# Patient Record
Sex: FEMALE | Race: WHITE | NOT HISPANIC OR LATINO | Employment: FULL TIME | URBAN - METROPOLITAN AREA
[De-identification: names, ages, dates, MRNs, and addresses within clinical notes are randomized per-mention and may not be internally consistent; named-entity substitution may affect disease eponyms.]

---

## 2017-02-04 ENCOUNTER — OFFICE VISIT (OUTPATIENT)
Dept: INTERNAL MEDICINE | Facility: CLINIC | Age: 27
End: 2017-02-04
Payer: COMMERCIAL

## 2017-02-04 VITALS
DIASTOLIC BLOOD PRESSURE: 78 MMHG | HEART RATE: 91 BPM | HEIGHT: 63 IN | TEMPERATURE: 98 F | WEIGHT: 229.06 LBS | BODY MASS INDEX: 40.59 KG/M2 | OXYGEN SATURATION: 97 % | SYSTOLIC BLOOD PRESSURE: 104 MMHG

## 2017-02-04 DIAGNOSIS — R05.9 COUGH: Primary | ICD-10-CM

## 2017-02-04 PROCEDURE — 99213 OFFICE O/P EST LOW 20 MIN: CPT | Mod: S$GLB,,, | Performed by: INTERNAL MEDICINE

## 2017-02-04 PROCEDURE — 99999 PR PBB SHADOW E&M-EST. PATIENT-LVL III: CPT | Mod: PBBFAC,,, | Performed by: INTERNAL MEDICINE

## 2017-02-04 RX ORDER — PREDNISONE 20 MG/1
20 TABLET ORAL SEE ADMIN INSTRUCTIONS
Qty: 12 TABLET | Refills: 0 | Status: SHIPPED | OUTPATIENT
Start: 2017-02-04 | End: 2017-02-10

## 2017-02-04 RX ORDER — CODEINE PHOSPHATE AND GUAIFENESIN 10; 100 MG/5ML; MG/5ML
5 SOLUTION ORAL 3 TIMES DAILY PRN
Qty: 180 ML | Refills: 0 | Status: SHIPPED | OUTPATIENT
Start: 2017-02-04 | End: 2018-02-20

## 2017-02-04 NOTE — LETTER
February 4, 2017      Sonam Hidalgo   [address] [place] [id number]             [facility]  [address]  [place] [address]  Phone: [phone]  Fax: [phone] Sonam Hidalgo    Was treated here on 02/04/2017    May Return to work/school on 02/06/2017    No Restrictions             Baldomero Booker MD

## 2017-02-04 NOTE — MR AVS SNAPSHOT
[facility]  [address]  [facility] [address]  Phone: [phone]  Fax: [phone]                  Sonam Hidalgo   2017 9:45 AM   Office Visit    Description:  Female : 1990   Provider:  Baldomero Booker MD   Department:  [facility]           Reason for Visit     Cough                To Do List           Goals (5 Years of Data)     None       These Medications        Disp Refills Start End    predniSONE (DELTASONE) 20 MG tablet 12 tablet 0 2017 2/10/2017    Take 1 tablet (20 mg total) by mouth As instructed. - Oral    Pharmacy: [facility]/pharmacy #[id number] - [place] - [address]. Ph #: [phone]       Notes to Pharmacy: Take all doses with food. Take 3 pills by mouth once daily for 2 days. Then take 2 pills by mouth once daily for 2 days. Then take one pill once daily for 2 days then stop. May increase your sugar level.    guaifenesin-codeine 100-10 mg/5 ml (TUSSI-ORGANIDIN NR)  mg/5 mL syrup 180 mL 0 2017     Take 5 mLs by mouth 3 (three) times daily as needed. - Oral    Pharmacy: [facility]/pharmacy #[id number] [facility] [place] - [address]. Ph #: [phone]         [facility] On [facility]     [facility] On Call Nurse Care Line -  Assistance  Registered nurses in the [facility] On Call Center provide clinical advisement, health education, appointment booking, and other advisory services.  Call for this free service at [phone].             Medications           Message regarding Medications     Verify the changes and/or additions to your medication regime listed below are the same as discussed with your clinician today.  If any of these changes or additions are incorrect, please notify your healthcare provider.        START taking these NEW medications        Refills    predniSONE (DELTASONE) 20 MG tablet 0    Sig: Take 1 tablet (20 mg total) by mouth As instructed.    Class: Normal    Route: Oral     "guaifenesin-codeine 100-10 mg/5 ml (TUSSI-ORGANIDIN NR)  mg/5 mL syrup 0    Sig: Take 5 mLs by mouth 3 (three) times daily as needed.    Class: Normal    Route: Oral      STOP taking these medications     albuterol 90 mcg/actuation inhaler Inhale 2 puffs into the lungs every 6 (six) hours as needed for Wheezing.           Verify that the below list of medications is an accurate representation of the medications you are currently taking.  If none reported, the list may be blank. If incorrect, please contact your healthcare provider. Carry this list with you in case of emergency.           Current Medications     guaifenesin-codeine 100-10 mg/5 ml (TUSSI-ORGANIDIN NR)  mg/5 mL syrup Take 5 mLs by mouth 3 (three) times daily as needed.    predniSONE (DELTASONE) 20 MG tablet Take 1 tablet (20 mg total) by mouth As instructed.    PRENATAL VIT W-CA,FE,FA,<1 MG, (PRENATAL VITAMIN ORAL) Take by mouth.    sertraline (ZOLOFT) 50 MG tablet Take 1 tablet (50 mg total) by mouth once daily.           Clinical Reference Information           Your Vitals Were     BP Pulse Temp Height Weight SpO2    104/78 (BP Location: Left arm, Patient Position: Sitting, BP Method: Manual) 91 98.1 °F (36.7 °C) (Oral) 5' 3" (1.6 m) 103.9 kg (229 lb 0.9 oz) 97%    BMI                40.58 kg/m2          Blood Pressure          Most Recent Value    BP  104/78      Allergies as of 2/4/2017     No Known Allergies      Immunizations Administered on Date of Encounter - 2/4/2017     None      Language Assistance Services     ATTENTION: Language assistance services are available, free of charge. Please call [phone].      ATENCIÓN: Si habla español, tiene a hanson disposición servicios gratuitos de asistencia lingüística. Llame al [phone].     CAROL Ý: N?u b?n nói Ti?ng Vi?t, có các d?ch v? h? tr? ngôn ng? mi?n phí dành cho b?n. G?i s? [phone].         [facility] complies with applicable Federal civil rights laws " and does not discriminate on the basis of race, color, national origin, age, disability, or sex.

## 2017-02-04 NOTE — PROGRESS NOTES
Subjective:       Patient ID: Sonam Hidalgo is a 26 y.o. female.    Chief Complaint: Cough ( x 1 week otc robintussin no relief 7/10)    HPI Comments: History of present illness: 26-year-old female complains of cough for about a week.  Dry, persistent throughout the day and night.  At times her chest feels tight from coughing but she hasn't noted an wheezing.  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  She had an albuterol inhaler in her medication profile.  She said she thinks she was given that during an episode of cough a few years ago but doesn't remember specifically wheezing and was never told she had asthma.     Cough   Pertinent negatives include no chest pain, chills, fever, rash, sore throat, shortness of breath or wheezing.     Review of Systems   Constitutional: Negative for appetite change, chills, diaphoresis and fever.   HENT: Positive for congestion. Negative for sore throat.    Respiratory: Positive for cough. Negative for shortness of breath and wheezing.    Cardiovascular: Negative for chest pain and leg swelling.   Gastrointestinal: Negative for abdominal pain, constipation and diarrhea.   Genitourinary: Negative for difficulty urinating.   Musculoskeletal: Negative for neck pain and neck stiffness.   Skin: Negative for rash.       Objective:      Physical Exam   Constitutional: She is oriented to person, place, and time. She appears well-developed and well-nourished. No distress.   OVerweight female, NAD   HENT:   Head: Normocephalic and atraumatic.   Mouth/Throat: Oropharynx is clear and moist. No oropharyngeal exudate.   Eyes: Conjunctivae are normal. Pupils are equal, round, and reactive to light. No scleral icterus.   Neck: Normal range of motion. Neck supple. No thyromegaly present.   Cardiovascular: Normal rate and regular rhythm.    No murmur heard.  Pulmonary/Chest: Effort normal and breath sounds normal. No respiratory distress. She has no wheezes. She has no  rales. She exhibits no tenderness.   Abdominal: Soft. Bowel sounds are normal. She exhibits no distension. There is no tenderness.   Musculoskeletal: She exhibits no tenderness.   Lymphadenopathy:     She has no cervical adenopathy.   Neurological: She is alert and oriented to person, place, and time.   Skin: No rash noted.   Psychiatric: She has a normal mood and affect.       Assessment:       1. Cough        Plan:       Sonam was seen today for cough.    Diagnoses and all orders for this visit:    Cough  Comments:  Viral syndrome versus ALLERGIES.  Certainly reactive airway disease is on the differential but without wheezing on exam I believe much less likely     Other orders  -     predniSONE (DELTASONE) 20 MG tablet; Take 1 tablet (20 mg total) by mouth As instructed.  -     guaifenesin-codeine 100-10 mg/5 ml (TUSSI-ORGANIDIN NR)  mg/5 mL syrup; Take 5 mLs by mouth 3 (three) times daily as needed.        Call if symptoms fail to improve or worsen.  Driving and drowsy precautions discussed.  Note for work with return on Monday given to patient

## 2017-12-27 ENCOUNTER — HOSPITAL ENCOUNTER (EMERGENCY)
Facility: HOSPITAL | Age: 27
Discharge: HOME | End: 2017-12-27
Attending: EMERGENCY MEDICINE
Payer: MEDICAID

## 2017-12-27 VITALS
TEMPERATURE: 98 F | WEIGHT: 220 LBS | RESPIRATION RATE: 16 BRPM | OXYGEN SATURATION: 98 % | HEIGHT: 62 IN | DIASTOLIC BLOOD PRESSURE: 97 MMHG | SYSTOLIC BLOOD PRESSURE: 119 MMHG | HEART RATE: 75 BPM | BODY MASS INDEX: 40.48 KG/M2

## 2017-12-27 DIAGNOSIS — M67.949 DISORDER OF TENDON IN FINGER: ICD-10-CM

## 2017-12-27 DIAGNOSIS — S60.221A CONTUSION OF RIGHT HAND, INITIAL ENCOUNTER: Primary | ICD-10-CM

## 2017-12-27 LAB
B-HCG UR QL: NEGATIVE
CTP QC/QA: YES

## 2017-12-27 PROCEDURE — 81025 URINE PREGNANCY TEST: CPT | Performed by: EMERGENCY MEDICINE

## 2017-12-27 PROCEDURE — 99284 EMERGENCY DEPT VISIT MOD MDM: CPT | Mod: 25

## 2017-12-28 NOTE — ED PROVIDER NOTES
Encounter Date: 12/27/2017       History     Chief Complaint   Patient presents with    Hand Pain     right hand pain after hitting door yesterday, unable to straighten right 4th digit.  Bruising, swelling and pain with movement     Patient is a 27-year-old female who complains of pain to her right hand after punching a door.  This occurred yesterday.  She has increased pain with movement of her right wrist.  Patient also says she is unable to fully extend her right fifth finger.      The history is provided by the patient.     Review of patient's allergies indicates:  No Known Allergies  Past Medical History:   Diagnosis Date    Abnormal Pap smear of vagina     Allergy     Anxiety     Hypertension      Past Surgical History:   Procedure Laterality Date    CHOLECYSTECTOMY      median arcuate ligament        Family History   Problem Relation Age of Onset    Breast cancer Mother     Alcohol abuse Father     Celiac disease Neg Hx     Cirrhosis Neg Hx     Colon cancer Neg Hx     Colon polyps Neg Hx     Crohn's disease Neg Hx     Cystic fibrosis Neg Hx     Esophageal cancer Neg Hx     Hemochromatosis Neg Hx     Inflammatory bowel disease Neg Hx     Irritable bowel syndrome Neg Hx     Liver cancer Neg Hx     Liver disease Neg Hx     Rectal cancer Neg Hx     Stomach cancer Neg Hx     Ulcerative colitis Neg Hx     Jacob's disease Neg Hx     Ovarian cancer Neg Hx      Social History   Substance Use Topics    Smoking status: Never Smoker    Smokeless tobacco: Never Used    Alcohol use Yes     Review of Systems   Musculoskeletal:        Right hand/wrist pain.   Skin: Negative for color change.   Neurological: Negative for numbness.   All other systems reviewed and are negative.      Physical Exam     Initial Vitals [12/27/17 2053]   BP Pulse Resp Temp SpO2   (!) 119/97 75 16 98.2 °F (36.8 °C) 98 %      MAP       104.33         Physical Exam    Nursing note and vitals reviewed.  Constitutional: No  distress.   HENT:   Head: Atraumatic.   Cardiovascular: Normal rate, regular rhythm and normal heart sounds.   Pulmonary/Chest: Breath sounds normal.   Musculoskeletal:   There is tenderness across the dorsum of the right wrist.  No bruising.  There is full range of motion of the right wrist.  Patient is unable to fully extend the right fifth finger.  No sensory deficits.  Right radial pulses 2+.   Neurological: She is alert and oriented to person, place, and time.   Skin: Skin is warm and dry. Capillary refill takes less than 2 seconds.   Psychiatric: Her behavior is normal. Thought content normal.         ED Course   Procedures  Labs Reviewed   POCT URINE PREGNANCY        Imaging Results          X-Ray Hand 3 view Right (Final result)  Result time 12/27/17 21:46:30    Final result by Rehan Gregg MD (12/27/17 21:46:30)                 Impression:        No acute bony abnormality.      Electronically signed by: Rehan Gregg  Date:     12/27/17  Time:    21:46              Narrative:    COMPARISON: None.    FINDINGS: Three views of the right hand.  No acute fracture or dislocation.  Soft tissues are unremarkable.  No radiopaque foreign body.                                 Medical Decision Making:   Clinical Tests:   Radiological Study: Ordered and Reviewed  ED Management:  27-year-old female with a right hand contusion after punching a door.  X-rays show no evidence of fracture dislocation.  My concern is that the patient cannot fully extend her small finger.  Finger has been splinted in full extension.  I will have her follow-up with Dr. Murrell, hand specialist, for further evaluation and treatment.                   ED Course      Clinical Impression:   The primary encounter diagnosis was Contusion of right hand, initial encounter. A diagnosis of Disorder of tendon in finger was also pertinent to this visit.                           Atif Gibbs MD  12/27/17 5591

## 2017-12-28 NOTE — ED NOTES
Pt reports hitting metal door with rt arm/hand last night, pt reports pain to rt wrist, no swelling or deformity noted, denies fever, full active rom with pain noted to rt wrist, no deformity noted, family at bedside, rt hand digits sensation intact cap refill less than 3 seconds

## 2018-02-20 ENCOUNTER — HOSPITAL ENCOUNTER (EMERGENCY)
Facility: HOSPITAL | Age: 28
Discharge: HOME | End: 2018-02-20
Attending: EMERGENCY MEDICINE

## 2018-02-20 VITALS
BODY MASS INDEX: 41.15 KG/M2 | WEIGHT: 225 LBS | OXYGEN SATURATION: 98 % | HEART RATE: 83 BPM | SYSTOLIC BLOOD PRESSURE: 135 MMHG | DIASTOLIC BLOOD PRESSURE: 93 MMHG | TEMPERATURE: 98 F | RESPIRATION RATE: 20 BRPM

## 2018-02-20 DIAGNOSIS — R05.9 COUGH: ICD-10-CM

## 2018-02-20 DIAGNOSIS — I10 HTN (HYPERTENSION): ICD-10-CM

## 2018-02-20 DIAGNOSIS — R07.89 CHEST WALL TENDERNESS: Primary | ICD-10-CM

## 2018-02-20 DIAGNOSIS — J20.8 ACUTE VIRAL BRONCHITIS: ICD-10-CM

## 2018-02-20 LAB
B-HCG UR QL: NEGATIVE
CTP QC/QA: YES

## 2018-02-20 PROCEDURE — 63600175 PHARM REV CODE 636 W HCPCS: Performed by: EMERGENCY MEDICINE

## 2018-02-20 PROCEDURE — 81025 URINE PREGNANCY TEST: CPT | Performed by: EMERGENCY MEDICINE

## 2018-02-20 PROCEDURE — 99284 EMERGENCY DEPT VISIT MOD MDM: CPT | Mod: 25

## 2018-02-20 PROCEDURE — 25000003 PHARM REV CODE 250: Performed by: EMERGENCY MEDICINE

## 2018-02-20 PROCEDURE — 25000242 PHARM REV CODE 250 ALT 637 W/ HCPCS: Performed by: EMERGENCY MEDICINE

## 2018-02-20 PROCEDURE — 94640 AIRWAY INHALATION TREATMENT: CPT

## 2018-02-20 PROCEDURE — 99284 EMERGENCY DEPT VISIT MOD MDM: CPT | Mod: ,,, | Performed by: EMERGENCY MEDICINE

## 2018-02-20 PROCEDURE — 96372 THER/PROPH/DIAG INJ SC/IM: CPT

## 2018-02-20 RX ORDER — IPRATROPIUM BROMIDE AND ALBUTEROL SULFATE 2.5; .5 MG/3ML; MG/3ML
3 SOLUTION RESPIRATORY (INHALATION)
Status: COMPLETED | OUTPATIENT
Start: 2018-02-20 | End: 2018-02-20

## 2018-02-20 RX ORDER — ACETAMINOPHEN 325 MG/1
650 TABLET ORAL
Status: COMPLETED | OUTPATIENT
Start: 2018-02-20 | End: 2018-02-20

## 2018-02-20 RX ORDER — TRIAMCINOLONE ACETONIDE 40 MG/ML
60 INJECTION, SUSPENSION INTRA-ARTICULAR; INTRAMUSCULAR
Status: COMPLETED | OUTPATIENT
Start: 2018-02-20 | End: 2018-02-20

## 2018-02-20 RX ORDER — ALBUTEROL SULFATE 90 UG/1
2 AEROSOL, METERED RESPIRATORY (INHALATION)
Qty: 18 G | Refills: 0 | Status: SHIPPED | OUTPATIENT
Start: 2018-02-20 | End: 2018-11-15

## 2018-02-20 RX ADMIN — ACETAMINOPHEN 650 MG: 325 TABLET ORAL at 10:02

## 2018-02-20 RX ADMIN — TRIAMCINOLONE ACETONIDE 60 MG: 40 INJECTION, SUSPENSION INTRA-ARTICULAR; INTRAMUSCULAR at 11:02

## 2018-02-20 RX ADMIN — IPRATROPIUM BROMIDE AND ALBUTEROL SULFATE 3 ML: .5; 3 SOLUTION RESPIRATORY (INHALATION) at 11:02

## 2018-02-20 NOTE — ED PROVIDER NOTES
Encounter Date: 2/20/2018    SCRIBE #1 NOTE: I, Erika Lujan, am scribing for, and in the presence of,  Dr. Cabello. I have scribed the entire note.       History     Chief Complaint   Patient presents with    URI     x few days.      Time patient was seen by the provider: 10:01 AM      The patient is a 27 y.o. female with co-morbidities including: HTN, who presents to the ED with a complaint of URI symptoms that began 5 days ago. The patient reports she has SOB, sore throat, cough, chest pain and weakness. She denies fever, nausea, and vomiting. Patient is a nonsmoker.           The history is provided by the patient, a parent and medical records.     Review of patient's allergies indicates:  No Known Allergies  Past Medical History:   Diagnosis Date    Abnormal Pap smear of vagina     Allergy     Anxiety     Hypertension      Past Surgical History:   Procedure Laterality Date    CHOLECYSTECTOMY      median arcuate ligament        Family History   Problem Relation Age of Onset    Breast cancer Mother     Alcohol abuse Father     Celiac disease Neg Hx     Cirrhosis Neg Hx     Colon cancer Neg Hx     Colon polyps Neg Hx     Crohn's disease Neg Hx     Cystic fibrosis Neg Hx     Esophageal cancer Neg Hx     Hemochromatosis Neg Hx     Inflammatory bowel disease Neg Hx     Irritable bowel syndrome Neg Hx     Liver cancer Neg Hx     Liver disease Neg Hx     Rectal cancer Neg Hx     Stomach cancer Neg Hx     Ulcerative colitis Neg Hx     Jacob's disease Neg Hx     Ovarian cancer Neg Hx      Social History   Substance Use Topics    Smoking status: Never Smoker    Smokeless tobacco: Never Used    Alcohol use Yes      Comment: occas, none recently     Review of Systems   Constitutional: Negative for fever.   HENT: Positive for sore throat.    Respiratory: Positive for cough and shortness of breath.    Cardiovascular: Positive for chest pain.   Gastrointestinal: Negative for nausea and vomiting.    Genitourinary: Negative for dysuria.   Musculoskeletal: Negative for back pain.   Skin: Negative for rash.   Neurological: Positive for weakness.   Hematological: Does not bruise/bleed easily.       Physical Exam     Initial Vitals [02/20/18 0952]   BP Pulse Resp Temp SpO2   (!) 135/93 83 20 98.3 °F (36.8 °C) 98 %      MAP       107         Physical Exam    Vitals reviewed.  Constitutional: She appears well-developed.   27 y.o.  female in mild discomfort noted.     HENT:   Head: Normocephalic and atraumatic.   Mouth/Throat: No oropharyngeal exudate.   Mildly erythematous throat no exudates or tonsillar enlargement.     Eyes: EOM are normal. Pupils are equal, round, and reactive to light.   Neck: No tracheal deviation present. No JVD present.   Cardiovascular: Normal rate, regular rhythm, normal heart sounds and intact distal pulses.   Pulmonary/Chest: Breath sounds normal. No stridor. No respiratory distress.   Intermittent cough. There is mid lower sternal tenderness noted.      Abdominal: Soft. She exhibits no distension. There is no tenderness.   Musculoskeletal: Normal range of motion. She exhibits no edema.   Moving all 4 extremities. No peripheral edema.   Psychiatric: Her behavior is normal. Thought content normal.         ED Course   Procedures  Labs Reviewed   POCT URINE PREGNANCY        Imaging Results          X-Ray Chest PA And Lateral (Final result)  Result time 02/20/18 11:01:04    Final result by Sridhar Vazquez MD (02/20/18 11:01:04)                 Impression:     No active cardiopulmonary disease and no significant interval change      Electronically signed by: Dr. SRIDHAR VAZQUEZ MD  Date:     02/20/18  Time:    11:01              Narrative:    Comparison: 07/02/2011    Results: 2 views. Heart size and pulmonary vascularity are within normal limits. No hilar or mediastinal enlargement. Lungs are well-expanded and clear. No pleural fluid or pneumothorax. Surgical clips in the right upper quadrant  of the abdomen.                                 Medical Decision Making:   History:   Old Medical Records: I decided to obtain old medical records.  Differential Diagnosis:   Viral syndrome, bronchitis, PNA.   Clinical Tests:   Lab Tests: Ordered and Reviewed  Radiological Study: Ordered and Reviewed            Scribe Attestation:   Scribe #1: I performed the above scribed service and the documentation accurately describes the services I performed. I attest to the accuracy of the note.    Attending Attestation:             Attending ED Notes:   Chest x-ray does not reveal evidence of acute injury or consolidation.  The patient describes improvement of her presenting complaints with emergency department therapy.  A parenteral dose of Kenalog 60 mg IM has been administered, and the patient will be prescribed a metered dose inhaler to be used every 4 hours while awake for the next 5 days.  I have recommended that she follow-up with her primary care physician in 3 days, and return to the emergency department as needed for worsening of current condition, intractable vomiting, worsening shortness of breath, or other urgent concerns.             Clinical Impression:   The primary encounter diagnosis was Chest wall tenderness. A diagnosis of Cough was also pertinent to this visit.    Disposition:   Disposition: Discharged  Condition: Stable                        Parviz Cabello MD  02/20/18 1127

## 2018-02-20 NOTE — ED NOTES
Patient identifiers verified and correct for Ms Hidalgo  C/C: Cough SOB  APPEARANCE: awake and alert in NAD.  SKIN: warm, dry and intact. No breakdown or bruising.  MUSCULOSKELETAL: Patient moving all extremities spontaneously, no obvious swelling or deformities noted. Ambulates independently.  RESPIRATORY: Denies shortness of breath.Respirations unlabored.   CARDIAC: Denies CP, 2+ distal pulses; no peripheral edema  ABDOMEN: S/ND/NT, Denies nausea  : voids spontaneously, denies difficulty  Neurologic: AAO x 4; follows commands equal strength in all extremities; denies numbness/tingling. Denies dizziness Denies weakness

## 2018-05-23 ENCOUNTER — PATIENT MESSAGE (OUTPATIENT)
Dept: OBSTETRICS AND GYNECOLOGY | Facility: CLINIC | Age: 28
End: 2018-05-23

## 2018-06-20 ENCOUNTER — PATIENT MESSAGE (OUTPATIENT)
Dept: OBSTETRICS AND GYNECOLOGY | Facility: CLINIC | Age: 28
End: 2018-06-20

## 2018-07-02 ENCOUNTER — OFFICE VISIT (OUTPATIENT)
Dept: URGENT CARE | Facility: CLINIC | Age: 28
End: 2018-07-02
Payer: COMMERCIAL

## 2018-07-02 VITALS
WEIGHT: 230 LBS | RESPIRATION RATE: 18 BRPM | OXYGEN SATURATION: 99 % | HEIGHT: 63 IN | HEART RATE: 63 BPM | TEMPERATURE: 98 F | BODY MASS INDEX: 40.75 KG/M2 | DIASTOLIC BLOOD PRESSURE: 88 MMHG | SYSTOLIC BLOOD PRESSURE: 124 MMHG

## 2018-07-02 DIAGNOSIS — J02.9 SORE THROAT: ICD-10-CM

## 2018-07-02 DIAGNOSIS — J02.9 PHARYNGITIS, UNSPECIFIED ETIOLOGY: Primary | ICD-10-CM

## 2018-07-02 LAB
CTP QC/QA: YES
S PYO RRNA THROAT QL PROBE: NEGATIVE

## 2018-07-02 PROCEDURE — 96372 THER/PROPH/DIAG INJ SC/IM: CPT | Mod: S$GLB,,, | Performed by: FAMILY MEDICINE

## 2018-07-02 PROCEDURE — 99214 OFFICE O/P EST MOD 30 MIN: CPT | Mod: 25,S$GLB,, | Performed by: FAMILY MEDICINE

## 2018-07-02 PROCEDURE — 3008F BODY MASS INDEX DOCD: CPT | Mod: CPTII,S$GLB,, | Performed by: FAMILY MEDICINE

## 2018-07-02 PROCEDURE — 87880 STREP A ASSAY W/OPTIC: CPT | Mod: QW,S$GLB,, | Performed by: FAMILY MEDICINE

## 2018-07-02 RX ORDER — AZITHROMYCIN 250 MG/1
TABLET, FILM COATED ORAL
Qty: 6 TABLET | Refills: 0 | Status: SHIPPED | OUTPATIENT
Start: 2018-07-02 | End: 2018-10-16

## 2018-07-02 RX ORDER — BETAMETHASONE SODIUM PHOSPHATE AND BETAMETHASONE ACETATE 3; 3 MG/ML; MG/ML
9 INJECTION, SUSPENSION INTRA-ARTICULAR; INTRALESIONAL; INTRAMUSCULAR; SOFT TISSUE
Status: COMPLETED | OUTPATIENT
Start: 2018-07-02 | End: 2018-07-02

## 2018-07-02 RX ADMIN — BETAMETHASONE SODIUM PHOSPHATE AND BETAMETHASONE ACETATE 9 MG: 3; 3 INJECTION, SUSPENSION INTRA-ARTICULAR; INTRALESIONAL; INTRAMUSCULAR; SOFT TISSUE at 05:07

## 2018-07-02 NOTE — PROGRESS NOTES
"Subjective:       Patient ID: Sonam Hidalgo is a 28 y.o. female.    Vitals:  height is 5' 3" (1.6 m) and weight is 104.3 kg (230 lb). Her temperature is 98.2 °F (36.8 °C). Her blood pressure is 124/88 and her pulse is 63. Her respiration is 18 and oxygen saturation is 99%.     Chief Complaint: Sore Throat    Sore Throat    This is a new problem. The current episode started today. The problem has been gradually worsening. The pain is worse on the left side. There has been no fever. The pain is at a severity of 5/10. The pain is moderate. Associated symptoms include swollen glands and trouble swallowing. Pertinent negatives include no abdominal pain, congestion, coughing, ear pain, headaches, hoarse voice or shortness of breath. She has tried nothing for the symptoms.     Review of Systems   Constitution: Positive for malaise/fatigue. Negative for chills and fever.   HENT: Positive for sore throat and trouble swallowing. Negative for congestion, ear pain and hoarse voice.    Eyes: Negative for discharge and redness.   Cardiovascular: Negative for chest pain, dyspnea on exertion and leg swelling.   Respiratory: Negative for cough, shortness of breath, sputum production and wheezing.    Musculoskeletal: Negative for myalgias.   Gastrointestinal: Negative for abdominal pain and nausea.   Neurological: Negative for headaches.       Objective:      Physical Exam   Constitutional: She is oriented to person, place, and time. She appears well-developed and well-nourished.   HENT:   Head: Normocephalic and atraumatic.   Right Ear: External ear normal.   Left Ear: External ear normal.   Erythematous pharynx, no exudate, no lesion, uvula midline.   Eyes: EOM are normal. Pupils are equal, round, and reactive to light.   Neck: Normal range of motion. Neck supple. No JVD present. No tracheal deviation present. No thyromegaly present.   Cardiovascular: Normal rate, regular rhythm and normal heart sounds.  Exam reveals no " gallop and no friction rub.    No murmur heard.  Pulmonary/Chest: Breath sounds normal. No respiratory distress. She has no wheezes. She has no rales. She exhibits no tenderness.   Abdominal: Soft. Bowel sounds are normal. She exhibits no distension and no mass. There is no tenderness. There is no rebound and no guarding. No hernia.   Musculoskeletal: Normal range of motion. She exhibits no edema, tenderness or deformity.   Lymphadenopathy:     She has no cervical adenopathy.   Neurological: She is alert and oriented to person, place, and time. She displays normal reflexes. No cranial nerve deficit. She exhibits normal muscle tone. Coordination normal.   Skin: Skin is warm. Capillary refill takes less than 2 seconds. No rash noted. No erythema. No pallor.   Psychiatric: She has a normal mood and affect. Her behavior is normal. Judgment and thought content normal.   Vitals reviewed.      Assessment:       1. Pharyngitis, unspecified etiology    2. Sore throat        Plan:         Pharyngitis, unspecified etiology  -     betamethasone acetate-betamethasone sodium phosphate injection 9 mg; Inject 1.5 mLs (9 mg total) into the muscle one time.  -     azithromycin (Z-CONOR) 250 MG tablet; Take 2 tablets by mouth x 1 for day 1 Then take 1 tablet by mouth daily for day 2 - 5  Dispense: 6 tablet; Refill: 0    Sore throat  -     POCT rapid strep A      Follow up with your doctor in a few days as needed.  Return to the urgent care or go to the ER if symptoms get worse.    Kosta Aviles MD

## 2018-07-02 NOTE — PATIENT INSTRUCTIONS
When You Have a Sore Throat    A sore throat can be painful. There are many reasons why you may have a sore throat. Your healthcare provider will work with you to find the cause of your sore throat. He or she will also find the best treatment for you.  What causes a sore throat?  Sore throats can be caused or worsened by:  · Cold or flu viruses  · Bacteria  · Irritants such as tobacco smoke or air pollution  · Acid reflux  A healthy throat  The tonsils are on the sides of the throat near the base of the tongue. They collect viruses and bacteria and help fight infection. The throat (pharynx) is the passage for air. Mucus from the nasal cavity also moves down the passage.  An inflamed throat  The tonsils and pharynx can become inflamed due to a cold or flu virus. Postnasal drip (excess mucus draining from the nasal cavity) can irritate the throat. It can also make the throat or tonsils more likely to be infected by bacteria. Severe, untreated tonsillitis in children or adults can cause a pocket of pus (abscess) to form near the tonsil.  Your evaluation  A medical evaluation can help find the cause of your sore throat. It can also help your healthcare provider choose the best treatment for you. The evaluation may include a health history, physical exam, and diagnostic tests.  Health history  Your healthcare provider may ask you the following:  · How long has the sore throat lasted and how have you been treating it?  · Do you have any other symptoms, such as body aches, fever, or cough?  · Does your sore throat recur? If so, how often? How many days of school or work have you missed because of a sore throat?  · Do you have trouble eating or swallowing?  · Have you been told that you snore or have other sleep problems?  · Do you have bad breath?  · Do you cough up bad-tasting mucus?  Physical exam  During the exam, your healthcare provider checks your ears, nose, and throat for problems. He or she also checks for  "swelling in the neck, and may listen to your chest.  Possible tests  Other tests your healthcare provider may perform include:  · A throat swab to check for bacteria such as streptococcus (the bacteria that causes strep throat)  · A blood test to check for mononucleosis (a viral infection)  · A chest X-ray to rule out pneumonia, especially if you have a cough  Treating a sore throat  Treatment depends on many factors. What is the likely cause? Is the problem recent? Does it keep coming back? In many cases, the best thing to do is to treat the symptoms, rest, and let the problem heal itself. Antibiotics may help clear up some bacterial infections. For cases of severe or recurring tonsillitis, the tonsils may need to be removed.  Relieving your symptoms  · Dont smoke, and avoid secondhand smoke.  · For children, try throat sprays or Popsicles. Adults and older children may try lozenges.  · Drink warm liquids to soothe the throat and help thin mucus. Avoid alcohol, spicy foods, and acidic drinks such as orange juice. These can irritate the throat.  · Gargle with warm saltwater (1 teaspoon of salt to 8 ounces of warm water).  · Use a humidifier to keep air moist and relieve throat dryness.  · Try over-the-counter pain relievers such as acetaminophen or ibuprofen. Use as directed, and dont exceed the recommended dose. Dont give aspirin to children.   Are antibiotics needed?  If your sore throat is due to a bacterial infection, antibiotics may speed healing and prevent complications. Although group A streptococcus ("strep throat" or GAS) is the major treatable infection for a sore throat, GAS causes only 5% to 15% of sore throats in adults who seek medical care. Most sore throats are caused by cold or flu viruses. And antibiotics dont treat viral illness. In fact, using antibiotics when theyre not needed may produce bacteria that are harder to kill. Your healthcare provider will prescribe antibiotics only if he or " she thinks they are likely to help.  If antibiotics are prescribed  Take the medicine exactly as directed. Be sure to finish your prescription even if youre feeling better. And be sure to ask your healthcare provider or pharmacist what side effects are common and what to do about them.  Is surgery needed?  In some cases, tonsils need to be removed. This is often done as outpatient (same-day) surgery. Your healthcare provider may advise removing the tonsils in cases of:  · Several severe bouts of tonsillitis in a year. Severe episodes include those that lead to missed days of school or work, or that need to be treated with antibiotics.  · Tonsillitis that causes breathing problems during sleep  · Tonsillitis caused by food particles collecting in pouches in the tonsils (cryptic tonsillitis)  Call your healthcare provider if any of the following occur:  · Symptoms worsen, or new symptoms develop.  · Swollen tonsils make breathing difficult.  · The pain is severe enough to keep you from drinking liquids.  · A skin rash, hives, or wheezing develops. Any of these could signal an allergic reaction to antibiotics.  · Symptoms dont improve within a week.  · Symptoms dont improve within 2 to 3 days of starting antibiotics.   Date Last Reviewed: 10/1/2016  © 9133-9722 [employer]. [address] [place] [address], [place] [address]. All rights reserved. This information is not intended as a substitute for professional medical care. Always follow your healthcare professional's instructions.      Follow up with your doctor in a few days as needed.  Return to the urgent care or go to the ER if symptoms get worse.    Kosta Aviles MD

## 2018-07-13 ENCOUNTER — TELEPHONE (OUTPATIENT)
Dept: OBSTETRICS AND GYNECOLOGY | Facility: CLINIC | Age: 28
End: 2018-07-13

## 2018-07-13 ENCOUNTER — OFFICE VISIT (OUTPATIENT)
Dept: OBSTETRICS AND GYNECOLOGY | Facility: CLINIC | Age: 28
End: 2018-07-13
Payer: COMMERCIAL

## 2018-07-13 ENCOUNTER — LAB VISIT (OUTPATIENT)
Dept: LAB | Facility: HOSPITAL | Age: 28
End: 2018-07-13
Attending: OBSTETRICS & GYNECOLOGY
Payer: COMMERCIAL

## 2018-07-13 VITALS
HEIGHT: 63 IN | HEART RATE: 73 BPM | BODY MASS INDEX: 42.35 KG/M2 | DIASTOLIC BLOOD PRESSURE: 75 MMHG | WEIGHT: 239 LBS | SYSTOLIC BLOOD PRESSURE: 121 MMHG

## 2018-07-13 DIAGNOSIS — Z01.419 ENCOUNTER FOR GYNECOLOGICAL EXAMINATION WITHOUT ABNORMAL FINDING: ICD-10-CM

## 2018-07-13 DIAGNOSIS — N92.6 IRREGULAR BLEEDING: ICD-10-CM

## 2018-07-13 DIAGNOSIS — I10 HYPERTENSION, UNSPECIFIED TYPE: ICD-10-CM

## 2018-07-13 DIAGNOSIS — N92.6 IRREGULAR BLEEDING: Primary | ICD-10-CM

## 2018-07-13 LAB
BASOPHILS # BLD AUTO: 0.07 K/UL
BASOPHILS NFR BLD: 0.7 %
DIFFERENTIAL METHOD: ABNORMAL
EOSINOPHIL # BLD AUTO: 0.1 K/UL
EOSINOPHIL NFR BLD: 1.3 %
ERYTHROCYTE [DISTWIDTH] IN BLOOD BY AUTOMATED COUNT: 13.2 %
HCT VFR BLD AUTO: 40.5 %
HGB BLD-MCNC: 12.9 G/DL
LYMPHOCYTES # BLD AUTO: 2.7 K/UL
LYMPHOCYTES NFR BLD: 28.4 %
MCH RBC QN AUTO: 27.6 PG
MCHC RBC AUTO-ENTMCNC: 31.9 G/DL
MCV RBC AUTO: 87 FL
MONOCYTES # BLD AUTO: 0.8 K/UL
MONOCYTES NFR BLD: 8.8 %
NEUTROPHILS # BLD AUTO: 5.7 K/UL
NEUTROPHILS NFR BLD: 60.3 %
NRBC BLD-RTO: 0 /100 WBC
PLATELET # BLD AUTO: 354 K/UL
PMV BLD AUTO: 9.8 FL
RBC # BLD AUTO: 4.67 M/UL
TSH SERPL DL<=0.005 MIU/L-ACNC: 1.25 UIU/ML
WBC # BLD AUTO: 9.48 K/UL

## 2018-07-13 PROCEDURE — 99999 PR PBB SHADOW E&M-EST. PATIENT-LVL III: CPT | Mod: PBBFAC,,, | Performed by: OBSTETRICS & GYNECOLOGY

## 2018-07-13 PROCEDURE — 99214 OFFICE O/P EST MOD 30 MIN: CPT | Mod: 25,S$GLB,, | Performed by: OBSTETRICS & GYNECOLOGY

## 2018-07-13 PROCEDURE — 36415 COLL VENOUS BLD VENIPUNCTURE: CPT

## 2018-07-13 PROCEDURE — 88175 CYTOPATH C/V AUTO FLUID REDO: CPT

## 2018-07-13 PROCEDURE — 3008F BODY MASS INDEX DOCD: CPT | Mod: CPTII,S$GLB,, | Performed by: OBSTETRICS & GYNECOLOGY

## 2018-07-13 PROCEDURE — 87491 CHLMYD TRACH DNA AMP PROBE: CPT

## 2018-07-13 PROCEDURE — 85025 COMPLETE CBC W/AUTO DIFF WBC: CPT

## 2018-07-13 PROCEDURE — 58301 REMOVE INTRAUTERINE DEVICE: CPT | Mod: S$GLB,,, | Performed by: OBSTETRICS & GYNECOLOGY

## 2018-07-13 PROCEDURE — 84443 ASSAY THYROID STIM HORMONE: CPT

## 2018-07-13 NOTE — PROGRESS NOTES
"CC: irregular bleeding  MIRENA IUD in place    Sonam Hidalgo is a 28 y.o. female  presents for Irregular bleeding.    She bled since April.  SHe stopped a few days ago.     Past Medical History:   Diagnosis Date    Abnormal Pap smear of vagina     Allergy     Anxiety     Hypertension        Past Surgical History:   Procedure Laterality Date    CHOLECYSTECTOMY      median arcuate ligament          OB History    Para Term  AB Living   2 1 1 0 1 1   SAB TAB Ectopic Multiple Live Births   0 1 0 0 1      # Outcome Date GA Lbr Angelito/2nd Weight Sex Delivery Anes PTL Lv   2 Term 11/30/15 38w3d  2.81 kg (6 lb 3.1 oz) M Vag-Spont EPI N ZENA      Birth Comments: NICU x 4 days.    Needed HFNC and CPAP but no intubation.  Abx for 48 hour rule out, blood cx negative.   Hepatitis B given on 12/3/15   1 TAB                   Family History   Problem Relation Age of Onset    Breast cancer Mother     Alcohol abuse Father     Celiac disease Neg Hx     Cirrhosis Neg Hx     Colon cancer Neg Hx     Colon polyps Neg Hx     Crohn's disease Neg Hx     Cystic fibrosis Neg Hx     Esophageal cancer Neg Hx     Hemochromatosis Neg Hx     Inflammatory bowel disease Neg Hx     Irritable bowel syndrome Neg Hx     Liver cancer Neg Hx     Liver disease Neg Hx     Rectal cancer Neg Hx     Stomach cancer Neg Hx     Ulcerative colitis Neg Hx     Jacob's disease Neg Hx     Ovarian cancer Neg Hx        Social History   Substance Use Topics    Smoking status: Never Smoker    Smokeless tobacco: Never Used    Alcohol use Yes      Comment: occas, none recently       /75 (BP Location: Left arm)   Pulse 73   Ht 5' 3" (1.6 m)   Wt 108.4 kg (238 lb 15.7 oz)   LMP 2018 (Approximate) Comment: continued until 18 - Mirena  BMI 42.33 kg/m²     ROS:  GENERAL: Denies weight gain or weight loss. Feeling well overall.   SKIN: Denies rash or lesions.   HEAD: Denies head injury or headache.   NODES: " Denies enlarged lymph nodes.   CHEST: Denies chest pain or shortness of breath.   CARDIOVASCULAR: Denies palpitations or left sided chest pain.   ABDOMEN: No abdominal pain, constipation, diarrhea, nausea, vomiting or rectal bleeding.   URINARY: No frequency, dysuria, hematuria, or burning on urination.  REPRODUCTIVE: See HPI.   BREASTS: The patient performs breast self-examination and denies pain, lumps, or nipple discharge.   HEMATOLOGIC: No easy bruisability or excessive bleeding.  MUSCULOSKELETAL: Denies joint pain or swelling.   NEUROLOGIC: Denies syncope or weakness.   PSYCHIATRIC: Denies depression, anxiety or mood swings.    Physical Exam:    APPEARANCE: Well nourished, well developed, in no acute distress.  AFFECT: WNL, alert and oriented x 3  SKIN: No acne or hirsutism  NECK: Neck symmetric without masses or thyromegaly  NODES: No inguinal, cervical, axillary, or femoral lymph node enlargement  CHEST: Good respiratory effect  ABDOMEN: Soft.  No tenderness or masses.  No hepatosplenomegaly.  No hernias.  PELVIC: Normal external genitalia without lesions.  Normal hair distribution.  Adequate perineal body, normal urethral meatus.  Vagina moist and well rugated without lesions or discharge.  Cervix pink, IUD string at the os,  RING FORCEPS used to remove the IUD, without lesions, discharge or tenderness.  No significant cystocele or rectocele.  Bimanual exam shows uterus to be normal size, regular, mobile and nontender.  Adnexa without masses or tenderness.    EXTREMITIES: No edema.      ASSESSMENT AND PLAN  1. Irregular bleeding  US Pelvis Comp with Transvag NON-OB (xpd    C. trachomatis/N. gonorrhoeae by AMP DNA Cervix    Removal of Intrauterine Device-Today    TSH    CBC auto differential   2. Hypertension, unspecified type     3. Encounter for gynecological examination without abnormal finding  Liquid-based pap smear, screening       Work on exercise, diet and health maintenance  Monitor bleeding pattern  once the MIRENA IUD was out    Patient was counseled today on A.C.S. Pap guidelines and recommendations for yearly pelvic exams, mammograms and monthly self breast exams; to see her PCP for other health maintenance.     Follow-up in about 4 weeks (around 8/10/2018), or if symptoms worsen or fail to improve.

## 2018-07-14 LAB
C TRACH DNA SPEC QL NAA+PROBE: NOT DETECTED
N GONORRHOEA DNA SPEC QL NAA+PROBE: NOT DETECTED

## 2018-07-20 ENCOUNTER — HOSPITAL ENCOUNTER (OUTPATIENT)
Dept: RADIOLOGY | Facility: HOSPITAL | Age: 28
Discharge: HOME | End: 2018-07-20
Attending: OBSTETRICS & GYNECOLOGY
Payer: COMMERCIAL

## 2018-07-20 DIAGNOSIS — N92.6 IRREGULAR BLEEDING: ICD-10-CM

## 2018-07-20 PROCEDURE — 76830 TRANSVAGINAL US NON-OB: CPT | Mod: 26,,, | Performed by: RADIOLOGY

## 2018-07-20 PROCEDURE — 76856 US EXAM PELVIC COMPLETE: CPT | Mod: TC

## 2018-07-20 PROCEDURE — 76856 US EXAM PELVIC COMPLETE: CPT | Mod: 26,,, | Performed by: RADIOLOGY

## 2018-07-20 PROCEDURE — 76830 TRANSVAGINAL US NON-OB: CPT | Mod: TC

## 2018-10-16 ENCOUNTER — NURSE TRIAGE (OUTPATIENT)
Dept: ADMINISTRATIVE | Facility: CLINIC | Age: 28
End: 2018-10-16

## 2018-10-16 ENCOUNTER — OFFICE VISIT (OUTPATIENT)
Dept: INTERNAL MEDICINE | Facility: CLINIC | Age: 28
End: 2018-10-16
Payer: COMMERCIAL

## 2018-10-16 VITALS
BODY MASS INDEX: 39.51 KG/M2 | TEMPERATURE: 99 F | WEIGHT: 223 LBS | HEIGHT: 63 IN | HEART RATE: 86 BPM | DIASTOLIC BLOOD PRESSURE: 76 MMHG | SYSTOLIC BLOOD PRESSURE: 116 MMHG | OXYGEN SATURATION: 98 %

## 2018-10-16 DIAGNOSIS — Z32.00 ENCOUNTER FOR PREGNANCY TEST, RESULT UNKNOWN: ICD-10-CM

## 2018-10-16 DIAGNOSIS — R68.89 FLU-LIKE SYMPTOMS: Primary | ICD-10-CM

## 2018-10-16 LAB
B-HCG UR QL: NEGATIVE
CTP QC/QA: YES
FLUAV AG SPEC QL IA: NEGATIVE
FLUBV AG SPEC QL IA: NEGATIVE
SPECIMEN SOURCE: NORMAL

## 2018-10-16 PROCEDURE — 87400 INFLUENZA A/B EACH AG IA: CPT | Mod: 59

## 2018-10-16 PROCEDURE — 99214 OFFICE O/P EST MOD 30 MIN: CPT | Mod: S$GLB,,, | Performed by: INTERNAL MEDICINE

## 2018-10-16 PROCEDURE — 99999 PR PBB SHADOW E&M-EST. PATIENT-LVL III: CPT | Mod: PBBFAC,,, | Performed by: INTERNAL MEDICINE

## 2018-10-16 PROCEDURE — 3008F BODY MASS INDEX DOCD: CPT | Mod: CPTII,S$GLB,, | Performed by: INTERNAL MEDICINE

## 2018-10-16 PROCEDURE — 81025 URINE PREGNANCY TEST: CPT | Mod: S$GLB,,, | Performed by: INTERNAL MEDICINE

## 2018-10-16 NOTE — PROGRESS NOTES
"Subjective:       Patient ID: Sonam Hidalgo is a 28 y.o. female.    Chief Complaint: Chest Pain (pt complains of mid chest pain, started this morning. ) and Generalized Body Aches (started on last night, chills and body aches (lower/upper extremities).)    HPI   27 yo F here for urgent eval of chills, body aches, mid chest pain since yesterday.   Tried to eat but poor appetite.   Son has sinus infection.  No known flu exposure.  No significant mucus production. No significant coughing.     LMP 9/20/18.    Review of Systems   Constitutional: Positive for chills and fever.   HENT: Positive for congestion and sore throat. Negative for sinus pressure.    Respiratory: Negative for cough and shortness of breath.    Cardiovascular: Positive for chest pain.   Musculoskeletal: Positive for myalgias.       Objective:   /76 (BP Location: Right arm, Patient Position: Sitting, BP Method: Large (Manual))   Pulse 86   Temp 99.3 °F (37.4 °C) (Oral)   Ht 5' 3" (1.6 m)   Wt 101.2 kg (223 lb)   SpO2 98%   BMI 39.50 kg/m²      Physical Exam   Constitutional: She is oriented to person, place, and time. She appears well-developed and well-nourished. No distress.   Ill but non toxic   HENT:   Head: Normocephalic and atraumatic.   Mouth/Throat: No oropharyngeal exudate.   Mildly erythematous throat with enlarged tonsils but no exudates   Neck: No tracheal deviation present. No thyromegaly present.   Cardiovascular: Normal rate.   Pulmonary/Chest: Effort normal. No stridor. No respiratory distress. She has no wheezes.   Lymphadenopathy:     She has cervical adenopathy.   Neurological: She is alert and oriented to person, place, and time.   Skin: Skin is warm and dry. She is not diaphoretic.   Psychiatric: She has a normal mood and affect. Her behavior is normal.       Assessment:       1. Flu-like symptoms    2. Encounter for pregnancy test, result unknown        Plan:       Sonam was seen today for chest pain and " generalized body aches.    Diagnoses and all orders for this visit:    Flu-like symptoms  Supportive care, rest, fluids  Check flu swab - tamiflu if positive    Encounter for pregnancy test, result unknown  -     POCT Urine Pregnancy is negative

## 2018-10-18 ENCOUNTER — PATIENT MESSAGE (OUTPATIENT)
Dept: INTERNAL MEDICINE | Facility: CLINIC | Age: 28
End: 2018-10-18

## 2018-11-15 ENCOUNTER — OFFICE VISIT (OUTPATIENT)
Dept: OBSTETRICS AND GYNECOLOGY | Facility: CLINIC | Age: 28
End: 2018-11-15
Payer: COMMERCIAL

## 2018-11-15 VITALS
DIASTOLIC BLOOD PRESSURE: 80 MMHG | BODY MASS INDEX: 40.2 KG/M2 | SYSTOLIC BLOOD PRESSURE: 122 MMHG | HEIGHT: 63 IN | HEIGHT: 63 IN | WEIGHT: 226.88 LBS | WEIGHT: 239.63 LBS | DIASTOLIC BLOOD PRESSURE: 80 MMHG | BODY MASS INDEX: 42.46 KG/M2 | SYSTOLIC BLOOD PRESSURE: 122 MMHG

## 2018-11-15 DIAGNOSIS — N89.8 VAGINAL DISCHARGE: ICD-10-CM

## 2018-11-15 DIAGNOSIS — Z87.59 HISTORY OF GESTATIONAL HYPERTENSION: ICD-10-CM

## 2018-11-15 DIAGNOSIS — Z32.01 POSITIVE PREGNANCY TEST: Primary | ICD-10-CM

## 2018-11-15 LAB
B-HCG UR QL: POSITIVE
CANDIDA RRNA VAG QL PROBE: NEGATIVE
CTP QC/QA: YES
G VAGINALIS RRNA GENITAL QL PROBE: POSITIVE
T VAGINALIS RRNA GENITAL QL PROBE: NEGATIVE

## 2018-11-15 PROCEDURE — 99999 PR PBB SHADOW E&M-EST. PATIENT-LVL III: CPT | Mod: PBBFAC,,, | Performed by: OBSTETRICS & GYNECOLOGY

## 2018-11-15 PROCEDURE — 87491 CHLMYD TRACH DNA AMP PROBE: CPT

## 2018-11-15 PROCEDURE — 3008F BODY MASS INDEX DOCD: CPT | Mod: CPTII,S$GLB,, | Performed by: OBSTETRICS & GYNECOLOGY

## 2018-11-15 PROCEDURE — 0502F SUBSEQUENT PRENATAL CARE: CPT | Mod: S$GLB,,, | Performed by: OBSTETRICS & GYNECOLOGY

## 2018-11-15 PROCEDURE — 87660 TRICHOMONAS VAGIN DIR PROBE: CPT

## 2018-11-15 PROCEDURE — 81025 URINE PREGNANCY TEST: CPT | Mod: S$GLB,,, | Performed by: OBSTETRICS & GYNECOLOGY

## 2018-11-15 PROCEDURE — 99214 OFFICE O/P EST MOD 30 MIN: CPT | Mod: S$GLB,,, | Performed by: OBSTETRICS & GYNECOLOGY

## 2018-11-15 NOTE — PROGRESS NOTES
CC: Absence of menses    Sonam Hidalgo is a 28 y.o. female  presents with complaint of absence of menstruation.  She denies nausea/vomIting/abdominal pain/bleeding.  UPT is positive.      History of GHTN.      Past Medical History:   Diagnosis Date    Abnormal Pap smear of vagina     Allergy     Anxiety     Hypertension      Past Surgical History:   Procedure Laterality Date    CHOLECYSTECTOMY      CHOLECYSTECTOMY, LAPAROSCOPIC N/A 2012    Performed by Joshua Goldberg, [facility] at [facility]    median arcuate ligament        Social History     Socioeconomic History    Marital status: Single     Spouse name: None    Number of children: None    Years of education: None    Highest education level: None   Social Needs    Financial resource strain: None    Food insecurity - worry: None    Food insecurity - inability: None    Transportation needs - medical: None    Transportation needs - non-medical: None   Occupational History    None   Tobacco Use    Smoking status: Never Smoker    Smokeless tobacco: Never Used   Substance and Sexual Activity    Alcohol use: Yes     Comment: occas, none recently    Drug use: No    Sexual activity: Yes     Partners: Male     Birth control/protection: None   Other Topics Concern    None   Social History Narrative    None     Family History   Problem Relation Age of Onset    Breast cancer Mother     Alcohol abuse Father     Celiac disease Neg Hx     Cirrhosis Neg Hx     Colon cancer Neg Hx     Colon polyps Neg Hx     Crohn's disease Neg Hx     Cystic fibrosis Neg Hx     Esophageal cancer Neg Hx     Hemochromatosis Neg Hx     Inflammatory bowel disease Neg Hx     Irritable bowel syndrome Neg Hx     Liver cancer Neg Hx     Liver disease Neg Hx     Rectal cancer Neg Hx     Stomach cancer Neg Hx     Ulcerative colitis Neg Hx     Jacob's disease Neg Hx     Ovarian cancer Neg Hx      OB History    Para Term  AB Living  "  3 1 1 0 1 1   SAB TAB Ectopic Multiple Live Births   0 1 0 0 1      # Outcome Date GA Lbr Angelito/2nd Weight Sex Delivery Anes PTL Lv   3 Current            2 Term 11/30/15 38w3d  2.81 kg (6 lb 3.1 oz) M Vag-Spont EPI N ZENA      Birth Comments: NICU x 4 days.    Needed HFNC and CPAP but no intubation.  Abx for 48 hour rule out, blood cx negative.   Hepatitis B given on 12/3/15   1 TAB                   /80   Ht 5' 3" (1.6 m)   Wt 108.7 kg (239 lb 10.2 oz)   LMP 09/25/2018   BMI 42.45 kg/m²     ROS:  GENERAL: Denies weight gain or weight loss. Feeling well overall.   SKIN: Denies rash or lesions.   HEAD: Denies head injury or headache.   NODES: Denies enlarged lymph nodes.   CHEST: Denies chest pain or shortness of breath.   CARDIOVASCULAR: Denies palpitations or left sided chest pain.   ABDOMEN: No abdominal pain, constipation, diarrhea, nausea, vomiting or rectal bleeding.   URINARY: No frequency, dysuria, hematuria, or burning on urination.  REPRODUCTIVE: See HPI.   BREASTS: The patient performs breast self-examination and denies pain, lumps, or nipple discharge.   HEMATOLOGIC: No easy bruisability or excessive bleeding.   MUSCULOSKELETAL: Denies joint pain or swelling.   NEUROLOGIC: Denies syncope or weakness.   PSYCHIATRIC: Denies depression, anxiety or mood swings.    PE:   APPEARANCE: Well nourished, well developed, in no acute distress.  AFFECT: WNL, alert and oriented x 3.  SKIN: No acne or hirsutism.  NECK: Neck symmetric without masses or thyromegaly.  NODES: No inguinal, cervical, axillary or femoral lymph node enlargement.  CHEST: Good respiratory effort.   ABDOMEN: Soft. No tenderness or masses. No hepatosplenomegaly. No hernias.  BREASTS: Symmetrical, no skin changes or visible lesions. No palpable masses, nipple discharge bilaterally.  PELVIC: Normal external female genitalia without lesions. Normal hair distribution. Adequate perineal body, normal urethral meatus. Vagina moist and well " rugated without lesions or discharge. Cervix pink, without lesions, discharge or tenderness. No significant cystocele or rectocele. Bimanual exam shows uterus is 10  weeks, regular, mobile and nontender. Adnexa without masses or tenderness.  EXTREMITIES: No edema.          ASSESSMENT and PLAN:    ICD-10-CM ICD-9-CM    1. Positive pregnancy test Z32.01 V72.42 HIV-1 and HIV-2 antibodies      RPR      CBC auto differential      Hepatitis panel, acute      C. trachomatis/N. gonorrhoeae by AMP DNA [facility]; Cervicovaginal      Type & Screen - Ob Profile      Rubella antibody, IgG      POCT Urine Pregnancy      US OB/GYN Procedure ([facility])-Future   2. History of gestational hypertension Z87.59 V13.29        Patient was counseled today on proper weight gain based on the [place] [employer]'s recommendations based on her pre-pregnancy weight. Discussed foods to avoid in pregnancy (i.e. sushi, fish that are high in mercury, deli meat, and unpasteurized cheeses). Discussed prenatal vitamin options (i.e. stool softener, DHA). Contingency screen offered - patient declines.     Follow-up in about 4 weeks (around 12/13/2018).  ASA 81 mg daily

## 2018-11-16 ENCOUNTER — PATIENT MESSAGE (OUTPATIENT)
Dept: OBSTETRICS AND GYNECOLOGY | Facility: CLINIC | Age: 28
End: 2018-11-16

## 2018-11-16 LAB
C TRACH DNA SPEC QL NAA+PROBE: NOT DETECTED
N GONORRHOEA DNA SPEC QL NAA+PROBE: NOT DETECTED

## 2018-11-16 RX ORDER — METRONIDAZOLE 7.5 MG/G
1 GEL VAGINAL NIGHTLY
Qty: 1 G | Refills: 0 | Status: SHIPPED | OUTPATIENT
Start: 2018-11-16 | End: 2018-11-23

## 2018-11-19 ENCOUNTER — TELEPHONE (OUTPATIENT)
Dept: OBSTETRICS AND GYNECOLOGY | Facility: CLINIC | Age: 28
End: 2018-11-19

## 2018-11-19 ENCOUNTER — HOSPITAL ENCOUNTER (EMERGENCY)
Facility: HOSPITAL | Age: 28
Discharge: HOME | End: 2018-11-20
Attending: EMERGENCY MEDICINE
Payer: COMMERCIAL

## 2018-11-19 ENCOUNTER — LAB VISIT (OUTPATIENT)
Dept: LAB | Facility: HOSPITAL | Age: 28
End: 2018-11-19
Attending: OBSTETRICS & GYNECOLOGY
Payer: COMMERCIAL

## 2018-11-19 VITALS
RESPIRATION RATE: 20 BRPM | SYSTOLIC BLOOD PRESSURE: 133 MMHG | WEIGHT: 240 LBS | TEMPERATURE: 98 F | DIASTOLIC BLOOD PRESSURE: 85 MMHG | HEIGHT: 62 IN | HEART RATE: 93 BPM | OXYGEN SATURATION: 97 % | BODY MASS INDEX: 44.16 KG/M2

## 2018-11-19 DIAGNOSIS — O20.0 THREATENED ABORTION: Primary | ICD-10-CM

## 2018-11-19 DIAGNOSIS — N39.0 URINARY TRACT INFECTION WITHOUT HEMATURIA, SITE UNSPECIFIED: Primary | ICD-10-CM

## 2018-11-19 DIAGNOSIS — R82.71 ASYMPTOMATIC BACTERIURIA DURING PREGNANCY: ICD-10-CM

## 2018-11-19 DIAGNOSIS — O99.891 ASYMPTOMATIC BACTERIURIA DURING PREGNANCY: ICD-10-CM

## 2018-11-19 DIAGNOSIS — Z3A.01 LESS THAN 8 WEEKS GESTATION OF PREGNANCY: ICD-10-CM

## 2018-11-19 DIAGNOSIS — O20.0 THREATENED ABORTION: ICD-10-CM

## 2018-11-19 DIAGNOSIS — N93.9 VAGINAL BLEEDING: ICD-10-CM

## 2018-11-19 LAB
ABO + RH BLD: NORMAL
ALBUMIN SERPL BCP-MCNC: 2.9 G/DL
ALP SERPL-CCNC: 68 U/L
ALT SERPL W/O P-5'-P-CCNC: 16 U/L
ANION GAP SERPL CALC-SCNC: 8 MMOL/L
AST SERPL-CCNC: 13 U/L
B-HCG UR QL: POSITIVE
BACTERIA #/AREA URNS AUTO: ABNORMAL /HPF
BACTERIA GENITAL QL WET PREP: ABNORMAL
BASOPHILS # BLD AUTO: 0.04 K/UL
BASOPHILS NFR BLD: 0.4 %
BILIRUB SERPL-MCNC: 0.2 MG/DL
BILIRUB UR QL STRIP: NEGATIVE
BLD GP AB SCN CELLS X3 SERPL QL: NORMAL
BUN SERPL-MCNC: 12 MG/DL
CALCIUM SERPL-MCNC: 9.3 MG/DL
CHLORIDE SERPL-SCNC: 104 MMOL/L
CLARITY UR REFRACT.AUTO: ABNORMAL
CLUE CELLS VAG QL WET PREP: ABNORMAL
CO2 SERPL-SCNC: 23 MMOL/L
COLOR UR AUTO: YELLOW
CREAT SERPL-MCNC: 0.7 MG/DL
CTP QC/QA: YES
DIFFERENTIAL METHOD: ABNORMAL
EOSINOPHIL # BLD AUTO: 0.2 K/UL
EOSINOPHIL NFR BLD: 1.3 %
ERYTHROCYTE [DISTWIDTH] IN BLOOD BY AUTOMATED COUNT: 13.2 %
EST. GFR  (AFRICAN AMERICAN): >60 ML/MIN/1.73 M^2
EST. GFR  (NON AFRICAN AMERICAN): >60 ML/MIN/1.73 M^2
FILAMENT FUNGI VAG WET PREP-#/AREA: ABNORMAL
GLUCOSE SERPL-MCNC: 94 MG/DL
GLUCOSE UR QL STRIP: NEGATIVE
HCG INTACT+B SERPL-ACNC: NORMAL MIU/ML
HCT VFR BLD AUTO: 37.1 %
HGB BLD-MCNC: 11.9 G/DL
HGB UR QL STRIP: ABNORMAL
IMM GRANULOCYTES # BLD AUTO: 0.04 K/UL
IMM GRANULOCYTES NFR BLD AUTO: 0.4 %
KETONES UR QL STRIP: NEGATIVE
LEUKOCYTE ESTERASE UR QL STRIP: ABNORMAL
LYMPHOCYTES # BLD AUTO: 2.9 K/UL
LYMPHOCYTES NFR BLD: 25.6 %
MCH RBC QN AUTO: 27.2 PG
MCHC RBC AUTO-ENTMCNC: 32.1 G/DL
MCV RBC AUTO: 85 FL
MICROSCOPIC COMMENT: ABNORMAL
MONOCYTES # BLD AUTO: 0.7 K/UL
MONOCYTES NFR BLD: 5.8 %
NEUTROPHILS # BLD AUTO: 7.5 K/UL
NEUTROPHILS NFR BLD: 66.5 %
NITRITE UR QL STRIP: NEGATIVE
NRBC BLD-RTO: 0 /100 WBC
PH UR STRIP: 6 [PH] (ref 5–8)
PLATELET # BLD AUTO: 359 K/UL
PMV BLD AUTO: 9.6 FL
POTASSIUM SERPL-SCNC: 3.9 MMOL/L
PROT SERPL-MCNC: 7 G/DL
PROT UR QL STRIP: NEGATIVE
RBC # BLD AUTO: 4.38 M/UL
RBC #/AREA URNS AUTO: 5 /HPF (ref 0–4)
SODIUM SERPL-SCNC: 135 MMOL/L
SP GR UR STRIP: 1.01 (ref 1–1.03)
SPECIMEN SOURCE: ABNORMAL
SQUAMOUS #/AREA URNS AUTO: 14 /HPF
T VAGINALIS GENITAL QL WET PREP: ABNORMAL
URN SPEC COLLECT METH UR: ABNORMAL
WBC # BLD AUTO: 11.25 K/UL
WBC #/AREA URNS AUTO: 69 /HPF (ref 0–5)
WBC #/AREA VAG WET PREP: ABNORMAL
YEAST GENITAL QL WET PREP: ABNORMAL

## 2018-11-19 PROCEDURE — 99284 EMERGENCY DEPT VISIT MOD MDM: CPT | Mod: ,,, | Performed by: PHYSICIAN ASSISTANT

## 2018-11-19 PROCEDURE — 84702 CHORIONIC GONADOTROPIN TEST: CPT

## 2018-11-19 PROCEDURE — 36415 COLL VENOUS BLD VENIPUNCTURE: CPT

## 2018-11-19 PROCEDURE — 25000003 PHARM REV CODE 250: Performed by: PHYSICIAN ASSISTANT

## 2018-11-19 PROCEDURE — 81001 URINALYSIS AUTO W/SCOPE: CPT

## 2018-11-19 PROCEDURE — 81025 URINE PREGNANCY TEST: CPT | Performed by: PHYSICIAN ASSISTANT

## 2018-11-19 PROCEDURE — 96360 HYDRATION IV INFUSION INIT: CPT

## 2018-11-19 PROCEDURE — 99285 EMERGENCY DEPT VISIT HI MDM: CPT | Mod: 25

## 2018-11-19 PROCEDURE — 87210 SMEAR WET MOUNT SALINE/INK: CPT

## 2018-11-19 PROCEDURE — 87086 URINE CULTURE/COLONY COUNT: CPT

## 2018-11-19 PROCEDURE — 80053 COMPREHEN METABOLIC PANEL: CPT

## 2018-11-19 PROCEDURE — 86901 BLOOD TYPING SEROLOGIC RH(D): CPT

## 2018-11-19 PROCEDURE — 87491 CHLMYD TRACH DNA AMP PROBE: CPT

## 2018-11-19 PROCEDURE — 85025 COMPLETE CBC W/AUTO DIFF WBC: CPT

## 2018-11-19 RX ADMIN — SODIUM CHLORIDE 1000 ML: 0.9 INJECTION, SOLUTION INTRAVENOUS at 11:11

## 2018-11-19 NOTE — TELEPHONE ENCOUNTER
Patient stated that she has been cramping and spotting,  rated at a 4 of 10, since Saturday (11/17/18). Patient denies vomiting, fever, and chills but is experimenting nausea. Patient denies participating in exercised or having intercourse. Patient does not feel that she needs to be seen unless it is an emergency. Patient has been scheduled for [facility] labs and also advised to go to the nearest Emergency Room for heavy vaginal bleeding and severe cramping. Patient verbalized understanding.

## 2018-11-19 NOTE — TELEPHONE ENCOUNTER
----- Message from Melanie Hernandez sent at 11/19/2018  8:38 AM CST -----  Contact: self  Pt states she is having some spotting and cramps. The pt can be reached at [phone].

## 2018-11-20 LAB
C TRACH DNA SPEC QL NAA+PROBE: NOT DETECTED
N GONORRHOEA DNA SPEC QL NAA+PROBE: NOT DETECTED

## 2018-11-20 PROCEDURE — 96361 HYDRATE IV INFUSION ADD-ON: CPT

## 2018-11-20 RX ORDER — CEPHALEXIN 500 MG/1
500 CAPSULE ORAL EVERY 6 HOURS
Qty: 20 CAPSULE | Refills: 0 | Status: SHIPPED | OUTPATIENT
Start: 2018-11-20 | End: 2018-11-25

## 2018-11-20 NOTE — PROVIDER PROGRESS NOTES - EMERGENCY DEPT.
Encounter Date: 2018 1:06 AM    ED Physician Progress Notes           ED Course: I, Lo Pena PA-C, have assumed care of this patient from Wanda Randolph PA-C. Patient is a 28 year old female with PMHX of -8 weeks pregnant, HTN, and anxiety. She presents to the ED for threatened miscarriage.     At the time of signout plan was pending transvaginal US.     Medications given in the ED:    Medications   sodium chloride 0.9% bolus 1,000 mL (0 mLs Intravenous Stopped 18 0120)     Transvaginal US found to have Single live intrauterine pregnancy with estimated gestational age of 9 weeks 0 days and an GRIFFIN of 2019. Fetal HR of 163 bpm. C.trachomatis/N. gonorrheae pending.     Patient reassessed, reports symptoms improved with ED management. I have discussed emergency department findings, and plan with the patient. Will discharge home with keflex and F/U with OB/GYN and PCP. Patient verbalizes understanding of plan and agrees. Return precautions given.     Disposition: Discharged.     Impression: UTI, 9 weeks of pregnancy, and vaginal bleeding.     I have discussed and reviewed with my supervising physician.

## 2018-11-20 NOTE — ED PROVIDER NOTES
"Encounter Date: 2018       History     Chief Complaint   Patient presents with    Threatened Miscarriage     pt states she 7-8 weeks pregnant presents with abdominal cramping 7/10 pain with spotting since sat. pt also reports nausea as well. pt is aox 3.      27 y/o  WF currently 7 weeks pregnant presents to the ED c/o abdominal cramping and vaginal bleeding since Saturday. She reports "spotting" and is only needing a panty liner. Abdominal cramping worsened today which prompted her ED evaluation. LMP 18. She reports some passage of clots earlier today. Cramping is worse on the R side. She reports nausea and vomiting - which has been present through her entire pregnancy. Followed by OBGYN - Dr Benton and was seen on 11/15 but has not yet had an ultrasound (scheduled in December). She denies fever, chills, chest pain, dysuria, headache, lightheadedness. Taking prenatal vitamins.       The history is provided by the patient.     Review of patient's allergies indicates:  No Known Allergies  Past Medical History:   Diagnosis Date    Abnormal Pap smear of vagina     Allergy     Anxiety     Hypertension      Past Surgical History:   Procedure Laterality Date    CHOLECYSTECTOMY      CHOLECYSTECTOMY, LAPAROSCOPIC N/A 2012    Performed by Joshua Goldberg, [facility] at [facility] OR [facility]    median arcuate ligament        Family History   Problem Relation Age of Onset    Breast cancer Mother     Alcohol abuse Father     Celiac disease Neg Hx     Cirrhosis Neg Hx     Colon cancer Neg Hx     Colon polyps Neg Hx     Crohn's disease Neg Hx     Cystic fibrosis Neg Hx     Esophageal cancer Neg Hx     Hemochromatosis Neg Hx     Inflammatory bowel disease Neg Hx     Irritable bowel syndrome Neg Hx     Liver cancer Neg Hx     Liver disease Neg Hx     Rectal cancer Neg Hx     Stomach cancer Neg Hx     Ulcerative colitis Neg Hx     Jacob's disease Neg Hx     Ovarian cancer Neg Hx      Social History "     Tobacco Use    Smoking status: Never Smoker    Smokeless tobacco: Never Used   Substance Use Topics    Alcohol use: Yes     Comment: occas, none recently    Drug use: No     Review of Systems   Constitutional: Negative for chills and fever.   HENT: Negative for congestion, rhinorrhea and sore throat.    Eyes: Negative for photophobia and visual disturbance.   Respiratory: Negative for shortness of breath.    Cardiovascular: Negative for chest pain.   Gastrointestinal: Positive for abdominal pain, nausea and vomiting. Negative for constipation and diarrhea.   Genitourinary: Positive for vaginal bleeding. Negative for dysuria, hematuria and vaginal discharge.   Musculoskeletal: Negative for back pain, neck pain and neck stiffness.   Skin: Negative for rash and wound.   Neurological: Negative for light-headedness, numbness and headaches.   Psychiatric/Behavioral: Negative for confusion.       Physical Exam     Initial Vitals [11/19/18 2103]   BP Pulse Resp Temp SpO2   133/85 93 20 98.4 °F (36.9 °C) 97 %      MAP       --         Physical Exam    Nursing note and vitals reviewed.  Constitutional: She appears well-developed and well-nourished. She is not diaphoretic. No distress.   HENT:   Head: Normocephalic and atraumatic.   Neck: Normal range of motion. Neck supple.   Cardiovascular: Normal rate, regular rhythm and normal heart sounds. Exam reveals no gallop and no friction rub.    No murmur heard.  Pulmonary/Chest: Breath sounds normal. She has no wheezes. She has no rhonchi. She has no rales.   Abdominal: Soft. Bowel sounds are normal. There is tenderness (R adnexal). There is no rebound and no guarding.   Genitourinary: There is no rash or tenderness on the right labia. There is no rash or tenderness on the left labia. Uterus is not tender. Cervix exhibits no motion tenderness and no discharge. Right adnexum displays tenderness. Right adnexum displays no mass. Left adnexum displays no mass and no  tenderness. There is bleeding in the vagina. No tenderness in the vagina.   Genitourinary Comments: Minimal vaginal bleeding noted. No clots.    Musculoskeletal: Normal range of motion.   Neurological: She is alert and oriented to person, place, and time.   Skin: Skin is warm and dry. No rash noted. No erythema.   Psychiatric: She has a normal mood and affect.         ED Course   Procedures  Labs Reviewed   CBC W/ AUTO DIFFERENTIAL - Abnormal; Notable for the following components:       Result Value    Hemoglobin 11.9 (*)     Platelets 359 (*)     All other components within normal limits   COMPREHENSIVE METABOLIC PANEL - Abnormal; Notable for the following components:    Sodium 135 (*)     Albumin 2.9 (*)     All other components within normal limits   URINALYSIS, REFLEX TO URINE CULTURE - Abnormal; Notable for the following components:    Appearance, UA Hazy (*)     Occult Blood UA 1+ (*)     Leukocytes, UA 3+ (*)     All other components within normal limits    Narrative:     Preferred Collection Type->Urine, Clean Catch   VAGINAL SCREEN - Abnormal; Notable for the following components:    WBC - Vaginal Screen Moderate (*)     Bacteria - Vaginal Screen Moderate (*)     All other components within normal limits   URINALYSIS MICROSCOPIC - Abnormal; Notable for the following components:    RBC, UA 5 (*)     WBC, UA 69 (*)     All other components within normal limits    Narrative:     Preferred Collection Type->Urine, Clean Catch   POCT URINE PREGNANCY - Abnormal; Notable for the following components:    POC Preg Test, Ur Positive (*)     All other components within normal limits   C. TRACHOMATIS/N. GONORRHOEAE BY AMP DNA   CULTURE, URINE   TYPE & SCREEN          Imaging Results    None          Medical Decision Making:   History:   Old Medical Records: I decided to obtain old medical records.  Old Records Summarized: records from clinic visits.       <> Summary of Records: Beta hCG drawn today - [id number]  Clinical  Tests:   Lab Tests: Ordered and Reviewed  Radiological Study: Ordered and Reviewed       APC / Resident Notes:   27 y/o  WF currently 7 weeks pregnant presents to the ED c/o abdominal cramping and vaginal bleeding since Saturday. VSS. Abdomen soft and tender in the R adnexa. No CVA tenderness. DDx includes but is not limited to ectopic pregnancy, threatened miscarriage, vaginal bleeding in early pregnancy.    Vaginal screen negative. No leukocytosis. CMP unremarkable. Blood type: O positive, no indication for RhoGam. UA: 3+  Leukocytes, 69 WBC. Urine culture pending. Will start abx for asymptomatic bacteruria in pregnant female.     Patient signed out to FLACO Morrow pending ultrasound. Will need rx for keflex 500mg q6 x 5 days if discharged.          Attending Attestation:     Physician Attestation Statement for NP/PA:   I discussed this assessment and plan of this patient with the NP/PA, but I did not personally examine the patient. The face to face encounter was performed by the NP/PA.                     Clinical Impression:   The primary encounter diagnosis was Urinary tract infection without hematuria, site unspecified. Diagnoses of Vaginal bleeding, Less than 8 weeks gestation of pregnancy, and Asymptomatic bacteriuria during pregnancy were also pertinent to this visit.                             FLACO Perkins-C  [phone]       Edmond Goncalves III, MD  [phone]

## 2018-11-20 NOTE — ED TRIAGE NOTES
Pt reports she is about 8 weeks pregnant and began to notice some spotting on Saturday. Pt also reports lower abd cramping that also began on Saturday but has worsened. Pt reports some small clots in vaginal discharge yesterday.

## 2018-11-21 LAB — BACTERIA UR CULT: NORMAL

## 2018-12-03 ENCOUNTER — PROCEDURE VISIT (OUTPATIENT)
Dept: OBSTETRICS AND GYNECOLOGY | Facility: CLINIC | Age: 28
End: 2018-12-03
Payer: COMMERCIAL

## 2018-12-03 ENCOUNTER — LAB VISIT (OUTPATIENT)
Dept: LAB | Facility: OTHER | Age: 28
End: 2018-12-03
Attending: OBSTETRICS & GYNECOLOGY
Payer: COMMERCIAL

## 2018-12-03 DIAGNOSIS — Z36.89 ESTABLISH GESTATIONAL AGE, ULTRASOUND: ICD-10-CM

## 2018-12-03 DIAGNOSIS — N92.6 MISSED MENSES: ICD-10-CM

## 2018-12-03 DIAGNOSIS — O36.80X0 ENCOUNTER TO DETERMINE FETAL VIABILITY OF PREGNANCY, SINGLE OR UNSPECIFIED FETUS: ICD-10-CM

## 2018-12-03 DIAGNOSIS — Z32.01 POSITIVE PREGNANCY TEST: ICD-10-CM

## 2018-12-03 LAB
ABO + RH BLD: NORMAL
BASOPHILS # BLD AUTO: 0.02 K/UL
BASOPHILS NFR BLD: 0.3 %
BLD GP AB SCN CELLS X3 SERPL QL: NORMAL
DIFFERENTIAL METHOD: ABNORMAL
EOSINOPHIL # BLD AUTO: 0.1 K/UL
EOSINOPHIL NFR BLD: 1.2 %
ERYTHROCYTE [DISTWIDTH] IN BLOOD BY AUTOMATED COUNT: 14 %
HAV IGM SERPL QL IA: NEGATIVE
HBV CORE IGM SERPL QL IA: NEGATIVE
HBV SURFACE AG SERPL QL IA: NEGATIVE
HCT VFR BLD AUTO: 37.3 %
HCV AB SERPL QL IA: NEGATIVE
HGB BLD-MCNC: 11.8 G/DL
HIV 1+2 AB+HIV1 P24 AG SERPL QL IA: NEGATIVE
LYMPHOCYTES # BLD AUTO: 1.5 K/UL
LYMPHOCYTES NFR BLD: 21.8 %
MCH RBC QN AUTO: 26.5 PG
MCHC RBC AUTO-ENTMCNC: 31.6 G/DL
MCV RBC AUTO: 84 FL
MONOCYTES # BLD AUTO: 0.4 K/UL
MONOCYTES NFR BLD: 5.7 %
NEUTROPHILS # BLD AUTO: 4.9 K/UL
NEUTROPHILS NFR BLD: 70.7 %
PLATELET # BLD AUTO: 368 K/UL
PMV BLD AUTO: 9.6 FL
RBC # BLD AUTO: 4.45 M/UL
RPR SER QL: NORMAL
WBC # BLD AUTO: 6.89 K/UL

## 2018-12-03 PROCEDURE — 86703 HIV-1/HIV-2 1 RESULT ANTBDY: CPT

## 2018-12-03 PROCEDURE — 86850 RBC ANTIBODY SCREEN: CPT

## 2018-12-03 PROCEDURE — 80074 ACUTE HEPATITIS PANEL: CPT

## 2018-12-03 PROCEDURE — 36415 COLL VENOUS BLD VENIPUNCTURE: CPT

## 2018-12-03 PROCEDURE — 76801 OB US < 14 WKS SINGLE FETUS: CPT | Mod: S$GLB,,, | Performed by: OBSTETRICS & GYNECOLOGY

## 2018-12-03 PROCEDURE — 85025 COMPLETE CBC W/AUTO DIFF WBC: CPT

## 2018-12-03 PROCEDURE — 86592 SYPHILIS TEST NON-TREP QUAL: CPT

## 2018-12-03 PROCEDURE — 86762 RUBELLA ANTIBODY: CPT

## 2018-12-03 NOTE — PROCEDURES
Obstetrical ultrasound completed today.  See report in imaging section of [facility].

## 2018-12-04 LAB
RUBV IGG SER-ACNC: 14.2 IU/ML
RUBV IGG SER-IMP: REACTIVE

## 2018-12-06 ENCOUNTER — TELEPHONE (OUTPATIENT)
Dept: OBSTETRICS AND GYNECOLOGY | Facility: CLINIC | Age: 28
End: 2018-12-06

## 2018-12-06 NOTE — TELEPHONE ENCOUNTER
----- Message from Quiana Corado sent at 12/6/2018  9:55 AM CST -----  Contact: Self            Name of Who is Calling: CLARA THOMPSON [[id number]]      What is the request in detail:  Pt states she would like to advise the clinical team  that she has been  Vomiting for the past 6hrs, and she also has rt side cramping. Please contact to further discuss and advise.        Can the clinic reply by [facility]: N      What Number to Call Back if not [facility]: [phone]

## 2018-12-06 NOTE — TELEPHONE ENCOUNTER
Contacted ob pt in regards to vomiting. Pt stated that sh has been vomiting on and off since 12:00pm last night. Patient informed to try and stay hydrated by drink water if possible or sprite or ginger ale. Pt informed that she can try eating crackers or toast to keep energy. Patient stated that she is feeling okay, but get a little dizzy when she stand up. Patient informed to let us know if she continues to vomit and if she starts to have blurry vision, seeing dark spots, room spinning or weakness. Pt informed that then she would need to come in and possible need fluids.  Pt verbalized understanding and will keep us updated

## 2018-12-07 ENCOUNTER — TELEPHONE (OUTPATIENT)
Dept: OBSTETRICS AND GYNECOLOGY | Facility: CLINIC | Age: 28
End: 2018-12-07

## 2018-12-07 ENCOUNTER — PATIENT MESSAGE (OUTPATIENT)
Dept: OBSTETRICS AND GYNECOLOGY | Facility: CLINIC | Age: 28
End: 2018-12-07

## 2018-12-14 ENCOUNTER — INITIAL PRENATAL (OUTPATIENT)
Dept: OBSTETRICS AND GYNECOLOGY | Facility: CLINIC | Age: 28
End: 2018-12-14
Payer: COMMERCIAL

## 2018-12-14 VITALS
WEIGHT: 222.69 LBS | DIASTOLIC BLOOD PRESSURE: 70 MMHG | BODY MASS INDEX: 40.73 KG/M2 | SYSTOLIC BLOOD PRESSURE: 110 MMHG

## 2018-12-14 DIAGNOSIS — Z3A.11 11 WEEKS GESTATION OF PREGNANCY: Primary | ICD-10-CM

## 2018-12-14 DIAGNOSIS — O99.341 DEPRESSION DURING PREGNANCY IN FIRST TRIMESTER: ICD-10-CM

## 2018-12-14 DIAGNOSIS — F32.A DEPRESSION DURING PREGNANCY IN FIRST TRIMESTER: ICD-10-CM

## 2018-12-14 PROCEDURE — 99999 PR PBB SHADOW E&M-EST. PATIENT-LVL III: CPT | Mod: PBBFAC,,, | Performed by: OBSTETRICS & GYNECOLOGY

## 2018-12-14 PROCEDURE — 0502F SUBSEQUENT PRENATAL CARE: CPT | Mod: S$GLB,,, | Performed by: OBSTETRICS & GYNECOLOGY

## 2018-12-14 RX ORDER — SERTRALINE HYDROCHLORIDE 50 MG/1
50 TABLET, FILM COATED ORAL DAILY
Qty: 30 TABLET | Refills: 3 | Status: SHIPPED | OUTPATIENT
Start: 2018-12-14 | End: 2019-04-09 | Stop reason: SDUPTHER

## 2018-12-14 NOTE — PROGRESS NOTES
HERE for routine OB visit at 11 3/7 wks, with NO complaints.  Denies vaginal bleeding, cramping.   She is suffering with depression like she did post partum.  NO homicidal or suicidal ideations.  F/U in four weeks.  Declines genetic testing. MFM scan.

## 2019-01-01 NOTE — PROGRESS NOTES
"CC: pos preg test    Sonam Hidalgo is a 28 y.o. female  presents for  Positive pregnancy test    Past Medical History:   Diagnosis Date    Abnormal Pap smear of vagina     Allergy     Anxiety     Hypertension        Past Surgical History:   Procedure Laterality Date    CHOLECYSTECTOMY      CHOLECYSTECTOMY, LAPAROSCOPIC N/A 2012    Performed by Joshua Goldberg, MD at [facility] OR [facility]    median arcuate ligament          OB History    Para Term  AB Living   3 1 1 0 1 1   SAB TAB Ectopic Multiple Live Births   0 1 0 0 1      # Outcome Date GA Lbr Angelito/2nd Weight Sex Delivery Anes PTL Lv   3 Current            2 Term 11/30/15 38w3d  2.81 kg (6 lb 3.1 oz) M Vag-Spont EPI N ZENA      Birth Comments: NICU x 4 days.    Needed HFNC and CPAP but no intubation.  Abx for 48 hour rule out, blood cx negative.   Hepatitis B given on 12/3/15   1 TAB                   Family History   Problem Relation Age of Onset    Breast cancer Mother     Alcohol abuse Father     Celiac disease Neg Hx     Cirrhosis Neg Hx     Colon cancer Neg Hx     Colon polyps Neg Hx     Crohn's disease Neg Hx     Cystic fibrosis Neg Hx     Esophageal cancer Neg Hx     Hemochromatosis Neg Hx     Inflammatory bowel disease Neg Hx     Irritable bowel syndrome Neg Hx     Liver cancer Neg Hx     Liver disease Neg Hx     Rectal cancer Neg Hx     Stomach cancer Neg Hx     Ulcerative colitis Neg Hx     Jacob's disease Neg Hx     Ovarian cancer Neg Hx        Social History     Tobacco Use    Smoking status: Never Smoker    Smokeless tobacco: Never Used   Substance Use Topics    Alcohol use: Yes     Comment: occas, none recently    Drug use: No       /80   Ht 5' 3" (1.6 m)   Wt 102.9 kg (226 lb 13.7 oz)   LMP 2018   BMI 40.19 kg/m²     ROS:  GENERAL: Denies weight gain or weight loss. Feeling well overall.   SKIN: Denies rash or lesions.   HEAD: Denies head injury or headache.   NODES: Denies " enlarged lymph nodes.   CHEST: Denies chest pain or shortness of breath.   CARDIOVASCULAR: Denies palpitations or left sided chest pain.   ABDOMEN: No abdominal pain, constipation, diarrhea, nausea, vomiting or rectal bleeding.   URINARY: No frequency, dysuria, hematuria, or burning on urination.  REPRODUCTIVE: See HPI.   BREASTS: The patient performs breast self-examination and denies pain, lumps, or nipple discharge.   HEMATOLOGIC: No easy bruisability or excessive bleeding.  MUSCULOSKELETAL: Denies joint pain or swelling.   NEUROLOGIC: Denies syncope or weakness.   PSYCHIATRIC: Denies depression, anxiety or mood swings.    Physical Exam:    APPEARANCE: Well nourished, well developed, in no acute distress.  AFFECT: WNL, alert and oriented x 3  SKIN: No acne or hirsutism  NECK: Neck symmetric without masses or thyromegaly  NODES: No inguinal, cervical, axillary, or femoral lymph node enlargement  CHEST: Good respiratory effect  ABDOMEN: Soft.  No tenderness or masses.  No hepatosplenomegaly.  No hernias.  BREASTS: Symmetrical, no skin changes or visible lesions.  No palpable masses, nipple discharge bilaterally.  PELVIC: Normal external genitalia without lesions.  Normal hair distribution.  Adequate perineal body, normal urethral meatus.  Vagina moist and well rugated without lesions or discharge.  Cervix pink, without lesions, discharge or tenderness.  No significant cystocele or rectocele.  Bimanual exam shows uterus to be normal size, regular, mobile and nontender.  Adnexa without masses or tenderness.    EXTREMITIES: No edema.    ASSESSMENT AND PLAN  1. Positive pregnancy test       IUP at 11 weeks    Patient was counseled today on A.C.S. Pap guidelines and recommendations for yearly pelvic exams, mammograms and monthly self breast exams; to see her PCP for other health maintenance.     No Follow-up on file.

## 2019-01-08 ENCOUNTER — PATIENT MESSAGE (OUTPATIENT)
Dept: ADMINISTRATIVE | Facility: OTHER | Age: 29
End: 2019-01-08

## 2019-01-11 ENCOUNTER — PATIENT OUTREACH (OUTPATIENT)
Dept: OTHER | Facility: OTHER | Age: 29
End: 2019-01-11

## 2019-01-11 ENCOUNTER — ROUTINE PRENATAL (OUTPATIENT)
Dept: OBSTETRICS AND GYNECOLOGY | Facility: CLINIC | Age: 29
End: 2019-01-11
Payer: COMMERCIAL

## 2019-01-11 VITALS
BODY MASS INDEX: 40.91 KG/M2 | SYSTOLIC BLOOD PRESSURE: 100 MMHG | WEIGHT: 223.69 LBS | DIASTOLIC BLOOD PRESSURE: 70 MMHG

## 2019-01-11 DIAGNOSIS — Z3A.15 15 WEEKS GESTATION OF PREGNANCY: Primary | ICD-10-CM

## 2019-01-11 PROCEDURE — 0502F SUBSEQUENT PRENATAL CARE: CPT | Mod: S$GLB,,, | Performed by: OBSTETRICS & GYNECOLOGY

## 2019-01-11 PROCEDURE — 99999 PR PBB SHADOW E&M-EST. PATIENT-LVL III: CPT | Mod: PBBFAC,,, | Performed by: OBSTETRICS & GYNECOLOGY

## 2019-01-11 PROCEDURE — 0502F PR SUBSEQUENT PRENATAL CARE: ICD-10-PCS | Mod: S$GLB,,, | Performed by: OBSTETRICS & GYNECOLOGY

## 2019-01-11 PROCEDURE — 99999 PR PBB SHADOW E&M-EST. PATIENT-LVL III: ICD-10-PCS | Mod: PBBFAC,,, | Performed by: OBSTETRICS & GYNECOLOGY

## 2019-01-11 NOTE — TELEPHONE ENCOUNTER
Initial introduction with MsTha Sonam Hidalgo completed and the role of the health coaches for [facility] was explained via [facility].    Reviewed the importance of taking weights and blood pressure readings, using the health  as a resource for physical activity and dietary habits, and that [facility] messages will be sent for weeks 20, 30, and 37 home urine tests.  Reviewed that the patient should contact her OB team with any specific questions regarding her pregnancy, and to contact [facility] or 911 in the case of a medical emergency.

## 2019-01-12 NOTE — PROGRESS NOTES
HERE for routine OB visit at 15 3/7 wks, with NO complaints.  Denies vaginal bleeding, cramping.  Declines any genetic testing.  MFM scan at 19 weeks. F/U in 8 weeks

## 2019-02-07 ENCOUNTER — PROCEDURE VISIT (OUTPATIENT)
Dept: MATERNAL FETAL MEDICINE | Facility: CLINIC | Age: 29
End: 2019-02-07
Payer: COMMERCIAL

## 2019-02-07 DIAGNOSIS — Z36.89 ENCOUNTER FOR FETAL ANATOMIC SURVEY: ICD-10-CM

## 2019-02-07 DIAGNOSIS — Z3A.11 11 WEEKS GESTATION OF PREGNANCY: ICD-10-CM

## 2019-02-07 PROCEDURE — 99499 NO LOS: ICD-10-PCS | Mod: S$GLB,,, | Performed by: OBSTETRICS & GYNECOLOGY

## 2019-02-07 PROCEDURE — 76805 PR US, OB 14+WKS, TRANSABD, SINGLE GESTATION: ICD-10-PCS | Mod: S$GLB,,, | Performed by: OBSTETRICS & GYNECOLOGY

## 2019-02-07 PROCEDURE — 99499 UNLISTED E&M SERVICE: CPT | Mod: S$GLB,,, | Performed by: OBSTETRICS & GYNECOLOGY

## 2019-02-07 PROCEDURE — 76805 OB US >/= 14 WKS SNGL FETUS: CPT | Mod: S$GLB,,, | Performed by: OBSTETRICS & GYNECOLOGY

## 2019-02-08 ENCOUNTER — OFFICE VISIT (OUTPATIENT)
Dept: URGENT CARE | Facility: CLINIC | Age: 29
End: 2019-02-08
Payer: COMMERCIAL

## 2019-02-08 VITALS
DIASTOLIC BLOOD PRESSURE: 78 MMHG | OXYGEN SATURATION: 98 % | TEMPERATURE: 98 F | WEIGHT: 223 LBS | RESPIRATION RATE: 19 BRPM | HEART RATE: 107 BPM | SYSTOLIC BLOOD PRESSURE: 118 MMHG | BODY MASS INDEX: 41.04 KG/M2 | HEIGHT: 62 IN

## 2019-02-08 DIAGNOSIS — J02.9 SORE THROAT: ICD-10-CM

## 2019-02-08 DIAGNOSIS — R11.2 NON-INTRACTABLE VOMITING WITH NAUSEA, UNSPECIFIED VOMITING TYPE: ICD-10-CM

## 2019-02-08 DIAGNOSIS — R50.9 FEVER, UNSPECIFIED FEVER CAUSE: ICD-10-CM

## 2019-02-08 DIAGNOSIS — J03.90 EXUDATIVE TONSILLITIS: Primary | ICD-10-CM

## 2019-02-08 DIAGNOSIS — Z3A.19 19 WEEKS GESTATION OF PREGNANCY: ICD-10-CM

## 2019-02-08 LAB
CTP QC/QA: YES
S PYO RRNA THROAT QL PROBE: NEGATIVE

## 2019-02-08 PROCEDURE — 99214 PR OFFICE/OUTPT VISIT, EST, LEVL IV, 30-39 MIN: ICD-10-PCS | Mod: 25,S$GLB,, | Performed by: NURSE PRACTITIONER

## 2019-02-08 PROCEDURE — 99214 OFFICE O/P EST MOD 30 MIN: CPT | Mod: 25,S$GLB,, | Performed by: NURSE PRACTITIONER

## 2019-02-08 PROCEDURE — 3008F PR BODY MASS INDEX (BMI) DOCUMENTED: ICD-10-PCS | Mod: CPTII,S$GLB,, | Performed by: NURSE PRACTITIONER

## 2019-02-08 PROCEDURE — 96372 PR INJECTION,THERAP/PROPH/DIAG2ST, IM OR SUBCUT: ICD-10-PCS | Mod: S$GLB,,, | Performed by: EMERGENCY MEDICINE

## 2019-02-08 PROCEDURE — 96372 THER/PROPH/DIAG INJ SC/IM: CPT | Mod: S$GLB,,, | Performed by: EMERGENCY MEDICINE

## 2019-02-08 PROCEDURE — 87880 POCT RAPID STREP A: ICD-10-PCS | Mod: QW,S$GLB,, | Performed by: NURSE PRACTITIONER

## 2019-02-08 PROCEDURE — 87880 STREP A ASSAY W/OPTIC: CPT | Mod: QW,S$GLB,, | Performed by: NURSE PRACTITIONER

## 2019-02-08 PROCEDURE — 3008F BODY MASS INDEX DOCD: CPT | Mod: CPTII,S$GLB,, | Performed by: NURSE PRACTITIONER

## 2019-02-08 RX ORDER — ONDANSETRON 2 MG/ML
4 INJECTION INTRAMUSCULAR; INTRAVENOUS
Status: COMPLETED | OUTPATIENT
Start: 2019-02-08 | End: 2019-02-08

## 2019-02-08 RX ORDER — ONDANSETRON 4 MG/1
4 TABLET, ORALLY DISINTEGRATING ORAL EVERY 8 HOURS PRN
Qty: 6 TABLET | Refills: 0 | Status: ON HOLD | OUTPATIENT
Start: 2019-02-08 | End: 2019-06-14 | Stop reason: HOSPADM

## 2019-02-08 RX ORDER — AMOXICILLIN 875 MG/1
875 TABLET, FILM COATED ORAL 2 TIMES DAILY
Qty: 20 TABLET | Refills: 0 | Status: SHIPPED | OUTPATIENT
Start: 2019-02-08 | End: 2019-02-18

## 2019-02-08 RX ADMIN — ONDANSETRON 4 MG: 2 INJECTION INTRAMUSCULAR; INTRAVENOUS at 02:02

## 2019-02-08 NOTE — PROGRESS NOTES
"Subjective:       Patient ID: Sonam Hidalgo is a 28 y.o. female.    Vitals:  height is 5' 2" (1.575 m) and weight is 101.2 kg (223 lb). Her oral temperature is 98.3 °F (36.8 °C). Her blood pressure is 118/78 and her pulse is 107. Her respiration is 19 and oxygen saturation is 98%.     Chief Complaint: URI (fatigue, nausea, vomiting, body aches, sore throat, chills)    Patient is pregnant.       URI    This is a new problem. The current episode started today. The problem has been unchanged. There has been no fever. Associated symptoms include nausea, a sore throat and vomiting. Pertinent negatives include no chest pain, congestion, coughing, diarrhea, dysuria, headaches or rash. She has tried nothing for the symptoms.       Constitution: Positive for chills and fatigue. Negative for fever.   HENT: Positive for sore throat. Negative for congestion.    Neck: Negative for painful lymph nodes.   Cardiovascular: Negative for chest pain and leg swelling.   Eyes: Negative for double vision and blurred vision.   Respiratory: Negative for cough and shortness of breath.    Gastrointestinal: Positive for nausea and vomiting. Negative for diarrhea.   Genitourinary: Negative for dysuria, frequency, urgency and history of kidney stones.   Musculoskeletal: Positive for muscle ache. Negative for joint pain, joint swelling and muscle cramps.   Skin: Negative for color change, pale, rash and bruising.   Allergic/Immunologic: Negative for seasonal allergies.   Neurological: Negative for dizziness, history of vertigo, light-headedness, passing out and headaches.   Hematologic/Lymphatic: Negative for swollen lymph nodes.   Psychiatric/Behavioral: Negative for nervous/anxious, sleep disturbance and depression. The patient is not nervous/anxious.        Objective:      Physical Exam   Constitutional: She is oriented to person, place, and time. She appears well-developed and well-nourished. She is cooperative.  Non-toxic appearance. " She appears ill. No distress.   HENT:   Head: Normocephalic and atraumatic.   Right Ear: Hearing, tympanic membrane, external ear and ear canal normal.   Left Ear: Hearing, tympanic membrane, external ear and ear canal normal.   Nose: Nose normal. No mucosal edema, rhinorrhea or nasal deformity. No epistaxis. Right sinus exhibits no maxillary sinus tenderness and no frontal sinus tenderness. Left sinus exhibits no maxillary sinus tenderness and no frontal sinus tenderness.   Mouth/Throat: Uvula is midline and mucous membranes are normal. No trismus in the jaw. Normal dentition. No uvula swelling. Posterior oropharyngeal erythema present. Tonsils are 2+ on the right. Tonsils are 2+ on the left. Tonsillar exudate.       Eyes: Conjunctivae and lids are normal. No scleral icterus.   Sclera clear bilat   Neck: Trachea normal, full passive range of motion without pain and phonation normal. Neck supple.   Cardiovascular: Normal rate, regular rhythm, normal heart sounds, intact distal pulses and normal pulses.   Pulmonary/Chest: Effort normal and breath sounds normal. No respiratory distress.   Abdominal: Soft. Normal appearance and bowel sounds are normal. She exhibits no distension. There is no tenderness.   Musculoskeletal: Normal range of motion. She exhibits no edema or deformity.   Neurological: She is alert and oriented to person, place, and time. She exhibits normal muscle tone. Coordination normal.   Skin: Skin is warm, dry and intact. She is not diaphoretic. No pallor.   Psychiatric: She has a normal mood and affect. Her speech is normal and behavior is normal. Judgment and thought content normal. Cognition and memory are normal.   Nursing note and vitals reviewed.      Results for orders placed or performed in visit on 02/08/19   POCT rapid strep A   Result Value Ref Range    Rapid Strep A Screen Negative Negative     Acceptable Yes      Assessment:       1. Exudative tonsillitis    2. Fever,  unspecified fever cause    3. Sore throat    4. Non-intractable vomiting with nausea, unspecified vomiting type        Plan:         Case discussed with Dr. Aviles and agrees with medication management and plan of care. Discussed with patient that if she continues to vomit she needs to seek care for precautions with her current pregnancy. Tylenol only for fevers/pain.     Able to tolerate liquids after zofran IM administration.     Exudative tonsillitis  -     amoxicillin (AMOXIL) 875 MG tablet; Take 1 tablet (875 mg total) by mouth 2 (two) times daily. for 10 days  Dispense: 20 tablet; Refill: 0  -     Strep A culture, throat    Fever, unspecified fever cause  -     POCT Influenza A/B    Sore throat  -     POCT rapid strep A    Non-intractable vomiting with nausea, unspecified vomiting type  -     ondansetron injection 4 mg  -     ondansetron (ZOFRAN-ODT) 4 MG TbDL; Take 1 tablet (4 mg total) by mouth every 8 (eight) hours as needed (nausea).  Dispense: 6 tablet; Refill: 0            Patient Instructions     We will call with the results in the next 3-5 days.  Motrin or Tylenol for fevers.   If he would continue to have vomiting you need to seek emergency medical care center or pregnant.      You must understand that you've received an Urgent Care treatment only and that you may be released before all your medical problems are known or treated. You, the patient, will arrange for follow up care as instructed.  If your condition worsens we recommend that you receive another evaluation at the emergency room immediately or contact your primary medical clinics after hours call service to discuss your concerns.  Please return here or go to the Emergency Department for any concerns or worsening of condition.      Pharyngitis (Sore Throat), Report Pending    Pharyngitis (sore throat) is often due to a virus. It can also be caused by the streptococcus, or strep, bacterium, often called strep throat. Both viral and strep  infections can cause throat pain that is worse when swallowing, aching all over with headache, and fever. Both types of infections are contagious. They may be spread by coughing, kissing, or touching others after touching your mouth or nose.  A test has been done to find out whether you (or your child, if your child is the patient) have strep throat. Call this facility or your healthcare provider if you were not given your test results. If the test is positive for strep infection, you will need to take antibiotic medicines. A prescription can be called into your pharmacy at that time. If the test is negative, you probably have a viral pharyngitis. This does not need to be treated with antibiotics. Until you receive the results of the strep test, you should stay home from work. If your child is being tested, he or she should stay home from school.  Home care  · Rest at home. Drink plenty of fluids so you won't get dehydrated.  · If the test is positive for strep, don't go to work or school for the first 2 days of taking the antibiotics. After this time, you will not be contagious. You can then return to work or school if you are feeling better.   · Take the antibiotic medicine for the full 10 days, even if you feel better. This is very important to make sure the infection is treated. It is also important to prevent drug-resistant germs from developing. If you were given an antibiotic shot, you won't need more antibiotics.  · For children: Use acetaminophen for fever, fussiness, or discomfort. In infants older than 6 months of age, you may use ibuprofen instead of acetaminophen. Talk with your child's healthcare provider before giving these medicines if your child has chronic liver or kidney disease or ever had a stomach ulcer or GI bleeding. Never give aspirin to a child under 18 years of age who is ill with a fever. It may cause severe liver damage.  · For adults: Use acetaminophen or ibuprofen to control pain or  fever, unless another medicine was prescribed for this. Talk with your healthcare provider before taking these medicines if you have chronic liver or kidney disease or ever had a stomach ulcer or GI bleeding.  · Use throat lozenges or numbing throat sprays to help reduce pain. Gargling with warm salt water will also help reduce throat pain. For this, dissolve 1/2 teaspoon of salt in 1 glass of warm water. To help soothe a sore throat, children can sip on juice or a popsicle. Children 5 years and older can also suck on a lollipop or hard candy.  · Don't eat salty or spicy foods. These can irritate the throat.  Follow-up care  Follow up with your healthcare provider or our staff if you don't get better over the next week.  When to seek medical advice  Call your healthcare provider right away if any of these occur:  · Fever as directed by your healthcare provider. For children, seek care if:  ¨ Your child is of any age and has repeated fevers above 104°F (40°C).  ¨ Your child is younger than 2 years of age and has a fever of 100.4°F (38°C) that continues for more than 1 day.  ¨ Your child is 2 years old or older and has a fever of 100.4°F (38°C) that continues for more than 3 days.  · New or worsening ear pain, sinus pain, or headache  · Painful lumps in the back of neck  · Stiff neck  · Lymph nodes are getting larger  · Inability to swallow liquids, excessive drooling, or inability to open mouth wide due to throat pain  · Signs of dehydration (very dark urine or no urine, sunken eyes, dizziness)  · Trouble breathing or noisy breathing  · Muffled voice  · New rash  · Child appears to be getting sicker  Date Last Reviewed: 4/13/2015  © 8140-0853 [employer]. [address] [place] [address], [place] [address]. All rights reserved. This information is not intended as a substitute for professional medical care. Always follow your healthcare professional's instructions.

## 2019-02-08 NOTE — LETTER
February 8, 2019      [facility] [place]  [address]  [place] [address]  Phone: [phone]  Fax: [phone]       Patient: Sonam Hidalgo   YOB: 1990  Date of Visit: 02/08/2019    To Whom It May Concern:    Joana Hidalgo  was at [facility] on 02/08/2019. She may return to work/school on 02/10/2019 with no restrictions. If you have any questions or concerns, or if I can be of further assistance, please do not hesitate to contact me.    Sincerely,        Martha Orona NP

## 2019-02-08 NOTE — PATIENT INSTRUCTIONS
We will call with the results in the next 3-5 days.  Motrin or Tylenol for fevers.   If he would continue to have vomiting you need to seek emergency medical care center or pregnant.      You must understand that you've received an Urgent Care treatment only and that you may be released before all your medical problems are known or treated. You, the patient, will arrange for follow up care as instructed.  If your condition worsens we recommend that you receive another evaluation at the emergency room immediately or contact your primary medical clinics after hours call service to discuss your concerns.  Please return here or go to the Emergency Department for any concerns or worsening of condition.      Pharyngitis (Sore Throat), Report Pending    Pharyngitis (sore throat) is often due to a virus. It can also be caused by the streptococcus, or strep, bacterium, often called strep throat. Both viral and strep infections can cause throat pain that is worse when swallowing, aching all over with headache, and fever. Both types of infections are contagious. They may be spread by coughing, kissing, or touching others after touching your mouth or nose.  A test has been done to find out whether you (or your child, if your child is the patient) have strep throat. Call this facility or your healthcare provider if you were not given your test results. If the test is positive for strep infection, you will need to take antibiotic medicines. A prescription can be called into your pharmacy at that time. If the test is negative, you probably have a viral pharyngitis. This does not need to be treated with antibiotics. Until you receive the results of the strep test, you should stay home from work. If your child is being tested, he or she should stay home from school.  Home care  · Rest at home. Drink plenty of fluids so you won't get dehydrated.  · If the test is positive for strep, don't go to work or school for the first 2 days  of taking the antibiotics. After this time, you will not be contagious. You can then return to work or school if you are feeling better.   · Take the antibiotic medicine for the full 10 days, even if you feel better. This is very important to make sure the infection is treated. It is also important to prevent drug-resistant germs from developing. If you were given an antibiotic shot, you won't need more antibiotics.  · For children: Use acetaminophen for fever, fussiness, or discomfort. In infants older than 6 months of age, you may use ibuprofen instead of acetaminophen. Talk with your child's healthcare provider before giving these medicines if your child has chronic liver or kidney disease or ever had a stomach ulcer or GI bleeding. Never give aspirin to a child under 18 years of age who is ill with a fever. It may cause severe liver damage.  · For adults: Use acetaminophen or ibuprofen to control pain or fever, unless another medicine was prescribed for this. Talk with your healthcare provider before taking these medicines if you have chronic liver or kidney disease or ever had a stomach ulcer or GI bleeding.  · Use throat lozenges or numbing throat sprays to help reduce pain. Gargling with warm salt water will also help reduce throat pain. For this, dissolve 1/2 teaspoon of salt in 1 glass of warm water. To help soothe a sore throat, children can sip on juice or a popsicle. Children 5 years and older can also suck on a lollipop or hard candy.  · Don't eat salty or spicy foods. These can irritate the throat.  Follow-up care  Follow up with your healthcare provider or our staff if you don't get better over the next week.  When to seek medical advice  Call your healthcare provider right away if any of these occur:  · Fever as directed by your healthcare provider. For children, seek care if:  ¨ Your child is of any age and has repeated fevers above 104°F (40°C).  ¨ Your child is younger than 2 years of age and has  a fever of 100.4°F (38°C) that continues for more than 1 day.  ¨ Your child is 2 years old or older and has a fever of 100.4°F (38°C) that continues for more than 3 days.  · New or worsening ear pain, sinus pain, or headache  · Painful lumps in the back of neck  · Stiff neck  · Lymph nodes are getting larger  · Inability to swallow liquids, excessive drooling, or inability to open mouth wide due to throat pain  · Signs of dehydration (very dark urine or no urine, sunken eyes, dizziness)  · Trouble breathing or noisy breathing  · Muffled voice  · New rash  · Child appears to be getting sicker  Date Last Reviewed: 4/13/2015  © 6939-7154 [employer]. [address] [place] [address], [place] [address]. All rights reserved. This information is not intended as a substitute for professional medical care. Always follow your healthcare professional's instructions.

## 2019-02-09 ENCOUNTER — NURSE TRIAGE (OUTPATIENT)
Dept: ADMINISTRATIVE | Facility: CLINIC | Age: 29
End: 2019-02-09

## 2019-02-09 NOTE — TELEPHONE ENCOUNTER
Reason for Disposition   Health Information question, no triage required and triager able to answer question    Protocols used: ST INFORMATION ONLY CALL-A-    19 weeks pregnant seen yesterday for pharyngitis.  Wanted to know if she can take motrin since it is the only thing she has. .  Advised - no tylenol is the safest to take when pregnant. Voiced understading. advised to call back for concerns.

## 2019-02-11 ENCOUNTER — TELEPHONE (OUTPATIENT)
Dept: URGENT CARE | Facility: CLINIC | Age: 29
End: 2019-02-11

## 2019-02-11 LAB — S PYO THROAT QL CULT: NEGATIVE

## 2019-02-11 NOTE — TELEPHONE ENCOUNTER
Discussed negative test results with patient and that she could stop taking antibiotics. She states she is feeling better now and that she is no longer vomiting.

## 2019-02-15 ENCOUNTER — PATIENT MESSAGE (OUTPATIENT)
Dept: OBSTETRICS AND GYNECOLOGY | Facility: CLINIC | Age: 29
End: 2019-02-15

## 2019-02-15 ENCOUNTER — PATIENT MESSAGE (OUTPATIENT)
Dept: ADMINISTRATIVE | Facility: OTHER | Age: 29
End: 2019-02-15

## 2019-02-25 ENCOUNTER — OFFICE VISIT (OUTPATIENT)
Dept: INTERNAL MEDICINE | Facility: CLINIC | Age: 29
End: 2019-02-25
Payer: COMMERCIAL

## 2019-02-25 VITALS
OXYGEN SATURATION: 98 % | HEART RATE: 102 BPM | DIASTOLIC BLOOD PRESSURE: 62 MMHG | TEMPERATURE: 98 F | BODY MASS INDEX: 40.41 KG/M2 | HEIGHT: 63 IN | SYSTOLIC BLOOD PRESSURE: 120 MMHG | WEIGHT: 228.06 LBS

## 2019-02-25 DIAGNOSIS — J06.9 UPPER RESPIRATORY TRACT INFECTION, UNSPECIFIED TYPE: Primary | ICD-10-CM

## 2019-02-25 PROCEDURE — 99213 PR OFFICE/OUTPT VISIT, EST, LEVL III, 20-29 MIN: ICD-10-PCS | Mod: S$GLB,,, | Performed by: FAMILY MEDICINE

## 2019-02-25 PROCEDURE — 99999 PR PBB SHADOW E&M-EST. PATIENT-LVL III: CPT | Mod: PBBFAC,,, | Performed by: FAMILY MEDICINE

## 2019-02-25 PROCEDURE — 87541 LEGION PNEUMO DNA AMP PROB: CPT

## 2019-02-25 PROCEDURE — 99999 PR PBB SHADOW E&M-EST. PATIENT-LVL III: ICD-10-PCS | Mod: PBBFAC,,, | Performed by: FAMILY MEDICINE

## 2019-02-25 PROCEDURE — 99213 OFFICE O/P EST LOW 20 MIN: CPT | Mod: S$GLB,,, | Performed by: FAMILY MEDICINE

## 2019-02-25 PROCEDURE — 3008F BODY MASS INDEX DOCD: CPT | Mod: CPTII,S$GLB,, | Performed by: FAMILY MEDICINE

## 2019-02-25 PROCEDURE — 3008F PR BODY MASS INDEX (BMI) DOCUMENTED: ICD-10-PCS | Mod: CPTII,S$GLB,, | Performed by: FAMILY MEDICINE

## 2019-02-25 NOTE — PROGRESS NOTES
"[facility]  Clinic Note      Subjective:       Patient ID: Sonam Hidalgo is a 28 y.o. female.    Chief Complaint: Cough    New patient is here today for a sick visit.  She notes she has been sick for the last 2-3 weeks, with initial fever and throat swelling, and had been seen at that time at [facility].  The fever has resolved but she feels she is not getting better.  She tested negative for influenza and Strep, follow-up throat culture also negative.  She finished an empiric course of amoxicillin for the throat swelling, about 10 days ago.  She notes her fever has resolved, but with persistent cough and associated headache.  She has had a few episodes of post-tussive emesis, no wheezing/rales, and no chest pain.  No other diarrhea or constipation.  She has a 3-year old son, who is also sick at home.  She is 21 weeks pregnant and was congratulated!  She notes some associated rhinorrhea and nasal congestion, no focal sinus pain/pressure.  She is not prone to recurrent sinus infections.  She has tried Robitussin with mild relief, as well as Ludens cough drops which don't seem to help.      Review of Systems   Constitutional: Positive for fatigue. Negative for fever.   HENT: Positive for congestion, rhinorrhea and sore throat (from coughing). Negative for sinus pressure and sinus pain.    Respiratory: Positive for cough. Negative for chest tightness, shortness of breath and wheezing.    Cardiovascular: Negative for chest pain and palpitations.   Gastrointestinal: Positive for vomiting (post-tussive episodes). Negative for diarrhea and nausea.   Skin: Negative for rash and wound.   Neurological: Positive for headaches. Negative for dizziness and syncope.       Objective:      /62   Pulse 102   Temp 98.3 °F (36.8 °C)   Ht 5' 3" (1.6 m)   Wt 103.4 kg (228 lb 1.1 oz)   LMP 09/25/2018   SpO2 98%   BMI 40.40 kg/m²   Physical Exam   Constitutional: She is oriented to person, place, and " time. She appears well-developed and well-nourished. No distress.   HENT:   Head: Normocephalic and atraumatic.   Right Ear: Tympanic membrane and ear canal normal. Tympanic membrane is not erythematous and not retracted. No middle ear effusion.   Left Ear: Tympanic membrane and ear canal normal. Tympanic membrane is not erythematous and not retracted.  No middle ear effusion.   Nose: Mucosal edema and rhinorrhea present. Right sinus exhibits no maxillary sinus tenderness and no frontal sinus tenderness. Left sinus exhibits no maxillary sinus tenderness and no frontal sinus tenderness.   Mouth/Throat: Mucous membranes are normal. Posterior oropharyngeal erythema (mild) present. No oropharyngeal exudate, posterior oropharyngeal edema or tonsillar abscesses.   Eyes: Conjunctivae are normal. Pupils are equal, round, and reactive to light.   Neck: Normal range of motion.   Cardiovascular: Normal rate and regular rhythm.   No murmur heard.  Pulmonary/Chest: Effort normal and breath sounds normal. No tachypnea. No respiratory distress. She has no wheezes. She has no rhonchi. She has no rales. Chest wall is not dull to percussion.   Abdominal: Soft. Bowel sounds are normal. She exhibits no distension. There is no tenderness.   Musculoskeletal: Normal range of motion.   Lymphadenopathy:     She has no cervical adenopathy.   Neurological: She is alert and oriented to person, place, and time.   Skin: Skin is warm and dry. No rash noted.   Psychiatric: She has a normal mood and affect.   Vitals reviewed.      Assessment:       1. Upper respiratory tract infection, unspecified type        Plan:     1. Upper respiratory tract infection, unspecified type  - history and recent note from urgent care reviewed, normal lung exam today, throat swelling/abscess resolved, findings reviewed  - differential reviewed, presentation is most consistent with post-viral cough versus acute bronchitis, likely viral, though would also consider  "atypical pneumonia as well  - evaluation options reviewed, have discussed to avoid CXR during pregnancy and patient agrees, but also this is not indicated at this time, will check respiratory panel to help guide additional therapy if needed/rule out atypical pneumonia, have cautioned that this test will take 3-4 days to result but hopefully will have our answer back before the weekend, patient expressed understanding  - - RESPIRATORY PATHOGENS PANEL (TEM-PCR) Nasopharyngeal Swab   - in the meantime, have recommended to continue supportive care measures, and we reviewed safe options during pregnancy including honey, Vicks, and "Class B" remedies  - - have discussed that if her respiratory testing is positive we may need to consider additional antibiotic, will call the patient to discuss in that case  - questions answered, warning signs reviewed, patient is comfortable with plan to monitor condition and was encouraged to call the office for any concerns or worsening of condition     - Follow-up in 1-2 weeks if needed, for follow-up cough.     Parviz Khanna MD  2/25/2019     "

## 2019-02-28 ENCOUNTER — TELEPHONE (OUTPATIENT)
Dept: INTERNAL MEDICINE | Facility: CLINIC | Age: 29
End: 2019-02-28

## 2019-02-28 DIAGNOSIS — J14 PNEUMONIA DUE TO HAEMOPHILUS INFLUENZAE, UNSPECIFIED LATERALITY, UNSPECIFIED PART OF LUNG: Primary | ICD-10-CM

## 2019-02-28 LAB
ENTEROVIRUS: NOT DETECTED
HUMAN BOCAVIRUS: NOT DETECTED
HUMAN CORONAVIRUS, COMMON COLD VIRUS: NOT DETECTED
INFLUENZA A - H1N1-09: NOT DETECTED
LEGIONELLA PNEUMOPHILA: NOT DETECTED
MORAXELLA CATARRHALIS: NOT DETECTED
PARAINFLUENZA: NOT DETECTED
RVP - ADENOVIRUS: NOT DETECTED
RVP - HUMAN METAPNEUMOVIRUS (HMPV): NOT DETECTED
RVP - INFLUENZA A: NOT DETECTED
RVP - INFLUENZA B: NOT DETECTED
RVP - RESPIRATORY SYNCTIAL VIRUS (RSV) A: NOT DETECTED
RVP - RESPIRATORY VIRAL PANEL, SOURCE: ABNORMAL
RVP - RHINOVIRUS: NOT DETECTED
TEM - ACINETOBACTER BAUMANNII: NOT DETECTED
TEM - BORDETELLA PERTUSSIS: NOT DETECTED
TEM - CHLAMYDOPHILA PNEUMONIAE: NOT DETECTED
TEM - KLEBSIELLA PNEUMONIAE: NOT DETECTED
TEM - MRSA: NOT DETECTED
TEM - MYCOPLASMA PNEUMONIAE: NOT DETECTED
TEM - NEISSERIA MENINGITIDIS: NOT DETECTED
TEM - PANTON-VALENTINE: NOT DETECTED
TEM - PSEUDOMONAS AERUGINOSA: NOT DETECTED
TEM - STAPHYLOCOCCUS AUREUS: NOT DETECTED
TEM - STREPTOCOCCUS PNEUMONIAE: NOT DETECTED
TEM - STREPTOCOCCUS PYOGENES A: NOT DETECTED
TEM- HAEMOPHILUS INFLUENZAE B: NOT DETECTED
TEM- HAEMOPHILUS INFLUENZAE: POSITIVE

## 2019-02-28 RX ORDER — CEFDINIR 300 MG/1
300 CAPSULE ORAL 2 TIMES DAILY
Qty: 10 CAPSULE | Refills: 0 | Status: SHIPPED | OUTPATIENT
Start: 2019-02-28 | End: 2019-03-05

## 2019-02-28 NOTE — TELEPHONE ENCOUNTER
Patient was called to discuss lab results, respiratory panel showing positive for H flu.  She notes persistent cough and post-tussive emesis, no fever but not feeling any better.  She had completed a course of Amoxil, but will provide with follow-up course of cephalosporin in case of resistant/beta-lactamase producing strain.  Patient is pregnant, and we discussed Cefdinir is considered safe during pregnancy.  CXR will be deferred due to the pregnancy.  Questions answered, patient is comfortable with plan, Rx sent to pharmacy.    Parviz Khanna MD  2/28/2019 1:42 PM

## 2019-03-07 ENCOUNTER — ROUTINE PRENATAL (OUTPATIENT)
Dept: OBSTETRICS AND GYNECOLOGY | Facility: CLINIC | Age: 29
End: 2019-03-07
Payer: COMMERCIAL

## 2019-03-07 ENCOUNTER — TELEPHONE (OUTPATIENT)
Dept: PEDIATRIC CARDIOLOGY | Facility: CLINIC | Age: 29
End: 2019-03-07

## 2019-03-07 ENCOUNTER — PROCEDURE VISIT (OUTPATIENT)
Dept: MATERNAL FETAL MEDICINE | Facility: CLINIC | Age: 29
End: 2019-03-07
Payer: COMMERCIAL

## 2019-03-07 VITALS
SYSTOLIC BLOOD PRESSURE: 126 MMHG | DIASTOLIC BLOOD PRESSURE: 58 MMHG | WEIGHT: 228.75 LBS | BODY MASS INDEX: 40.52 KG/M2

## 2019-03-07 DIAGNOSIS — O99.210 OBESITY IN PREGNANCY, ANTEPARTUM: ICD-10-CM

## 2019-03-07 DIAGNOSIS — Z3A.23 23 WEEKS GESTATION OF PREGNANCY: Primary | ICD-10-CM

## 2019-03-07 DIAGNOSIS — Z36.89 ENCOUNTER FOR ULTRASOUND TO ASSESS FETAL GROWTH: Primary | ICD-10-CM

## 2019-03-07 DIAGNOSIS — O35.9XX0 SUSPECTED FETAL ABNORMALITY AFFECTING MANAGEMENT OF MOTHER, SINGLE OR UNSPECIFIED FETUS: Primary | ICD-10-CM

## 2019-03-07 PROCEDURE — 0502F PR SUBSEQUENT PRENATAL CARE: ICD-10-PCS | Mod: CPTII,S$GLB,, | Performed by: OBSTETRICS & GYNECOLOGY

## 2019-03-07 PROCEDURE — 0502F SUBSEQUENT PRENATAL CARE: CPT | Mod: CPTII,S$GLB,, | Performed by: OBSTETRICS & GYNECOLOGY

## 2019-03-07 PROCEDURE — 76816 PR  US,PREGNANT UTERUS,F/U,TRANSABD APP: ICD-10-PCS | Mod: S$GLB,,, | Performed by: OBSTETRICS & GYNECOLOGY

## 2019-03-07 PROCEDURE — 99999 PR PBB SHADOW E&M-EST. PATIENT-LVL III: CPT | Mod: PBBFAC,,, | Performed by: OBSTETRICS & GYNECOLOGY

## 2019-03-07 PROCEDURE — 90471 IMMUNIZATION ADMIN: CPT | Mod: S$GLB,,, | Performed by: OBSTETRICS & GYNECOLOGY

## 2019-03-07 PROCEDURE — 90686 IIV4 VACC NO PRSV 0.5 ML IM: CPT | Mod: S$GLB,,, | Performed by: OBSTETRICS & GYNECOLOGY

## 2019-03-07 PROCEDURE — 76816 OB US FOLLOW-UP PER FETUS: CPT | Mod: S$GLB,,, | Performed by: OBSTETRICS & GYNECOLOGY

## 2019-03-07 PROCEDURE — 90686 FLU VACCINE (QUAD) GREATER THAN OR EQUAL TO 3YO PRESERVATIVE FREE IM: ICD-10-PCS | Mod: S$GLB,,, | Performed by: OBSTETRICS & GYNECOLOGY

## 2019-03-07 PROCEDURE — 99999 PR PBB SHADOW E&M-EST. PATIENT-LVL III: ICD-10-PCS | Mod: PBBFAC,,, | Performed by: OBSTETRICS & GYNECOLOGY

## 2019-03-07 PROCEDURE — 90471 FLU VACCINE (QUAD) GREATER THAN OR EQUAL TO 3YO PRESERVATIVE FREE IM: ICD-10-PCS | Mod: S$GLB,,, | Performed by: OBSTETRICS & GYNECOLOGY

## 2019-03-07 NOTE — PROGRESS NOTES
HERE for routine OB visit at 23 3/7 wks, with round ligament pain at times.  Supportive care, maternity belt and warm soaks. Consider PT.    Denies vaginal bleeding, cramping/ ctx, or LOF.  + FM.   [facility] BID. OB screen CBC. FLU vaccine today.  F/U in four weeks

## 2019-03-07 NOTE — TELEPHONE ENCOUNTER
Spoke with patient and scheduled for fetal echo on 4/12 per request at [facility] at 245. Office number and location verified. Appt reminder slip mailed to address on file and confirmed by pt.    ----- Message from Екатерина Wellington MA sent at 3/7/2019  3:35 PM CST -----  Hey [place],     This pt was seen by Dr. Dye today in [facility]. She is requesting the pt be scheduled for a fetal echo due to Subopt/BMI . She is currently 23 2/7  with an GRIFFIN of 07/02/19. Please call us if you have any questions.     Thank You,     FATOUMATA Mejias,PAR  Maternal Fetal Medicine  [phone]

## 2019-03-07 NOTE — PROGRESS NOTES
After obtaining consent, and per orders of Dr. Benton, injection of fluzone Lot [id number] 6/30/19 given in the LD by DAVID ANDERSON. Patient tolerated well and band aid applied. Patient instructed to remain in clinic for 15 minutes afterwards, and to report any adverse reaction to me immediately.

## 2019-04-04 ENCOUNTER — CLINICAL SUPPORT (OUTPATIENT)
Dept: INTERNAL MEDICINE | Facility: CLINIC | Age: 29
End: 2019-04-04
Payer: COMMERCIAL

## 2019-04-04 ENCOUNTER — ROUTINE PRENATAL (OUTPATIENT)
Dept: OBSTETRICS AND GYNECOLOGY | Facility: CLINIC | Age: 29
End: 2019-04-04
Payer: COMMERCIAL

## 2019-04-04 VITALS
WEIGHT: 235.56 LBS | BODY MASS INDEX: 41.73 KG/M2 | DIASTOLIC BLOOD PRESSURE: 86 MMHG | SYSTOLIC BLOOD PRESSURE: 120 MMHG

## 2019-04-04 DIAGNOSIS — Z3A.23 23 WEEKS GESTATION OF PREGNANCY: ICD-10-CM

## 2019-04-04 DIAGNOSIS — Z3A.27 27 WEEKS GESTATION OF PREGNANCY: Primary | ICD-10-CM

## 2019-04-04 LAB
BASOPHILS # BLD AUTO: 0.03 K/UL (ref 0–0.2)
BASOPHILS NFR BLD: 0.3 % (ref 0–1.9)
DIFFERENTIAL METHOD: ABNORMAL
EOSINOPHIL # BLD AUTO: 0.1 K/UL (ref 0–0.5)
EOSINOPHIL NFR BLD: 0.9 % (ref 0–8)
ERYTHROCYTE [DISTWIDTH] IN BLOOD BY AUTOMATED COUNT: 14.8 % (ref 11.5–14.5)
GLUCOSE SERPL-MCNC: 114 MG/DL (ref 70–140)
HCT VFR BLD AUTO: 32.7 % (ref 37–48.5)
HGB BLD-MCNC: 10.3 G/DL (ref 12–16)
IMM GRANULOCYTES # BLD AUTO: 0.14 K/UL (ref 0–0.04)
IMM GRANULOCYTES NFR BLD AUTO: 1.4 % (ref 0–0.5)
LYMPHOCYTES # BLD AUTO: 1.8 K/UL (ref 1–4.8)
LYMPHOCYTES NFR BLD: 17.7 % (ref 18–48)
MCH RBC QN AUTO: 25.5 PG (ref 27–31)
MCHC RBC AUTO-ENTMCNC: 31.5 G/DL (ref 32–36)
MCV RBC AUTO: 81 FL (ref 82–98)
MONOCYTES # BLD AUTO: 0.5 K/UL (ref 0.3–1)
MONOCYTES NFR BLD: 4.8 % (ref 4–15)
NEUTROPHILS # BLD AUTO: 7.6 K/UL (ref 1.8–7.7)
NEUTROPHILS NFR BLD: 74.9 % (ref 38–73)
NRBC BLD-RTO: 0 /100 WBC
PLATELET # BLD AUTO: 328 K/UL (ref 150–350)
PMV BLD AUTO: 9.9 FL (ref 9.2–12.9)
RBC # BLD AUTO: 4.04 M/UL (ref 4–5.4)
WBC # BLD AUTO: 10.16 K/UL (ref 3.9–12.7)

## 2019-04-04 PROCEDURE — 82950 GLUCOSE TEST: CPT

## 2019-04-04 PROCEDURE — 99999 PR PBB SHADOW E&M-EST. PATIENT-LVL III: ICD-10-PCS | Mod: PBBFAC,,, | Performed by: OBSTETRICS & GYNECOLOGY

## 2019-04-04 PROCEDURE — 85025 COMPLETE CBC W/AUTO DIFF WBC: CPT

## 2019-04-04 PROCEDURE — 0502F PR SUBSEQUENT PRENATAL CARE: ICD-10-PCS | Mod: CPTII,S$GLB,, | Performed by: OBSTETRICS & GYNECOLOGY

## 2019-04-04 PROCEDURE — 99999 PR PBB SHADOW E&M-EST. PATIENT-LVL III: CPT | Mod: PBBFAC,,, | Performed by: OBSTETRICS & GYNECOLOGY

## 2019-04-04 PROCEDURE — 0502F SUBSEQUENT PRENATAL CARE: CPT | Mod: CPTII,S$GLB,, | Performed by: OBSTETRICS & GYNECOLOGY

## 2019-04-04 PROCEDURE — 90471 IMMUNIZATION ADMIN: CPT | Mod: S$GLB,,, | Performed by: OBSTETRICS & GYNECOLOGY

## 2019-04-04 PROCEDURE — 90471 TDAP VACCINE GREATER THAN OR EQUAL TO 7YO IM: ICD-10-PCS | Mod: S$GLB,,, | Performed by: OBSTETRICS & GYNECOLOGY

## 2019-04-04 PROCEDURE — 90715 TDAP VACCINE GREATER THAN OR EQUAL TO 7YO IM: ICD-10-PCS | Mod: S$GLB,,, | Performed by: OBSTETRICS & GYNECOLOGY

## 2019-04-04 PROCEDURE — 90715 TDAP VACCINE 7 YRS/> IM: CPT | Mod: S$GLB,,, | Performed by: OBSTETRICS & GYNECOLOGY

## 2019-04-04 NOTE — PROGRESS NOTES
HERE for routine OB visit at 27 2/7 wks, with NO complaints.  Denies vaginal bleeding, cramping/ ctx, or LOF.  + FM.  [facility] BID.   PTL precautions. tdap today.  F/ U 2 wks

## 2019-04-04 NOTE — PROGRESS NOTES
After obtaining consent, and per orders of Dr. Benton, injection of tdap Lot [id number] 12/15/20 given in the LD by DAVID ANDERSON. Patient tolerated well and band aid applied. Patient instructed to remain in clinic for 15 minutes afterwards, and to report any adverse reaction to me immediately.

## 2019-04-08 ENCOUNTER — PATIENT MESSAGE (OUTPATIENT)
Dept: OBSTETRICS AND GYNECOLOGY | Facility: CLINIC | Age: 29
End: 2019-04-08

## 2019-04-09 DIAGNOSIS — F32.A DEPRESSION DURING PREGNANCY IN FIRST TRIMESTER: ICD-10-CM

## 2019-04-09 DIAGNOSIS — O99.341 DEPRESSION DURING PREGNANCY IN FIRST TRIMESTER: ICD-10-CM

## 2019-04-09 DIAGNOSIS — Z3A.11 11 WEEKS GESTATION OF PREGNANCY: ICD-10-CM

## 2019-04-09 RX ORDER — SERTRALINE HYDROCHLORIDE 50 MG/1
TABLET, FILM COATED ORAL
Qty: 30 TABLET | Refills: 3 | Status: SHIPPED | OUTPATIENT
Start: 2019-04-09 | End: 2019-10-07 | Stop reason: SDUPTHER

## 2019-04-09 NOTE — TELEPHONE ENCOUNTER
----- Message from Kusum López sent at 4/9/2019  1:14 PM CDT -----  Contact: pt   Type:  Patient Returning Call    Who Called: CLARA THOMPSON [[id number]]    Who Left Message for Patient: Merary Benton MD     Does the patient know what this is regarding?:yes     Best Call Back Number: [phone]    Additional Information: N/a

## 2019-04-09 NOTE — TELEPHONE ENCOUNTER
LIT Masters,  I called to touch base with you.  I hope you are feeling better.  If not I would like to see you in the office.  Anemia and fatigue in pregnancy can be overwhelming.  You can increase the water in your diet, try to rest and relax when it is possible.  Compression stockings, maternity belt and support can also help.    If cramping increases I will need to see you in the office.  MARY CARMEN Benton MD

## 2019-04-12 ENCOUNTER — PROCEDURE VISIT (OUTPATIENT)
Dept: MATERNAL FETAL MEDICINE | Facility: CLINIC | Age: 29
End: 2019-04-12
Payer: COMMERCIAL

## 2019-04-12 ENCOUNTER — OFFICE VISIT (OUTPATIENT)
Dept: PEDIATRIC CARDIOLOGY | Facility: CLINIC | Age: 29
End: 2019-04-12
Attending: PEDIATRICS
Payer: COMMERCIAL

## 2019-04-12 VITALS
DIASTOLIC BLOOD PRESSURE: 84 MMHG | SYSTOLIC BLOOD PRESSURE: 122 MMHG | HEART RATE: 80 BPM | WEIGHT: 237.19 LBS | BODY MASS INDEX: 42.02 KG/M2

## 2019-04-12 DIAGNOSIS — O35.BXX0 ECHOGENIC FOCUS OF HEART OF FETUS AFFECTING ANTEPARTUM CARE OF MOTHER, SINGLE OR UNSPECIFIED FETUS: ICD-10-CM

## 2019-04-12 DIAGNOSIS — O35.9XX0 SUSPECTED FETAL ABNORMALITY AFFECTING MANAGEMENT OF MOTHER, SINGLE OR UNSPECIFIED FETUS: ICD-10-CM

## 2019-04-12 DIAGNOSIS — Z03.73 FETAL ANOMALY SUSPECTED BUT NOT FOUND: Primary | ICD-10-CM

## 2019-04-12 DIAGNOSIS — Z36.89 ENCOUNTER FOR ULTRASOUND TO ASSESS FETAL GROWTH: ICD-10-CM

## 2019-04-12 PROCEDURE — 76827 ECHO EXAM OF FETAL HEART: CPT | Mod: S$GLB,,, | Performed by: PEDIATRICS

## 2019-04-12 PROCEDURE — 76816 PR  US,PREGNANT UTERUS,F/U,TRANSABD APP: ICD-10-PCS | Mod: S$GLB,,, | Performed by: PEDIATRICS

## 2019-04-12 PROCEDURE — 76816 OB US FOLLOW-UP PER FETUS: CPT | Mod: S$GLB,,, | Performed by: PEDIATRICS

## 2019-04-12 PROCEDURE — 99999 PR PBB SHADOW E&M-EST. PATIENT-LVL III: ICD-10-PCS | Mod: PBBFAC,,, | Performed by: PEDIATRICS

## 2019-04-12 PROCEDURE — 99999 PR PBB SHADOW E&M-EST. PATIENT-LVL III: CPT | Mod: PBBFAC,,, | Performed by: PEDIATRICS

## 2019-04-12 PROCEDURE — 76825 ECHO EXAM OF FETAL HEART: CPT | Mod: S$GLB,,, | Performed by: PEDIATRICS

## 2019-04-12 PROCEDURE — 99242 PR OFFICE CONSULTATION,LEVEL II: ICD-10-PCS | Mod: 25,S$GLB,, | Performed by: PEDIATRICS

## 2019-04-12 PROCEDURE — 76825 PR  SO2 FETAL HEART: ICD-10-PCS | Mod: S$GLB,,, | Performed by: PEDIATRICS

## 2019-04-12 PROCEDURE — 99499 UNLISTED E&M SERVICE: CPT | Mod: S$GLB,,, | Performed by: PEDIATRICS

## 2019-04-12 PROCEDURE — 93325 PR DOPPLER COLOR FLOW VELOCITY MAP: ICD-10-PCS | Mod: S$GLB,,, | Performed by: PEDIATRICS

## 2019-04-12 PROCEDURE — 99242 OFF/OP CONSLTJ NEW/EST SF 20: CPT | Mod: 25,S$GLB,, | Performed by: PEDIATRICS

## 2019-04-12 PROCEDURE — 93325 DOPPLER ECHO COLOR FLOW MAPG: CPT | Mod: S$GLB,,, | Performed by: PEDIATRICS

## 2019-04-12 PROCEDURE — 99499 NO LOS: ICD-10-PCS | Mod: S$GLB,,, | Performed by: PEDIATRICS

## 2019-04-12 PROCEDURE — 76827 PR  SO2 FETAL HEART DOPPLER: ICD-10-PCS | Mod: S$GLB,,, | Performed by: PEDIATRICS

## 2019-04-12 NOTE — PROGRESS NOTES
[facility] [place] [address] Fetal Cardiology Clinic    Today, I had the pleasure of evaluating Sonam Hidalgo who is now 29 y.o. and carrying her third pregnancy at 28 3/7 weeks gestation with an GRIFFIN of 19. She was referred for evaluation of the fetal heart due to suboptimal views of the fetal heart on MFM evaluation.      She is carrying a female  fetus.  Thus far, the pregnancy has been unremarkable.     Obstetric History:    .  Her OB history is notable for an  with first pregnancy and a term  with second pregnancy.      Past Medical History:   Diagnosis Date    Abnormal Pap smear of vagina     Allergy     Anxiety     Hypertension          Current Outpatient Medications:     iron,carb/vit C/vit B12/folic (IRON 100 PLUS ORAL), Take by mouth., Disp: , Rfl:     BABY ASPIRIN ORAL, , Disp: , Rfl:     ondansetron (ZOFRAN-ODT) 4 MG TbDL, Take 1 tablet (4 mg total) by mouth every 8 (eight) hours as needed (nausea)., Disp: 6 tablet, Rfl: 0    prenatal 25/iron fum/folic/dha (PRENATAL-1 ORAL), Take by mouth., Disp: , Rfl:     sertraline (ZOLOFT) 50 MG tablet, TAKE 1 TABLET BY MOUTH EVERY DAY, Disp: 30 tablet, Rfl: 3     Review of patient's allergies indicates:  No Known Allergies    Family History: Negative for congenital heart disease, genetic syndromes, multiple miscarriages or other congenital anomalies.    Social History: Ms. Sonam Hidalgo is in a relationship with the father of the baby.  The father of the baby is involved.     FETAL ECHOCARDIOGRAM (summary):  Fetal echo at 28 3/7 wga, GRIFFIN 2019.  Normal segmental anatomy  No signifiant valvar dysfunction.  The right ventricle measures top normal for gestational age.  Normal left ventricle structure and size. Echogenic focus noted in the left ventricle.  Normal right and left ventricular function.  Normal fetal patent ductus arteriosus  In limited views, the fetal aortic arch appears patent with no obstruction.  There is  no pericardial effusion.  (Full report in electronic medical record)    Impression:  Single active female fetus at 28 3/7 wga.  Fetal echo notable for generous RV size, which measures top normal for gestational age. There is no evidence of coarctation in arch images and there are at least two pulmonary veins that return to the left atrium.     In addition, there was an echogenic focus in the left heart. I reviewed the ultrasound finding of echodense foci.  When first described,  these foci were reported to have an association reported with Down syndrome.  However, subsequent paper have refuted this association. In the absence of other risk factors for Down syndrome, the prevailing  opinion is that the observation of echogenic foci has no predictive validity for the health and well being of the infant after delivery. In general, this observation in isolation requires any follow-up.    I discussed with her that fetal echocardiography is insufficiently sensitive to rule out all septal defects, anomalies of pulmonary and systemic veins, arch anomalies, and some valvar abnormalities, nor can it ensure that the ductus arteriosus and foramen ovale will spontaneously close.     Recommendations:  Location, timing, and mode of delivery will be determined by the obstetrical team.  She does not require further follow-up in the fetal echocardiography clinic, but I would be happy to see her again if additional questions or concerns arise.    I suspect that the infant's heart will be normal, but recommended a post- echo prior to nursery discharge.         Claudia Garcia MD, MSCI  Pediatric Cardiology  Pediatric Echocardiography, Fetal Echocardiography, Cardiac MRI  [facility] [address] [place]  [address]  Phone [phone], Fax [phone]    The above information was discussed in detail including the use of diagrams, with 30 minutes of total face to face time, with greater  than 50% with counseling and coordination of care.  The discussion of the diagnosis and treatment options is as described above.

## 2019-04-12 NOTE — PROGRESS NOTES
Indication  ========    Evaluation of fetal growth/ BMI.    History  ======    Previous Outcomes   3  Para 1    Pregnancy History  ==============    Maternal Lab Tests  Genetic screening declined.      Method  ======    Transabdominal ultrasound examination, 2D Color Doppler, Voluson E10. View: Good view.    Pregnancy  =========    Wan pregnancy. Number of fetuses: 1.    Dating  ======    Cycle: regular cycle  Ultrasound examination on: 2019  GA by U/S based upon: AC, BPD, Femur, HC  GA by U/S 29 w + 2 d  GRIFFIN by U/S: 2019  Assigned: Dating performed on 12/3/2018, based on the LMP  Assigned GA 28 w + 3 d  Assigned GRIFFIN: 2019    General Evaluation  ==============    Cardiac activity: present.  bpm.  Fetal movements: visualized.  Presentation: cephalic.  Placenta:  Placental site: posterior.  Umbilical cord: Cord vessels: 3 vessel cord.  Amniotic fluid: MVP 6.2 cm.    Fetal Biometry  ============    Fetal Biometry  BPD 74.4 mm 29w 6d Hadlock  OFD 95.1 mm 30w 5d Jeffrey  .1 mm 29w 3d Hadlock  .1 mm 28w 5d Hadlock  Femur 54.7 mm 28w 6d Hadlock  EFW 1,302 g 51% Justo  Calculated by: Hadlock (BPD-HC-AC-FL)  EFW (lb) 2 lb  EFW (oz) 14 oz  Cephalic index 0.78  HC / AC 1.11  FL / BPD 0.74  FL / AC 0.22  MVP 6.2 cm   bpm  Head / Face / Neck   3.2 mm    Fetal Anatomy  ============    Cranium: appears normal  Posterior fossa: normal  Profile: normal  4-chamber view: normal  Stomach: normal  Kidneys: normal  Bladder: normal  Wants to know gender: no  Other: A full anatomic survey has been previously performed.    Impression  =========    The fetal anatomic survey was completed today, and no fetal structural abnormalities were noted.  Interval fetal growth has been normal, and the AFV is normal.    F/U as clinically indicated.      Recommendation  ==============      Thank you again for allowing us to participate in the care of your patients. If you have any questions  concerning today's consultation, feel free  to contact me or one of my partners. We can be reached at [phone] during normal business hours. If you have a question after normal  business hours, please contact Labor and Delivery at [phone].

## 2019-04-12 NOTE — LETTER
April 12, 2019        Merary Benton MD  [address]  [address]  [place] [facility] [address]             [facility] [place] [address]  [address]  [facility] [facility] [address]  Phone: [phone]  Fax: [phone]   Patient: Sonam Hidalgo   MR Number: [id number]   YOB: 1990   Date of Visit: 4/12/2019       Dear Dr. Benton:    Thank you for referring Sonam Hidalgo to me for evaluation. Below are the relevant portions of my assessment and plan of care.            If you have questions, please do not hesitate to call me. I look forward to following Sonam along with you.    Sincerely,      Claudia Garcia MD           CC  Deedee Power MD

## 2019-05-04 ENCOUNTER — PATIENT MESSAGE (OUTPATIENT)
Dept: ADMINISTRATIVE | Facility: OTHER | Age: 29
End: 2019-05-04

## 2019-05-13 ENCOUNTER — HOSPITAL ENCOUNTER (EMERGENCY)
Facility: OTHER | Age: 29
Discharge: HOME | End: 2019-05-13
Attending: OBSTETRICS & GYNECOLOGY
Payer: COMMERCIAL

## 2019-05-13 ENCOUNTER — NURSE TRIAGE (OUTPATIENT)
Dept: ADMINISTRATIVE | Facility: CLINIC | Age: 29
End: 2019-05-13

## 2019-05-13 VITALS
OXYGEN SATURATION: 98 % | SYSTOLIC BLOOD PRESSURE: 135 MMHG | DIASTOLIC BLOOD PRESSURE: 82 MMHG | RESPIRATION RATE: 18 BRPM | TEMPERATURE: 97 F | HEART RATE: 82 BPM

## 2019-05-13 DIAGNOSIS — O16.3 ELEVATED BLOOD PRESSURE AFFECTING PREGNANCY IN THIRD TRIMESTER, ANTEPARTUM: ICD-10-CM

## 2019-05-13 DIAGNOSIS — Z3A.32 32 WEEKS GESTATION OF PREGNANCY: Primary | ICD-10-CM

## 2019-05-13 LAB
ALBUMIN SERPL BCP-MCNC: 2.5 G/DL (ref 3.5–5.2)
ALP SERPL-CCNC: 130 U/L (ref 55–135)
ALT SERPL W/O P-5'-P-CCNC: 11 U/L (ref 10–44)
ANION GAP SERPL CALC-SCNC: 4 MMOL/L (ref 8–16)
AST SERPL-CCNC: 13 U/L (ref 10–40)
BASOPHILS # BLD AUTO: 0.01 K/UL (ref 0–0.2)
BASOPHILS NFR BLD: 0.1 % (ref 0–1.9)
BILIRUB SERPL-MCNC: 0.4 MG/DL (ref 0.1–1)
BUN SERPL-MCNC: 7 MG/DL (ref 6–20)
CALCIUM SERPL-MCNC: 8.9 MG/DL (ref 8.7–10.5)
CHLORIDE SERPL-SCNC: 108 MMOL/L (ref 95–110)
CO2 SERPL-SCNC: 24 MMOL/L (ref 23–29)
CREAT SERPL-MCNC: 0.6 MG/DL (ref 0.5–1.4)
CREAT UR-MCNC: 71.9 MG/DL (ref 15–325)
DIFFERENTIAL METHOD: ABNORMAL
EOSINOPHIL # BLD AUTO: 0.1 K/UL (ref 0–0.5)
EOSINOPHIL NFR BLD: 1 % (ref 0–8)
ERYTHROCYTE [DISTWIDTH] IN BLOOD BY AUTOMATED COUNT: 15.4 % (ref 11.5–14.5)
EST. GFR  (AFRICAN AMERICAN): >60 ML/MIN/1.73 M^2
EST. GFR  (NON AFRICAN AMERICAN): >60 ML/MIN/1.73 M^2
GLUCOSE SERPL-MCNC: 87 MG/DL (ref 70–110)
HCT VFR BLD AUTO: 32.2 % (ref 37–48.5)
HGB BLD-MCNC: 10.1 G/DL (ref 12–16)
LYMPHOCYTES # BLD AUTO: 1.6 K/UL (ref 1–4.8)
LYMPHOCYTES NFR BLD: 23.3 % (ref 18–48)
MCH RBC QN AUTO: 24.8 PG (ref 27–31)
MCHC RBC AUTO-ENTMCNC: 31.4 G/DL (ref 32–36)
MCV RBC AUTO: 79 FL (ref 82–98)
MONOCYTES # BLD AUTO: 0.4 K/UL (ref 0.3–1)
MONOCYTES NFR BLD: 5.9 % (ref 4–15)
NEUTROPHILS # BLD AUTO: 4.7 K/UL (ref 1.8–7.7)
NEUTROPHILS NFR BLD: 68.8 % (ref 38–73)
PLATELET # BLD AUTO: 340 K/UL (ref 150–350)
PMV BLD AUTO: 9.5 FL (ref 9.2–12.9)
POTASSIUM SERPL-SCNC: 3.9 MMOL/L (ref 3.5–5.1)
PROT SERPL-MCNC: 6.6 G/DL (ref 6–8.4)
PROT UR-MCNC: 9 MG/DL (ref 0–15)
PROT/CREAT UR: 0.13 MG/G{CREAT} (ref 0–0.2)
RBC # BLD AUTO: 4.07 M/UL (ref 4–5.4)
SODIUM SERPL-SCNC: 136 MMOL/L (ref 136–145)
WBC # BLD AUTO: 6.81 K/UL (ref 3.9–12.7)

## 2019-05-13 PROCEDURE — 59025 PR FETAL 2N-STRESS TEST: ICD-10-PCS | Mod: 26,,, | Performed by: OBSTETRICS & GYNECOLOGY

## 2019-05-13 PROCEDURE — 25000003 PHARM REV CODE 250: Performed by: STUDENT IN AN ORGANIZED HEALTH CARE EDUCATION/TRAINING PROGRAM

## 2019-05-13 PROCEDURE — 85025 COMPLETE CBC W/AUTO DIFF WBC: CPT

## 2019-05-13 PROCEDURE — 80053 COMPREHEN METABOLIC PANEL: CPT

## 2019-05-13 PROCEDURE — 59025 FETAL NON-STRESS TEST: CPT

## 2019-05-13 PROCEDURE — 59025 FETAL NON-STRESS TEST: CPT | Mod: 26,,, | Performed by: OBSTETRICS & GYNECOLOGY

## 2019-05-13 PROCEDURE — 84156 ASSAY OF PROTEIN URINE: CPT

## 2019-05-13 PROCEDURE — 99284 EMERGENCY DEPT VISIT MOD MDM: CPT | Mod: 25,,, | Performed by: OBSTETRICS & GYNECOLOGY

## 2019-05-13 PROCEDURE — 99284 PR EMERGENCY DEPT VISIT,LEVEL IV: ICD-10-PCS | Mod: 25,,, | Performed by: OBSTETRICS & GYNECOLOGY

## 2019-05-13 PROCEDURE — 99284 EMERGENCY DEPT VISIT MOD MDM: CPT | Mod: 25

## 2019-05-13 RX ORDER — BUTALBITAL, ACETAMINOPHEN AND CAFFEINE 50; 325; 40 MG/1; MG/1; MG/1
1 TABLET ORAL EVERY 4 HOURS PRN
Status: DISCONTINUED | OUTPATIENT
Start: 2019-05-13 | End: 2019-05-13 | Stop reason: HOSPADM

## 2019-05-13 RX ADMIN — BUTALBITAL, ACETAMINOPHEN AND CAFFEINE 1 TABLET: 50; 325; 40 TABLET ORAL at 11:05

## 2019-05-13 NOTE — ED PROVIDER NOTES
Encounter Date: 2019       History     Chief Complaint   Patient presents with    Hypertension     Sonam Hidalgo is a 29 y.o. [id number] at 32w6d presents complaining of headache. Patient reports she experienced a headache last night which responded to tylenol. She then awoke in the middle of the night with another headache and took her blood pressure which was elevated to systolic of 130s. She has a history of preeclampsia in a prior pregnancy and states she feels similar to when she was diagnosed in prior pregnancy    This IUP is complicated by history of preeclampsia (on aspirin) and depression (on zoloft).  Patient denies contractions, denies vaginal bleeding, denies LOF.   Fetal Movement: normal.          Review of patient's allergies indicates:  No Known Allergies  Past Medical History:   Diagnosis Date    Abnormal Pap smear of vagina     Allergy     Anxiety     Hypertension      Past Surgical History:   Procedure Laterality Date    CHOLECYSTECTOMY      CHOLECYSTECTOMY, LAPAROSCOPIC N/A 2012    Performed by Joshua Goldberg, MD at [facility] OR [facility]    median arcuate ligament        Family History   Problem Relation Age of Onset    Breast cancer Mother     Alcohol abuse Father     Celiac disease Neg Hx     Cirrhosis Neg Hx     Colon cancer Neg Hx     Colon polyps Neg Hx     Crohn's disease Neg Hx     Cystic fibrosis Neg Hx     Esophageal cancer Neg Hx     Hemochromatosis Neg Hx     Inflammatory bowel disease Neg Hx     Irritable bowel syndrome Neg Hx     Liver cancer Neg Hx     Liver disease Neg Hx     Rectal cancer Neg Hx     Stomach cancer Neg Hx     Ulcerative colitis Neg Hx     Jacob's disease Neg Hx     Ovarian cancer Neg Hx     Arrhythmia Neg Hx     Cardiomyopathy Neg Hx     Congenital heart disease Neg Hx     Early death Neg Hx     Heart attacks under age 50 Neg Hx     Pacemaker/defibrilator Neg Hx      Social History     Tobacco Use    Smoking status: Never  Smoker    Smokeless tobacco: Never Used   Substance Use Topics    Alcohol use: Yes     Comment: occas, none recently    Drug use: No     Review of Systems   Constitutional: Negative for activity change, chills, diaphoresis, fatigue and fever.   HENT: Negative for trouble swallowing.    Eyes: Negative for photophobia and visual disturbance.   Respiratory: Negative for cough, chest tightness, shortness of breath and wheezing.    Cardiovascular: Negative for chest pain and palpitations.   Gastrointestinal: Negative for abdominal pain, diarrhea, nausea and vomiting.   Genitourinary: Negative for difficulty urinating, dysuria, hematuria, pelvic pain, vaginal bleeding, vaginal discharge and vaginal pain.   Musculoskeletal: Negative for arthralgias and myalgias.   Skin: Negative for rash.   Neurological: Positive for headaches. Negative for dizziness, syncope, weakness and light-headedness.       Physical Exam     Initial Vitals   BP Pulse Resp Temp SpO2   -- -- -- -- --      MAP       --       /82   Pulse 82   Temp 97.2 °F (36.2 °C)   Resp 18   LMP 09/25/2018   SpO2 98%     Physical Exam    Vitals reviewed.  Constitutional: She appears well-developed and well-nourished. She is not diaphoretic. No distress.   HENT:   Head: Normocephalic and atraumatic.   Nose: Nose normal.   Eyes: Conjunctivae are normal. Right eye exhibits no discharge. Left eye exhibits no discharge. No scleral icterus.   Neck: Normal range of motion.   Cardiovascular: Normal rate and intact distal pulses.   Pulmonary/Chest: No respiratory distress.   Abdominal: Soft. She exhibits no distension. There is no tenderness. There is no rebound and no guarding.   gravid   Musculoskeletal: Normal range of motion. She exhibits no edema or tenderness.   Neurological: She is alert and oriented to person, place, and time.   Skin: Skin is warm and dry. No erythema.   Psychiatric: She has a normal mood and affect. Her behavior is normal. Judgment and  thought content normal.         ED Course   Obtain Fetal nonstress test (NST)  Date/Time: 5/13/2019 11:56 AM  Performed by: Karena Padilla MD  Authorized by: Karena Padilla MD     Nonstress Test:     Variability:  6-25 BPM    Decelerations:  None    Accelerations:  15 bpm    Baseline:  140    Uterine Irritability: No      Contractions:  Not present  Biophysical Profile:     Nonstress Test Interpretation: reactive      Overall Impression:  Reassuring      Labs Reviewed   COMPREHENSIVE METABOLIC PANEL   CBC W/ AUTO DIFFERENTIAL   PROTEIN / CREATININE RATIO, URINE          Imaging Results    None          Medical Decision Making:   ED Management:  VSS; blood pressure normal to mild range while in SHAHID; no pressures > 140/90  NST reactive and reassuring   preE labs normal  fiorcet given with some improvement in symptoms   Patient h/h 10/31; counseled on iron supplementation as anemia can cause headache   Patient stable for discharge home; encouraged hydration and continued monitoring of blood pressure and symptoms   Routine follow up with primary ob               Attending Attestation:   Physician Attestation Statement for Resident:  As the supervising MD   Physician Attestation Statement: I have personally seen and examined this patient.   I agree with the above history. -:   As the supervising MD I agree with the above PE.    As the supervising MD I agree with the above treatment, course, plan, and disposition.   -: Patient evaluated and found to be stable, agree with resident's assessment and plan.  I was personally present during the critical portions of the procedure(s) performed by the resident and was immediately available in the ED to provide services and assistance as needed during the entire procedure.  I have reviewed and agree with the residents interpretation of the following: lab data.  I have reviewed the following: old records at this facility.                    ED Course as of May 13  1156   Mon May 13, 2019   1147 NST reactive/reassuring. BP normal, fioricet given, labs sent    [AR]      ED Course User Index  [AR] Candie Philippe MD     Clinical Impression:       ICD-10-CM ICD-9-CM   1. 32 weeks gestation of pregnancy Z3A.32 V22.2   2. Elevated blood pressure affecting pregnancy in third trimester, antepartum O16.3 642.33         Disposition:   Disposition: Discharged  Condition: Stable                        Karena Padilla MD  Resident  05/13/19 1335       Candie Philippe MD  05/13/19 [id number]

## 2019-05-13 NOTE — TELEPHONE ENCOUNTER
Reason for Disposition   Pregnant > 20 weeks and new hand or face swelling    Protocols used: HIGH BLOOD PRESSURE-A-OH    Sonam is 33 weeks pregnant , and reports headache since last night, now with hands becoming more swollen and BP this morning of 136/86. States she is mildly SOB, but she says not unusual or worse. She does have history of gestational HTN.  Per [facility] triage protocol, recommend she go to L&D now.  She states she will go to [facility].  Message to Dr Merary Benton, her OB.  Please contact caller directly with any additional care advice.

## 2019-05-14 ENCOUNTER — PATIENT MESSAGE (OUTPATIENT)
Dept: OBSTETRICS AND GYNECOLOGY | Facility: CLINIC | Age: 29
End: 2019-05-14

## 2019-05-29 ENCOUNTER — HOSPITAL ENCOUNTER (EMERGENCY)
Facility: OTHER | Age: 29
Discharge: HOME | End: 2019-05-29
Attending: OBSTETRICS & GYNECOLOGY
Payer: COMMERCIAL

## 2019-05-29 VITALS
RESPIRATION RATE: 18 BRPM | DIASTOLIC BLOOD PRESSURE: 81 MMHG | OXYGEN SATURATION: 95 % | TEMPERATURE: 98 F | HEART RATE: 90 BPM | SYSTOLIC BLOOD PRESSURE: 134 MMHG

## 2019-05-29 DIAGNOSIS — Z3A.35 35 WEEKS GESTATION OF PREGNANCY: ICD-10-CM

## 2019-05-29 DIAGNOSIS — R03.0 ELEVATED BLOOD PRESSURE, SITUATIONAL: Primary | ICD-10-CM

## 2019-05-29 LAB
ALBUMIN SERPL BCP-MCNC: 2.5 G/DL (ref 3.5–5.2)
ALP SERPL-CCNC: 156 U/L (ref 55–135)
ALT SERPL W/O P-5'-P-CCNC: 12 U/L (ref 10–44)
ANION GAP SERPL CALC-SCNC: 9 MMOL/L (ref 8–16)
AST SERPL-CCNC: 14 U/L (ref 10–40)
BASOPHILS # BLD AUTO: 0.02 K/UL (ref 0–0.2)
BASOPHILS NFR BLD: 0.2 % (ref 0–1.9)
BILIRUB SERPL-MCNC: 0.4 MG/DL (ref 0.1–1)
BUN SERPL-MCNC: 13 MG/DL (ref 6–20)
CALCIUM SERPL-MCNC: 9.5 MG/DL (ref 8.7–10.5)
CHLORIDE SERPL-SCNC: 106 MMOL/L (ref 95–110)
CO2 SERPL-SCNC: 20 MMOL/L (ref 23–29)
CREAT SERPL-MCNC: 0.7 MG/DL (ref 0.5–1.4)
CREAT UR-MCNC: 23.7 MG/DL (ref 15–325)
DIFFERENTIAL METHOD: ABNORMAL
EOSINOPHIL # BLD AUTO: 0.1 K/UL (ref 0–0.5)
EOSINOPHIL NFR BLD: 0.9 % (ref 0–8)
ERYTHROCYTE [DISTWIDTH] IN BLOOD BY AUTOMATED COUNT: 15.8 % (ref 11.5–14.5)
EST. GFR  (AFRICAN AMERICAN): >60 ML/MIN/1.73 M^2
EST. GFR  (NON AFRICAN AMERICAN): >60 ML/MIN/1.73 M^2
GLUCOSE SERPL-MCNC: 79 MG/DL (ref 70–110)
HCT VFR BLD AUTO: 33.5 % (ref 37–48.5)
HGB BLD-MCNC: 10.6 G/DL (ref 12–16)
LYMPHOCYTES # BLD AUTO: 2 K/UL (ref 1–4.8)
LYMPHOCYTES NFR BLD: 18.2 % (ref 18–48)
MCH RBC QN AUTO: 24.9 PG (ref 27–31)
MCHC RBC AUTO-ENTMCNC: 31.6 G/DL (ref 32–36)
MCV RBC AUTO: 79 FL (ref 82–98)
MONOCYTES # BLD AUTO: 0.7 K/UL (ref 0.3–1)
MONOCYTES NFR BLD: 6.7 % (ref 4–15)
NEUTROPHILS # BLD AUTO: 7.9 K/UL (ref 1.8–7.7)
NEUTROPHILS NFR BLD: 73.2 % (ref 38–73)
PLATELET # BLD AUTO: 365 K/UL (ref 150–350)
PMV BLD AUTO: 9.9 FL (ref 9.2–12.9)
POTASSIUM SERPL-SCNC: 4.3 MMOL/L (ref 3.5–5.1)
PROT SERPL-MCNC: 6.6 G/DL (ref 6–8.4)
PROT UR-MCNC: <7 MG/DL (ref 0–15)
PROT/CREAT UR: NORMAL MG/G{CREAT} (ref 0–0.2)
RBC # BLD AUTO: 4.25 M/UL (ref 4–5.4)
SODIUM SERPL-SCNC: 135 MMOL/L (ref 136–145)
WBC # BLD AUTO: 10.76 K/UL (ref 3.9–12.7)

## 2019-05-29 PROCEDURE — 84156 ASSAY OF PROTEIN URINE: CPT

## 2019-05-29 PROCEDURE — 85025 COMPLETE CBC W/AUTO DIFF WBC: CPT

## 2019-05-29 PROCEDURE — 99284 PR EMERGENCY DEPT VISIT,LEVEL IV: ICD-10-PCS | Mod: 25,,, | Performed by: OBSTETRICS & GYNECOLOGY

## 2019-05-29 PROCEDURE — 80053 COMPREHEN METABOLIC PANEL: CPT

## 2019-05-29 PROCEDURE — 99284 EMERGENCY DEPT VISIT MOD MDM: CPT | Mod: 25

## 2019-05-29 PROCEDURE — 99284 EMERGENCY DEPT VISIT MOD MDM: CPT | Mod: 25,,, | Performed by: OBSTETRICS & GYNECOLOGY

## 2019-05-29 PROCEDURE — 59025 FETAL NON-STRESS TEST: CPT

## 2019-05-29 PROCEDURE — 59025 FETAL NON-STRESS TEST: CPT | Mod: 26,,, | Performed by: OBSTETRICS & GYNECOLOGY

## 2019-05-29 PROCEDURE — 59025 PR FETAL 2N-STRESS TEST: ICD-10-PCS | Mod: 26,,, | Performed by: OBSTETRICS & GYNECOLOGY

## 2019-05-30 ENCOUNTER — PATIENT MESSAGE (OUTPATIENT)
Dept: OBSTETRICS AND GYNECOLOGY | Facility: CLINIC | Age: 29
End: 2019-05-30

## 2019-05-30 NOTE — PROGRESS NOTES
Patient discharged to home with self care. No s/s of distress noted. VS stable. Discharge instructions reviewed with patient, verbalized understanding.  Provider answered all questions. Follow up appointment 5/31.

## 2019-05-30 NOTE — ED PROVIDER NOTES
Encounter Date: 2019       History     Chief Complaint   Patient presents with    Hypertension     per Connected mom cuff at home     Sonam Hidalgo is a 29 y.o. [id number] at 35w1d presents complaining of elevated BPs at home (systolic 140s). Patient has hx of pre eclampsia in prior pregnancy. Using connected MOM. Reports no HA, vision changes, difficulty breathing, chest pain, RUQ pain or new leg edema.      Patient denies contractions, denies vaginal bleeding, denies LOF.   Fetal Movement: normal.          Review of patient's allergies indicates:  No Known Allergies  Past Medical History:   Diagnosis Date    Abnormal Pap smear of vagina     Allergy     Anxiety     Hypertension      Past Surgical History:   Procedure Laterality Date    CHOLECYSTECTOMY      CHOLECYSTECTOMY, LAPAROSCOPIC N/A 2012    Performed by Joshua Goldberg, MD at [facility] OR [facility]    median arcuate ligament        Family History   Problem Relation Age of Onset    Breast cancer Mother     Alcohol abuse Father     Celiac disease Neg Hx     Cirrhosis Neg Hx     Colon cancer Neg Hx     Colon polyps Neg Hx     Crohn's disease Neg Hx     Cystic fibrosis Neg Hx     Esophageal cancer Neg Hx     Hemochromatosis Neg Hx     Inflammatory bowel disease Neg Hx     Irritable bowel syndrome Neg Hx     Liver cancer Neg Hx     Liver disease Neg Hx     Rectal cancer Neg Hx     Stomach cancer Neg Hx     Ulcerative colitis Neg Hx     Jacob's disease Neg Hx     Ovarian cancer Neg Hx     Arrhythmia Neg Hx     Cardiomyopathy Neg Hx     Congenital heart disease Neg Hx     Early death Neg Hx     Heart attacks under age 50 Neg Hx     Pacemaker/defibrilator Neg Hx      Social History     Tobacco Use    Smoking status: Never Smoker    Smokeless tobacco: Never Used   Substance Use Topics    Alcohol use: Yes     Comment: occas, none recently    Drug use: No     Review of Systems   Constitutional: Negative for chills, fatigue  and fever.   HENT: Negative for congestion.    Eyes: Negative for visual disturbance.   Respiratory: Negative for cough and shortness of breath.    Cardiovascular: Negative for chest pain and palpitations.   Gastrointestinal: Negative for abdominal distention, abdominal pain, constipation, diarrhea, nausea and vomiting.   Genitourinary: Negative for difficulty urinating, dysuria, hematuria, vaginal bleeding and vaginal discharge.   Skin: Negative for rash.   Neurological: Negative for dizziness, seizures, light-headedness and headaches.   Hematological: Does not bruise/bleed easily.   Psychiatric/Behavioral: Negative for dysphoric mood. The patient is not nervous/anxious.        Physical Exam     Initial Vitals [05/29/19 2150]   BP Pulse Resp Temp SpO2   134/81 87 18 98.3 °F (36.8 °C) 98 %      MAP       --         Physical Exam    Nursing note and vitals reviewed.  Constitutional: She appears well-developed and well-nourished. She is not diaphoretic. No distress.   HENT:   Head: Normocephalic and atraumatic.   Eyes: Conjunctivae and EOM are normal.   Neck: Normal range of motion.   Cardiovascular: Normal rate.   Pulmonary/Chest: No respiratory distress.   Musculoskeletal: Normal range of motion.   Neurological: She is alert and oriented to person, place, and time.   Skin: Skin is warm and dry.   Psychiatric: She has a normal mood and affect.     OB LABOR EXAM:   Pre-Term Labor: No.   Membranes ruptured: No.   Method: Sterile vaginal exam per MD.       Dilatation: 2.   Station: -3.   Effacement: 60%.       Comments:          ED Course   Obtain Fetal nonstress test (NST)  Date/Time: 5/29/2019 10:43 PM  Performed by: Claudia Rhodes MD  Authorized by: Claudia Rhodes MD     Nonstress Test:     Variability:  6-25 BPM    Decelerations:  None    Accelerations:  15 bpm    Baseline:  145    Contractions:  Regular    Contraction Frequency:  Q 2 minutes (Pt reports not feeling them)  Biophysical Profile:      "Nonstress Test Interpretation: reactive      Overall Impression:  Reassuring      Labs Reviewed   COMPREHENSIVE METABOLIC PANEL - Abnormal; Notable for the following components:       Result Value    Sodium 135 (*)     CO2 20 (*)     Albumin 2.5 (*)     Alkaline Phosphatase 156 (*)     All other components within normal limits   CBC W/ AUTO DIFFERENTIAL - Abnormal; Notable for the following components:    Hemoglobin 10.6 (*)     Hematocrit 33.5 (*)     Mean Corpuscular Volume 79 (*)     Mean Corpuscular Hemoglobin 24.9 (*)     Mean Corpuscular Hemoglobin Conc 31.6 (*)     RDW 15.8 (*)     Platelets 365 (*)     Gran # (ANC) 7.9 (*)     Gran% 73.2 (*)     All other components within normal limits   PROTEIN / CREATININE RATIO, URINE          Imaging Results    None          Medical Decision Making:   ED Management:  Rule out pre e  - No s/sx of pre eclampsia  - BPs wnl  - Normal standard pre e labs  - Reactive NST  - Has appt Friday with Dr ANTONIO Baez RTC for:  Excessive vaginal bleeding beyond spotting over next 48 hours  Frequent, painful contractions  Fluid loss ("gush of fluid")  Decreased fetal movement  - Stable for discharge home              Attending Attestation:   Physician Attestation Statement for Resident:  As the supervising MD   Physician Attestation Statement: I have personally seen and examined this patient.   I agree with the above history. -:   As the supervising MD I agree with the above PE.    As the supervising MD I agree with the above treatment, course, plan, and disposition.   -:   NST  I independently reviewed the fetal non-stress test with the following interpretation:  145 BPM baseline  Variability: moderate  Accelerations: present  Decelerations: absent  Contractions: Q 2-5 min but patient is unaware of contractions  Category 1    Clinical Interpretation:reactive    Patient evaluated and found to be stable, agree with resident's assessment of elevated blood pressure at home but reassuring testing " and lab work and plan to discharge to home with precautions re s/s pre-eclampsia.  I was personally present during the critical portions of the procedure(s) performed by the resident and was immediately available in the ED to provide services and assistance as needed during the entire procedure.  I have reviewed and agree with the residents interpretation of the following: lab data.  I have reviewed the following: old records at this facility.                       Clinical Impression:       ICD-10-CM ICD-9-CM   1. Elevated blood pressure, situational R03.0 796.2         Disposition:   Disposition: Discharged  Condition: Stable                        Claudia Rhodes MD  Resident  05/29/19 4756       oJy Hill MD  05/29/19 1154

## 2019-05-30 NOTE — DISCHARGE INSTRUCTIONS
Keep previously scheduled Clinic appointment.     Return or call if you have any of the following symptoms: blurred vision, headache, vaginal bleeding, nausea/ vomiting, leaking of fluid, contractions that are 4-5 in one hour (before 36 weeks), decreased fetal movements ( 10 kicks in 2 hours), headache not relieved by Tylenol, or temp of 100.4 and greater.  Begin doing fetal kick counts, at least 10 movements in 2 hours starting at 28 weeks gestation. Term Labor: We want your contractions 5 min apart for 2 hours.    For any questions or concerns call your Clinic [phone]  or [facility] [phone].

## 2019-05-31 ENCOUNTER — ROUTINE PRENATAL (OUTPATIENT)
Dept: OBSTETRICS AND GYNECOLOGY | Facility: CLINIC | Age: 29
End: 2019-05-31
Payer: COMMERCIAL

## 2019-05-31 VITALS
BODY MASS INDEX: 44.54 KG/M2 | SYSTOLIC BLOOD PRESSURE: 133 MMHG | DIASTOLIC BLOOD PRESSURE: 79 MMHG | WEIGHT: 251.44 LBS

## 2019-05-31 DIAGNOSIS — O13.3 GESTATIONAL HYPERTENSION, THIRD TRIMESTER: ICD-10-CM

## 2019-05-31 DIAGNOSIS — Z3A.35 35 WEEKS GESTATION OF PREGNANCY: Primary | ICD-10-CM

## 2019-05-31 PROCEDURE — 0502F PR SUBSEQUENT PRENATAL CARE: ICD-10-PCS | Mod: CPTII,S$GLB,, | Performed by: OBSTETRICS & GYNECOLOGY

## 2019-05-31 PROCEDURE — 0502F SUBSEQUENT PRENATAL CARE: CPT | Mod: CPTII,S$GLB,, | Performed by: OBSTETRICS & GYNECOLOGY

## 2019-05-31 PROCEDURE — 99999 PR PBB SHADOW E&M-EST. PATIENT-LVL III: CPT | Mod: PBBFAC,,, | Performed by: OBSTETRICS & GYNECOLOGY

## 2019-05-31 PROCEDURE — 99999 PR PBB SHADOW E&M-EST. PATIENT-LVL III: ICD-10-PCS | Mod: PBBFAC,,, | Performed by: OBSTETRICS & GYNECOLOGY

## 2019-05-31 PROCEDURE — 87081 CULTURE SCREEN ONLY: CPT

## 2019-05-31 NOTE — PROGRESS NOTES
HERE for routine OB visit at 35 3/7 wks, with NO complaints.  Denies vaginal bleeding, cramping/ ctx, or LOF.  + FM.  FMC BID.   Monitored in L & D for GHTN.   Will stop ASA .F/U in one week, PNT weekly.

## 2019-06-03 ENCOUNTER — ROUTINE PRENATAL (OUTPATIENT)
Dept: OBSTETRICS AND GYNECOLOGY | Facility: CLINIC | Age: 29
End: 2019-06-03
Payer: COMMERCIAL

## 2019-06-03 ENCOUNTER — HOSPITAL ENCOUNTER (OUTPATIENT)
Dept: PERINATAL CARE | Facility: OTHER | Age: 29
Discharge: HOME | End: 2019-06-03
Attending: OBSTETRICS & GYNECOLOGY
Payer: COMMERCIAL

## 2019-06-03 VITALS
BODY MASS INDEX: 44.87 KG/M2 | WEIGHT: 253.31 LBS | DIASTOLIC BLOOD PRESSURE: 79 MMHG | SYSTOLIC BLOOD PRESSURE: 134 MMHG

## 2019-06-03 DIAGNOSIS — Z3A.35 35 WEEKS GESTATION OF PREGNANCY: Primary | ICD-10-CM

## 2019-06-03 DIAGNOSIS — O13.3 GESTATIONAL HYPERTENSION, THIRD TRIMESTER: ICD-10-CM

## 2019-06-03 DIAGNOSIS — Z3A.35 35 WEEKS GESTATION OF PREGNANCY: ICD-10-CM

## 2019-06-03 LAB — BACTERIA SPEC AEROBE CULT: NORMAL

## 2019-06-03 PROCEDURE — 76815 OB US LIMITED FETUS(S): CPT | Mod: 26,,, | Performed by: PEDIATRICS

## 2019-06-03 PROCEDURE — 76815 OB US LIMITED FETUS(S): CPT

## 2019-06-03 PROCEDURE — 0502F PR SUBSEQUENT PRENATAL CARE: ICD-10-PCS | Mod: CPTII,S$GLB,, | Performed by: OBSTETRICS & GYNECOLOGY

## 2019-06-03 PROCEDURE — 59025 PR FETAL 2N-STRESS TEST: ICD-10-PCS | Mod: 26,,, | Performed by: PEDIATRICS

## 2019-06-03 PROCEDURE — 76815 PR  US,PREGNANT UTERUS,LIMITED, 1/> FETUSES: ICD-10-PCS | Mod: 26,,, | Performed by: PEDIATRICS

## 2019-06-03 PROCEDURE — 59025 FETAL NON-STRESS TEST: CPT | Mod: 26,,, | Performed by: PEDIATRICS

## 2019-06-03 PROCEDURE — 99999 PR PBB SHADOW E&M-EST. PATIENT-LVL III: ICD-10-PCS | Mod: PBBFAC,,, | Performed by: OBSTETRICS & GYNECOLOGY

## 2019-06-03 PROCEDURE — 0502F SUBSEQUENT PRENATAL CARE: CPT | Mod: CPTII,S$GLB,, | Performed by: OBSTETRICS & GYNECOLOGY

## 2019-06-03 PROCEDURE — 99999 PR PBB SHADOW E&M-EST. PATIENT-LVL III: CPT | Mod: PBBFAC,,, | Performed by: OBSTETRICS & GYNECOLOGY

## 2019-06-03 PROCEDURE — 59025 FETAL NON-STRESS TEST: CPT

## 2019-06-03 NOTE — PROGRESS NOTES
Indication  ========    obesity, ghtn    History  ======    Previous Outcomes   3  Para 1    Method  ======    Transabdominal ultrasound examination. 2D Color Doppler, [place] S8. View: Sufficient    Pregnancy  =========    Wan pregnancy. Number of fetuses: 1    Dating  ======    Cycle: regular cycle  Assigned: Dating performed on 12/3/2018, based on the LMP  Assigned GA 35 w + 6 d  Assigned GRIFFIN: 2019  Pregnancy length 280 d    General Evaluation  ==============    Cardiac activity present.  bpm.  Fetal movements present.  Presentation cephalic.  Placenta posterior.    Non Stress Test  =============    NST interpretation: reactive. Test duration 31 min. Baseline  bpm. Baseline variability: moderate. Accelerations: present.  Decelerations: absent. Uterine activity: absent. Acoustic stimulation: no  reports + fetal movement, denies ctx, vag bleeding or loss of fluid. [place] [facility] [place]    Amniotic Fluid Assessment  =====================    Amount of AF: normal amount  MVP 5.5 cm    Impression  =========    NST R  MVP normal      Recommendation  ==============    Continue fetal surveillance as previously outlined

## 2019-06-03 NOTE — PROGRESS NOTES
HERE for routine OB visit at 35 6/7 wks, with NO complaints.  Denies vaginal bleeding, cramping/ NO regular ctx, or LOF.  + FM.  FMC BID.   PNT for GHTN.  If showing regular contraction consider PTL and possible BMZ.

## 2019-06-04 ENCOUNTER — TELEPHONE (OUTPATIENT)
Dept: OBSTETRICS AND GYNECOLOGY | Facility: CLINIC | Age: 29
End: 2019-06-04

## 2019-06-04 ENCOUNTER — ROUTINE PRENATAL (OUTPATIENT)
Dept: OBSTETRICS AND GYNECOLOGY | Facility: CLINIC | Age: 29
End: 2019-06-04
Payer: COMMERCIAL

## 2019-06-04 VITALS
BODY MASS INDEX: 44.75 KG/M2 | WEIGHT: 252.63 LBS | DIASTOLIC BLOOD PRESSURE: 78 MMHG | SYSTOLIC BLOOD PRESSURE: 125 MMHG

## 2019-06-04 DIAGNOSIS — R10.9 ABDOMINAL PAIN DURING PREGNANCY IN THIRD TRIMESTER: ICD-10-CM

## 2019-06-04 DIAGNOSIS — O26.893 ABDOMINAL PAIN DURING PREGNANCY IN THIRD TRIMESTER: ICD-10-CM

## 2019-06-04 DIAGNOSIS — Z01.30 BLOOD PRESSURE CHECK: Primary | ICD-10-CM

## 2019-06-04 PROCEDURE — 99999 PR PBB SHADOW E&M-EST. PATIENT-LVL II: CPT | Mod: PBBFAC,,, | Performed by: NURSE PRACTITIONER

## 2019-06-04 PROCEDURE — 0502F PR SUBSEQUENT PRENATAL CARE: ICD-10-PCS | Mod: CPTII,S$GLB,, | Performed by: NURSE PRACTITIONER

## 2019-06-04 PROCEDURE — 0502F SUBSEQUENT PRENATAL CARE: CPT | Mod: CPTII,S$GLB,, | Performed by: NURSE PRACTITIONER

## 2019-06-04 PROCEDURE — 99999 PR PBB SHADOW E&M-EST. PATIENT-LVL II: ICD-10-PCS | Mod: PBBFAC,,, | Performed by: NURSE PRACTITIONER

## 2019-06-04 NOTE — PROGRESS NOTES
Patient presents to the [facility] at 36w0d gestation for BP check and evaluation of abdominal discomfort that began today. Denies HA, edema, RUQ pain, blurred vision, dizziness, CP, or SOB. Reports /81 and dark green protein dipstick today at home with use of Connected Mom. Reports episodic abdominal tightening that last several seconds without regularity and has shifted to her lower abdomen today. Denies VB, LOF. Reports loss of mucus plug today.    /79, 125/78 today in clinic with manual cuff. Urine dipstick: trace protein.    with excellent fetal movement. 3/60/-3     Patient encouraged to continue monitoring BP and urine daily, to perform FKC, verbalized understanding. Given preE and labor precaution with instruction to go to OB ED, verbalized understanding. She will follow up with Dr. Benton next week for routine prenatal visit.

## 2019-06-04 NOTE — TELEPHONE ENCOUNTER
Received notification of patient's blood pressure reading of 141/88. Called to see if patient was symptomatic. Patient stated that she left work because she was not feeling well and having a lot of pressure in her pelvic area. Patient stated that she took her blood pressure and dipped her urine (/88 Protein dark green) and noticed that she lost her mucus plug. Patient stated that she was packing a bag and leaving to go to OB ED. Patient offered an appointment for tomorrow morning (6/5/19) but declined stating that she wanted to be seen today. Patient scheduled in the women's walk in clinic and accepted. Patient verbalized understanding.

## 2019-06-09 ENCOUNTER — HOSPITAL ENCOUNTER (EMERGENCY)
Facility: OTHER | Age: 29
Discharge: HOME | End: 2019-06-09
Attending: OBSTETRICS & GYNECOLOGY
Payer: COMMERCIAL

## 2019-06-09 VITALS
OXYGEN SATURATION: 100 % | RESPIRATION RATE: 18 BRPM | SYSTOLIC BLOOD PRESSURE: 131 MMHG | DIASTOLIC BLOOD PRESSURE: 85 MMHG | TEMPERATURE: 98 F | HEART RATE: 88 BPM

## 2019-06-09 DIAGNOSIS — R51.9 NONINTRACTABLE HEADACHE, UNSPECIFIED CHRONICITY PATTERN, UNSPECIFIED HEADACHE TYPE: Primary | ICD-10-CM

## 2019-06-09 LAB
ABO + RH BLD: NORMAL
ALBUMIN SERPL BCP-MCNC: 2.4 G/DL (ref 3.5–5.2)
ALP SERPL-CCNC: 153 U/L (ref 55–135)
ALT SERPL W/O P-5'-P-CCNC: 11 U/L (ref 10–44)
ANION GAP SERPL CALC-SCNC: 9 MMOL/L (ref 8–16)
AST SERPL-CCNC: 13 U/L (ref 10–40)
BASOPHILS # BLD AUTO: 0.02 K/UL (ref 0–0.2)
BASOPHILS NFR BLD: 0.3 % (ref 0–1.9)
BILIRUB SERPL-MCNC: 0.3 MG/DL (ref 0.1–1)
BLD GP AB SCN CELLS X3 SERPL QL: NORMAL
BUN SERPL-MCNC: 13 MG/DL (ref 6–20)
CALCIUM SERPL-MCNC: 8.5 MG/DL (ref 8.7–10.5)
CHLORIDE SERPL-SCNC: 107 MMOL/L (ref 95–110)
CO2 SERPL-SCNC: 21 MMOL/L (ref 23–29)
CREAT SERPL-MCNC: 0.7 MG/DL (ref 0.5–1.4)
CREAT UR-MCNC: 241.4 MG/DL (ref 15–325)
DIFFERENTIAL METHOD: ABNORMAL
EOSINOPHIL # BLD AUTO: 0.1 K/UL (ref 0–0.5)
EOSINOPHIL NFR BLD: 1.3 % (ref 0–8)
ERYTHROCYTE [DISTWIDTH] IN BLOOD BY AUTOMATED COUNT: 16.1 % (ref 11.5–14.5)
EST. GFR  (AFRICAN AMERICAN): >60 ML/MIN/1.73 M^2
EST. GFR  (NON AFRICAN AMERICAN): >60 ML/MIN/1.73 M^2
GLUCOSE SERPL-MCNC: 79 MG/DL (ref 70–110)
HCT VFR BLD AUTO: 30.5 % (ref 37–48.5)
HGB BLD-MCNC: 9.7 G/DL (ref 12–16)
LYMPHOCYTES # BLD AUTO: 1.9 K/UL (ref 1–4.8)
LYMPHOCYTES NFR BLD: 23.7 % (ref 18–48)
MCH RBC QN AUTO: 25.2 PG (ref 27–31)
MCHC RBC AUTO-ENTMCNC: 31.8 G/DL (ref 32–36)
MCV RBC AUTO: 79 FL (ref 82–98)
MONOCYTES # BLD AUTO: 0.8 K/UL (ref 0.3–1)
MONOCYTES NFR BLD: 9.6 % (ref 4–15)
NEUTROPHILS # BLD AUTO: 5 K/UL (ref 1.8–7.7)
NEUTROPHILS NFR BLD: 64.5 % (ref 38–73)
PLATELET # BLD AUTO: 298 K/UL (ref 150–350)
PMV BLD AUTO: 9.8 FL (ref 9.2–12.9)
POTASSIUM SERPL-SCNC: 4.3 MMOL/L (ref 3.5–5.1)
PROT SERPL-MCNC: 6 G/DL (ref 6–8.4)
PROT UR-MCNC: 39 MG/DL (ref 0–15)
PROT/CREAT UR: 0.16 MG/G{CREAT} (ref 0–0.2)
RBC # BLD AUTO: 3.85 M/UL (ref 4–5.4)
SODIUM SERPL-SCNC: 137 MMOL/L (ref 136–145)
WBC # BLD AUTO: 7.81 K/UL (ref 3.9–12.7)

## 2019-06-09 PROCEDURE — 99284 PR EMERGENCY DEPT VISIT,LEVEL IV: ICD-10-PCS | Mod: 25,,, | Performed by: OBSTETRICS & GYNECOLOGY

## 2019-06-09 PROCEDURE — 82570 ASSAY OF URINE CREATININE: CPT

## 2019-06-09 PROCEDURE — 59025 FETAL NON-STRESS TEST: CPT

## 2019-06-09 PROCEDURE — 59025 PR FETAL 2N-STRESS TEST: ICD-10-PCS | Mod: 26,,, | Performed by: OBSTETRICS & GYNECOLOGY

## 2019-06-09 PROCEDURE — 99284 EMERGENCY DEPT VISIT MOD MDM: CPT | Mod: 25,,, | Performed by: OBSTETRICS & GYNECOLOGY

## 2019-06-09 PROCEDURE — 85025 COMPLETE CBC W/AUTO DIFF WBC: CPT

## 2019-06-09 PROCEDURE — 99284 EMERGENCY DEPT VISIT MOD MDM: CPT | Mod: 25

## 2019-06-09 PROCEDURE — 80053 COMPREHEN METABOLIC PANEL: CPT

## 2019-06-09 PROCEDURE — 86850 RBC ANTIBODY SCREEN: CPT

## 2019-06-09 PROCEDURE — 59025 FETAL NON-STRESS TEST: CPT | Mod: 26,,, | Performed by: OBSTETRICS & GYNECOLOGY

## 2019-06-09 PROCEDURE — 25000003 PHARM REV CODE 250: Performed by: STUDENT IN AN ORGANIZED HEALTH CARE EDUCATION/TRAINING PROGRAM

## 2019-06-09 RX ORDER — BUTALBITAL, ACETAMINOPHEN AND CAFFEINE 50; 325; 40 MG/1; MG/1; MG/1
1 TABLET ORAL ONCE
Status: COMPLETED | OUTPATIENT
Start: 2019-06-09 | End: 2019-06-09

## 2019-06-09 RX ADMIN — BUTALBITAL, ACETAMINOPHEN AND CAFFEINE 1 TABLET: 50; 325; 40 TABLET ORAL at 09:06

## 2019-06-10 ENCOUNTER — ROUTINE PRENATAL (OUTPATIENT)
Dept: OBSTETRICS AND GYNECOLOGY | Facility: CLINIC | Age: 29
End: 2019-06-10
Payer: COMMERCIAL

## 2019-06-10 ENCOUNTER — HOSPITAL ENCOUNTER (OUTPATIENT)
Dept: PERINATAL CARE | Facility: OTHER | Age: 29
Discharge: HOME | End: 2019-06-10
Attending: OBSTETRICS & GYNECOLOGY
Payer: COMMERCIAL

## 2019-06-10 VITALS — SYSTOLIC BLOOD PRESSURE: 134 MMHG | WEIGHT: 259.69 LBS | BODY MASS INDEX: 46 KG/M2 | DIASTOLIC BLOOD PRESSURE: 80 MMHG

## 2019-06-10 DIAGNOSIS — Z3A.35 35 WEEKS GESTATION OF PREGNANCY: ICD-10-CM

## 2019-06-10 DIAGNOSIS — O13.3 GESTATIONAL HYPERTENSION, THIRD TRIMESTER: ICD-10-CM

## 2019-06-10 DIAGNOSIS — Z34.80 SUPERVISION OF OTHER NORMAL PREGNANCY, ANTEPARTUM: Primary | ICD-10-CM

## 2019-06-10 PROCEDURE — 76815 PR  US,PREGNANT UTERUS,LIMITED, 1/> FETUSES: ICD-10-PCS | Mod: 26,,, | Performed by: PEDIATRICS

## 2019-06-10 PROCEDURE — 76815 OB US LIMITED FETUS(S): CPT | Mod: 26,,, | Performed by: PEDIATRICS

## 2019-06-10 PROCEDURE — 99999 PR PBB SHADOW E&M-EST. PATIENT-LVL II: ICD-10-PCS | Mod: PBBFAC,,, | Performed by: NURSE PRACTITIONER

## 2019-06-10 PROCEDURE — 59025 PR FETAL 2N-STRESS TEST: ICD-10-PCS | Mod: 26,,, | Performed by: PEDIATRICS

## 2019-06-10 PROCEDURE — 0502F PR SUBSEQUENT PRENATAL CARE: ICD-10-PCS | Mod: CPTII,S$GLB,, | Performed by: NURSE PRACTITIONER

## 2019-06-10 PROCEDURE — 59025 FETAL NON-STRESS TEST: CPT | Mod: 26,,, | Performed by: PEDIATRICS

## 2019-06-10 PROCEDURE — 0502F SUBSEQUENT PRENATAL CARE: CPT | Mod: CPTII,S$GLB,, | Performed by: NURSE PRACTITIONER

## 2019-06-10 PROCEDURE — 76815 OB US LIMITED FETUS(S): CPT

## 2019-06-10 PROCEDURE — 59025 FETAL NON-STRESS TEST: CPT

## 2019-06-10 PROCEDURE — 99999 PR PBB SHADOW E&M-EST. PATIENT-LVL II: CPT | Mod: PBBFAC,,, | Performed by: NURSE PRACTITIONER

## 2019-06-10 NOTE — PROGRESS NOTES
Here for routine OB appt at 36w6d, with no complaints.  She went to OB ED yesterday with elevated BP on connected MOM cuff. BP today is 134/80.  Denies any HA, BV or epigastric pain at presents.  Reports good FM.  Denies LOF, denies VB, denies contractions.  Reviewed warning signs of Labor and Preeclampsia.  Daily FM counts reinforced.  Peds- Dr. Salcedo. Plans to breastfeed- has ordered pump.   Continue PNT for GHTN.  F/U scheduled 1 week

## 2019-06-10 NOTE — ED PROVIDER NOTES
Encounter Date: 2019       History     Chief Complaint   Patient presents with    Headache    Hypertension     Sonam Hidalgo is a 29 y.o. [id number] at 36w5d presents complaining of headache and pressures 160/97 at home (connected MOM). Patient took tylenol and headache resolved to a 4/10. Reports no HA, vision changes, difficulty breathing, chest pain, RUQ pain or new leg edema. Denies regular contractions, pelvic pressure.   This IUP is complicated by ghtn, depression.         Review of patient's allergies indicates:  No Known Allergies  Past Medical History:   Diagnosis Date    Abnormal Pap smear of vagina     Allergy     Anxiety     Hypertension      Past Surgical History:   Procedure Laterality Date    CHOLECYSTECTOMY      CHOLECYSTECTOMY, LAPAROSCOPIC N/A 2012    Performed by Joshua Goldberg[facility] at [facility] OR [facility]    median arcuate ligament        Family History   Problem Relation Age of Onset    Breast cancer Mother     Alcohol abuse Father     Celiac disease Neg Hx     Cirrhosis Neg Hx     Colon cancer Neg Hx     Colon polyps Neg Hx     Crohn's disease Neg Hx     Cystic fibrosis Neg Hx     Esophageal cancer Neg Hx     Hemochromatosis Neg Hx     Inflammatory bowel disease Neg Hx     Irritable bowel syndrome Neg Hx     Liver cancer Neg Hx     Liver disease Neg Hx     Rectal cancer Neg Hx     Stomach cancer Neg Hx     Ulcerative colitis Neg Hx     Jacob's disease Neg Hx     Ovarian cancer Neg Hx     Arrhythmia Neg Hx     Cardiomyopathy Neg Hx     Congenital heart disease Neg Hx     Early death Neg Hx     Heart attacks under age 50 Neg Hx     Pacemaker/defibrilator Neg Hx      Social History     Tobacco Use    Smoking status: Never Smoker    Smokeless tobacco: Never Used   Substance Use Topics    Alcohol use: Yes     Comment: occas, none recently    Drug use: No     Review of Systems   Constitutional: Negative for chills, fatigue and fever.   HENT: Negative for  congestion.    Eyes: Negative for visual disturbance.   Respiratory: Negative for cough and shortness of breath.    Cardiovascular: Negative for chest pain and palpitations.   Gastrointestinal: Negative for abdominal distention, abdominal pain, constipation, diarrhea, nausea and vomiting.   Genitourinary: Negative for difficulty urinating, dysuria, hematuria, vaginal bleeding and vaginal discharge.   Skin: Negative for rash.   Neurological: Negative for dizziness, seizures, light-headedness and headaches.   Hematological: Does not bruise/bleed easily.   Psychiatric/Behavioral: Negative for dysphoric mood. The patient is not nervous/anxious.        Physical Exam     Initial Vitals   BP Pulse Resp Temp SpO2   06/09/19 2103 06/09/19 2102 06/09/19 2102 06/09/19 2102 06/09/19 2102   130/89 97 18 98.1 °F (36.7 °C) 98 %      MAP       --                Physical Exam    Vitals reviewed.  Constitutional: She appears well-developed and well-nourished. She is not diaphoretic. No distress.   HENT:   Head: Normocephalic and atraumatic.   Cardiovascular: Normal rate, regular rhythm, normal heart sounds and intact distal pulses.   Pulmonary/Chest: No respiratory distress.   Abdominal: Soft. Bowel sounds are normal.   Neurological: She is alert and oriented to person, place, and time. She has normal strength. No sensory deficit.   Psychiatric: She has a normal mood and affect. Her behavior is normal. Judgment and thought content normal.     OB LABOR EXAM:                       Comments: FHT: reassuring, 135bpm, mod BTB variability, pos accels, nodecels  TOCO: irregular       ED Course   Obtain Fetal nonstress test (NST)  Date/Time: 6/10/2019 6:50 AM  Performed by: Carmen Singh MD  Authorized by: Tip Beckwith MD     Nonstress Test:     Variability:  6-25 BPM    Decelerations:  None    Accelerations:  15 bpm    Baseline:  135    Contractions:  Irregular  Biophysical Profile:     Nonstress Test Interpretation: reactive  "     Overall Impression:  Reassuring      Labs Reviewed   CBC W/ AUTO DIFFERENTIAL - Abnormal; Notable for the following components:       Result Value    RBC 3.85 (*)     Hemoglobin 9.7 (*)     Hematocrit 30.5 (*)     Mean Corpuscular Volume 79 (*)     Mean Corpuscular Hemoglobin 25.2 (*)     Mean Corpuscular Hemoglobin Conc 31.8 (*)     RDW 16.1 (*)     All other components within normal limits   COMPREHENSIVE METABOLIC PANEL   PROTEIN / CREATININE RATIO, URINE   TYPE & SCREEN          Imaging Results    None          Medical Decision Making:   ED Management:  - NST reactive  - BPs wnl  - Pre E labs wnl  - Headache resolved with tylenol  - No signs of severe features of pre e or labor  - Has follow up tomorrow with primary OB  - Stable for discharge, return for:  Excessive vaginal bleeding   Frequent, painful contractions  Fluid loss ("gush of fluid")  Decreased fetal movement                Attending Attestation:   Physician Attestation Statement for Resident:  As the supervising MD   Physician Attestation Statement: I have personally seen and examined this patient.   I agree with the above history. -:   As the supervising MD I agree with the above PE.    As the supervising MD I agree with the above treatment, course, plan, and disposition.  I was personally present during the critical portions of the procedure(s) performed by the resident and was immediately available in the ED to provide services and assistance as needed during the entire procedure.  I have reviewed and agree with the residents interpretation of the following: rhythm strips and lab data.  I have reviewed the following: old records at this facility.                       Clinical Impression:       ICD-10-CM ICD-9-CM   1. Nonintractable headache, unspecified chronicity pattern, unspecified headache type R51 784.0                                Tip Beckwith MD  Resident  06/09/19 2224       Carmen Singh MD  06/10/19 0651    "

## 2019-06-11 ENCOUNTER — TELEPHONE (OUTPATIENT)
Dept: OBSTETRICS AND GYNECOLOGY | Facility: CLINIC | Age: 29
End: 2019-06-11

## 2019-06-11 NOTE — TELEPHONE ENCOUNTER
""Sonam Hidalgo submitted an abnormal reading of 150/94 at 6/9/2019  7:35 PM. "- Connected MOM    Patient stated that she is fine and has been contacted and seen in office. Denies any symptoms at this time.       "

## 2019-06-11 NOTE — PROGRESS NOTES
Indication  ========    gestational hypertension    History  ======    Previous Outcomes   3  Para 1    Method  ======    Transabdominal ultrasound examination. 2D Color Doppler, [place] S8. View: Sufficient    Pregnancy  =========    Wan pregnancy. Number of fetuses: 1    Dating  ======    Cycle: regular cycle  Assigned: Dating performed on 12/3/2018, based on the LMP  Assigned GA 36 w + 6 d  Assigned GRIFFIN: 2019  Pregnancy length 280 d    General Evaluation  ==============    Cardiac activity present.  bpm.  Fetal movements present.  Presentation cephalic.  Placenta Fundal.    Non Stress Test  =============    NST interpretation: reactive. Test duration 44 min. Baseline  bpm. Baseline variability: moderate. Accelerations: present.  Decelerations: absent. Uterine activity: absent. Acoustic stimulation: yes. Number of stimulations: 1. Stimulation response: acceleration  reports + fetal movement, denies ctx, vag bleeding or loss of fluid. [facility] [place], [place] revieweed    Amniotic Fluid Assessment  =====================    Amount of AF: normal amount  MVP 5.4 cm    Impression  =========    R NST  MVP normal.      Recommendation  ==============    Continue fetal surveillance as previously outlined

## 2019-06-12 ENCOUNTER — HOSPITAL ENCOUNTER (INPATIENT)
Facility: OTHER | Age: 29
LOS: 3 days | Discharge: HOME | End: 2019-06-15
Attending: OBSTETRICS & GYNECOLOGY | Admitting: OBSTETRICS & GYNECOLOGY
Payer: COMMERCIAL

## 2019-06-12 ENCOUNTER — TELEPHONE (OUTPATIENT)
Dept: OBSTETRICS AND GYNECOLOGY | Facility: CLINIC | Age: 29
End: 2019-06-12

## 2019-06-12 DIAGNOSIS — O13.9 GESTATIONAL HYPERTENSION: Primary | ICD-10-CM

## 2019-06-12 DIAGNOSIS — Z3A.37 37 WEEKS GESTATION OF PREGNANCY: ICD-10-CM

## 2019-06-12 DIAGNOSIS — O13.3 GESTATIONAL HYPERTENSION, THIRD TRIMESTER: ICD-10-CM

## 2019-06-12 PROBLEM — F32.A DEPRESSION DURING PREGNANCY IN THIRD TRIMESTER: Status: ACTIVE | Noted: 2019-06-12

## 2019-06-12 PROBLEM — E66.01 CLASS 3 SEVERE OBESITY IN ADULT: Status: ACTIVE | Noted: 2019-06-12

## 2019-06-12 PROBLEM — O99.343 DEPRESSION DURING PREGNANCY IN THIRD TRIMESTER: Status: ACTIVE | Noted: 2019-06-12

## 2019-06-12 PROBLEM — E66.813 CLASS 3 SEVERE OBESITY IN ADULT: Status: ACTIVE | Noted: 2019-06-12

## 2019-06-12 LAB
BASOPHILS # BLD AUTO: 0.01 K/UL (ref 0–0.2)
BASOPHILS NFR BLD: 0.1 % (ref 0–1.9)
BILIRUB SERPL-MCNC: NORMAL MG/DL
BLOOD URINE, POC: NORMAL
COLOR, POC UA: NORMAL
CREAT UR-MCNC: 120.1 MG/DL (ref 15–325)
DIFFERENTIAL METHOD: ABNORMAL
EOSINOPHIL # BLD AUTO: 0.1 K/UL (ref 0–0.5)
EOSINOPHIL NFR BLD: 1.1 % (ref 0–8)
ERYTHROCYTE [DISTWIDTH] IN BLOOD BY AUTOMATED COUNT: 16.1 % (ref 11.5–14.5)
GLUCOSE UR QL STRIP: NORMAL
HCT VFR BLD AUTO: 32.1 % (ref 37–48.5)
HGB BLD-MCNC: 10.1 G/DL (ref 12–16)
KETONES UR QL STRIP: NORMAL
LEUKOCYTE ESTERASE URINE, POC: NORMAL
LYMPHOCYTES # BLD AUTO: 1.9 K/UL (ref 1–4.8)
LYMPHOCYTES NFR BLD: 22.5 % (ref 18–48)
MCH RBC QN AUTO: 24.8 PG (ref 27–31)
MCHC RBC AUTO-ENTMCNC: 31.5 G/DL (ref 32–36)
MCV RBC AUTO: 79 FL (ref 82–98)
MONOCYTES # BLD AUTO: 0.8 K/UL (ref 0.3–1)
MONOCYTES NFR BLD: 8.9 % (ref 4–15)
NEUTROPHILS # BLD AUTO: 5.7 K/UL (ref 1.8–7.7)
NEUTROPHILS NFR BLD: 66.5 % (ref 38–73)
NITRITE, POC UA: NORMAL
PH, POC UA: 5
PLATELET # BLD AUTO: 318 K/UL (ref 150–350)
PMV BLD AUTO: 10.1 FL (ref 9.2–12.9)
PROT UR-MCNC: 19 MG/DL (ref 0–15)
PROT/CREAT UR: 0.16 MG/G{CREAT} (ref 0–0.2)
PROTEIN, POC: NORMAL
RBC # BLD AUTO: 4.07 M/UL (ref 4–5.4)
SPECIFIC GRAVITY, POC UA: 1.02
UROBILINOGEN, POC UA: NORMAL
WBC # BLD AUTO: 8.5 K/UL (ref 3.9–12.7)

## 2019-06-12 PROCEDURE — 85025 COMPLETE CBC W/AUTO DIFF WBC: CPT

## 2019-06-12 PROCEDURE — 99285 EMERGENCY DEPT VISIT HI MDM: CPT | Mod: 25

## 2019-06-12 PROCEDURE — 59025 PR FETAL 2N-STRESS TEST: ICD-10-PCS | Mod: 26,,, | Performed by: OBSTETRICS & GYNECOLOGY

## 2019-06-12 PROCEDURE — 81002 URINALYSIS NONAUTO W/O SCOPE: CPT

## 2019-06-12 PROCEDURE — 59025 FETAL NON-STRESS TEST: CPT | Mod: 26,,, | Performed by: OBSTETRICS & GYNECOLOGY

## 2019-06-12 PROCEDURE — 99284 EMERGENCY DEPT VISIT MOD MDM: CPT | Mod: 25,,, | Performed by: OBSTETRICS & GYNECOLOGY

## 2019-06-12 PROCEDURE — 80053 COMPREHEN METABOLIC PANEL: CPT

## 2019-06-12 PROCEDURE — 86901 BLOOD TYPING SEROLOGIC RH(D): CPT

## 2019-06-12 PROCEDURE — 99284 PR EMERGENCY DEPT VISIT,LEVEL IV: ICD-10-PCS | Mod: 25,,, | Performed by: OBSTETRICS & GYNECOLOGY

## 2019-06-12 PROCEDURE — 84156 ASSAY OF PROTEIN URINE: CPT

## 2019-06-12 PROCEDURE — 11000001 HC ACUTE MED/SURG PRIVATE ROOM

## 2019-06-12 PROCEDURE — 59025 FETAL NON-STRESS TEST: CPT

## 2019-06-12 RX ORDER — SODIUM CHLORIDE 9 MG/ML
INJECTION, SOLUTION INTRAVENOUS
Status: DISCONTINUED | OUTPATIENT
Start: 2019-06-13 | End: 2019-06-13

## 2019-06-12 RX ORDER — ONDANSETRON 8 MG/1
8 TABLET, ORALLY DISINTEGRATING ORAL EVERY 8 HOURS PRN
Status: DISCONTINUED | OUTPATIENT
Start: 2019-06-13 | End: 2019-06-13

## 2019-06-12 RX ORDER — OXYTOCIN/RINGER'S LACTATE 20/1000 ML
333 PLASTIC BAG, INJECTION (ML) INTRAVENOUS CONTINUOUS
Status: DISCONTINUED | OUTPATIENT
Start: 2019-06-13 | End: 2019-06-13

## 2019-06-12 RX ORDER — OXYTOCIN/RINGER'S LACTATE 20/1000 ML
41.7 PLASTIC BAG, INJECTION (ML) INTRAVENOUS CONTINUOUS
Status: DISCONTINUED | OUTPATIENT
Start: 2019-06-12 | End: 2019-06-13

## 2019-06-12 RX ORDER — CEFAZOLIN SODIUM 2 G/50ML
2 SOLUTION INTRAVENOUS
Status: DISCONTINUED | OUTPATIENT
Start: 2019-06-12 | End: 2019-06-13

## 2019-06-12 RX ORDER — OXYTOCIN/RINGER'S LACTATE 20/1000 ML
2 PLASTIC BAG, INJECTION (ML) INTRAVENOUS CONTINUOUS
Status: DISCONTINUED | OUTPATIENT
Start: 2019-06-13 | End: 2019-06-13

## 2019-06-12 RX ORDER — SODIUM CHLORIDE, SODIUM LACTATE, POTASSIUM CHLORIDE, CALCIUM CHLORIDE 600; 310; 30; 20 MG/100ML; MG/100ML; MG/100ML; MG/100ML
INJECTION, SOLUTION INTRAVENOUS CONTINUOUS
Status: DISCONTINUED | OUTPATIENT
Start: 2019-06-13 | End: 2019-06-13

## 2019-06-12 RX ADMIN — SODIUM CHLORIDE, SODIUM LACTATE, POTASSIUM CHLORIDE, AND CALCIUM CHLORIDE: .6; .31; .03; .02 INJECTION, SOLUTION INTRAVENOUS at 11:06

## 2019-06-12 NOTE — TELEPHONE ENCOUNTER
YOur BP is staying elevated. We need to discuss induction at this time. Please come in to discuss  AP

## 2019-06-13 ENCOUNTER — ANESTHESIA EVENT (OUTPATIENT)
Dept: OBSTETRICS AND GYNECOLOGY | Facility: OTHER | Age: 29
End: 2019-06-13
Payer: COMMERCIAL

## 2019-06-13 ENCOUNTER — RESEARCH ENCOUNTER (OUTPATIENT)
Dept: RESEARCH | Facility: HOSPITAL | Age: 29
End: 2019-06-13

## 2019-06-13 ENCOUNTER — ANESTHESIA (OUTPATIENT)
Dept: OBSTETRICS AND GYNECOLOGY | Facility: OTHER | Age: 29
End: 2019-06-13
Payer: COMMERCIAL

## 2019-06-13 LAB
ABO + RH BLD: NORMAL
ALBUMIN SERPL BCP-MCNC: 2.5 G/DL (ref 3.5–5.2)
ALP SERPL-CCNC: 165 U/L (ref 55–135)
ALT SERPL W/O P-5'-P-CCNC: 13 U/L (ref 10–44)
ANION GAP SERPL CALC-SCNC: 13 MMOL/L (ref 8–16)
AST SERPL-CCNC: 27 U/L (ref 10–40)
BILIRUB SERPL-MCNC: 0.3 MG/DL (ref 0.1–1)
BLD GP AB SCN CELLS X3 SERPL QL: NORMAL
BUN SERPL-MCNC: 13 MG/DL (ref 6–20)
CALCIUM SERPL-MCNC: 8.5 MG/DL (ref 8.7–10.5)
CHLORIDE SERPL-SCNC: 106 MMOL/L (ref 95–110)
CO2 SERPL-SCNC: 18 MMOL/L (ref 23–29)
CREAT SERPL-MCNC: 0.7 MG/DL (ref 0.5–1.4)
EST. GFR  (AFRICAN AMERICAN): >60 ML/MIN/1.73 M^2
EST. GFR  (NON AFRICAN AMERICAN): >60 ML/MIN/1.73 M^2
GLUCOSE SERPL-MCNC: 88 MG/DL (ref 70–110)
POTASSIUM SERPL-SCNC: 4.6 MMOL/L (ref 3.5–5.1)
PROT SERPL-MCNC: 6.6 G/DL (ref 6–8.4)
SODIUM SERPL-SCNC: 137 MMOL/L (ref 136–145)

## 2019-06-13 PROCEDURE — 63600175 PHARM REV CODE 636 W HCPCS: Performed by: STUDENT IN AN ORGANIZED HEALTH CARE EDUCATION/TRAINING PROGRAM

## 2019-06-13 PROCEDURE — 25000003 PHARM REV CODE 250: Performed by: STUDENT IN AN ORGANIZED HEALTH CARE EDUCATION/TRAINING PROGRAM

## 2019-06-13 PROCEDURE — 59400 PR FULL ROUT OBSTE CARE,VAGINAL DELIV: ICD-10-PCS | Mod: AT,,, | Performed by: OBSTETRICS & GYNECOLOGY

## 2019-06-13 PROCEDURE — 0502F SUBSEQUENT PRENATAL CARE: CPT | Mod: ,,, | Performed by: OBSTETRICS & GYNECOLOGY

## 2019-06-13 PROCEDURE — 11000001 HC ACUTE MED/SURG PRIVATE ROOM

## 2019-06-13 PROCEDURE — 72100002 HC LABOR CARE, 1ST 8 HOURS

## 2019-06-13 PROCEDURE — 0502F PR SUBSEQUENT PRENATAL CARE: ICD-10-PCS | Mod: ,,, | Performed by: OBSTETRICS & GYNECOLOGY

## 2019-06-13 PROCEDURE — 59400 OBSTETRICAL CARE: CPT | Mod: AT,,, | Performed by: OBSTETRICS & GYNECOLOGY

## 2019-06-13 RX ORDER — SERTRALINE HYDROCHLORIDE 50 MG/1
50 TABLET, FILM COATED ORAL DAILY
Status: DISCONTINUED | OUTPATIENT
Start: 2019-06-14 | End: 2019-06-15 | Stop reason: HOSPADM

## 2019-06-13 RX ORDER — OXYTOCIN/RINGER'S LACTATE 20/1000 ML
20 PLASTIC BAG, INJECTION (ML) INTRAVENOUS ONCE
Status: COMPLETED | OUTPATIENT
Start: 2019-06-13 | End: 2019-06-13

## 2019-06-13 RX ORDER — HYDROCODONE BITARTRATE AND ACETAMINOPHEN 10; 325 MG/1; MG/1
1 TABLET ORAL EVERY 4 HOURS PRN
Status: DISCONTINUED | OUTPATIENT
Start: 2019-06-13 | End: 2019-06-15 | Stop reason: HOSPADM

## 2019-06-13 RX ORDER — CARBOPROST TROMETHAMINE 250 UG/ML
250 INJECTION, SOLUTION INTRAMUSCULAR
Status: DISCONTINUED | OUTPATIENT
Start: 2019-06-13 | End: 2019-06-13

## 2019-06-13 RX ORDER — BUTALBITAL, ACETAMINOPHEN AND CAFFEINE 50; 325; 40 MG/1; MG/1; MG/1
2 TABLET ORAL EVERY 4 HOURS PRN
Status: DISCONTINUED | OUTPATIENT
Start: 2019-06-13 | End: 2019-06-13

## 2019-06-13 RX ORDER — ONDANSETRON 8 MG/1
8 TABLET, ORALLY DISINTEGRATING ORAL EVERY 8 HOURS PRN
Status: DISCONTINUED | OUTPATIENT
Start: 2019-06-13 | End: 2019-06-15 | Stop reason: HOSPADM

## 2019-06-13 RX ORDER — DIPHENHYDRAMINE HYDROCHLORIDE 50 MG/ML
25 INJECTION INTRAMUSCULAR; INTRAVENOUS EVERY 4 HOURS PRN
Status: DISCONTINUED | OUTPATIENT
Start: 2019-06-13 | End: 2019-06-15 | Stop reason: HOSPADM

## 2019-06-13 RX ORDER — FENTANYL CITRATE 50 UG/ML
INJECTION, SOLUTION INTRAMUSCULAR; INTRAVENOUS
Status: DISCONTINUED
Start: 2019-06-13 | End: 2019-06-13 | Stop reason: WASHOUT

## 2019-06-13 RX ORDER — OXYTOCIN/RINGER'S LACTATE 20/1000 ML
2 PLASTIC BAG, INJECTION (ML) INTRAVENOUS CONTINUOUS
Status: DISCONTINUED | OUTPATIENT
Start: 2019-06-13 | End: 2019-06-13

## 2019-06-13 RX ORDER — METHYLERGONOVINE MALEATE 0.2 MG/ML
200 INJECTION INTRAVENOUS
Status: DISCONTINUED | OUTPATIENT
Start: 2019-06-13 | End: 2019-06-13

## 2019-06-13 RX ORDER — MISOPROSTOL 200 UG/1
600 TABLET ORAL
Status: DISCONTINUED | OUTPATIENT
Start: 2019-06-13 | End: 2019-06-13

## 2019-06-13 RX ORDER — FENTANYL/BUPIVACAINE/NS/PF 2MCG/ML-.1
PLASTIC BAG, INJECTION (ML) INJECTION
Status: DISCONTINUED
Start: 2019-06-13 | End: 2019-06-13 | Stop reason: WASHOUT

## 2019-06-13 RX ORDER — ACETAMINOPHEN 325 MG/1
650 TABLET ORAL EVERY 6 HOURS PRN
Status: DISCONTINUED | OUTPATIENT
Start: 2019-06-13 | End: 2019-06-15 | Stop reason: HOSPADM

## 2019-06-13 RX ORDER — HYDROCORTISONE 25 MG/G
CREAM TOPICAL 3 TIMES DAILY PRN
Status: DISCONTINUED | OUTPATIENT
Start: 2019-06-13 | End: 2019-06-15 | Stop reason: HOSPADM

## 2019-06-13 RX ORDER — OXYTOCIN/RINGER'S LACTATE 20/1000 ML
41.65 PLASTIC BAG, INJECTION (ML) INTRAVENOUS CONTINUOUS
Status: DISPENSED | OUTPATIENT
Start: 2019-06-13 | End: 2019-06-13

## 2019-06-13 RX ORDER — OXYTOCIN/RINGER'S LACTATE 20/1000 ML
41.65 PLASTIC BAG, INJECTION (ML) INTRAVENOUS CONTINUOUS
Status: DISCONTINUED | OUTPATIENT
Start: 2019-06-13 | End: 2019-06-13

## 2019-06-13 RX ORDER — DIPHENHYDRAMINE HCL 25 MG
25 CAPSULE ORAL EVERY 4 HOURS PRN
Status: DISCONTINUED | OUTPATIENT
Start: 2019-06-13 | End: 2019-06-15 | Stop reason: HOSPADM

## 2019-06-13 RX ORDER — DOCUSATE SODIUM 100 MG/1
200 CAPSULE, LIQUID FILLED ORAL 2 TIMES DAILY PRN
Status: DISCONTINUED | OUTPATIENT
Start: 2019-06-13 | End: 2019-06-15 | Stop reason: HOSPADM

## 2019-06-13 RX ORDER — LIDOCAINE HYDROCHLORIDE 10 MG/ML
INJECTION INFILTRATION; PERINEURAL
Status: DISPENSED
Start: 2019-06-13 | End: 2019-06-14

## 2019-06-13 RX ORDER — IBUPROFEN 600 MG/1
600 TABLET ORAL EVERY 6 HOURS
Status: DISCONTINUED | OUTPATIENT
Start: 2019-06-13 | End: 2019-06-13

## 2019-06-13 RX ORDER — LIDOCAINE HYDROCHLORIDE 10 MG/ML
1 INJECTION INFILTRATION; PERINEURAL ONCE
Status: DISCONTINUED | OUTPATIENT
Start: 2019-06-13 | End: 2019-06-15 | Stop reason: HOSPADM

## 2019-06-13 RX ORDER — ACETAMINOPHEN 500 MG
1000 TABLET ORAL ONCE
Status: DISCONTINUED | OUTPATIENT
Start: 2019-06-13 | End: 2019-06-13

## 2019-06-13 RX ORDER — HYDROCODONE BITARTRATE AND ACETAMINOPHEN 5; 325 MG/1; MG/1
1 TABLET ORAL EVERY 4 HOURS PRN
Status: DISCONTINUED | OUTPATIENT
Start: 2019-06-13 | End: 2019-06-15 | Stop reason: HOSPADM

## 2019-06-13 RX ORDER — DIPHENOXYLATE HYDROCHLORIDE AND ATROPINE SULFATE 2.5; .025 MG/1; MG/1
1 TABLET ORAL 4 TIMES DAILY PRN
Status: DISCONTINUED | OUTPATIENT
Start: 2019-06-13 | End: 2019-06-13

## 2019-06-13 RX ORDER — IBUPROFEN 600 MG/1
600 TABLET ORAL EVERY 6 HOURS
Status: DISCONTINUED | OUTPATIENT
Start: 2019-06-13 | End: 2019-06-15 | Stop reason: HOSPADM

## 2019-06-13 RX ADMIN — Medication 2 MILLI-UNITS/MIN: at 12:06

## 2019-06-13 RX ADMIN — IBUPROFEN 600 MG: 600 TABLET ORAL at 08:06

## 2019-06-13 RX ADMIN — BUTALBITAL, ACETAMINOPHEN AND CAFFEINE 2 TABLET: 50; 325; 40 TABLET ORAL at 05:06

## 2019-06-13 RX ADMIN — Medication 41.65 MILLI-UNITS/MIN: at 01:06

## 2019-06-13 RX ADMIN — IBUPROFEN 600 MG: 600 TABLET ORAL at 01:06

## 2019-06-13 RX ADMIN — Medication 20 UNITS: at 12:06

## 2019-06-13 NOTE — DISCHARGE INSTRUCTIONS
Breastfeeding Discharge Instructions       Feed the baby at the earliest sign of hunger or comfort  o Hands to mouth, sucking motions  o Rooting or searching for something to suck on  o Dont wait for crying - it is a sign of distress     The feedings may be 8-12 times per 24hrs and will not follow a schedule   Avoid pacifiers and bottles for the first 4 weeks   Alternate the breast you start the feeding with, or start with the breast that feels the fullest   Switch breasts when the baby takes himself off the breast or falls asleep   Keep offering breasts until the baby looks full, no longer gives hunger signs, and stays asleep when placed on his back in the crib   If the baby is sleepy and wont wake for a feeding, put the baby skin-to-skin dressed in a diaper against the mothers bare chest   Sleep near your baby   The baby should be positioned and latched on to the breast correctly  o Chest-to-chest, chin in the breast  o Babys lips are flipped outward  o Babys mouth is stretched open wide like a shout  o Babys sucking should feel like tugging to the mother  - The baby should be drinking at the breast:  o You should hear swallowing or gulping throughout the feeding  o You should see milk on the babys lips when he comes off the breast  o Your breasts should be softer when the baby is finished feeding  o The baby should look relaxed at the end of feedings  o After the 4th day and your milk is in:  o The babys poop should turn bright yellow and be loose, watery, and seedy  o The baby should have at least 3-4 poops the size of the palm of your hand per day  o The baby should have at least 5-6 wet diapers per day  o The urine should be light yellow in color  You should drink when you are thirsty and eat a healthy diet when you are    hungry.     Take naps to get the rest you need.   Take medications and/or drink alcohol only with permission of your obstetrician    or the babys pediatrician.  You can  also call the [facility],   ([phone]), Monday-Friday, 8am-5pm Central time, to get the most   up-to-date evidence-based information on the use of medications during   pregnancy and breastfeeding.      The baby should be examined by a pediatrician at 3-5 days of age.  Once your   milk comes in, the baby should be gaining at least ½ - 1oz each day and should be back to birthweight no later than 10-14 days of age.          Community Resources    [facility] Breastfeeding Warmline: [phone]   Local [facility] clinics: provide incentives and breastpumps to eligible mothers  [employer] (LLLI):  mother-to-mother support group website        www.SocialGuides.[employer]  Local La Leche League mother-to-mother support groups:        www.eCullet        La Leche League [facility]   Dr. Axel Brunson website for latch videos and general information:        www.breastfeedinginc.ca  [facility] is a call center that provides information about the safety of taking medications while breastfeeding.  Call 1-295.216.1326, M-F, 8am-5pm, CT.  International Lactation Consultant Association provides resources for assistance:        www.ilca.org  [place] Breastfeeding Coalition provides informationand resources for parents  and the community    www.LaBreastfeedingSupport.org     Rosie Mccord is a mom-to-mom support group:                             www.nolanesting.Toygaroo.com//breastfeedng-support/  Partners for Healthy Babies:  6-500-089-BABY(1288)  Cafe [employer] Lait: a breastfeeding support group for women of color, 449.574.6567            [facility] can help obtain an insurance breastpump    Phone: [phone] ext [phone]     Address: [address] [place] [address] ( Across from [facility] next to [employer])    Information they will need:    Your name and phone number  Name of insured on health insurance plan  Policy number  Health insurance provider contact  phone number

## 2019-06-13 NOTE — ANESTHESIA PREPROCEDURE EVALUATION
2019  Sonam Hidalgo is a 29 y.o. female with PMH of gestational HTN with increasing HTN and HA presented with contractions and found to be in active labor. Has had one vaginal delivery in the past with successful epidural placement.     OB History    Para Term  AB Living   3 1 1 0 1 1   SAB TAB Ectopic Multiple Live Births   0 1 0 0 1      # Outcome Date GA Lbr Angelito/2nd Weight Sex Delivery Anes PTL Lv   3 Current            2 Term 11/30/15 38w3d  2.81 kg (6 lb 3.1 oz) M Vag-Spont EPI N ZENA      Birth Comments: NICU x 4 days.    Needed HFNC and CPAP but no intubation.  Abx for 48 hour rule out, blood cx negative.   Hepatitis B given on 12/3/15   1 TAB                Wt Readings from Last 1 Encounters:   19 2320 116 kg (255 lb 11.7 oz)       BP Readings from Last 3 Encounters:   19 122/73   06/10/19 134/80   19 131/85       Patient Active Problem List   Diagnosis     (spontaneous vaginal delivery)    Gestational hypertension    Joint pain    Shoulder pain, left    Fetal echo with top normal size of right heart, recommend  echo prior to nursery discharge    37 weeks gestation of pregnancy    Depression during pregnancy in third trimester    Class 3 severe obesity in adult       Past Surgical History:   Procedure Laterality Date    CHOLECYSTECTOMY      CHOLECYSTECTOMY, LAPAROSCOPIC N/A 2012    Performed by Joshua Goldberg, MD at [facility] OR [facility]    median arcuate ligament          Social History     Socioeconomic History    Marital status: Single     Spouse name: Not on file    Number of children: Not on file    Years of education: Not on file    Highest education level: Not on file   Occupational History    Not on file   Social Needs    Financial resource strain: Not on file    Food insecurity:     Worry: Not on file     Inability: Not  on file    Transportation needs:     Medical: Not on file     Non-medical: Not on file   Tobacco Use    Smoking status: Never Smoker    Smokeless tobacco: Never Used   Substance and Sexual Activity    Alcohol use: Yes     Comment: occas, none recently    Drug use: No    Sexual activity: Yes     Partners: Male     Birth control/protection: None   Lifestyle    Physical activity:     Days per week: Not on file     Minutes per session: Not on file    Stress: Not on file   Relationships    Social connections:     Talks on phone: Not on file     Gets together: Not on file     Attends Bahai service: Not on file     Active member of club or organization: Not on file     Attends meetings of clubs or organizations: Not on file     Relationship status: Not on file   Other Topics Concern    Not on file   Social History Narrative    Not on file         Chemistry        Component Value Date/Time     06/12/2019 2316    K 4.6 06/12/2019 2316     06/12/2019 2316    CO2 18 (L) 06/12/2019 2316    BUN 13 06/12/2019 2316    CREATININE 0.7 06/12/2019 2316    GLU 88 06/12/2019 2316        Component Value Date/Time    CALCIUM 8.5 (L) 06/12/2019 2316    ALKPHOS 165 (H) 06/12/2019 2316    AST 27 06/12/2019 2316    ALT 13 06/12/2019 2316    BILITOT 0.3 06/12/2019 2316    ESTGFRAFRICA >60 06/12/2019 2316    EGFRNONAA >60 06/12/2019 2316            Lab Results   Component Value Date    WBC 8.50 06/12/2019    HGB 10.1 (L) 06/12/2019    HCT 32.1 (L) 06/12/2019    MCV 79 (L) 06/12/2019     06/12/2019       No results for input(s): PT, INR, PROTIME, APTT in the last 72 hours.                  Anesthesia Evaluation    I have reviewed the Patient Summary Reports.     I have reviewed the Medications.     Review of Systems  Anesthesia Hx:  No problems with previous Anesthesia  History of prior surgery of interest to airway management or planning: Previous anesthesia: General Denies Family Hx of Anesthesia  complications.   Denies Personal Hx of Anesthesia complications.   Social:  Non-Smoker    Cardiovascular:   Hypertension, well controlled    Pulmonary:  Pulmonary Normal    Hepatic/GI:   GERD, well controlled    Neurological:  Neurology Normal    Endocrine:  Endocrine Normal    Psych:   depression          Physical Exam  General:  Obesity    Airway/Jaw/Neck:  Airway Findings: Mouth Opening: Normal Tongue: Normal  General Airway Assessment: Adult  Mallampati: II  Improves to I with phonation.  TM Distance: Normal, at least 6 cm  Jaw/Neck Findings:     Neck ROM: Normal ROM      Dental:  Dental Findings: In tact   Chest/Lungs:  Chest/Lungs Findings: Clear to auscultation, Normal Respiratory Rate     Heart/Vascular:  Heart Findings: Rate: Normal  Rhythm: Regular Rhythm  Sounds: Normal             Anesthesia Plan  Type of Anesthesia, risks & benefits discussed:  Anesthesia Type:  epidural, general, spinal, CSE  Patient's Preference:   Intra-op Monitoring Plan: standard ASA monitors  Intra-op Monitoring Plan Comments:   Post Op Pain Control Plan: multimodal analgesia  Post Op Pain Control Plan Comments:   Induction:   IV  Beta Blocker:  Patient is not currently on a Beta-Blocker (No further documentation required).       Informed Consent: Patient understands risks and agrees with Anesthesia plan.  Questions answered. Anesthesia consent signed with patient.  ASA Score: 3     Day of Surgery Review of History & Physical:    H&P update referred to the provider.         Ready For Surgery From Anesthesia Perspective.

## 2019-06-13 NOTE — PROGRESS NOTES
TXA study Consent note     was admitted on yesterday for delivery. She was approached and consented for TXA trial today in private L&D suite, and we discussed the study in detail, including the purpose, numbers/length, procedures, risk/benefit, cost/payment, contacts (investigators/IRB), alternatives/voluntary nature/withdrawal, confidentiality/HIPPA. Dr. Jones was available for questions, and patient had no further questions. Patient also did consent to be contacted for future studies. The consent form was signed on today 2019 at 0924 and patient will be randomized if a C/S is called.  Patient was given a copy of signed informed consent.      PREVENA study Consent note:    Ms. Hidalgo was approached and consented for the Prevena study in private L&Dsuite, and we discussed the study in detail, including the purpose, numbers/length, procedures, risk/benefit, cost/payment, contacts (investigators/IRB), alternatives/voluntary nature/withdrawal, confidentiality/HIPPA. Dr. Jones was available for questions, and patient had no further questions.  Patient also did consent to data storage for future research. The consent form was signed on today 2019 at 0932 and patient will be randomized if a  is called.  Patient was given a copy of signed informed consent.

## 2019-06-13 NOTE — PROGRESS NOTES
"LABOR NOTE    S:  Complaints: No.  Epidural working:  not applicable  MD to bedside for routine cervical check     O: /70   Pulse 83   Temp 97 °F (36.1 °C) (Oral)   Resp 18   Ht 5' 2" (1.575 m)   Wt 116 kg (255 lb 11.7 oz)   LMP 2018   SpO2 95%   Breastfeeding? No   BMI 46.77 kg/m²       FHT: 130; mod BTBV: + accels, no decels Cat 1 (reassuring)  CTX: q 2-3 minutes  SVE: /-2, gush of fluid with this check       ASSESSMENT:   29 y.o.  at 37w2d, induction of labor     FHT reassuring    Active Hospital Problems    Diagnosis  POA    37 weeks gestation of pregnancy [Z3A.37]  Not Applicable    Depression during pregnancy in third trimester [O99.343, F32.9]  Unknown    Class 3 severe obesity in adult [E66.01]  Unknown    Gestational hypertension [O13.9]  Yes      Resolved Hospital Problems   No resolved problems to display.   0400: /-3  0830: /-2, ruptured minimal fluid   1030: /-2, additional fluid leaked, discussed IUPC, patient would prefer to walk the king prior to placement       PLAN:    Continue Close Maternal/Fetal Monitoring  Pitocin Augmentation per protocol  Recheck 2 hours or PRN    Karena Padilla MD  OBGYN, PGY-1    "

## 2019-06-13 NOTE — L&D DELIVERY NOTE
[facility]  Vaginal Delivery   Operative Note    SUMMARY   MD called to bedside for delivery. Patient had been sitting up for an epidural when she felt the urge to push. She was checked by nurse in the room and found to be complete and zero station. Upon entry into the room she was rechecked and found to be complete and . Staff was called to the room and patient was prepared for imminent delivery. Two maternal pushes resulted in   Normal spontaneous vaginal delivery of live female infant. Infant was placed on mothers abdomen for skin to skin and bulb suctioning performed.  Infant delivered position JANETH over perineum. No episiotomy was performed.   Nuchal cord: No.    Spontaneous delivery of placenta and IV pitocin given noting good uterine tone.  2nd degree laceration noted and repaired in normal fashion with 2-0 vicryl suture after adequate anesthesia with lidocaine was achieved. A periurethral tear was also noted which was hemostatic.   Patient tolerated delivery well. Sponge needle and lap counted correctly x2.    Indications: Gestational hypertension  Pregnancy complicated by:   Patient Active Problem List   Diagnosis     (spontaneous vaginal delivery)    Gestational hypertension    Joint pain    Shoulder pain, left    Fetal echo with top normal size of right heart, recommend  echo prior to nursery discharge    37 weeks gestation of pregnancy    Depression during pregnancy in third trimester    Class 3 severe obesity in adult    Spontaneous vaginal delivery     Admitting GA: 37w2d    Delivery Information for  Girl Sonam Hidalgo    Birth information:  YOB: 2018   Time of birth: 11:55 AM   Sex: female   Head Delivery Date/Time: 2019 11:55 AM   Delivery type: Vaginal, Spontaneous   Gestational Age: 37w2d    Delivery Providers    Delivering clinician:  Joy Hill MD   Provider Role    Chino James MD Resident    Karena Armenta  MD Randy Resident    Shawna Sosa, RN Registered Nurse    Angela Pryor, RN Registered Nurse    Jaqueline Marc, RN Registered Nurse            Measurements    Weight:    Length:           Apgars    Living status:  Living  Apgars:   1 min.:   5 min.:   10 min.:   15 min.:   20 min.:     Skin color:   1  1       Heart rate:   2  2       Reflex irritability:   2  2       Muscle tone:   2  2       Respiratory effort:   2  2       Total:   9  9              Operative Delivery    Forceps attempted?:  No  Vacuum extractor attempted?:  No         Shoulder Dystocia    Shoulder dystocia present?:  No           Presentation    Presentation:  Vertex  Position:  Left Occiput Anterior           Interventions/Resuscitation    Method:  Bulb Suctioning, Tactile Stimulation       Cord    Vessels:  3 vessels  Complications:  None  Delayed Cord Clamping?:  Yes  Cord Blood Disposition:  Sent with Baby  Gases Sent?:  No  Stem Cell Collection (by MD):  No       Placenta    Placenta delivery date/time:  2019 1208  Placenta removal:  Spontaneous  Placenta appearance:  Intact  Placenta disposition:  discarded           Labor Events:       labor: No     Labor Onset Date/Time:         Dilation Complete Date/Time:         Start Pushing Date/Time:       Rupture Date/Time:              Rupture type:           Fluid Amount:        Fluid Color:        Fluid Odor:        Membrane Status (PeriCalm): ARM (Artificial Rupture)      Rupture Date/Time (PeriCalm): 2019 08:18:00      Fluid Amount (PeriCalm): Small      Fluid Color (PeriCalm):         steroids: None     Antibiotics given for GBS: No     Induction: oxytocin     Indications for induction:  Hypertension     Augmentation: amniotomy;oxytocin     Indications for augmentation: Hypertension     Labor complications: None     Additional complications:          Cervical ripening:                     Delivery:      Episiotomy: None     Indication for Episiotomy:        Perineal Lacerations: 2nd Repaired:  Yes   Periurethral Laceration:   Repaired: Yes   Labial Laceration:   Repaired:     Sulcus Laceration:   Repaired:     Vaginal Laceration:   Repaired:     Cervical Laceration:   Repaired:     Repair suture:       Repair # of packets:       Last Value - EBL - Nursing (mL):       Sum - EBL - Nursing (mL): 0     Last Value - EBL - Anesthesia (mL):      Calculated QBL (mL):        Vaginal Sweep Performed: Yes     [facility] Correct:         Other providers:       Anesthesia    Method:  None          Details (if applicable):  Trial of Labor      Categorization:      Priority:     Indications for :     Incision Type:       Additional  information:  Forceps:    Vacuum:    Breech:    Observed anomalies    Other (Comments):         I was present for the duration of the delivery and repair and agree with management.

## 2019-06-13 NOTE — H&P
HISTORY AND PHYSICAL                                                OBSTETRICS          Subjective:       Sonam Hidalgo is a 28 yo  at 37w1d presents for contractions that have been getting progressively more frequent and painful since about 2 pm. She also has had elevated BP readings at home. She has gestational HTN on no medications. She also reports a mild headache relieved by tylenol and RUQ pain. She denies vaginal bleeding, leakage of fluid. Reports normal fetal movement. This IUP is complicated by gHTN, obesity, and depression (on zoloft).    PMHx:   Past Medical History:   Diagnosis Date    Abnormal Pap smear of vagina     Allergy     Anxiety     Hypertension        PSHx:   Past Surgical History:   Procedure Laterality Date    CHOLECYSTECTOMY      CHOLECYSTECTOMY, LAPAROSCOPIC N/A 2012    Performed by [facility] at [facility] OR [facility]    median arcuate ligament          All: Review of patient's allergies indicates:  No Known Allergies    Meds:   Medications Prior to Admission   Medication Sig Dispense Refill Last Dose    iron,carb/vit C/vit B12/folic (IRON 100 PLUS ORAL) Take by mouth.   Taking    ondansetron (ZOFRAN-ODT) 4 MG TbDL Take 1 tablet (4 mg total) by mouth every 8 (eight) hours as needed (nausea). 6 tablet 0 Taking    prenatal 25/iron fum/folic/dha (PRENATAL-1 ORAL) Take by mouth.   Taking    sertraline (ZOLOFT) 50 MG tablet TAKE 1 TABLET BY MOUTH EVERY DAY 30 tablet 3 Taking       SH:   Social History     Socioeconomic History    Marital status: Single     Spouse name: Not on file    Number of children: Not on file    Years of education: Not on file    Highest education level: Not on file   Occupational History    Not on file   Social Needs    Financial resource strain: Not on file    Food insecurity:     Worry: Not on file     Inability: Not on file    Transportation needs:     Medical: Not on file     Non-medical: Not on file   Tobacco Use     Smoking status: Never Smoker    Smokeless tobacco: Never Used   Substance and Sexual Activity    Alcohol use: Yes     Comment: occas, none recently    Drug use: No    Sexual activity: Yes     Partners: Male     Birth control/protection: None   Lifestyle    Physical activity:     Days per week: Not on file     Minutes per session: Not on file    Stress: Not on file   Relationships    Social connections:     Talks on phone: Not on file     Gets together: Not on file     Attends [employer] service: Not on file     Active member of club or organization: Not on file     Attends meetings of clubs or organizations: Not on file     Relationship status: Not on file   Other Topics Concern    Not on file   Social History Narrative    Not on file       FH:   Family History   Problem Relation Age of Onset    Breast cancer Mother     Alcohol abuse Father     Celiac disease Neg Hx     Cirrhosis Neg Hx     Colon cancer Neg Hx     Colon polyps Neg Hx     Crohn's disease Neg Hx     Cystic fibrosis Neg Hx     Esophageal cancer Neg Hx     Hemochromatosis Neg Hx     Inflammatory bowel disease Neg Hx     Irritable bowel syndrome Neg Hx     Liver cancer Neg Hx     Liver disease Neg Hx     Rectal cancer Neg Hx     Stomach cancer Neg Hx     Ulcerative colitis Neg Hx     Jacob's disease Neg Hx     Ovarian cancer Neg Hx     Arrhythmia Neg Hx     Cardiomyopathy Neg Hx     Congenital heart disease Neg Hx     Early death Neg Hx     Heart attacks under age 50 Neg Hx     Pacemaker/defibrilator Neg Hx        OBHx:   OB History    Para Term  AB Living   3 1 1 0 1 1   SAB TAB Ectopic Multiple Live Births   0 1 0 0 1      # Outcome Date GA Lbr Angelito/2nd Weight Sex Delivery Anes PTL Lv   3 Current            2 Term 11/30/15 38w3d  2.81 kg (6 lb 3.1 oz) M Vag-Spont EPI N ZENA      Birth Comments: NICU x 4 days.    Needed HFNC and CPAP but no intubation.  Abx for 48 hour rule out, blood cx negative.    Hepatitis B given on 12/3/15      Name: Sheldon      Apgar1: 2  Apgar5: 5   1 TAB                Review of Systems   Constitutional: Negative for activity change, appetite change, chills and fever.   Eyes: Negative for photophobia and visual disturbance.   Respiratory: Negative for chest tightness and shortness of breath.   Cardiovascular: Negative for chest pain.   Gastrointestinal: Positive for abdominal pain. Negative for nausea and vomiting.   Genitourinary: Positive for pelvic pain. Negative for dysuria, vaginal bleeding and vaginal discharge.   Neurological: Positive for headaches. Negative for dizziness and light-headedness.     Objective:       /85   Pulse 90   Temp 98.8 °F (37.1 °C) (Oral)   Resp 16   LMP 09/25/2018   SpO2 97%     Vitals:    06/12/19 2309 06/12/19 2314 06/12/19 2319 06/12/19 2320   BP:    138/85   BP Location:       Patient Position:       Pulse: 87 87 92 90   Resp:       Temp:       TempSrc:       SpO2: 97% 98% 97%        General:   alert, appears stated age and cooperative   Lungs:   normal respiratory effort   Heart:   regular rate and rhythm   Abdomen:  soft, non-tender; bowel sounds normal; no masses,  no organomegaly   Extremities positive edema, negative erythema   FHT: 135 bpm, moderate variability, +accels, no decels Cat 1 (reassuring)                 TOCO: No ctx visualized   Presentations: cephalic by ultrasound   Cervix:     Dilation: 5cm    Effacement: 75%    Station:  -2     Lab Review  Blood Type O POS  GBBS: negative  Rubella: Immune  RPR: Nonreactive  HIV: negative  HepB: negative       Assessment:       37w1d weeks gestation:    Active Hospital Problems    Diagnosis  POA    37 weeks gestation of pregnancy [Z3A.37]  Not Applicable    Depression during pregnancy in third trimester [O99.343, F32.9]  Unknown    Class 3 severe obesity in adult [E66.01]  Unknown    Gestational hypertension [O13.9]  Yes      Resolved Hospital Problems   No resolved problems to  display.          Plan:      Risks, benefits, alternatives and possible complications have been discussed in detail with the patient.   - Consents signed and to chart  - Admit to Labor and Delivery unit  - PreE labs drawn. Continue to closely monitor BP.  - Pitocin for labor augmentation  - Epidural per Anesthesia  - Draw CBC, T&S  - Notify Staff  - Recheck in 2 hrs or PRN    Post-Partum Hemorrhage risk - medium          Claudia Rhodes MD  OBGYN PGY-1

## 2019-06-13 NOTE — LACTATION NOTE
Attempted latch at right breast, infant sleepy and reluctant to open mouth, licking nipple. No latch achieved after multiple attempts and position changes. Hand expression achieved 1mL which was spoonfed. Lactation Basics education completed. LC reviewed Breastfeeding Guide and encouraged tracking feeds and output. Encouraged use of STS, frequent feeds on demand, and avoiding artificial nipples. Pt verbalized understanding.  number left on board.        06/13/19 9697   Maternal Assessment   Breast Shape round   Breast Density soft   Areola elastic   Nipples everted   Maternal Infant Feeding   Maternal Emotional State relaxed;assist needed   Infant Positioning clutch/football   Signs of Milk Transfer   (no latch achieved)   Latch Assistance yes

## 2019-06-13 NOTE — PROGRESS NOTES
LABOR PROGRESS NOTE    S:  MD to bedside for cervical check. Complaints: No.  Mild pain with contractions    O: Temp:  [98.8 °F (37.1 °C)] 98.8 °F (37.1 °C)  Pulse:  [74-93] 76  Resp:  [16-20] 20  SpO2:  [94 %-99 %] 97 %  BP: (131-142)/(67-85) 140/74      FHT: 135 BPM/mod beat to beat variability/pos accels/no decels Cat 1 (reassuring)  CTX: q 2 minutes, pitocin at 6  SVE: 5/70/-3        ASSESSMENT:   29 y.o.  at 37w2d, IOL for gHTN    FHT reassuring    Active Hospital Problems    Diagnosis  POA    37 weeks gestation of pregnancy [Z3A.37]  Not Applicable    Depression during pregnancy in third trimester [O99.343, F32.9]  Unknown    Class 3 severe obesity in adult [E66.01]  Unknown    Gestational hypertension [O13.9]  Yes      Resolved Hospital Problems   No resolved problems to display.     Labor Course  0400: 5/70/-3, pit on 6    PLAN:    Labor management  Continue Close Maternal/Fetal Monitoring.   Pitocin Augmentation per protocol  Recheck 2 hours or PRN      gHTN  - PCR 0.16, a decrease  - LFT and PLT wnl  - Continue to monitor pressure    Tip Beckwith MD

## 2019-06-13 NOTE — ED PROVIDER NOTES
Encounter Date: 2019       History     Chief Complaint   Patient presents with    Contractions     30 yo  at 37w1d presents for contractions that have been getting progressively more frequent and painful since about 2 pm. She also has had elevated BP readings at home. She has gestational HTN on no medications. She also reports a mild headache relieved by tylenol and RUQ pain. She denies vaginal bleeding, leakage of fluid. Reports normal fetal movement. This IUP is complicated by gHTN, obesity, and depression (on zoloft).        Review of patient's allergies indicates:  No Known Allergies  Past Medical History:   Diagnosis Date    Abnormal Pap smear of vagina     Allergy     Anxiety     Hypertension      Past Surgical History:   Procedure Laterality Date    CHOLECYSTECTOMY      CHOLECYSTECTOMY, LAPAROSCOPIC N/A 2012    Performed by [facility] Goldberg[facility] at [facility] OR [facility]    median arcuate ligament        Family History   Problem Relation Age of Onset    Breast cancer Mother     Alcohol abuse Father     Celiac disease Neg Hx     Cirrhosis Neg Hx     Colon cancer Neg Hx     Colon polyps Neg Hx     Crohn's disease Neg Hx     Cystic fibrosis Neg Hx     Esophageal cancer Neg Hx     Hemochromatosis Neg Hx     Inflammatory bowel disease Neg Hx     Irritable bowel syndrome Neg Hx     Liver cancer Neg Hx     Liver disease Neg Hx     Rectal cancer Neg Hx     Stomach cancer Neg Hx     Ulcerative colitis Neg Hx     Jacob's disease Neg Hx     Ovarian cancer Neg Hx     Arrhythmia Neg Hx     Cardiomyopathy Neg Hx     Congenital heart disease Neg Hx     Early death Neg Hx     Heart attacks under age 50 Neg Hx     Pacemaker/defibrilator Neg Hx      Social History     Tobacco Use    Smoking status: Never Smoker    Smokeless tobacco: Never Used   Substance Use Topics    Alcohol use: Yes     Comment: occas, none recently    Drug use: No     Review of Systems   Constitutional:  Negative for activity change, appetite change, chills and fever.   Eyes: Negative for photophobia and visual disturbance.   Respiratory: Negative for chest tightness and shortness of breath.    Cardiovascular: Negative for chest pain.   Gastrointestinal: Positive for abdominal pain. Negative for nausea and vomiting.   Genitourinary: Positive for pelvic pain. Negative for dysuria, vaginal bleeding and vaginal discharge.   Neurological: Positive for headaches. Negative for dizziness and light-headedness.       Physical Exam     Initial Vitals [06/12/19 2249]   BP Pulse Resp Temp SpO2   137/78 84 16 98.8 °F (37.1 °C) 98 %      MAP       --         Physical Exam    Vitals reviewed.  Constitutional: She appears well-developed and well-nourished. She is not diaphoretic. No distress.   HENT:   Head: Normocephalic and atraumatic.   Nose: Nose normal.   Eyes: Conjunctivae are normal.   Neck: Normal range of motion.   Cardiovascular: Normal rate.   Pulmonary/Chest: No respiratory distress.   Abdominal: Soft. She exhibits no distension. There is no tenderness. There is no rebound.   Gravid   Musculoskeletal: She exhibits no edema or tenderness.   Neurological: She is alert and oriented to person, place, and time.   Skin: Skin is warm and dry. No rash noted. No erythema.   Psychiatric: She has a normal mood and affect. Her behavior is normal. Judgment and thought content normal.     OB LABOR EXAM:   Pre-Term Labor: No.   Membranes ruptured: No.   Method: Sterile vaginal exam per MD.   Vaginal Bleeding: none present.     Dilatation: 5.   Station: -2.   Effacement: 70%.   Amniotic Fluid Color: no fluid.           ED Course   Obtain Fetal nonstress test (NST)  Date/Time: 6/12/2019 11:14 PM  Performed by: Claudia Rhodes MD  Authorized by: Claudia Rhodes MD     Nonstress Test:     Variability:  6-25 BPM    Decelerations:  None    Accelerations:  15 bpm    Baseline:  135    Contractions:  Not present  Biophysical  Profile:     Nonstress Test Interpretation: reactive      Overall Impression:  Reassuring      Labs Reviewed   POCT URINALYSIS, DIPSTICK OR TABLET REAGENT, AUTOMATED, WITH MICROSCOP          Imaging Results    None          Medical Decision Making:   ED Management:  VSS. NST reactive and reassuring. BP normal here but patient with headache and RUQ pain. PreE labs drawn. Cervix dilated to 5 cm on SVE. Will admit to L&D.              Attending Attestation:   Physician Attestation Statement for Resident:  As the supervising MD   Physician Attestation Statement: I have personally seen and examined this patient.   I agree with the above history. -:   As the supervising MD I agree with the above PE.    As the supervising MD I agree with the above treatment, course, plan, and disposition.  I was personally present during the critical portions of the procedure(s) performed by the resident and was immediately available in the ED to provide services and assistance as needed during the entire procedure.  I have reviewed and agree with the residents interpretation of the following: rhythm strips.  I have reviewed the following: old records at this facility.                    ED Course as of    2305 I evaluated Ms. Hidalgo and discussed plan with Dr. Rhodes.  Pt presents at 37w1d with contractions.  She is found to be 5cm.  Will admit to labor and delivery    [HU]      ED Course User Index  [HU] Anjelica Orr DO     Clinical Impression:       ICD-10-CM ICD-9-CM   1. Gestational hypertension O13.9 642.30   2. 37 weeks gestation of pregnancy Z3A.37 V22.2   3. Spontaneous vaginal delivery O80 650   4.  (spontaneous vaginal delivery) O80 650   5. Indication for care in labor or delivery O75.9 659.90   6. Gestational hypertension, third trimester O13.3 642.33         Disposition:   Disposition: Admitted  Condition: Stable                        Claudia Rhodes MD  Resident  19  2315       Anjelica Orr, DO  06/20/19 9304

## 2019-06-14 LAB
BASOPHILS # BLD AUTO: 0.02 K/UL (ref 0–0.2)
BASOPHILS NFR BLD: 0.2 % (ref 0–1.9)
DIFFERENTIAL METHOD: ABNORMAL
EOSINOPHIL # BLD AUTO: 0.1 K/UL (ref 0–0.5)
EOSINOPHIL NFR BLD: 1.1 % (ref 0–8)
ERYTHROCYTE [DISTWIDTH] IN BLOOD BY AUTOMATED COUNT: 16.2 % (ref 11.5–14.5)
HCT VFR BLD AUTO: 30 % (ref 37–48.5)
HGB BLD-MCNC: 9.2 G/DL (ref 12–16)
LYMPHOCYTES # BLD AUTO: 2.4 K/UL (ref 1–4.8)
LYMPHOCYTES NFR BLD: 21.8 % (ref 18–48)
MCH RBC QN AUTO: 24.3 PG (ref 27–31)
MCHC RBC AUTO-ENTMCNC: 30.7 G/DL (ref 32–36)
MCV RBC AUTO: 79 FL (ref 82–98)
MONOCYTES # BLD AUTO: 1 K/UL (ref 0.3–1)
MONOCYTES NFR BLD: 8.8 % (ref 4–15)
NEUTROPHILS # BLD AUTO: 7.4 K/UL (ref 1.8–7.7)
NEUTROPHILS NFR BLD: 67.5 % (ref 38–73)
PLATELET # BLD AUTO: 290 K/UL (ref 150–350)
PMV BLD AUTO: 9.8 FL (ref 9.2–12.9)
RBC # BLD AUTO: 3.78 M/UL (ref 4–5.4)
WBC # BLD AUTO: 10.89 K/UL (ref 3.9–12.7)

## 2019-06-14 PROCEDURE — 11000001 HC ACUTE MED/SURG PRIVATE ROOM

## 2019-06-14 PROCEDURE — 25000003 PHARM REV CODE 250: Performed by: STUDENT IN AN ORGANIZED HEALTH CARE EDUCATION/TRAINING PROGRAM

## 2019-06-14 PROCEDURE — 36415 COLL VENOUS BLD VENIPUNCTURE: CPT

## 2019-06-14 PROCEDURE — 51702 INSERT TEMP BLADDER CATH: CPT

## 2019-06-14 PROCEDURE — 72100003 HC LABOR CARE, EA. ADDL. 8 HRS

## 2019-06-14 PROCEDURE — 72200005 HC VAGINAL DELIVERY LEVEL II

## 2019-06-14 PROCEDURE — 85025 COMPLETE CBC W/AUTO DIFF WBC: CPT

## 2019-06-14 RX ORDER — IRON POLYSACCHARIDE COMPLEX 150 MG
150 CAPSULE ORAL DAILY
Status: DISCONTINUED | OUTPATIENT
Start: 2019-06-14 | End: 2019-06-15 | Stop reason: HOSPADM

## 2019-06-14 RX ORDER — DOCUSATE SODIUM 100 MG/1
100 CAPSULE, LIQUID FILLED ORAL DAILY
Status: DISCONTINUED | OUTPATIENT
Start: 2019-06-14 | End: 2019-06-15 | Stop reason: HOSPADM

## 2019-06-14 RX ORDER — IBUPROFEN 600 MG/1
600 TABLET ORAL EVERY 6 HOURS
Qty: 30 TABLET | Refills: 1 | Status: SHIPPED | OUTPATIENT
Start: 2019-06-14 | End: 2019-10-06

## 2019-06-14 RX ADMIN — IBUPROFEN 600 MG: 600 TABLET ORAL at 09:06

## 2019-06-14 RX ADMIN — DOCUSATE SODIUM 100 MG: 100 CAPSULE, LIQUID FILLED ORAL at 08:06

## 2019-06-14 RX ADMIN — IBUPROFEN 600 MG: 600 TABLET ORAL at 08:06

## 2019-06-14 RX ADMIN — SERTRALINE HYDROCHLORIDE 50 MG: 50 TABLET ORAL at 08:06

## 2019-06-14 RX ADMIN — Medication 150 MG: at 08:06

## 2019-06-14 RX ADMIN — IBUPROFEN 600 MG: 600 TABLET ORAL at 02:06

## 2019-06-14 RX ADMIN — IBUPROFEN 600 MG: 600 TABLET ORAL at 01:06

## 2019-06-14 NOTE — LACTATION NOTE
Infant to right breast, wide gape and deep latch achieved. Tenderness at latch that resolves by bringing infant closer to breast, using breast compression and tucking infant's hips closer; this resolves pain. Audible swallows, good tugs and pulls. Infant latched to L breast, wide gape and deep latch achieved. Audible swallows. Pt encouraged to hand express after feedings and discussed hand expression as alternative to feeding if tenderness continues, verbalized understanding.    Pt desires to pump and bottle feed at home, discussed methods and provided with information for [employer] to obtain pump. Pt to receive pump RX today. Questions answered and pt verbalized understanding.        06/14/19 1015   Maternal Assessment   Breast Shape round   Breast Density soft   Areola elastic   Nipples everted   Left Nipple Symptoms tender   Right Nipple Symptoms tender   Maternal Infant Feeding   Maternal Emotional State assist needed   Infant Positioning clutch/football   Signs of Milk Transfer audible swallow;infant jaw motion present   Pain with Feeding yes   Pain Location nipples, bilateral   Pain Description soreness  (6/10 at latch, resolves to 0-1/10 <45 seconds)   Comfort Measures Before/During Feeding infant position adjusted;latch adjusted;suction broken using finger;maternal position adjusted   Comfort Measures Following Feeding air-drying encouraged;expressed milk applied  (lanolin applied)   Nipple Shape After Feeding, Left round   Nipple Shape After Feeding, Right round   Latch Assistance yes

## 2019-06-14 NOTE — PROGRESS NOTES
POSTPARTUM PROGRESS NOTE     Sonam Hidalgo is a 29 y.o. female PPD #1 status post Spontaneous vaginal delivery at 37w3d in a pregnancy complicated by gestational HTN, MO and depression. Patient is doing well this morning. She denies nausea, vomiting, fever or chills.  Patient reports mild abdominal pain that is well relieved by oral pain medications. Lochia is mild and decreasing. Patient is voiding without difficulty and ambulating with no difficulty. She has passed flatus, and has not had BM.  Patient does plan to breast feed. Defer to post partum visit with Dr. Benton for contraception.     Objective:       Temp:  [97.3 °F (36.3 °C)-98.4 °F (36.9 °C)] 98.4 °F (36.9 °C)  Pulse:  [75-96] 80  Resp:  [18] 18  SpO2:  [93 %-99 %] 99 %  BP: (124-153)/(64-83) 143/83    General:   alert, appears stated age and cooperative   Lungs:   clear to auscultation bilaterally   Heart:   regular rate and rhythm, S1, S2 normal, no murmur, click, rub or gallop   Abdomen:  soft, non-tender; bowel sounds normal; no masses,  no organomegaly   Uterus:  firm located at the umblicus.    Extremities: peripheral pulses normal, no pedal edema, no clubbing or cyanosis     Lab Review   Recent Results (from the past 4 hour(s))   CBC auto differential    Collection Time: 06/14/19  3:48 AM   Result Value Ref Range    WBC 10.89 3.90 - 12.70 K/uL    RBC 3.78 (L) 4.00 - 5.40 M/uL    Hemoglobin 9.2 (L) 12.0 - 16.0 g/dL    Hematocrit 30.0 (L) 37.0 - 48.5 %    Mean Corpuscular Volume 79 (L) 82 - 98 fL    Mean Corpuscular Hemoglobin 24.3 (L) 27.0 - 31.0 pg    Mean Corpuscular Hemoglobin Conc 30.7 (L) 32.0 - 36.0 g/dL    RDW 16.2 (H) 11.5 - 14.5 %    Platelets 290 150 - 350 K/uL    MPV 9.8 9.2 - 12.9 fL    Gran # (ANC) 7.4 1.8 - 7.7 K/uL    Lymph # 2.4 1.0 - 4.8 K/uL    Mono # 1.0 0.3 - 1.0 K/uL    Eos # 0.1 0.0 - 0.5 K/uL    Baso # 0.02 0.00 - 0.20 K/uL    Gran% 67.5 38.0 - 73.0 %    Lymph% 21.8 18.0 - 48.0 %    Mono% 8.8 4.0 - 15.0 %    Eosinophil%  1.1 0.0 - 8.0 %    Basophil% 0.2 0.0 - 1.9 %    Differential Method Automated        I/O    Intake/Output Summary (Last 24 hours) at 2019 0632  Last data filed at 2019  Gross per 24 hour   Intake --   Output 1250 ml   Net -1250 ml        Assessment:     Patient Active Problem List   Diagnosis     (spontaneous vaginal delivery)    Gestational hypertension    Joint pain    Shoulder pain, left    Fetal echo with top normal size of right heart, recommend  echo prior to nursery discharge    37 weeks gestation of pregnancy    Depression during pregnancy in third trimester    Class 3 severe obesity in adult    Spontaneous vaginal delivery        Plan:   1. Postpartum care:  - Patient doing well. Continue routine management and advances.  - Continue PO pain meds. Pain well controlled.  - Heme: H/h: 10..1>.  - Encourage ambulation  - Contraception - defer to post partum visit with Dr. Benton  - Lactation consult PRN    2. Acute blood loss anemia  - Asymptomatic  - Heme: H/h: 10..>.  - Iron and colace  - Continue to monitor    3. Gestational HTN  - Asymptomatic  - BP: (124-153)/(64-83) 143/83  - CBC/CMP: WNL, P/C: 0.16 on   - Continue to monitor    4. Depression  - Mood stable this AM  - Continue sertraline 50 mg  - Will follow up in 2 weeks for post partum visit    5. MO  - BMI: 46.77  - Encourage use of TEDs/SCDs and ambulation    Dispo: As patient meets milestones, will plan to discharge PPD#1-2    Merary Trammell MD  OB/GYN  PGY-1    I have reviewed and concur with the resident's history, physical assessment and plan.  I have personally interviewed and examined the patient at bedside. Discharge tomorrow. Doing well in postpartum period.     Angella Wang MD  Obstetrics and Gynecology  [facility]

## 2019-06-14 NOTE — NURSING
RN called to room for latch assistance. Baby did not have deep latch, patient reported pain with feeding. When latch was broken, nipple was not rounded. Assisted with positioning and latch, encouraged hand expression after. Patient reported no pain with latch. Lanolin brought to bedside. After feeding, baby sleepy with cessation of suck.

## 2019-06-15 VITALS
HEIGHT: 62 IN | RESPIRATION RATE: 18 BRPM | DIASTOLIC BLOOD PRESSURE: 64 MMHG | TEMPERATURE: 98 F | SYSTOLIC BLOOD PRESSURE: 123 MMHG | OXYGEN SATURATION: 97 % | WEIGHT: 255.75 LBS | BODY MASS INDEX: 47.06 KG/M2 | HEART RATE: 80 BPM

## 2019-06-15 PROCEDURE — 25000003 PHARM REV CODE 250: Performed by: STUDENT IN AN ORGANIZED HEALTH CARE EDUCATION/TRAINING PROGRAM

## 2019-06-15 RX ADMIN — IBUPROFEN 600 MG: 600 TABLET ORAL at 12:06

## 2019-06-15 RX ADMIN — SERTRALINE HYDROCHLORIDE 50 MG: 50 TABLET ORAL at 08:06

## 2019-06-15 RX ADMIN — IBUPROFEN 600 MG: 600 TABLET ORAL at 06:06

## 2019-06-15 RX ADMIN — Medication 150 MG: at 08:06

## 2019-06-15 RX ADMIN — DOCUSATE SODIUM 100 MG: 100 CAPSULE, LIQUID FILLED ORAL at 08:06

## 2019-06-15 NOTE — NURSING
Educated pt on discharge instructions on pt and baby. Pt stated understanding. RN answered all questions and concerns. Transport requested. Pt stable and will continue to monitor.

## 2019-06-15 NOTE — PROGRESS NOTES
POSTPARTUM PROGRESS NOTE     Sonam Hidalgo is a 29 y.o. female PPD #2 status post Spontaneous vaginal delivery at 37w3d in a pregnancy complicated by gestational HTN, MO and depression. Patient is doing well this morning. She denies nausea, vomiting, fever or chills.  Patient reports mild abdominal pain that is well relieved by oral pain medications. Lochia is mild and decreasing. Patient is voiding without difficulty and ambulating with no difficulty. She has passed flatus, and has not had BM.  Patient does plan to breast feed. Defer to post partum visit with Dr. Benton for contraception.     Objective:       Temp:  [97.5 °F (36.4 °C)-98.4 °F (36.9 °C)] 97.5 °F (36.4 °C)  Pulse:  [77-94] 92  Resp:  [18] 18  SpO2:  [95 %-99 %] 98 %  BP: (123-143)/(66-83) 132/66    General:   alert, appears stated age and cooperative   Lungs:   clear to auscultation bilaterally   Heart:   regular rate and rhythm, S1, S2 normal, no murmur, click, rub or gallop   Abdomen:  soft, non-tender; bowel sounds normal; no masses,  no organomegaly   Uterus:  firm located at the umblicus.    Extremities: peripheral pulses normal, no pedal edema, no clubbing or cyanosis     Lab Review  No results found for this or any previous visit (from the past 4 hour(s)).    I/O  No intake or output data in the 24 hours ending 06/15/19 6061     Assessment:     Patient Active Problem List   Diagnosis     (spontaneous vaginal delivery)    Gestational hypertension    Joint pain    Shoulder pain, left    Fetal echo with top normal size of right heart, recommend  echo prior to nursery discharge    37 weeks gestation of pregnancy    Depression during pregnancy in third trimester    Class 3 severe obesity in adult    Spontaneous vaginal delivery        Plan:   1. Postpartum care:  - Patient doing well. Continue routine management and advances.  - Continue PO pain meds. Pain well controlled.  - Heme: H/h: 10.1/32.1>9.2/30  - Encourage  ambulation  - Contraception - defer to post partum visit with Dr. Benton  - Lactation consult PRN    2. Acute blood loss anemia  - Asymptomatic  - Heme: H/h: 10.1/32.1>9.2/30  - Iron and colace  - Continue to monitor    3. Gestational HTN  - Asymptomatic  - BP: (123-143)/(66-80) 132/71  - CBC/CMP: WNL, P/C: 0.16 on 06/12  - Continue to monitor    4. Depression  - Mood stable this AM  - Continue sertraline 50 mg  - Will follow up in 2 weeks for post partum visit    5. MO  - BMI: 46.77  - Encourage use of TEDs/SCDs and ambulation    Dispo: As patient meets milestones, will plan to discharge PPD#1-2    Karena Padilla MD  OBGYN, PGY-1    I have reviewed and concur with the resident's history, physical assessment and plan.  I have personally interviewed and examined the patient at bedside. Stable for discharge today. 2 week PP visit for PP depression screen and BP check.     Angella Wang MD  Obstetrics and Gynecology  [facility]

## 2019-06-15 NOTE — PLAN OF CARE
Problem: Breastfeeding  Goal: Effective Breastfeeding  Based on pt's stated feeding goals, the Plan of care for today is for pt to do skin-to-skin, to feed frequently on demand and use nipple shield PRN; if latching continues to be painful after 3-5 minutes, unlatch infant and pump x 15 minutes, followed by 5 minutes of hand expression, observe for signs of effective milk transfer, to monitor voids and stools, and to call for assistance. Pt may breastfeed and/ or express breastmilk with hand expression, and provide EBM to baby with cup, or spoon, as needed. Pt verbalizes understanding.

## 2019-06-15 NOTE — DISCHARGE SUMMARY
Delivery Discharge Summary  Obstetrics      Primary OB Clinician: Merary Benton MD     Admission date: 2019  Discharge date: 06/15/2019    Disposition: To home, self care    Discharge Diagnosis List:      Patient Active Problem List   Diagnosis     (spontaneous vaginal delivery)    Gestational hypertension    Joint pain    Shoulder pain, left    Fetal echo with top normal size of right heart, recommend  echo prior to nursery discharge    37 weeks gestation of pregnancy    Depression during pregnancy in third trimester    Class 3 severe obesity in adult    Spontaneous vaginal delivery       Procedure:     Hospital Course:  Sonam Hidalgo is a 29 y.o. now , PPD #2 who was admitted on 2019 at 37w1d for normal labor and elevated blood pressures on setting of known gestational HTN (on no medications). Patient was subsequently admitted to labor and delivery unit with signed consents.     Labor course was uncomplicated and resulted in  without complications.     Please see delivery note for further details. Her postpartum course was uncomplicated. On discharge day, patient's pain is controlled with oral pain medications. Pt is tolerating ambulation without SOB or CP, and regular diet without N/V. Reports lochia is mild. Denies any HA, vision changes, F/C, LE swelling. Denies any breast pain/soreness.    Pt in stable condition and ready for discharge. She has been instructed to start and/or continue medications and follow up with her obstetrics provider as listed below.    Pertinent studies:  CBC  Recent Labs   Lab 19  2115 19  2316 19  0348   WBC 7.81 8.50 10.89   HGB 9.7* 10.1* 9.2*   HCT 30.5* 32.1* 30.0*   MCV 79* 79* 79*    318 290      Immunization History   Administered Date(s) Administered    DTP 1991, 1991, 09/15/1992, 1994    DTaP 1995    HIB 1991, 1991, 1991, 1992    HPV Quadrivalent  10/30/2007, 01/07/2008, 04/02/2008    Hepatitis B, Pediatric/Adolescent 1991, 10/02/1995, 01/25/2002    Influenza - Quadrivalent - PF 10/14/2015, 03/07/2019    Influenza Split 10/30/2007, 01/12/2010    MMR 06/11/1992, 08/30/1995    Meningococcal Conjugate (MCV4P) 10/30/2007    OPV 1991, 1991, 1991, 09/15/1992, 08/30/1995    PPD Test 01/12/2010, 01/14/2010, 12/06/2010, 12/08/2010    Td (ADULT) 07/24/2004    Tdap 01/12/2010, 10/05/2015, 04/04/2019    Varicella 10/02/1995        Delivery:    Episiotomy: None   Lacerations: 2nd   Repair suture:     Repair # of packets: 1   Blood loss (ml): 100     Birth information:  YOB: 2018   Time of birth: 11:55 AM   Sex: female   Delivery type: Vaginal, Spontaneous   Gestational Age: 37w2d    Delivery Clinician:      Other providers:       Additional  information:  Forceps:    Vacuum:    Breech:    Observed anomalies      Living?:           APGARS  One minute Five minutes Ten minutes   Skin color:         Heart rate:         Grimace:         Muscle tone:         Breathing:         Totals: 9  9        Placenta: Delivered:       appearance      Patient Instructions:   Current Discharge Medication List      START taking these medications    Details   ibuprofen (ADVIL,MOTRIN) 600 MG tablet Take 1 tablet (600 mg total) by mouth every 6 (six) hours.  Qty: 30 tablet, Refills: 1         CONTINUE these medications which have NOT CHANGED    Details   iron,carb/vit C/vit B12/folic (IRON 100 PLUS ORAL) Take by mouth.      prenatal 25/iron fum/folic/dha (PRENATAL-1 ORAL) Take by mouth.      sertraline (ZOLOFT) 50 MG tablet TAKE 1 TABLET BY MOUTH EVERY DAY  Qty: 30 tablet, Refills: 3    Associated Diagnoses: 11 weeks gestation of pregnancy; Depression during pregnancy in first trimester         STOP taking these medications       ondansetron (ZOFRAN-ODT) 4 MG TbDL Comments:   Reason for Stopping:               Discharge Procedure Orders   BREAST  PUMP FOR HOME USE     Order Specific Question Answer Comments   Type of pump: Electric    Weight: 116 kg (255 lb 11.7 oz)    Length of need (1-99 months): 24      Other restrictions (specify):   Order Comments: Nothing in the vagina for six weeks     Notify your health care provider if you experience any of the following:  temperature >100.4     Notify your health care provider if you experience any of the following:  persistent nausea and vomiting or diarrhea     Notify your health care provider if you experience any of the following:  severe uncontrolled pain     Notify your health care provider if you experience any of the following:  difficulty breathing or increased cough     Notify your health care provider if you experience any of the following:  severe persistent headache     Notify your health care provider if you experience any of the following:  persistent dizziness, light-headedness, or visual disturbances     Notify your health care provider if you experience any of the following:  increased confusion or weakness     Notify your health care provider if you experience any of the following:   Order Comments: Heavy vaginal bleeding >1 pad/hour for greater than 2 hours     Activity as tolerated       Follow-up Information     Merary Benton MD In 6 weeks.    Specialties:  Obstetrics, Obstetrics and Gynecology  Why:  post partum visit  Contact information:  [address] [address]  [phone]                    Karena Padilla MD  OBGYN, PGY-1    I have reviewed and concur with the resident's history, physical assessment and plan.  I have personally interviewed and examined the patient at bedside.    Angella Wang MD  Obstetrics and Gynecology  [facility]

## 2019-06-15 NOTE — LACTATION NOTE
" rounds, infant nursing on right side, pt reports pain 6/10, infant pulled closer and gentle pressure on chin applied, pain does not resolve although infant with audible swallows. Infant unlatched, nipple angled and red,abraded. Pt reports pain increasing overnight, she has attained "one or two painless latches" but feels like infant pushes nipple to front of mouth. Oral assessment reveals a thick labial frenulum that attaches at the gingiva, + elevation and no blanching but lip is thick and turns under during latching. Thin, short, posterior frenulum noted under tongue, +elevation but it is limited, + laterilzation to both sides. Infant is uncoordinated with sucking on gloved finger, bunches tongue in back of mouth, bubble palate noted; able to attain rhythmic suck with cheek support. Discussed findings with pt and provided with TOTS information.    LC provided full assist to latch infant to left breast, wide gape achieved but pt reports latch 8/10 pain, positional adjustment of baby and mother does not resolve pain. Attempted latch with nipple shield, wide gape and deep latch achieved but mother continues with 8/10 pain. Latching attempts stopped at this time and pt pumped bilaterally x 15 minutes, 27mm flange on right and 24mm flange on left. After performing hand expression after feeding 3mL breast milk obtained and spoonfed to infant. Pt provided with breast shells and educated on use/cleaning/sterilization. Pt also provided with hydrogels and will alternate use.    Lactation discharge education completed. Plan of care is for pt to attempt latching and use position changes and deep latch techniques to achieve painless latch, if pain persists after 5-7 minutes, unlatch infant and pump bilaterally x 15 minutes followed by 5 minutes of hand expression. Pt has Medela pump n style at home. Pt reports she may use formula at home if pumping/hand expression "not enough", discussed  needs and milk supply, " verbalized understanding; education also provided on using spoon/cup/paced bottle feeding.  Pt to frequently feed on demand, and to monitor baby's voids and stools. Breastfeeding guide, including First Alert survey, resource list, and lactation warmline phone number reviewed. Pt to notify doctor for maternal or infant concerns, as reviewed with LC. Pt verbalizes understanding, questions answered.       06/15/19 1063   Maternal Assessment   Breast Shape round   Breast Density soft   Areola elastic   Nipples everted   Left Nipple Symptoms painful;abraded;redness   Right Nipple Symptoms abraded;painful;redness   Maternal Infant Feeding   Maternal Preparation breast care;hand hygiene   Maternal Emotional State anxious;assist needed   Infant Positioning clutch/football   Signs of Milk Transfer audible swallow;infant jaw motion present  (on right side, no significant latch achieved on left)   Pain with Feeding yes   Pain Location nipples, bilateral   Pain Description aching;burning;soreness   Comfort Measures Before/During Feeding maternal position adjusted;suction broken using finger;latch adjusted;infant position adjusted   Comfort Measures Following Feeding air-drying encouraged;breast shell(s) used;expressed milk applied  (hydrogels provided, to alternate with breast shells)   Nipple Shape After Feeding, Left round   Nipple Shape After Feeding, Right angled   Latch Assistance yes   Equipment Type   Breast Pump Type double electric, hospital grade   Breast Pump Flange Type hard   Breast Pump Flange Size   (27mm on right, 24mm on left)   Breast Pumping   Breast Pumping Interventions post-feed pumping encouraged   Breast Pumping bilateral breasts pumped until soft;double electric breast pump utilized  (post pumping hand expression)   Lactation Referrals   Lactation Referrals outpatient lactation program;pediatric care provider;support group  (TOTS information provided)

## 2019-06-15 NOTE — NURSING
RN called to room. Patient having pain to nipples during feedings. Assisted with latch and attempted hand expression. Expressed drops into baby's mouth. Patient had questions about supplementing. All questions answered and benefits of exclusive breastfeeding discussed. Patient requesting to pump, explained she did not receive her pump from home today. [employer] pump, tubing, collections containers and labels brought to bedside per patient request. Discussed proper pump setting of initiation phase. Instructed on proper usage of pump and to adjust suction according to maximum comfort level. Verified appropriate flange fit. Educated on the frequency and duration of pumping in order to promote and maintain a full milk supply. Hands on pumping technique reviewed. Encouraged hand expression after pumping.  Instructed on cleaning of breast pump parts. Written instructions also given. Pt verbalized understanding.

## 2019-06-17 ENCOUNTER — TELEPHONE (OUTPATIENT)
Dept: LACTATION | Facility: CLINIC | Age: 29
End: 2019-06-17

## 2019-06-17 NOTE — TELEPHONE ENCOUNTER
1800 call back    Pt watched latching video, reports last feeding at 1700 went poorly, infant was fussy and latched x 2 minutes but pulled off a scab from pt's nipple. After attempting x 10 minutes pt stopped and pumped bilaterally, achieved 31mL which was bottle fed to infant.     Plan of care is for pt to take nipple vacation, using pump for all feedings. Pt to pump bilaterally x 20 minutes or until 20 letdowns using hands on pumping technique followed by hand expression. Pt to feed all EBM to infant via bottle (per preference). Pt to continue alternating hydrogels and breast shells/lanolin as needed for skin healing.     Pt verbalized understanding and is amenable to plan. LC follow up tomorrow 6/18/19. Discussed probable need for hands-on assessment by IBCLC this week, discussed [facility] outpatient v community resources, pt states she will consider options and come up with a plan tomorrow with LC. All questions answered.

## 2019-06-17 NOTE — TELEPHONE ENCOUNTER
" f/u call.  - Milk supply: Pt states breasts feel more full  - Pumping: Pt has pumped twice since 0800, achieved 40mL each time with Medela PIS. Denies pain.  - Latch: continues to have pain with latching, states when infant has "what feels like a deep latch she won't suck, but when she is just on the nipple she sucks a lot". Pain is 8/10. Pt has used shield for 25% of feedings "it doesn't really help much.  - Feeding: Pt has been alternating feeding at breast with pumping bilaterally x 15 minutes. Baby has fed at breast 5x in 24 hours. Receiving supplementation of EBM 40mL with Georgina with wide base nipple. Has received 3 EBM bottles in last 24 hours  - Infant output: No stool in last 24 hours, 2 wet diapers with "brick dust" urate crystals.  - Ped: last appt 6/17, weight: 6#1oz (-1% from discharge), next appt 6/18 0915 for weight check. Pt reports pediatrician did not do oral assessment today.      Recommends:  -Watch [employer] videos: Attaching baby to breast, what to do about nipple pain  -Attempt to latch infant x 5 minutes, if pain continues with latch, stop feeding and pump bilaterally for 15 minutes   - Provide baby supplement via bottle per preference   -Discussed risks of bottle v cup/spoon, pt verbalized understand and desires to continue with bottle   - Skin to skin as much as possible  -  to call pt back at 1830 to see how feeding went and discuss plan of care for overnight.             "

## 2019-06-18 ENCOUNTER — TELEPHONE (OUTPATIENT)
Dept: LACTATION | Facility: CLINIC | Age: 29
End: 2019-06-18

## 2019-06-18 NOTE — TELEPHONE ENCOUNTER
RANDY called to check on mom and baby. Mom states breastfeeding is improving. She states her nipples are better and started latching infant again this afternoon and only complains of tenderness. In the last 24 hours mom has pumped 60 mls every 3 hours. Instructed mom to pump after nursing to protect milk supply for now, verbalizes understanding. In the last 24 hours infant has had 3 voids and 1 stool that is brown and soft. Instructed mom to supplement infant with EBM after nursing to insure infant gets enough volume, verbalizes understanding. Educated mom on I&O's. At ped appt today infant was 6lbs 5 oz (4% weight gain from 6/17). Next appt is in 1 month. Mom states breasts have felt full before pumping and softer after, educated that this is what should happen with baby as well. Encouraged mom to take infant in for weight check early next week to make sure things are on right track. Discussed OPC but pt would like to wait for now since things seem to be going better. Discussed TOTS info as well, pt will wait on this for now. Encouraged pt to call warmline for any assistance as needed.

## 2019-06-20 ENCOUNTER — TELEPHONE (OUTPATIENT)
Dept: OBSTETRICS AND GYNECOLOGY | Facility: OTHER | Age: 29
End: 2019-06-20

## 2019-06-21 ENCOUNTER — CLINICAL SUPPORT (OUTPATIENT)
Dept: OBSTETRICS AND GYNECOLOGY | Facility: CLINIC | Age: 29
End: 2019-06-21
Payer: COMMERCIAL

## 2019-06-21 VITALS
SYSTOLIC BLOOD PRESSURE: 132 MMHG | DIASTOLIC BLOOD PRESSURE: 76 MMHG | WEIGHT: 230.38 LBS | BODY MASS INDEX: 42.14 KG/M2

## 2019-06-21 DIAGNOSIS — Z01.30 BLOOD PRESSURE CHECK: Primary | ICD-10-CM

## 2019-06-21 PROCEDURE — 0502F SUBSEQUENT PRENATAL CARE: CPT | Mod: CPTII,S$GLB,, | Performed by: NURSE PRACTITIONER

## 2019-06-21 PROCEDURE — 0502F PR SUBSEQUENT PRENATAL CARE: ICD-10-PCS | Mod: CPTII,S$GLB,, | Performed by: NURSE PRACTITIONER

## 2019-06-21 PROCEDURE — 99999 PR PBB SHADOW E&M-EST. PATIENT-LVL III: CPT | Mod: PBBFAC,,, | Performed by: NURSE PRACTITIONER

## 2019-06-21 PROCEDURE — 99999 PR PBB SHADOW E&M-EST. PATIENT-LVL III: ICD-10-PCS | Mod: PBBFAC,,, | Performed by: NURSE PRACTITIONER

## 2019-06-21 NOTE — PROGRESS NOTES
History & Physical  Gynecology      SUBJECTIVE:     Chief Complaint: Blood Pressure Check       History of Present Illness  Sonam Hidalgo is a 29 y.o. female   presents for post partum BP recheck. Reports giving birth Uncomplicated vaginal delivery on 19, pregnancy was complicated by gestation hypertension. Denies headache, blurred vision, dizziness, swelling, CP, SOB. Reports 1-2 episodes of RUQ abdominal pain that have resolved with positional changes. She is bonding well with  daughter Buster and has adjusting to life with two children. Denies complaint today.    BP Readings from Last 2 Encounters:   19 132/76   06/15/19 123/64          Review of patient's allergies indicates:  No Known Allergies    Past Medical History:   Diagnosis Date    Abnormal Pap smear of vagina     Allergy     Anxiety     Hypertension      Past Surgical History:   Procedure Laterality Date    CHOLECYSTECTOMY      CHOLECYSTECTOMY, LAPAROSCOPIC N/A 2012    Performed by Joshua Goldberg, MD at [facility] OR [facility]    median arcuate ligament        OB History        3    Para   2    Term   2       0    AB   1    Living   2       SAB   0    TAB   1    Ectopic   0    Multiple   0    Live Births   2               Family History   Problem Relation Age of Onset    Breast cancer Mother     Alcohol abuse Father     Celiac disease Neg Hx     Cirrhosis Neg Hx     Colon cancer Neg Hx     Colon polyps Neg Hx     Crohn's disease Neg Hx     Cystic fibrosis Neg Hx     Esophageal cancer Neg Hx     Hemochromatosis Neg Hx     Inflammatory bowel disease Neg Hx     Irritable bowel syndrome Neg Hx     Liver cancer Neg Hx     Liver disease Neg Hx     Rectal cancer Neg Hx     Stomach cancer Neg Hx     Ulcerative colitis Neg Hx     Jacob's disease Neg Hx     Ovarian cancer Neg Hx     Arrhythmia Neg Hx     Cardiomyopathy Neg Hx     Congenital heart disease Neg Hx     Early death Neg Hx      Heart attacks under age 50 Neg Hx     Pacemaker/defibrilator Neg Hx      Social History     Tobacco Use    Smoking status: Never Smoker    Smokeless tobacco: Never Used   Substance Use Topics    Alcohol use: Yes     Comment: occas, none recently    Drug use: No       Current Outpatient Medications   Medication Sig    ibuprofen (ADVIL,MOTRIN) 600 MG tablet Take 1 tablet (600 mg total) by mouth every 6 (six) hours.    sertraline (ZOLOFT) 50 MG tablet TAKE 1 TABLET BY MOUTH EVERY DAY    iron,carb/vit C/vit B12/folic (IRON 100 PLUS ORAL) Take by mouth.    prenatal 25/iron fum/folic/dha (PRENATAL-1 ORAL) Take by mouth.     No current facility-administered medications for this visit.          Review of Systems:  Review of Systems   Constitutional: Negative for activity change, appetite change, chills, fatigue, fever and unexpected weight change.   HENT: Negative for nasal congestion and mouth sores.    Eyes: Negative for discharge and visual disturbance.   Respiratory: Negative for shortness of breath and wheezing.    Cardiovascular: Negative for chest pain, palpitations and leg swelling.   Gastrointestinal: Negative for abdominal pain, constipation, diarrhea, nausea, vomiting and reflux.   Endocrine: Negative for diabetes, hair loss, hot flashes, hyperthyroidism and hypothyroidism.   Genitourinary: Negative for bladder incontinence, decreased libido, dysmenorrhea, dyspareunia, dysuria, flank pain, frequency, genital sores, hematuria, hot flashes, menorrhagia, menstrual problem, pelvic pain, urgency, vaginal bleeding, vaginal discharge, vaginal pain, urinary incontinence, postcoital bleeding, postmenopausal bleeding, vaginal dryness and vaginal odor.   Musculoskeletal: Negative for arthralgias, back pain, joint swelling, leg pain and myalgias.   Integumentary:  Negative for rash, acne, hair changes, mole/lesion, breast mass, nipple discharge, breast skin changes and breast tenderness.   Neurological: Negative for  vertigo, seizures, syncope, numbness and headaches.   Hematological: Negative for adenopathy. Does not bruise/bleed easily.   Psychiatric/Behavioral: Negative for depression and sleep disturbance. The patient is not nervous/anxious.    Breast: Negative for asymmetry, breast self exam, lump, mass, mastodynia, nipple discharge, skin changes and tenderness       OBJECTIVE:     Physical Exam:  Physical Exam   Constitutional: She is oriented to person, place, and time. She appears well-developed and well-nourished. No distress.   HENT:   Head: Normocephalic and atraumatic.   Nose: Nose normal.   Mouth/Throat: Oropharynx is clear and moist.   Eyes: Pupils are equal, round, and reactive to light. Conjunctivae and EOM are normal.   Neck: Normal range of motion. Neck supple. No JVD present. No tracheal deviation present. No thyromegaly present.   Cardiovascular: Normal rate, regular rhythm, normal heart sounds and intact distal pulses. Exam reveals no gallop and no friction rub.   No murmur heard.  Trace BLE edema.   Pulmonary/Chest: Effort normal and breath sounds normal. No stridor. No respiratory distress. She has no wheezes. She has no rales. She exhibits no tenderness.   Abdominal: Soft. Bowel sounds are normal. She exhibits no distension and no mass. There is no tenderness. There is no rebound and no guarding. No hernia.   Genitourinary:   Genitourinary Comments: Deferred   Musculoskeletal: Normal range of motion. She exhibits no edema or tenderness.   Lymphadenopathy:     She has no cervical adenopathy.   Neurological: She is alert and oriented to person, place, and time. She displays normal reflexes. No sensory deficit. She exhibits normal muscle tone. Coordination normal.   Skin: Skin is warm and dry. Capillary refill takes less than 2 seconds. No rash noted. She is not diaphoretic. No erythema. No pallor.   Psychiatric: She has a normal mood and affect. Her behavior is normal. Judgment and thought content normal.          ASSESSMENT:       ICD-10-CM ICD-9-CM    1. Blood pressure check Z01.30 V81.1           Plan:    /76 and 132/78 in clinic today. Patient is asymptomatic. She was given preE precautions with instruction to go to OB ED, verbalized understanding. She will follow up with Dr. Benton in 5 weeks for post partum visit.     Counseling time: 15 minutes      Isabel Stoddard

## 2019-07-11 ENCOUNTER — PATIENT MESSAGE (OUTPATIENT)
Dept: OBSTETRICS AND GYNECOLOGY | Facility: CLINIC | Age: 29
End: 2019-07-11

## 2019-07-29 ENCOUNTER — POSTPARTUM VISIT (OUTPATIENT)
Dept: OBSTETRICS AND GYNECOLOGY | Facility: CLINIC | Age: 29
End: 2019-07-29
Payer: COMMERCIAL

## 2019-07-29 VITALS
WEIGHT: 233.25 LBS | SYSTOLIC BLOOD PRESSURE: 123 MMHG | HEIGHT: 63 IN | DIASTOLIC BLOOD PRESSURE: 77 MMHG | BODY MASS INDEX: 41.33 KG/M2

## 2019-07-29 DIAGNOSIS — Z30.9 ENCOUNTER FOR CONTRACEPTIVE MANAGEMENT, UNSPECIFIED TYPE: ICD-10-CM

## 2019-07-29 PROCEDURE — 0503F POSTPARTUM CARE VISIT: CPT | Mod: S$GLB,,, | Performed by: OBSTETRICS & GYNECOLOGY

## 2019-07-29 PROCEDURE — 99999 PR PBB SHADOW E&M-EST. PATIENT-LVL III: CPT | Mod: PBBFAC,,, | Performed by: OBSTETRICS & GYNECOLOGY

## 2019-07-29 PROCEDURE — 99999 PR PBB SHADOW E&M-EST. PATIENT-LVL III: ICD-10-PCS | Mod: PBBFAC,,, | Performed by: OBSTETRICS & GYNECOLOGY

## 2019-07-29 PROCEDURE — 0503F PR POSTPARTUM CARE VISIT: ICD-10-PCS | Mod: S$GLB,,, | Performed by: OBSTETRICS & GYNECOLOGY

## 2019-07-29 RX ORDER — ACETAMINOPHEN AND CODEINE PHOSPHATE 120; 12 MG/5ML; MG/5ML
1 SOLUTION ORAL DAILY
Qty: 28 TABLET | Refills: 11 | Status: SHIPPED | OUTPATIENT
Start: 2019-07-29 | End: 2019-09-13 | Stop reason: SDUPTHER

## 2019-08-02 NOTE — PROGRESS NOTES
"CC: Post-partum follow-up    Sonam Hidalgo is a 29 y.o. female  who presents for post-partum visit.  She is S/P a .  She and the baby are doing well.  No pain.  No fever.   No bowel / bladder complaints.    Delivery Date: 2019  Breast Feeding: YES  Depression: NO  Contraception: oral progesterone-only contraceptive    Pregnancy was complicated by:  History of HTN    /77   Ht 5' 3" (1.6 m)   Wt 105.8 kg (233 lb 4 oz)   LMP 2018   BMI 41.32 kg/m²     ROS:  GENERAL: No fever, chills, fatigability.  VULVAR: No pain, no lesions and no itching.  VAGINAL: No relaxation, no itching, no discharge, no abnormal bleeding and no lesions.  ABDOMEN: No abdominal pain. Denies nausea. Denies vomiting. No diarrhea. No constipation  BREAST: Denies pain. No lumps.  URINARY: No incontinence, no nocturia, no frequency and no dysuria.  CARDIOVASCULAR: No chest pain. No shortness of breath. No leg cramps.  NEUROLOGICAL: No headaches. No vision changes.    PHYSICAL EXAM:  Exam chaperoned by nurse  ABDOMEN:  Soft, non-tender, non-distended  VULVA:  Normal, no lesions  CERVIX:  Without lesions, polyps or tenderness.  UTERUS:  Normal size, shape, consistency, no mass or tenderness.  ADNEXA:  Normal in size without mass or tenderness    IMP:  Doing well S/P   Instructions / precautions reviewed  Contraceptive counseling    Rx micronor    PLAN:  May resume normal activities  Return: PRN          "

## 2019-08-23 ENCOUNTER — OFFICE VISIT (OUTPATIENT)
Dept: URGENT CARE | Facility: CLINIC | Age: 29
End: 2019-08-23
Payer: COMMERCIAL

## 2019-08-23 VITALS
DIASTOLIC BLOOD PRESSURE: 81 MMHG | TEMPERATURE: 97 F | OXYGEN SATURATION: 97 % | RESPIRATION RATE: 19 BRPM | HEIGHT: 63 IN | SYSTOLIC BLOOD PRESSURE: 132 MMHG | BODY MASS INDEX: 41.29 KG/M2 | HEART RATE: 79 BPM | WEIGHT: 233 LBS

## 2019-08-23 DIAGNOSIS — L02.91 ABSCESS: Primary | ICD-10-CM

## 2019-08-23 PROCEDURE — 99214 PR OFFICE/OUTPT VISIT, EST, LEVL IV, 30-39 MIN: ICD-10-PCS | Mod: 25,S$GLB,, | Performed by: FAMILY MEDICINE

## 2019-08-23 PROCEDURE — 3008F BODY MASS INDEX DOCD: CPT | Mod: CPTII,S$GLB,, | Performed by: FAMILY MEDICINE

## 2019-08-23 PROCEDURE — 3008F PR BODY MASS INDEX (BMI) DOCUMENTED: ICD-10-PCS | Mod: CPTII,S$GLB,, | Performed by: FAMILY MEDICINE

## 2019-08-23 PROCEDURE — 10060 I&D ABSCESS SIMPLE/SINGLE: CPT | Mod: S$GLB,,, | Performed by: FAMILY MEDICINE

## 2019-08-23 PROCEDURE — 99214 OFFICE O/P EST MOD 30 MIN: CPT | Mod: 25,S$GLB,, | Performed by: FAMILY MEDICINE

## 2019-08-23 PROCEDURE — 10060 INCISION & DRAINAGE: ICD-10-PCS | Mod: S$GLB,,, | Performed by: FAMILY MEDICINE

## 2019-08-23 RX ORDER — MUPIROCIN 20 MG/G
OINTMENT TOPICAL 3 TIMES DAILY
Qty: 30 G | Refills: 0 | Status: SHIPPED | OUTPATIENT
Start: 2019-08-23 | End: 2019-10-06

## 2019-08-23 RX ORDER — SULFAMETHOXAZOLE AND TRIMETHOPRIM 800; 160 MG/1; MG/1
1 TABLET ORAL 2 TIMES DAILY
Qty: 14 TABLET | Refills: 0 | Status: SHIPPED | OUTPATIENT
Start: 2019-08-23 | End: 2019-10-06

## 2019-08-23 NOTE — PATIENT INSTRUCTIONS
PLEASE READ YOUR DISCHARGE INSTRUCTIONS ENTIRELY AS IT CONTAINS IMPORTANT INFORMATION.    Please return here or go to the Emergency Department for any concerns or worsening of condition.    If you were prescribed antibiotics, please take them to completion.    If not allergic, please take over the counter Tylenol (Acetaminophen) and/or Motrin (Ibuprofen) as directed for control of pain and/or fever.  Please follow up with your primary care doctor or specialist as needed.  Soak wound as discussed and apply warm compresses frequently     If this is a recurrent issue, use hibiclens three times a week as body wash to help prevent future abscess(es), let stay on skin for 5 minutes before rinsing.off.  If placed on antibiotics, complete them.  The abscess may drain for a day or two, keep covered while out.     If you smoke, please stop smoking.    Please return or see your primary care doctor if you develop new or worsening symptoms.     Please arrange follow up with your primary medical clinic as soon as possible. You must understand that you've received an Urgent Care treatment only and that you may be released before all of your medical problems are known or treated. You, the patient, will arrange for follow up as instructed. If your symptoms worsen or fail to improve you should go to the Emergency Room.  Abscess (Antibiotic Treatment Only)  An abscess (sometimes called a boil) happens when bacteria get trapped under the skin and start to grow. Pus forms inside the abscess as the body responds to the bacteria. An abscess can happen with an insect bite, ingrown hair, blocked oil gland, pimple, cyst, or puncture wound.  In the early stages, your wound may be red and tender. For this stage, you may get antibiotics. If the abscess does not get better with antibiotics, it will need to be drained with a small cut.  Home care  These tips will help you care for your abscess at home:  · Soak the wound in hot water or apply  hot packs (small towel soaked in hot water) to the area for 20 minutes at a time. Do this 3 to 4 times a day.  · Do not cut, squeeze, or pop the boil yourself.  · Apply antibiotic cream or ointment to the skin 3 to 4 times a day, unless something else was prescribed. Some ointments include an antibiotic plus a pain reliever.  · If your doctor prescribed antibiotics, do not stop taking them until you have finished the medicine or the doctor tells you to stop.  · You may use an over-the-counter pain medicine to control pain, unless another pain medicine was prescribed. If you have chronic liver or kidney disease or ever had a stomach ulcer or gastrointestinal bleeding, talk with your doctor before using these any of these.  Follow-up care  Follow up with your healthcare provider, or as advised. Check your wound each day for the signs of worsening infection listed below.  When to seek medical advice  Get prompt medical attention if any of these occur:  · An increase in redness or swelling  · Red streaks in the skin leading away from the abscess  · An increase in local pain or swelling  · Fever of 100.4ºF (38ºC) or higher, or as directed by your healthcare provider  · Pus or fluid coming from the abscess  · Boil returns after getting better  Date Last Reviewed: 9/1/2016  © 7268-7716 [employer] [place] [employer] [address] [place] [address], [place] [address]. All rights reserved. This information is not intended as a substitute for professional medical care. Always follow your healthcare professional's instructions.

## 2019-09-13 DIAGNOSIS — Z30.9 ENCOUNTER FOR CONTRACEPTIVE MANAGEMENT, UNSPECIFIED TYPE: ICD-10-CM

## 2019-09-13 RX ORDER — NORETHINDRONE 0.35 MG/1
TABLET ORAL
Qty: 28 TABLET | Refills: 11 | Status: SHIPPED | OUTPATIENT
Start: 2019-09-13 | End: 2019-10-06

## 2019-09-16 ENCOUNTER — TELEPHONE (OUTPATIENT)
Dept: OBSTETRICS AND GYNECOLOGY | Facility: CLINIC | Age: 29
End: 2019-09-16

## 2019-09-16 DIAGNOSIS — Z36.89 ESTABLISH GESTATIONAL AGE, ULTRASOUND: Primary | ICD-10-CM

## 2019-09-16 NOTE — TELEPHONE ENCOUNTER
----- Message from Smitha Reed sent at 9/16/2019  8:34 AM CDT -----  Contact: Patient- via [employer]  Message     Appointment Request From: Sonam Hidalgo    With Provider: Merary Benton MD [[place] - Ob[facility]etrics [facility] Gynecology]    Preferred Date Range: 9/16/2019 - 9/30/2019    Preferred Times: Any time    Reason for visit: Positive home pregnancy test.    Comments:  My life       Unable to schedule, Epic requiring referral

## 2019-09-16 NOTE — TELEPHONE ENCOUNTER
Called and spoke with pt.pt stated she took a home pregnancy test came out positive.i let pt know Melanie will be giving her a call to schedule her pregnancy confirmation pt verbalized and understands

## 2019-09-16 NOTE — TELEPHONE ENCOUNTER
----- Message from Melanie Hernandez sent at 9/16/2019 10:07 AM CDT -----  Contact: Patient- via [email or web address]  Please place patients dating us orders  ----- Message -----  From: Carmen Taylor MA  Sent: 9/16/2019   8:56 AM  To: Melanie Hernandez        ----- Message -----  From: Smitha Reed  Sent: 9/16/2019   8:34 AM  To: [facility] Staff    Message     Appointment Request From: Sonam Hidalgo    With Provider: Merary Benton MD [[place] - Obst[facility] Gyne[facility]]    Preferred Date Range: 9/16/2019 - 9/30/2019    Preferred Times: Any time    Reason for visit: Positive home pregnancy test.    Comments:  My life       Unable to schedule, Epic requiring referral

## 2019-09-30 ENCOUNTER — HOSPITAL ENCOUNTER (EMERGENCY)
Facility: HOSPITAL | Age: 29
Discharge: HOME | End: 2019-09-30
Attending: EMERGENCY MEDICINE | Admitting: PSYCHIATRY & NEUROLOGY
Payer: COMMERCIAL

## 2019-09-30 VITALS
RESPIRATION RATE: 18 BRPM | DIASTOLIC BLOOD PRESSURE: 103 MMHG | HEART RATE: 72 BPM | SYSTOLIC BLOOD PRESSURE: 152 MMHG | OXYGEN SATURATION: 100 % | TEMPERATURE: 98 F

## 2019-09-30 DIAGNOSIS — N30.00 ACUTE CYSTITIS WITHOUT HEMATURIA: ICD-10-CM

## 2019-09-30 DIAGNOSIS — R29.898 RIGHT ARM WEAKNESS: ICD-10-CM

## 2019-09-30 DIAGNOSIS — I63.9 STROKE: ICD-10-CM

## 2019-09-30 DIAGNOSIS — Z3A.01 LESS THAN 8 WEEKS GESTATION OF PREGNANCY: ICD-10-CM

## 2019-09-30 DIAGNOSIS — R20.0 RIGHT ARM NUMBNESS: ICD-10-CM

## 2019-09-30 DIAGNOSIS — R51.9 ACUTE NONINTRACTABLE HEADACHE, UNSPECIFIED HEADACHE TYPE: Primary | ICD-10-CM

## 2019-09-30 DIAGNOSIS — N93.9 VAGINAL BLEEDING: ICD-10-CM

## 2019-09-30 PROBLEM — Z34.90 PREGNANCY AT EARLY STAGE: Status: ACTIVE | Noted: 2019-09-30

## 2019-09-30 PROBLEM — O99.340 DEPRESSION DURING PREGNANCY: Status: ACTIVE | Noted: 2019-06-12

## 2019-09-30 PROBLEM — I10 ESSENTIAL HYPERTENSION: Status: ACTIVE | Noted: 2019-09-30

## 2019-09-30 PROBLEM — R29.818 FOCAL NEUROLOGICAL DEFICIT: Status: ACTIVE | Noted: 2019-09-30

## 2019-09-30 LAB
ALBUMIN SERPL BCP-MCNC: 3.3 G/DL (ref 3.5–5.2)
ALLENS TEST: ABNORMAL
ALP SERPL-CCNC: 71 U/L (ref 55–135)
ALT SERPL W/O P-5'-P-CCNC: 12 U/L (ref 10–44)
AMPHET+METHAMPHET UR QL: NEGATIVE
ANION GAP SERPL CALC-SCNC: 8 MMOL/L (ref 8–16)
AST SERPL-CCNC: 16 U/L (ref 10–40)
B-HCG UR QL: POSITIVE
BACTERIA #/AREA URNS AUTO: ABNORMAL /HPF
BARBITURATES UR QL SCN>200 NG/ML: NEGATIVE
BASOPHILS # BLD AUTO: 0.05 K/UL (ref 0–0.2)
BASOPHILS NFR BLD: 0.7 % (ref 0–1.9)
BENZODIAZ UR QL SCN>200 NG/ML: NEGATIVE
BILIRUB SERPL-MCNC: 0.3 MG/DL (ref 0.1–1)
BILIRUB UR QL STRIP: NEGATIVE
BUN SERPL-MCNC: 13 MG/DL (ref 6–20)
BZE UR QL SCN: NEGATIVE
CALCIUM SERPL-MCNC: 8.9 MG/DL (ref 8.7–10.5)
CANNABINOIDS UR QL SCN: NEGATIVE
CHLORIDE SERPL-SCNC: 108 MMOL/L (ref 95–110)
CHOLEST SERPL-MCNC: 170 MG/DL (ref 120–199)
CHOLEST/HDLC SERPL: 3.6 {RATIO} (ref 2–5)
CLARITY UR REFRACT.AUTO: ABNORMAL
CO2 SERPL-SCNC: 20 MMOL/L (ref 23–29)
COLOR UR AUTO: YELLOW
CREAT SERPL-MCNC: 0.6 MG/DL (ref 0.5–1.4)
CREAT SERPL-MCNC: 0.8 MG/DL (ref 0.5–1.4)
CREAT UR-MCNC: 149 MG/DL (ref 15–325)
CTP QC/QA: YES
DELSYS: ABNORMAL
DIFFERENTIAL METHOD: ABNORMAL
EOSINOPHIL # BLD AUTO: 0.1 K/UL (ref 0–0.5)
EOSINOPHIL NFR BLD: 1.6 % (ref 0–8)
ERYTHROCYTE [DISTWIDTH] IN BLOOD BY AUTOMATED COUNT: 14.3 % (ref 11.5–14.5)
EST. GFR  (AFRICAN AMERICAN): >60 ML/MIN/1.73 M^2
EST. GFR  (NON AFRICAN AMERICAN): >60 ML/MIN/1.73 M^2
GLUCOSE SERPL-MCNC: 89 MG/DL (ref 70–110)
GLUCOSE UR QL STRIP: NEGATIVE
HCG INTACT+B SERPL-ACNC: 2119 MIU/ML
HCO3 UR-SCNC: 21.7 MMOL/L (ref 24–28)
HCT VFR BLD AUTO: 40.9 % (ref 37–48.5)
HDLC SERPL-MCNC: 47 MG/DL (ref 40–75)
HDLC SERPL: 27.6 % (ref 20–50)
HGB BLD-MCNC: 13.3 G/DL (ref 12–16)
HGB UR QL STRIP: ABNORMAL
IMM GRANULOCYTES # BLD AUTO: 0.04 K/UL (ref 0–0.04)
IMM GRANULOCYTES NFR BLD AUTO: 0.6 % (ref 0–0.5)
INR PPP: 0.9 (ref 0.8–1.2)
KETONES UR QL STRIP: NEGATIVE
LDLC SERPL CALC-MCNC: 94 MG/DL (ref 63–159)
LEUKOCYTE ESTERASE UR QL STRIP: ABNORMAL
LYMPHOCYTES # BLD AUTO: 1.9 K/UL (ref 1–4.8)
LYMPHOCYTES NFR BLD: 28.3 % (ref 18–48)
MCH RBC QN AUTO: 27 PG (ref 27–31)
MCHC RBC AUTO-ENTMCNC: 32.5 G/DL (ref 32–36)
MCV RBC AUTO: 83 FL (ref 82–98)
METHADONE UR QL SCN>300 NG/ML: NEGATIVE
MICROSCOPIC COMMENT: ABNORMAL
MODE: ABNORMAL
MONOCYTES # BLD AUTO: 0.4 K/UL (ref 0.3–1)
MONOCYTES NFR BLD: 6.1 % (ref 4–15)
NEUTROPHILS # BLD AUTO: 4.3 K/UL (ref 1.8–7.7)
NEUTROPHILS NFR BLD: 62.7 % (ref 38–73)
NITRITE UR QL STRIP: NEGATIVE
NONHDLC SERPL-MCNC: 123 MG/DL
NRBC BLD-RTO: 0 /100 WBC
OPIATES UR QL SCN: NEGATIVE
PCO2 BLDA: 39.3 MMHG (ref 35–45)
PCP UR QL SCN>25 NG/ML: NEGATIVE
PH SMN: 7.35 [PH] (ref 7.35–7.45)
PH UR STRIP: 5 [PH] (ref 5–8)
PLATELET # BLD AUTO: 325 K/UL (ref 150–350)
PMV BLD AUTO: 9.3 FL (ref 9.2–12.9)
PO2 BLDA: 47 MMHG (ref 40–60)
POC BE: -4 MMOL/L
POC PTINR: 1.1 (ref 0.9–1.2)
POC PTWBT: 12.7 SEC (ref 9.7–14.3)
POC SATURATED O2: 81 % (ref 95–100)
POC TCO2: 23 MMOL/L (ref 24–29)
POCT GLUCOSE: 93 MG/DL (ref 70–110)
POTASSIUM SERPL-SCNC: 4.3 MMOL/L (ref 3.5–5.1)
PROT SERPL-MCNC: 7 G/DL (ref 6–8.4)
PROT UR QL STRIP: NEGATIVE
PROTHROMBIN TIME: 9.9 SEC (ref 9–12.5)
RBC # BLD AUTO: 4.92 M/UL (ref 4–5.4)
RBC #/AREA URNS AUTO: 2 /HPF (ref 0–4)
SAMPLE: ABNORMAL
SAMPLE: NORMAL
SAMPLE: NORMAL
SITE: ABNORMAL
SODIUM SERPL-SCNC: 136 MMOL/L (ref 136–145)
SP GR UR STRIP: 1.02 (ref 1–1.03)
SP02: 99
SQUAMOUS #/AREA URNS AUTO: 23 /HPF
TOXICOLOGY INFORMATION: NORMAL
TRIGL SERPL-MCNC: 145 MG/DL (ref 30–150)
TSH SERPL DL<=0.005 MIU/L-ACNC: 1.11 UIU/ML (ref 0.4–4)
URN SPEC COLLECT METH UR: ABNORMAL
WBC # BLD AUTO: 6.86 K/UL (ref 3.9–12.7)
WBC #/AREA URNS AUTO: 17 /HPF (ref 0–5)

## 2019-09-30 PROCEDURE — 93010 ELECTROCARDIOGRAM REPORT: CPT | Mod: ,,, | Performed by: INTERNAL MEDICINE

## 2019-09-30 PROCEDURE — 81025 URINE PREGNANCY TEST: CPT | Performed by: EMERGENCY MEDICINE

## 2019-09-30 PROCEDURE — 96374 THER/PROPH/DIAG INJ IV PUSH: CPT

## 2019-09-30 PROCEDURE — 63600175 PHARM REV CODE 636 W HCPCS: Performed by: PHYSICIAN ASSISTANT

## 2019-09-30 PROCEDURE — 96361 HYDRATE IV INFUSION ADD-ON: CPT

## 2019-09-30 PROCEDURE — 85610 PROTHROMBIN TIME: CPT

## 2019-09-30 PROCEDURE — 85025 COMPLETE CBC W/AUTO DIFF WBC: CPT

## 2019-09-30 PROCEDURE — 87086 URINE CULTURE/COLONY COUNT: CPT

## 2019-09-30 PROCEDURE — 80053 COMPREHEN METABOLIC PANEL: CPT

## 2019-09-30 PROCEDURE — 84443 ASSAY THYROID STIM HORMONE: CPT

## 2019-09-30 PROCEDURE — 99285 EMERGENCY DEPT VISIT HI MDM: CPT | Mod: ,,, | Performed by: PHYSICIAN ASSISTANT

## 2019-09-30 PROCEDURE — 80061 LIPID PANEL: CPT

## 2019-09-30 PROCEDURE — 25000003 PHARM REV CODE 250: Performed by: PHYSICIAN ASSISTANT

## 2019-09-30 PROCEDURE — 99283 EMERGENCY DEPT VISIT LOW MDM: CPT | Mod: ,,, | Performed by: PSYCHIATRY & NEUROLOGY

## 2019-09-30 PROCEDURE — 93010 EKG 12-LEAD: ICD-10-PCS | Mod: ,,, | Performed by: INTERNAL MEDICINE

## 2019-09-30 PROCEDURE — 80307 DRUG TEST PRSMV CHEM ANLYZR: CPT

## 2019-09-30 PROCEDURE — 93005 ELECTROCARDIOGRAM TRACING: CPT

## 2019-09-30 PROCEDURE — 81001 URINALYSIS AUTO W/SCOPE: CPT

## 2019-09-30 PROCEDURE — 84702 CHORIONIC GONADOTROPIN TEST: CPT

## 2019-09-30 PROCEDURE — 99285 PR EMERGENCY DEPT VISIT,LEVEL V: ICD-10-PCS | Mod: ,,, | Performed by: PHYSICIAN ASSISTANT

## 2019-09-30 PROCEDURE — 99284 EMERGENCY DEPT VISIT MOD MDM: CPT | Mod: 25

## 2019-09-30 PROCEDURE — 82565 ASSAY OF CREATININE: CPT

## 2019-09-30 PROCEDURE — 82803 BLOOD GASES ANY COMBINATION: CPT

## 2019-09-30 PROCEDURE — 99900035 HC TECH TIME PER 15 MIN (STAT)

## 2019-09-30 PROCEDURE — 99283 PR EMERGENCY DEPT VISIT,LEVEL III: ICD-10-PCS | Mod: ,,, | Performed by: PSYCHIATRY & NEUROLOGY

## 2019-09-30 RX ORDER — CEPHALEXIN 500 MG/1
500 CAPSULE ORAL EVERY 12 HOURS
Qty: 10 CAPSULE | Refills: 0 | Status: SHIPPED | OUTPATIENT
Start: 2019-09-30 | End: 2019-10-06

## 2019-09-30 RX ORDER — METOCLOPRAMIDE HYDROCHLORIDE 5 MG/ML
10 INJECTION INTRAMUSCULAR; INTRAVENOUS
Status: COMPLETED | OUTPATIENT
Start: 2019-09-30 | End: 2019-09-30

## 2019-09-30 RX ORDER — ACETAMINOPHEN 500 MG
1000 TABLET ORAL
Status: COMPLETED | OUTPATIENT
Start: 2019-09-30 | End: 2019-09-30

## 2019-09-30 RX ADMIN — SODIUM CHLORIDE 1000 ML: 0.9 INJECTION, SOLUTION INTRAVENOUS at 10:09

## 2019-09-30 RX ADMIN — ACETAMINOPHEN 1000 MG: 500 TABLET ORAL at 10:09

## 2019-09-30 RX ADMIN — METOCLOPRAMIDE 10 MG: 5 INJECTION, SOLUTION INTRAMUSCULAR; INTRAVENOUS at 10:09

## 2019-09-30 NOTE — ED TRIAGE NOTES
Patient is a 28 yo female in for eval of headache and numbness that started yesterday. States that she has a history of migraines, but has not had a migraine since she was younger. Reports headache started yesterday morning - reports frontal in nature. States numbness and weakness to R side started at 9 pm yesterday evening. FLACO Sewell and Dr. Alegria at bedside on patient arrival. Stroke code called at 0816 by Dr. Alegria, charge nurse made aware. Patient alert and oriented, conversing with ease. Denies any changes in speech, aphasia, vision changes, nausea, vomiting, chest pain, SOB, or abdominal pain.

## 2019-09-30 NOTE — PLAN OF CARE
Brief Vascular neurology update note    Reviewed imaging personally - does not reveal a stroke nor other vascular etiology for patient's symptoms.  Based on history of headache yesterday morning, suspicious for complicated migraine/migraine with motor weakness.      Discussed with Dr. Molina, vascular neurology will sign off.  Please call back ([phone]) for any questions/concerns or if formal radiology report reveals any findings that need to be discussed/reevaluated.    Salty Patel MD  [phone]

## 2019-09-30 NOTE — HOSPITAL COURSE
9/29/19: onset of symptoms  09[place]30/2019: [place] [facility] [place]

## 2019-09-30 NOTE — ASSESSMENT & PLAN NOTE
Ms. Hidalgo is a 28 yo pregnant female w/ PMH significant for migraine, depression, and recent pregnancy (3 months prior to presentation) who presented to ED on 9/30 AM with concern for RUE weakness and numbness.  Stroke consulted in ED.    Based on exam findings, history of recent pregnancy, headache (w/ new associated deficits), differential includes small vessel stroke, sinus thrombosis, complicated migraine.     Recommendations  -MRI Brain, MRA brain and neck, MRV head w/o contrast  -IF the above studies reveal stroke, will admit to stroke service.  Please call [phone] when studies result.

## 2019-09-30 NOTE — HPI
Ms. Hidalgo is a 28 yo pregnant female w/ PMH significant for migraine, depression, and recent pregnancy (3 months prior to presentation) who presented to ED on 9/30 AM with concern for RUE weakness and numbness.  Stroke consulted in ED.    She reports onset of a headache the morning prior to presentation and decreased sensation in RUE ~2115 the night prior when she went to bed.  When she awoke this morning, the symptoms persisted and she notes mild weakness in addition to her decreased sensation.  She endorses mild photosensitivity and nausea w/o vomiting.  She reports a history of migraines in high school and increased frequency during her recent pregnancy, but denies any prior focal neuro deficits associated with these headaches.    She is currently trying to breastfeed.  States last pregnancy unremarkable except that she thinks she may have had pre-eclampsia (elevated BP); uneventful vaginal delivery.  Does not require any assistance at her baseline.  Denies alcohol, recreational drug use, cigarette use.      Reports vaginal spotting.  Not on any anticoagulants, antithrombotics, nor statin at her baseline.

## 2019-09-30 NOTE — SUBJECTIVE & OBJECTIVE
Past Medical History:   Diagnosis Date    Abnormal Pap smear of vagina     Allergy     Anxiety     Hypertension      Past Surgical History:   Procedure Laterality Date    CHOLECYSTECTOMY      median arcuate ligament        Family History   Problem Relation Age of Onset    Breast cancer Mother     Alcohol abuse Father     Celiac disease Neg Hx     Cirrhosis Neg Hx     Colon cancer Neg Hx     Colon polyps Neg Hx     Crohn's disease Neg Hx     Cystic fibrosis Neg Hx     Esophageal cancer Neg Hx     Hemochromatosis Neg Hx     Inflammatory bowel disease Neg Hx     Irritable bowel syndrome Neg Hx     Liver cancer Neg Hx     Liver disease Neg Hx     Rectal cancer Neg Hx     Stomach cancer Neg Hx     Ulcerative colitis Neg Hx     Jacob's disease Neg Hx     Ovarian cancer Neg Hx     Arrhythmia Neg Hx     Cardiomyopathy Neg Hx     Congenital heart disease Neg Hx     Early death Neg Hx     Heart attacks under age 50 Neg Hx     Pacemaker/defibrilator Neg Hx      Social History     Tobacco Use    Smoking status: Never Smoker    Smokeless tobacco: Never Used   Substance Use Topics    Alcohol use: Yes     Comment: occas, none recently    Drug use: No     Review of patient's allergies indicates:  No Known Allergies    Medications: I have reviewed the current medication administration record.      (Not in a hospital admission)    Review of Systems   Constitutional: Negative for fever.   HENT: Negative for hearing loss.    Eyes: Negative for redness.   Respiratory:        No hemoptysis   Cardiovascular: Negative for leg swelling.   Gastrointestinal: Negative for blood in stool.   Genitourinary: Positive for vaginal bleeding. Negative for hematuria.   Allergic/Immunologic: Negative for immunocompromised state.   Neurological: Positive for weakness, numbness and headaches.   Psychiatric/Behavioral: Negative for confusion.     Objective:     Vital Signs (Most Recent):  Temp: 99.1 °F (37.3 °C)  (09/30/19 0841)  Pulse: 106 (09/30/19 0834)  Resp: 18 (09/30/19 0834)  BP: 139/88 (09/30/19 0834)  SpO2: 98 % (09/30/19 0834)    Vital Signs Range (Last 24H):  Temp:  [99.1 °F (37.3 °C)]   Pulse:  [106]   Resp:  [18]   BP: (139)/(88)   SpO2:  [98 %]     Physical Exam   Constitutional: She appears well-developed. No distress.   HENT:   Head: Normocephalic and atraumatic.   Eyes: Pupils are equal, round, and reactive to light. EOM are normal.   Cardiovascular: Normal rate and regular rhythm.   Pulmonary/Chest: Effort normal. No respiratory distress.   Abdominal: Soft. She exhibits no distension.   Musculoskeletal: Normal range of motion.       Neurological Exam:   LOC: alert  Language: No aphasia  Articulation: No dysarthria  Orientation: Person, Place, Time   Visual Fields: Full  EOM (CN III, IV, VI): Full/intact  Pupils (CN II, III): PERRL  Facial Sensation (CN V): Normal  Facial Movement (CN VII): Symmetric facial expression    Reflexes: 2+ b/l biceps, brachioradialis, 1+ patellar b/l.  Motor: 5/5 throughout except 4+/5 R triceps  Cebellar: finger/nose intact b/l  Sensation: to light touch intact in all extremities      Laboratory:  Recent Results (from the past 24 hour(s))   POCT glucose    Collection Time: 09/30/19  8:23 AM   Result Value Ref Range    POCT Glucose 93 70 - 110 mg/dL   CBC W/ AUTO DIFFERENTIAL    Collection Time: 09/30/19  8:24 AM   Result Value Ref Range    WBC 6.86 3.90 - 12.70 K/uL    RBC 4.92 4.00 - 5.40 M/uL    Hemoglobin 13.3 12.0 - 16.0 g/dL    Hematocrit 40.9 37.0 - 48.5 %    Mean Corpuscular Volume 83 82 - 98 fL    Mean Corpuscular Hemoglobin 27.0 27.0 - 31.0 pg    Mean Corpuscular Hemoglobin Conc 32.5 32.0 - 36.0 g/dL    RDW 14.3 11.5 - 14.5 %    Platelets 325 150 - 350 K/uL    MPV 9.3 9.2 - 12.9 fL    Immature Granulocytes 0.6 (H) 0.0 - 0.5 %    Gran # (ANC) 4.3 1.8 - 7.7 K/uL    Immature Grans (Abs) 0.04 0.00 - 0.04 K/uL    Lymph # 1.9 1.0 - 4.8 K/uL    Mono # 0.4 0.3 - 1.0 K/uL    Eos  # 0.1 0.0 - 0.5 K/uL    Baso # 0.05 0.00 - 0.20 K/uL    nRBC 0 0 /100 WBC    Gran% 62.7 38.0 - 73.0 %    Lymph% 28.3 18.0 - 48.0 %    Mono% 6.1 4.0 - 15.0 %    Eosinophil% 1.6 0.0 - 8.0 %    Basophil% 0.7 0.0 - 1.9 %    Differential Method Automated          Diagnostic Results:      Brain imagin19 CTH: Per independent resident review and interpretation,  No acute infarct, hemorrhage, nor mass effect.      Vessel Imaging:  None available    Cardiac Evaluation:   19 EKG: Per independent resident review and interpretation,  NSR.  Qtc 418.

## 2019-09-30 NOTE — ED PROVIDER NOTES
"Encounter Date: 2019       History     Chief Complaint   Patient presents with    Headache     started yest ,morning    Numbness     whole r arm since , r arm drift      29-year-old  who is 3 and half months postpartum s/p  (2019) with history of anxiety, remote history of migraines and gestational hypertension presents for headache and right arm numbness and weakness since last night.  Patient reports first noticing numbness in her right arm around 9:00 p.m. last night.  Her left-sided temporal "pressure-like" headache started this morning.  Headache was improved with Tylenol but is still present.  She reports that onset of headache was "like a Evan truck".  She does endorse history of migraines many years ago but none recently.  She denies numbness or weakness in her face or leg, speech disturbance, visual changes or fevers.        Review of patient's allergies indicates:  No Known Allergies  Past Medical History:   Diagnosis Date    Abnormal Pap smear of vagina     Allergy     Anxiety     Hypertension      Past Surgical History:   Procedure Laterality Date    CHOLECYSTECTOMY      median arcuate ligament        Family History   Problem Relation Age of Onset    Breast cancer Mother     Alcohol abuse Father     Celiac disease Neg Hx     Cirrhosis Neg Hx     Colon cancer Neg Hx     Colon polyps Neg Hx     Crohn's disease Neg Hx     Cystic fibrosis Neg Hx     Esophageal cancer Neg Hx     Hemochromatosis Neg Hx     Inflammatory bowel disease Neg Hx     Irritable bowel syndrome Neg Hx     Liver cancer Neg Hx     Liver disease Neg Hx     Rectal cancer Neg Hx     Stomach cancer Neg Hx     Ulcerative colitis Neg Hx     Jacob's disease Neg Hx     Ovarian cancer Neg Hx     Arrhythmia Neg Hx     Cardiomyopathy Neg Hx     Congenital heart disease Neg Hx     Early death Neg Hx     Heart attacks under age 50 Neg Hx     Pacemaker/defibrilator Neg Hx      Social History "     Tobacco Use    Smoking status: Never Smoker    Smokeless tobacco: Never Used   Substance Use Topics    Alcohol use: Yes     Comment: occas, none recently    Drug use: No     Review of Systems   Constitutional: Negative for fatigue and fever.   Eyes: Negative for photophobia and visual disturbance.   Respiratory: Negative for cough and shortness of breath.    Cardiovascular: Negative for chest pain and palpitations.   Gastrointestinal: Negative for abdominal pain, nausea and vomiting.   Endocrine: Negative for polydipsia and polyuria.   Genitourinary: Positive for pelvic pain and vaginal bleeding. Negative for dysuria, flank pain and hematuria.   Musculoskeletal: Negative for back pain and myalgias.   Skin: Negative for color change and wound.   Allergic/Immunologic: Negative for immunocompromised state.   Neurological: Positive for weakness, numbness and headaches. Negative for tremors, seizures, syncope and speech difficulty.   Hematological: Does not bruise/bleed easily.   Psychiatric/Behavioral: Negative for confusion.       Physical Exam     Initial Vitals   BP Pulse Resp Temp SpO2   09/30/19 0834 09/30/19 0834 09/30/19 0834 09/30/19 0841 09/30/19 0834   139/88 106 18 99.1 °F (37.3 °C) 98 %      MAP       --                Physical Exam    Nursing note and vitals reviewed.  Constitutional: She appears well-developed and well-nourished. She is not diaphoretic. No distress.   HENT:   Head: Normocephalic and atraumatic.   Eyes: EOM are normal. Pupils are equal, round, and reactive to light.   Neck: Normal range of motion. Neck supple.   Cardiovascular: Normal rate, regular rhythm, normal heart sounds and intact distal pulses. Exam reveals no gallop and no friction rub.    No murmur heard.  Pulmonary/Chest: Breath sounds normal. No respiratory distress. She has no wheezes. She has no rhonchi. She has no rales. She exhibits no tenderness.   Abdominal: Soft. Bowel sounds are normal. She exhibits no distension  and no mass. There is no tenderness. There is no rebound and no guarding.   Musculoskeletal: Normal range of motion.   Neurological: She is alert and oriented to person, place, and time. No cranial nerve deficit or sensory deficit. GCS score is 15. GCS eye subscore is 4. GCS verbal subscore is 5. GCS motor subscore is 6.   3/5 strength right arm.  There is pronator drift.  Subjective decreased sensation to light touch on the right side of the face and the right arm.  No facial droop.  Normal speech.   Skin: Skin is warm and dry.   Psychiatric: She has a normal mood and affect.         ED Course   Procedures  Labs Reviewed   CBC W/ AUTO DIFFERENTIAL - Abnormal; Notable for the following components:       Result Value    Immature Granulocytes 0.6 (*)     All other components within normal limits   COMPREHENSIVE METABOLIC PANEL - Abnormal; Notable for the following components:    CO2 20 (*)     Albumin 3.3 (*)     All other components within normal limits   POCT URINE PREGNANCY - Abnormal; Notable for the following components:    POC Preg Test, Ur Positive (*)     All other components within normal limits   PROTIME-INR   LIPID PANEL   HCG, QUANTITATIVE, PREGNANCY   TSH   DRUG SCREEN PANEL, URINE EMERGENCY   POCT GLUCOSE, HAND-HELD DEVICE   POCT GLUCOSE     EKG Readings: (Independently Interpreted)   Initial Reading: No STEMI. Previous EKG: Compared with most recent EKG Previous EKG Date: 8/26/2015. Rhythm: Normal Sinus Rhythm. Heart Rate: 82. Ectopy: No Ectopy. Conduction: Normal. ST Segments: Normal ST Segments. T Waves: Normal. Clinical Impression: Normal Sinus Rhythm       Imaging Results          US OB Less Than 14 Wks with Transvaginal (xpd) (Final result)  Result time 09/30/19 13:49:13   Procedure changed from US OB Less Than 14 Wks First Gestation     Final result by Hira Barroso MD (09/30/19 13:49:13)                 Impression:      Single live intrauterine pregnancy with estimated gestational age 5  weeks 6 days.  Heart rate is slightly low at 107 bpm, favored to be due to early pregnancy.    7 cm simple cyst in the left ovary. Follow-up with repeat ultrasound in 8-12 weeks.    Thickened endometrium measuring 26 mm which may be related to postpartum state.  Recommend continued follow-up.    Electronically signed by resident: Frank Edmond  Date:    09/30/2019  Time:    13:15    Electronically signed by: Hira Barroso MD  Date:    09/30/2019  Time:    13:49             Narrative:    EXAMINATION:  US OB LESS THAN 14 WKS WITH TRANSVAGINAL (XPD)    CLINICAL HISTORY:  pregnancy; Abnormal uterine and vaginal bleeding, unspecified    TECHNIQUE:  Transabdominal sonography of the pelvis was performed, followed by transvaginal sonography to better evaluate the uterus and ovaries.    COMPARISON:  Ultrasound OB 11/20/2018    FINDINGS:  Intrauterine gestation(s): Single    Mean gestational sac diameter: 0.67 cm    Yolk sac: 2 mm    Crown-rump length (CRL): 0.27 cm    Cardiac activity: 107 bpm    Subchorionic hemorrhage: None.    Right ovary: Unable to visualize.    Left ovary: 6.6 x 0.4 x 6.4 cm.  There is a corpus luteal cyst measuring 5.8 x 7.1 x 5.5 cm.    Miscellaneous: The endometrium is thickened at 26 mm.                               MRV Brain Without Contrast (Final result)  Result time 09/30/19 10:30:40    Final result by Harley Duff DO (09/30/19 10:30:40)                 Impression:      Unremarkable MRV of the head as detailed above specifically without evidence for dural venous sinus thrombosis.      Electronically signed by: Harley Duff DO  Date:    09/30/2019  Time:    10:30             Narrative:    EXAMINATION:  MRV BRAIN WITHOUT CONTRAST    CLINICAL HISTORY:  Dural venous sinus thrombosis suspected;    TECHNIQUE:  noncontrast 3-D time of flight MRV head with 3 dimensional MIP reformations performed.    COMPARISON:  None    FINDINGS:  MRV head:    The superior sagittal sinus is patent.  The  inferior sagittal sinus is hypoplastic.  The bilateral transverse and sigmoid dural venous sinuses are patent..    The paired internal cerebral veins, lateral atrial veins and basal veins of Veronique are patent to the vein of [place].  The torcula herophili early and straight sinus are patent.                               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    Otherwise unremarkable MRA of the head as detailed above specifically without evidence for high-grade stenosis or proximal occlusion.    MRA neck: Unremarkable motion limited MRA of the neck as detailed above without evidence for significant focal stenosis or occlusion.      Electronically signed by: Harley Duff DO  Date:    09/30/2019  Time:    10:26             Narrative:    EXAMINATION:  MRA BRAIN WITHOUT CONTRAST; MRA NECK WITHOUT CONTRAST    CLINICAL HISTORY:  Stroke;    TECHNIQUE:  noncontrast 3-D time of flight MRA head and noncontrast 2-D and 3D  time-of-flight MRA neck.    COMPARISON:  None    FINDINGS:  MRA head:    Anterior circulation:  The bilateral distal cervical, An, cavernous and supraclinoid segments of the ICA's and the visualized bilateral anterior and middle cerebral arteries are patent without evidence for significant focal stenosis or proximal occlusion..  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  This measures approximately 4-5 mm in length.    Posterior circulation: The distal vertebral arteries, basilar artery and posterior cerebral arteries are patent without evidence for significant focal stenosis or aneurysm.    MRA neck: Study limited by patient motion.  Allowing for limitation the  origins of the right brachycephalic,  left common carotid and left subclavian arteries from the arch are within normal limits.  Vertebral arteries essentially patent throughout their course with slight limitation by motion..    The bilateral common carotid arteries, carotid bifurcations and extracranial portions of the internal carotid arteries are patent without evidence for significant focal stenosis allowing for motion.  There is tortuosity of the distal cervical ICAs bilaterally    Less than 50% proximal ICA stenosis by NASCET criteria.                               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    Otherwise unremarkable MRA of the head as detailed above specifically without evidence for high-grade stenosis or proximal occlusion.    MRA neck: Unremarkable motion limited MRA of the neck as detailed above without evidence for significant focal stenosis or occlusion.      Electronically signed by: Harley Duff DO  Date:    09/30/2019  Time:    10:26             Narrative:    EXAMINATION:  MRA BRAIN WITHOUT CONTRAST; MRA NECK WITHOUT CONTRAST    CLINICAL HISTORY:  Stroke;    TECHNIQUE:  noncontrast 3-D time of flight MRA head and noncontrast 2-D and 3D  time-of-flight MRA neck.    COMPARISON:  None    FINDINGS:  MRA head:    Anterior circulation:  The bilateral distal cervical, An, cavernous and supraclinoid segments of the ICA's and the visualized bilateral anterior and middle cerebral arteries are patent without evidence for significant focal stenosis or proximal occlusion..  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  This measures approximately 4-5 mm in  length.    Posterior circulation: The distal vertebral arteries, basilar artery and posterior cerebral arteries are patent without evidence for significant focal stenosis or aneurysm.    MRA neck: Study limited by patient motion.  Allowing for limitation the origins of the right brachycephalic,  left common carotid and left subclavian arteries from the arch are within normal limits.  Vertebral arteries essentially patent throughout their course with slight limitation by motion..    The bilateral common carotid arteries, carotid bifurcations and extracranial portions of the internal carotid arteries are patent without evidence for significant focal stenosis allowing for motion.  There is tortuosity of the distal cervical ICAs bilaterally    Less than 50% proximal ICA stenosis by NASCET criteria.                               MRI Brain Without Contrast (Final result)  Result time 09/30/19 10:01:23    Final result by Harley Duff DO (09/30/19 10:01:23)                 Impression:      Unremarkable noncontrast MRI brain as detailed above specifically without evidence for acute infarction or hydrocephalus.    Please see above for additional details.    Electronically signed by resident: Salty Vigil  Date:    09/30/2019  Time:    09:50    Electronically signed by: Harley Duff DO  Date:    09/30/2019  Time:    10:01             Narrative:    EXAMINATION:  MRI BRAIN WITHOUT CONTRAST    CLINICAL HISTORY:  Focal neuro deficit, new, fixed or worsening, >6 hours;right sided weakness;.    TECHNIQUE:  Multiplanar multisequence MR imaging of the brain was performed without contrast.    COMPARISON:  CT head without contrast 09/30/2019, 04/01/2015    FINDINGS:  The brain parenchyma is normal in signal and contour.  The ventricles are normal in size without hydrocephalus.  There is no midline shift or mass effect.  No abnormal parenchymal susceptibility to suggest parenchymal hemorrhage.  Major intracranial T2 flow voids are  present.    Incidental nonspecific prominence palatine tonsils    Question proteinaceous mucous retention cyst or arrested pneumatization in the right frontal sinus.  There is a small lobular fluid signal focus within the left maxillary antra suggestive for mucous retention cyst..                               CT Head Without Contrast (Final result)  Result time 09/30/19 08:51:15    Final result by Harley Duff DO (09/30/19 08:51:15)                 Impression:      Unremarkable noncontrast CT head as detailed above specifically without evidence for acute intracranial hemorrhage.  Clinical correlation and further evaluation as warranted.    Electronically signed by resident: Salty Vigil  Date:    09/30/2019  Time:    08:41    Electronically signed by: Harley Duff DO  Date:    09/30/2019  Time:    08:51             Narrative:    EXAMINATION:  CT HEAD WITHOUT CONTRAST    CLINICAL HISTORY:  Stroke;    TECHNIQUE:  Low dose axial images were obtained through the head.  Coronal and sagittal reformations were also performed. Contrast was not administered.    COMPARISON:  None.    FINDINGS:  Ventricles are normal in size and configuration.  No evidence of hydrocephalus.  No evidence for acute intracranial hemorrhage or sulcal effacement..    No midline shift or mass effect.  Please note there is distortion of the brainstem specifically the beny secondary to artifact from beam hardening which limits its evaluation..    Osseous calvarium appears intact.  Visualized paranasal sinuses and mastoid air cells are essentially clear.                                 Medical Decision Making:   History:   Old Medical Records: I decided to obtain old medical records.  Old Records Summarized: records from previous admission(s).       <> Summary of Records: This recent ED visits for gestational hypertension and headache.  Initial Assessment:   29-year-old female presenting for right arm numbness, weakness and headache. She has  significant objective right arm weakness as well as decreased sensation in her right arm and the right side of her face on exam.  Normal speech.   Differential Diagnosis:   Dural venous sinus thrombosis  CVA  Complicated migraine  Drug intoxication  Lower suspicion for SAH  Independently Interpreted Test(s):   I have ordered and independently interpreted EKG Reading(s) - see prior notes  Clinical Tests:   Lab Tests: Ordered and Reviewed  Radiological Study: Ordered and Reviewed  Medical Tests: Ordered and Reviewed  ED Management:  29-year-old female presenting for right arm numbness and weakness and left-sided headache. Patient is out of the window for tPA, however given patient's likely pro thrombotic risk given her recent delivery will call stroke code.    Patient seen and evaluated by vascular Neurology.  They recommend MRI/MRA/MRV.  Will order.  Will give fluids, Reglan and Tylenol for headache. Point of care pregnancy test positive. I discussed this result with the patient.  She has been breastfeeding and taking her OCPs intermittently.  She denies any abdominal pain, however she does endorse occasional pelvic cramping and vaginal spotting this morning.    Patient reports improvement of headache after medications. Brain imaging negative for acute stroke.  Suspect her neurological symptoms are due to complicated migraine.  Beta-hCG 2119.  Unable to visualize IUP on bedside ultrasound.  Will order official ultrasound given patient's vaginal spotting and occasional cramping.    Transvaginal ultrasound shows IUP with suspected gestational age of 5 weeks.  I discussed this result with the patient.  Given her reassuring workup, I do not believe she requires further ED treatment is stable for discharge. Discharge for Keflex for bacteria in urine given pregnancy.  Will instruct patient to follow up with Neurology and Ob/gyn. Stressed the importance of follow-up, strict ED return precautions given.  Patient voiced  understanding and is comfortable with discharge. I discussed this patient with my supervising physician.    Melodie Aragon PA-C      Other:   I have discussed this case with another health care provider.                      Clinical Impression:       ICD-10-CM ICD-9-CM   1. Acute nonintractable headache, unspecified headache type R51 784.0   2. Stroke I63.9 434.91   3. Vaginal bleeding N93.9 623.8   4. Less than 8 weeks gestation of pregnancy Z3A.01 V22.2   5. Right arm numbness R20.2 782.0   6. Right arm weakness R29.898 729.89   7. Acute cystitis without hematuria N30.00 595.0         Disposition:   Disposition: Discharged  Condition: Stable                        Melodie Aragon PA-C  09/30/19 2139

## 2019-09-30 NOTE — CONSULTS
[facility]  Vascular Neurology  Comprehensive Stroke Center  Consult Note    Inpatient consult to Vascular (Stroke) Neurology  Consult performed by: Salty Patel MD  Consult ordered by: Leatha Alegria MD        Assessment/Plan:     Patient is a 29 y.o. year old female with:    * Focal neurological deficit  Ms. Hidalgo is a 30 yo pregnant female w/ PMH significant for migraine, depression, and recent pregnancy (3 months prior to presentation) who presented to ED on 9/30 AM with concern for RUE weakness and numbness.  Stroke consulted in ED.    Based on exam findings, history of recent pregnancy, headache (w/ new associated deficits), differential includes small vessel stroke, sinus thrombosis, complicated migraine.     Recommendations  -MRI Brain, MRA brain and neck, MRV head w/o contrast  -IF the above studies reveal stroke, will admit to stroke service.  Please call [phone] when studies result.      Class 3 severe obesity in adult  Stroke risk factor  -encourage weight loss    Depression  Continue home sertraline    Pregnancy at early stage  Urine pregnancy test resulted positive in ED 9/30  -hypercoagulable state, increased risk of stroke      STROKE DOCUMENTATION     Acute Stroke Times   Last Known Normal Date: 09/29/19  Last Known Normal Time: 2114  Symptom Onset Date: 09/29/19  Symptom Onset Time: 2115  Stroke Team Called Date: 09/30/19  Stroke Team Called Time: 0820  Stroke Team Arrival Date: 09/30/19  Stroke Team Arrival Time: 0825  CT Interpretation Time: 0830    NIH Scale:  Interval: baseline  1a. Level of Consciousness: 0-->Alert, keenly responsive  1b. LOC Questions: 0-->Answers both questions correctly  1c. LOC Commands: 0-->Performs both tasks correctly  2. Best Gaze: 0-->Normal  3. Visual: 0-->No visual loss  4. Facial Palsy: 0-->Normal symmetrical movements  5a. Motor Arm, Left: 0-->No drift, limb holds 90 (or 45) degrees for full 10 secs  5b. Motor Arm, Right: 0-->No  drift, limb holds 90 (or 45) degrees for full 10 secs  6a. Motor Leg, Left: 0-->No drift, leg holds 30 degree position for full 5 secs  6b. Motor Leg, Right: 0-->No drift, leg holds 30 degree position for full 5 secs  7. Limb Ataxia: 0-->Absent  8. Sensory: 1-->Mild-to-moderate sensory loss, patient feels pinprick is less sharp or is dull on the affected side, or there is a loss of superficial pain with pinprick, but patient is aware of being touched(RUE)  9. Best Language: 0-->No aphasia, normal  10. Dysarthria: 0-->Normal  11. Extinction and Inattention (formerly Neglect): 0-->No abnormality  Total (NIH Stroke Scale): 1    Modified Bainbridge Score: 0  C[place]ton Coma Scale:15   ABCD2 Score:    [id number] Score:   HAS -BLED Score:   ICH Score:   Hunt & Payton Classification:       Thrombolysis Candidate? No, Out of window , Strong suspicion for stroke mimic or alternative diagnosis     Delays to Thrombolysis?  No    Interventional Revascularization Candidate?   Is the patient eligible for mechanical endovascular reperfusion (RADHA)?  No; No significant neurological deficit      Hemorrhagic change of an Ischemic Stroke: Does this patient have an ischemic stroke with hemorrhagic changes? No     Subjective:     History of Present Illness:  Ms. Hidalgo is a 28 yo female w/ PMH significant for migraine, depression, and recent pregnancy (3 months prior to presentation) who presented to ED on 9/30 AM with concern for RUE weakness and numbness.  Stroke consulted in ED.    She reports onset of a headache the morning prior to presentation and decreased sensation in RUE ~2115 the night prior when she went to bed.  When she awoke this morning, the symptoms persisted and she notes mild weakness in addition to her decreased sensation.  She endorses mild photosensitivity and nausea w/o vomiting.  She reports a history of migraines in high school and increased frequency during her recent pregnancy, but denies any prior focal neuro  deficits associated with these headaches.    She is currently trying to breastfeed.  States last pregnancy unremarkable except that she thinks she may have had pre-eclampsia (elevated BP); uneventful vaginal delivery.  Does not require any assistance at her baseline.  Denies alcohol, recreational drug use, cigarette use.      Reports vaginal spotting.  Not on any anticoagulants, antithrombotics, nor statin at her baseline.        Past Medical History:   Diagnosis Date    Abnormal Pap smear of vagina     Allergy     Anxiety     Hypertension      Past Surgical History:   Procedure Laterality Date    CHOLECYSTECTOMY      median arcuate ligament        Family History   Problem Relation Age of Onset    Breast cancer Mother     Alcohol abuse Father     Celiac disease Neg Hx     Cirrhosis Neg Hx     Colon cancer Neg Hx     Colon polyps Neg Hx     Crohn's disease Neg Hx     Cystic fibrosis Neg Hx     Esophageal cancer Neg Hx     Hemochromatosis Neg Hx     Inflammatory bowel disease Neg Hx     Irritable bowel syndrome Neg Hx     Liver cancer Neg Hx     Liver disease Neg Hx     Rectal cancer Neg Hx     Stomach cancer Neg Hx     Ulcerative colitis Neg Hx     Jacob's disease Neg Hx     Ovarian cancer Neg Hx     Arrhythmia Neg Hx     Cardiomyopathy Neg Hx     Congenital heart disease Neg Hx     Early death Neg Hx     Heart attacks under age 50 Neg Hx     Pacemaker/defibrilator Neg Hx      Social History     Tobacco Use    Smoking status: Never Smoker    Smokeless tobacco: Never Used   Substance Use Topics    Alcohol use: Yes     Comment: occas, none recently    Drug use: No     Review of patient's allergies indicates:  No Known Allergies    Medications: I have reviewed the current medication administration record.      (Not in a hospital admission)    Review of Systems   Constitutional: Negative for fever.   HENT: Negative for hearing loss.    Eyes: Negative for redness.   Respiratory:         No hemoptysis   Cardiovascular: Negative for leg swelling.   Gastrointestinal: Negative for blood in stool.   Genitourinary: Positive for vaginal bleeding. Negative for hematuria.   Allergic/Immunologic: Negative for immunocompromised state.   Neurological: Positive for weakness, numbness and headaches.   Psychiatric/Behavioral: Negative for confusion.     Objective:     Vital Signs (Most Recent):  Temp: 99.1 °F (37.3 °C) (09/30/19 0841)  Pulse: 106 (09/30/19 0834)  Resp: 18 (09/30/19 0834)  BP: 139/88 (09/30/19 0834)  SpO2: 98 % (09/30/19 0834)    Vital Signs Range (Last 24H):  Temp:  [99.1 °F (37.3 °C)]   Pulse:  [106]   Resp:  [18]   BP: (139)/(88)   SpO2:  [98 %]     Physical Exam   Constitutional: She appears well-developed. No distress.   HENT:   Head: Normocephalic and atraumatic.   Eyes: Pupils are equal, round, and reactive to light. EOM are normal.   Cardiovascular: Normal rate and regular rhythm.   Pulmonary/Chest: Effort normal. No respiratory distress.   Abdominal: Soft. She exhibits no distension.   Musculoskeletal: Normal range of motion.       Neurological Exam:   LOC: alert  Language: No aphasia  Articulation: No dysarthria  Orientation: Person, Place, Time   Visual Fields: Full  EOM (CN III, IV, VI): Full/intact  Pupils (CN II, III): PERRL  Facial Sensation (CN V): Normal  Facial Movement (CN VII): Symmetric facial expression    Reflexes: 2+ b/l biceps, brachioradialis, 1+ patellar b/l.  Motor: 5/5 throughout except 4+/5 R triceps  Cebellar: finger/nose intact b/l  Sensation: to light touch intact in all extremities      Laboratory:  Recent Results (from the past 24 hour(s))   POCT glucose    Collection Time: 09/30/19  8:23 AM   Result Value Ref Range    POCT Glucose 93 70 - 110 mg/dL   CBC W/ AUTO DIFFERENTIAL    Collection Time: 09/30/19  8:24 AM   Result Value Ref Range    WBC 6.86 3.90 - 12.70 K/uL    RBC 4.92 4.00 - 5.40 M/uL    Hemoglobin 13.3 12.0 - 16.0 g/dL    Hematocrit 40.9 37.0 - 48.5 %     Mean Corpuscular Volume 83 82 - 98 fL    Mean Corpuscular Hemoglobin 27.0 27.0 - 31.0 pg    Mean Corpuscular Hemoglobin Conc 32.5 32.0 - 36.0 g/dL    RDW 14.3 11.5 - 14.5 %    Platelets 325 150 - 350 K/uL    MPV 9.3 9.2 - 12.9 fL    Immature Granulocytes 0.6 (H) 0.0 - 0.5 %    Gran # (ANC) 4.3 1.8 - 7.7 K/uL    Immature Grans (Abs) 0.04 0.00 - 0.04 K/uL    Lymph # 1.9 1.0 - 4.8 K/uL    Mono # 0.4 0.3 - 1.0 K/uL    Eos # 0.1 0.0 - 0.5 K/uL    Baso # 0.05 0.00 - 0.20 K/uL    nRBC 0 0 /100 WBC    Gran% 62.7 38.0 - 73.0 %    Lymph% 28.3 18.0 - 48.0 %    Mono% 6.1 4.0 - 15.0 %    Eosinophil% 1.6 0.0 - 8.0 %    Basophil% 0.7 0.0 - 1.9 %    Differential Method Automated          Diagnostic Results:      Brain imagin19 CTH: Per independent resident review and interpretation,  No acute infarct, hemorrhage, nor mass effect.      Vessel Imaging:  None available    Cardiac Evaluation:   19 EKG: Per independent resident review and interpretation,  NSR.  Qtc 418.        Salty Patel MD  [facility]  Department of Vascular Neurology   [facility]

## 2019-09-30 NOTE — ASSESSMENT & PLAN NOTE
Ms. Hidalgo is a 28 yo female w/ PMH significant for migraine, depression, and recent pregnancy (3 months prior to presentation) who presented to ED on 9/30 AM with concern for RUE weakness and numbness.  Stroke consulted in ED.    Based on exam findings, history of recent pregnancy, headache (w/ new associated deficits), differential includes small vessel stroke, sinus thrombosis, complicated migraine.     Recommendations  -MRI Brain, MRA brain and neck, MRV head w/o contrast  -IF the above studies reveal stroke, will admit to stroke service.  Please call [phone] when studies result.

## 2019-09-30 NOTE — ASSESSMENT & PLAN NOTE
Urine pregnancy test resulted positive in ED 9/30  -hypercoagulable state, increased risk of stroke

## 2019-09-30 NOTE — DISCHARGE INSTRUCTIONS
Please schedule an appointment with your obstetrician for follow-up. If you start to have any new or worsening symptoms, please come back to the emergency department.      If you wish to terminate your pregnancy, there is one clinic in [place] that does this procedure.  Information is listed below.  You need financial assistance, please go to the [place]  fund web site also listed below.    [address] [address]  [phone]  [email or web address]    [email or web address]

## 2019-10-01 ENCOUNTER — TELEPHONE (OUTPATIENT)
Dept: NEUROLOGY | Facility: CLINIC | Age: 29
End: 2019-10-01

## 2019-10-01 LAB
BACTERIA UR CULT: NORMAL
BACTERIA UR CULT: NORMAL

## 2019-10-01 NOTE — TELEPHONE ENCOUNTER
----- Message from Lucille Mccallum sent at 10/1/2019  8:51 AM CDT -----  Contact: pt @ [phone]  Patient called, was seen in the ER last night, told to follow up with Neurology. Patient is not sure of what concern (issue) she should be scheduling for Stroke or headaches. Please Shirley would you take a look at her chart and give her a call to schedule.

## 2019-10-02 ENCOUNTER — PATIENT OUTREACH (OUTPATIENT)
Dept: ADMINISTRATIVE | Facility: OTHER | Age: 29
End: 2019-10-02

## 2019-10-06 ENCOUNTER — PATIENT MESSAGE (OUTPATIENT)
Dept: OBSTETRICS AND GYNECOLOGY | Facility: CLINIC | Age: 29
End: 2019-10-06

## 2019-10-06 ENCOUNTER — HOSPITAL ENCOUNTER (EMERGENCY)
Facility: OTHER | Age: 29
Discharge: HOME | End: 2019-10-06
Attending: EMERGENCY MEDICINE
Payer: COMMERCIAL

## 2019-10-06 VITALS
OXYGEN SATURATION: 97 % | HEART RATE: 78 BPM | TEMPERATURE: 98 F | BODY MASS INDEX: 40.75 KG/M2 | WEIGHT: 230 LBS | HEIGHT: 63 IN | RESPIRATION RATE: 18 BRPM | DIASTOLIC BLOOD PRESSURE: 78 MMHG | SYSTOLIC BLOOD PRESSURE: 149 MMHG

## 2019-10-06 DIAGNOSIS — O20.9 VAGINAL BLEEDING AFFECTING EARLY PREGNANCY: ICD-10-CM

## 2019-10-06 DIAGNOSIS — O03.9 MISCARRIAGE: Primary | ICD-10-CM

## 2019-10-06 DIAGNOSIS — O02.1 MISSED ABORTION: Primary | ICD-10-CM

## 2019-10-06 LAB
ANION GAP SERPL CALC-SCNC: 9 MMOL/L (ref 8–16)
B-HCG UR QL: POSITIVE
BACTERIA #/AREA URNS HPF: ABNORMAL /HPF
BACTERIA GENITAL QL WET PREP: ABNORMAL
BASOPHILS # BLD AUTO: 0.05 K/UL (ref 0–0.2)
BASOPHILS NFR BLD: 0.6 % (ref 0–1.9)
BILIRUB UR QL STRIP: NEGATIVE
BUN SERPL-MCNC: 10 MG/DL (ref 6–20)
CALCIUM SERPL-MCNC: 8.3 MG/DL (ref 8.7–10.5)
CHLORIDE SERPL-SCNC: 107 MMOL/L (ref 95–110)
CLARITY UR: ABNORMAL
CLUE CELLS VAG QL WET PREP: ABNORMAL
CO2 SERPL-SCNC: 22 MMOL/L (ref 23–29)
COLOR UR: YELLOW
CREAT SERPL-MCNC: 0.7 MG/DL (ref 0.5–1.4)
CTP QC/QA: YES
DIFFERENTIAL METHOD: ABNORMAL
EOSINOPHIL # BLD AUTO: 0.1 K/UL (ref 0–0.5)
EOSINOPHIL NFR BLD: 1.4 % (ref 0–8)
ERYTHROCYTE [DISTWIDTH] IN BLOOD BY AUTOMATED COUNT: 13.9 % (ref 11.5–14.5)
EST. GFR  (AFRICAN AMERICAN): >60 ML/MIN/1.73 M^2
EST. GFR  (NON AFRICAN AMERICAN): >60 ML/MIN/1.73 M^2
FILAMENT FUNGI VAG WET PREP-#/AREA: ABNORMAL
GLUCOSE SERPL-MCNC: 91 MG/DL (ref 70–110)
GLUCOSE UR QL STRIP: NEGATIVE
HCG INTACT+B SERPL-ACNC: 1578 MIU/ML
HCT VFR BLD AUTO: 39.8 % (ref 37–48.5)
HGB BLD-MCNC: 12.8 G/DL (ref 12–16)
HGB UR QL STRIP: ABNORMAL
IMM GRANULOCYTES # BLD AUTO: 0.03 K/UL (ref 0–0.04)
IMM GRANULOCYTES NFR BLD AUTO: 0.4 % (ref 0–0.5)
KETONES UR QL STRIP: NEGATIVE
LEUKOCYTE ESTERASE UR QL STRIP: ABNORMAL
LYMPHOCYTES # BLD AUTO: 1.8 K/UL (ref 1–4.8)
LYMPHOCYTES NFR BLD: 22.7 % (ref 18–48)
MCH RBC QN AUTO: 26.8 PG (ref 27–31)
MCHC RBC AUTO-ENTMCNC: 32.2 G/DL (ref 32–36)
MCV RBC AUTO: 83 FL (ref 82–98)
MICROSCOPIC COMMENT: ABNORMAL
MONOCYTES # BLD AUTO: 0.6 K/UL (ref 0.3–1)
MONOCYTES NFR BLD: 7 % (ref 4–15)
NEUTROPHILS # BLD AUTO: 5.5 K/UL (ref 1.8–7.7)
NEUTROPHILS NFR BLD: 67.9 % (ref 38–73)
NITRITE UR QL STRIP: NEGATIVE
NRBC BLD-RTO: 0 /100 WBC
PH UR STRIP: 6 [PH] (ref 5–8)
PLATELET # BLD AUTO: 362 K/UL (ref 150–350)
PMV BLD AUTO: 9.2 FL (ref 9.2–12.9)
POTASSIUM SERPL-SCNC: 3.9 MMOL/L (ref 3.5–5.1)
PROT UR QL STRIP: ABNORMAL
RBC # BLD AUTO: 4.78 M/UL (ref 4–5.4)
RBC #/AREA URNS HPF: >100 /HPF (ref 0–4)
SODIUM SERPL-SCNC: 138 MMOL/L (ref 136–145)
SP GR UR STRIP: 1.02 (ref 1–1.03)
SPECIMEN SOURCE: ABNORMAL
SQUAMOUS #/AREA URNS HPF: 4 /HPF
T VAGINALIS GENITAL QL WET PREP: ABNORMAL
URN SPEC COLLECT METH UR: ABNORMAL
UROBILINOGEN UR STRIP-ACNC: NEGATIVE EU/DL
WBC # BLD AUTO: 8.05 K/UL (ref 3.9–12.7)
WBC #/AREA URNS HPF: 6 /HPF (ref 0–5)
WBC #/AREA VAG WET PREP: ABNORMAL
YEAST GENITAL QL WET PREP: ABNORMAL

## 2019-10-06 PROCEDURE — 81025 URINE PREGNANCY TEST: CPT | Performed by: EMERGENCY MEDICINE

## 2019-10-06 PROCEDURE — 80048 BASIC METABOLIC PNL TOTAL CA: CPT

## 2019-10-06 PROCEDURE — 81000 URINALYSIS NONAUTO W/SCOPE: CPT

## 2019-10-06 PROCEDURE — 85025 COMPLETE CBC W/AUTO DIFF WBC: CPT

## 2019-10-06 PROCEDURE — 87210 SMEAR WET MOUNT SALINE/INK: CPT

## 2019-10-06 PROCEDURE — 84702 CHORIONIC GONADOTROPIN TEST: CPT

## 2019-10-06 PROCEDURE — 87491 CHLMYD TRACH DNA AMP PROBE: CPT

## 2019-10-06 PROCEDURE — 99284 EMERGENCY DEPT VISIT MOD MDM: CPT | Mod: 25

## 2019-10-06 PROCEDURE — 36415 COLL VENOUS BLD VENIPUNCTURE: CPT

## 2019-10-06 NOTE — ED PROVIDER NOTES
"Encounter Date: 10/6/2019    SCRIBE #1 NOTE: I, Butch Robles, am scribing for, and in the presence of, Dr. Zeng.       History     Chief Complaint   Patient presents with    Vaginal Bleeding     Pt is 6 weeks pregnant, c/o vaginal bleeding with tissue passed at 3 am this morning. Pt also c/o RLQ abdominal cramping.     Time seen by provider: 8:40 AM    This is a 29 y.o. female who presents with complaint of vaginal bleeding that began approximately one week ago. Her vaginal bleeding began as mild spotting throughout the week but worsened this morning. She is also experiencing lower abdominal cramping. The patient is approximately six weeks pregnant and was seen at [facility] on  for right arm weakness that was found to be a complex migraine after extensive workup and MRI. She was also given Keflex for a suspected UTI, but has not been taking it. She woke up at 3 AM today and started to experience heavy bleeding with the passing of tissue/clots. She states "it felt like my uterus fell out". She reports that she is also experiencing mild cramping. She denies fever, sore throat, chest pain, shortness of breath, nausea, vomiting, and dysuria. The patient reports that she didn't fill her antibiotic prescription because she has had no symptoms of a UTI. This is her fourth pregnancy, she has two kids and had one .     The history is provided by the patient and medical records.     Review of patient's allergies indicates:  No Known Allergies  Past Medical History:   Diagnosis Date    Abnormal Pap smear of vagina     Allergy     Anxiety     Hypertension      Past Surgical History:   Procedure Laterality Date    CHOLECYSTECTOMY      median arcuate ligament        Family History   Problem Relation Age of Onset    Breast cancer Mother     Alcohol abuse Father     Celiac disease Neg Hx     Cirrhosis Neg Hx     Colon cancer Neg Hx     Colon polyps Neg Hx     Crohn's disease Neg Hx     Cystic " fibrosis Neg Hx     Esophageal cancer Neg Hx     Hemochromatosis Neg Hx     Inflammatory bowel disease Neg Hx     Irritable bowel syndrome Neg Hx     Liver cancer Neg Hx     Liver disease Neg Hx     Rectal cancer Neg Hx     Stomach cancer Neg Hx     Ulcerative colitis Neg Hx     Jacob's disease Neg Hx     Ovarian cancer Neg Hx     Arrhythmia Neg Hx     Cardiomyopathy Neg Hx     Congenital heart disease Neg Hx     Early death Neg Hx     Heart attacks under age 50 Neg Hx     Pacemaker/defibrilator Neg Hx      Social History     Tobacco Use    Smoking status: Never Smoker    Smokeless tobacco: Never Used   Substance Use Topics    Alcohol use: Yes     Comment: occas, none recently    Drug use: No     Review of Systems   Constitutional: Negative for fever.   HENT: Negative for congestion.    Eyes: Negative for redness.   Respiratory: Negative for shortness of breath.    Cardiovascular: Negative for chest pain.   Gastrointestinal: Positive for abdominal pain (cramping). Negative for nausea and vomiting.   Genitourinary: Positive for vaginal bleeding. Negative for dysuria.   Skin: Negative for rash.   Neurological: Negative for headaches.   Psychiatric/Behavioral: Negative for confusion.       Physical Exam     Initial Vitals [10/06/19 0751]   BP Pulse Resp Temp SpO2   (!) 169/95 (!) 111 18 98.5 °F (36.9 °C) 98 %      MAP       --         Physical Exam    Nursing note and vitals reviewed.  Constitutional: She appears well-developed and well-nourished. She is not diaphoretic. No distress.   HENT:   Head: Normocephalic and atraumatic.   Mouth/Throat: Oropharynx is clear and moist.   Eyes: Conjunctivae and EOM are normal. Pupils are equal, round, and reactive to light.   Neck: Normal range of motion.   Cardiovascular: Normal rate, regular rhythm and normal heart sounds.   No murmur heard.  Pulmonary/Chest: Breath sounds normal. No respiratory distress. She has no wheezes. She has no rales.   Abdominal:  Soft. There is no tenderness. There is no rebound and no guarding.   Genitourinary:   Genitourinary Comments: Active bleeding from cervix. No vaginal lesions. Cervix is full and closed.    Musculoskeletal: Normal range of motion. She exhibits no edema or tenderness.   Neurological: She is alert and oriented to person, place, and time. She has normal strength. No cranial nerve deficit.   Skin: Skin is warm and dry. No rash and no abscess noted. No erythema. No pallor.   Psychiatric: She has a normal mood and affect. Her behavior is normal. Judgment and thought content normal.         ED Course   Procedures  Labs Reviewed   BASIC METABOLIC PANEL - Abnormal; Notable for the following components:       Result Value    CO2 22 (*)     Calcium 8.3 (*)     All other components within normal limits   CBC W/ AUTO DIFFERENTIAL - Abnormal; Notable for the following components:    Mean Corpuscular Hemoglobin 26.8 (*)     Platelets 362 (*)     All other components within normal limits   URINALYSIS, REFLEX TO URINE CULTURE - Abnormal; Notable for the following components:    Appearance, UA Hazy (*)     Protein, UA Trace (*)     Occult Blood UA 3+ (*)     Leukocytes, UA Trace (*)     All other components within normal limits    Narrative:     Preferred Collection Type->Urine, Clean Catch   URINALYSIS MICROSCOPIC - Abnormal; Notable for the following components:    RBC, UA >100 (*)     WBC, UA 6 (*)     All other components within normal limits    Narrative:     Preferred Collection Type->Urine, Clean Catch   VAGINAL SCREEN - Abnormal; Notable for the following components:    WBC - Vaginal Screen Few (*)     Bacteria - Vaginal Screen Moderate (*)     All other components within normal limits   POCT URINE PREGNANCY - Abnormal; Notable for the following components:    POC Preg Test, Ur Positive (*)     All other components within normal limits   C. TRACHOMATIS/N. GONORRHOEAE BY AMP DNA    Narrative:     Sources by Resulting  Lab:->[facility]   HCG, QUANTITATIVE, PREGNANCY          Imaging Results          US OB Less Than 14 Wks with Transvaginal (xpd) (Final result)  Result time 10/06/19 11:12:07    Final result by Liat Kim MD (10/06/19 11:12:07)                 Impression:      In light of an intrauterine gestation being identified on the recent previous exam and now one not being present, these findings are concerning for failed early pregnancy.  Continued clinical and laboratory follow-up is recommended with imaging follow-up as deemed clinically indicated.      Electronically signed by: Liat Kim MD  Date:    10/06/2019  Time:    11:12             Narrative:    EXAMINATION:  US OB LESS THAN 14 WKS WITH TRANSVAGINAL (XPD)    CLINICAL HISTORY:  Vaginal bleeding early pregnancy;    TECHNIQUE:  Transabdominal sonography of the pelvis was performed, followed by transvaginal sonography to better evaluate the uterus and ovaries.    COMPARISON:  2019    FINDINGS:  Uterus:    Size: 10.3 cm x 5.3 cm x 6.5 cm    Masses: None    Endometrium: Abnormal in this pregnant patient.  There is no intrauterine gestation as was seen on the recent exam.  The endometrium measures 2.1 cm.    Right ovary:    Size: 6.3 cm x 5.4 cm x 7.5 cm    Appearance: There is a 6.6 cm simple cystic structure on the right ovary.    Vascular flow: Normal.    Left ovary:    The left ovary is not visualized.  There are no left adnexal abnormalities.    Free Fluid:    None.                                 Medical Decision Making:   Initial Assessment:       29-year-old female  approximately 6 weeks pregnant by LMP presents with worsening vaginal bleeding and lower abdominal cramping this morning.  She has been spotting throughout this week with light bleeding, but this morning became heavy with passage of clots and possible products.  Of note, patient was seen at [facility] 1 week ago for acute onset of right arm weakness with headache; she was  evaluated by stroke team there and had MRI with no sign of CVA, and symptoms attributed to complex migraine.  No headache or weakness since then; she was prescribed Keflex for possible UTI after visit there, but patient has not taken it yet since she has no symptoms of UTI such as frequency or dysuria.  On exam she has no focal abdominal tenderness, but pelvic exam with active bleeding from cervix with full but closed cervix, suggesting miscarriage.    Labs checked with beta HCG 1578, decreased from previous level 1 week ago.  UA with only 6 WBC and greater than 100 RBC, not convincing for UTI, and urine culture from last week with multiple organisms.  Basic labs otherwise unremarkable. Ultrasound done and no IUP seen, and one was seen 1 week ago, suggesting complete miscarriage.  I informed patient of the results and she is tearful but understands diagnosis.  She is Rh positive. Bleeding slowed while in ED and she is hemodynamically stable with no indication for transfusion.  She has gyn follow-up that she will keep, and she can return to ED for any worsening bleeding or other concerns. I had extensive discussion about workup results and patient is comfortable with discharge plan. Patient understands to closely follow-up with OB and return for the ED for any worsening symptoms or other concerns.              Clinical Tests:   Lab Tests: Ordered and Reviewed  Radiological Study: Ordered and Reviewed            Scribe Attestation:   Scribe #1: I performed the above scribed service and the documentation accurately describes the services I performed. I attest to the accuracy of the note.    Attending Attestation:           Physician Attestation for Scribe:  Physician Attestation Statement for Scribe #1: I, Dr. Zeng, reviewed documentation, as scribed by Butch Robles  in my presence, and it is both accurate and complete.                    Clinical Impression:     1. Miscarriage    2. Vaginal bleeding affecting  early pregnancy                                 Thor Zeng MD  10/08/19 4031

## 2019-10-06 NOTE — ED NOTES
ROUNDING:  Lying on the stretcher with HOB elevated. AAOx4. States right lower pelvic pain 4/10. Denies sob, cp, dizziness, lightheadedness, n/v or any other discomfort at this time. Skin is warm and dry. Resp:18 even and unlabored. Comfort and BR needs addressed. Plan of care updated. NADN. Bed locked in low position, side rails up x2 and call light within reach. Will continue to monitor.

## 2019-10-06 NOTE — ED TRIAGE NOTES
A&Ox4 with respirations even and unlabored. 6 wks pregnant.  C/o spotting blood this past week.  Seen at [facility] earlier in week.  Did not fill prescription for Keflex.  This am, vaginal bleeding became heavy.  No other complaints.

## 2019-10-07 ENCOUNTER — TELEPHONE (OUTPATIENT)
Dept: OBSTETRICS AND GYNECOLOGY | Facility: CLINIC | Age: 29
End: 2019-10-07

## 2019-10-07 DIAGNOSIS — O99.341 DEPRESSION DURING PREGNANCY IN FIRST TRIMESTER: ICD-10-CM

## 2019-10-07 DIAGNOSIS — Z30.9 ENCOUNTER FOR CONTRACEPTIVE MANAGEMENT, UNSPECIFIED TYPE: Primary | ICD-10-CM

## 2019-10-07 DIAGNOSIS — Z3A.11 11 WEEKS GESTATION OF PREGNANCY: ICD-10-CM

## 2019-10-07 DIAGNOSIS — F32.A DEPRESSION DURING PREGNANCY IN FIRST TRIMESTER: ICD-10-CM

## 2019-10-07 LAB
C TRACH DNA SPEC QL NAA+PROBE: NOT DETECTED
N GONORRHOEA DNA SPEC QL NAA+PROBE: NOT DETECTED

## 2019-10-07 NOTE — TELEPHONE ENCOUNTER
----- Message from Dheeraj Gonzalez sent at 10/7/2019  9:43 AM CDT -----  PLEASE CALL PT SHE HAD A MISSED AB IN A LOT OF PAIN [phone]

## 2019-10-07 NOTE — TELEPHONE ENCOUNTER
Patient stated that she was seen in the ER one week ago had a positive upt. Patient stated that she has had a miscarriage and is now having cramping and bleeding. Patient denied bleeding through more than a pad an hour and just wanted to know if she can now take ibuprofen or if she is still limited to tylenol. Patient advised (per Dr. Benton) that she can take ibuprofen or naproxen-which ever she chooses. Patient offered an appointment to be seen. Patient declined stated that she will keep her appointment on 11/1/19. Advised patient to monitor for increase bleeding and clots, severe cramping, and vaginal odor and discharge. Patient verbalized understanding stating that she will call if she is having any problems.

## 2019-10-08 RX ORDER — SERTRALINE HYDROCHLORIDE 100 MG/1
50 TABLET, FILM COATED ORAL DAILY
Qty: 30 TABLET | Refills: 3 | Status: SHIPPED | OUTPATIENT
Start: 2019-10-08 | End: 2021-02-17

## 2019-10-09 ENCOUNTER — PATIENT MESSAGE (OUTPATIENT)
Dept: OBSTETRICS AND GYNECOLOGY | Facility: CLINIC | Age: 29
End: 2019-10-09

## 2019-10-22 ENCOUNTER — PATIENT OUTREACH (OUTPATIENT)
Dept: ADMINISTRATIVE | Facility: OTHER | Age: 29
End: 2019-10-22

## 2019-10-24 ENCOUNTER — OFFICE VISIT (OUTPATIENT)
Dept: NEUROLOGY | Facility: CLINIC | Age: 29
End: 2019-10-24
Payer: COMMERCIAL

## 2019-10-24 VITALS
WEIGHT: 244.94 LBS | SYSTOLIC BLOOD PRESSURE: 126 MMHG | HEART RATE: 78 BPM | DIASTOLIC BLOOD PRESSURE: 82 MMHG | BODY MASS INDEX: 45.07 KG/M2 | HEIGHT: 62 IN

## 2019-10-24 DIAGNOSIS — I67.1 INTRACRANIAL ANEURYSM: ICD-10-CM

## 2019-10-24 DIAGNOSIS — G43.409 HEMIPLEGIC MIGRAINE WITHOUT STATUS MIGRAINOSUS, NOT INTRACTABLE: ICD-10-CM

## 2019-10-24 DIAGNOSIS — G43.709 CHRONIC MIGRAINE W/O AURA W/O STATUS MIGRAINOSUS, NOT INTRACTABLE: Primary | ICD-10-CM

## 2019-10-24 PROCEDURE — 99204 PR OFFICE/OUTPT VISIT, NEW, LEVL IV, 45-59 MIN: ICD-10-PCS | Mod: S$GLB,,, | Performed by: STUDENT IN AN ORGANIZED HEALTH CARE EDUCATION/TRAINING PROGRAM

## 2019-10-24 PROCEDURE — 99999 PR PBB SHADOW E&M-EST. PATIENT-LVL III: CPT | Mod: PBBFAC,,, | Performed by: STUDENT IN AN ORGANIZED HEALTH CARE EDUCATION/TRAINING PROGRAM

## 2019-10-24 PROCEDURE — 99999 PR PBB SHADOW E&M-EST. PATIENT-LVL III: ICD-10-PCS | Mod: PBBFAC,,, | Performed by: STUDENT IN AN ORGANIZED HEALTH CARE EDUCATION/TRAINING PROGRAM

## 2019-10-24 PROCEDURE — 3008F BODY MASS INDEX DOCD: CPT | Mod: CPTII,S$GLB,, | Performed by: STUDENT IN AN ORGANIZED HEALTH CARE EDUCATION/TRAINING PROGRAM

## 2019-10-24 PROCEDURE — 99204 OFFICE O/P NEW MOD 45 MIN: CPT | Mod: S$GLB,,, | Performed by: STUDENT IN AN ORGANIZED HEALTH CARE EDUCATION/TRAINING PROGRAM

## 2019-10-24 PROCEDURE — 3008F PR BODY MASS INDEX (BMI) DOCUMENTED: ICD-10-PCS | Mod: CPTII,S$GLB,, | Performed by: STUDENT IN AN ORGANIZED HEALTH CARE EDUCATION/TRAINING PROGRAM

## 2019-10-24 RX ORDER — LANOLIN ALCOHOL/MO/W.PET/CERES
400 CREAM (GRAM) TOPICAL DAILY
Refills: 0 | COMMUNITY
Start: 2019-10-24 | End: 2021-02-17

## 2019-10-24 RX ORDER — RIBOFLAVIN (VITAMIN B2) 100 MG
100 TABLET ORAL DAILY
Refills: 0 | COMMUNITY
Start: 2019-10-24 | End: 2020-03-02 | Stop reason: SDUPTHER

## 2019-10-24 NOTE — PROGRESS NOTES
Patient Name: Sonam Hidalgo  MRN: [id number]    CC: Headaches     HPI: Sonam Hidalgo is a 29 y.o. female  with medical history of depression / recent miscarriage / prior pr-eclampsia / chronic migraines presenting for evaluation and management for headaches    Reason for visit: Complex headache presentation with RSW / sensory deficits on 9/30/19      Headache Type I: (Migraine)  Onset: Since childhood / At the age of 13 yrs   Quality/Character: ice-pricking to throbbing / Right > left forehead / No aura / Associated symptoms of intermittent increased sensitivity to light, sound + nausea/ + complex focal sensory or motor deficits on 9/30/19  Duration: > 4hr   Frequency: Average 3-4 per week  Intensity: Average 4/10 - Max 8/10   Triggers: Stress   Medications: Abortive regimen - ibuprofen / aleeve / tylenol. Preventive regimen: zoloft     Pertinent past medical history:   - TBI: None   - Neck trauma: None   - Tension headaches / Cluster headaches / Occipital neuralgia / Other headache syndromes: None   - Depression + / anxiety / bipolar / schizophrenia: None   - Seizures: None    - Stroke: None   - Motion sickness : None     Pertinent investigations:   CT head: : None   MRI brain: 9/30 WNL   EEG: None  Vessel imaging:  MRA + fusiform 4-5 mm of cavernous right ICA     Family history:   - Migraines / Tension headaches / Cluster headaches / Occipital neuralgia / Other headache syndromes: None     ROS:   Review of Systems   Constitutional: Negative for malaise/fatigue. Negative for weight loss.   HENT: Negative for hearing loss.   Eyes: Negative for blurred vision and double vision.   Respiratory: Negative for shortness of breath and stridor.   Cardiovascular: Negative for chest pain and palpitations.   Gastrointestinal: Negative for nausea, vomiting and constipation.   Genitourinary: Negative for frequency. Negative for urgency.   Musculoskeletal: Negative for joint pain. Negative for myalgias and falls.   Skin:  Negative for rash.   Neurological: Negative for dizziness and tremors. Negative for focal weakness and seizures.   Endo/Heme/Allergies: Does not bruise/bleed easily.   Psychiatric/Behavioral: Negative for memory loss. Negative for depression and hallucinations. The patient is not nervous/anxious.      Past Medical History  Past Medical History:   Diagnosis Date    Abnormal Pap smear of vagina     Allergy     Anxiety     Hypertension        Medications    Current Outpatient Medications:     magnesium oxide (MAG-OX) 400 mg (241.3 mg magnesium) tablet, Take 1 tablet (400 mg total) by mouth once daily., Disp: , Rfl: 0    riboflavin, vitamin B2, (VITAMIN B-2) 100 mg Tab tablet, Take 1 tablet (100 mg total) by mouth once daily., Disp: , Rfl: 0    sertraline (ZOLOFT) 100 MG tablet, Take 0.5 tablets (50 mg total) by mouth once daily., Disp: 30 tablet, Rfl: 3    Allergies  Review of patient's allergies indicates:  No Known Allergies    Social History  Social History     Socioeconomic History    Marital status: Single     Spouse name: Not on file    Number of children: Not on file    Years of education: Not on file    Highest education level: Not on file   Occupational History    Not on file   Social Needs    Financial resource strain: Not on file    Food insecurity:     Worry: Not on file     Inability: Not on file    Transportation needs:     Medical: Not on file     Non-medical: Not on file   Tobacco Use    Smoking status: Never Smoker    Smokeless tobacco: Never Used   Substance and Sexual Activity    Alcohol use: Yes     Comment: occas, none recently    Drug use: No    Sexual activity: Yes     Partners: Male     Birth control/protection: None   Lifestyle    Physical activity:     Days per week: Not on file     Minutes per session: Not on file    Stress: Not on file   Relationships    Social connections:     Talks on phone: Not on file     Gets together: Not on file     Attends Ten[employer] service:  "Not on file     Active member of club or organization: Not on file     Attends meetings of clubs or organizations: Not on file     Relationship status: Not on file   Other Topics Concern    Not on file   Social History Narrative    Not on file       Family History  Family History   Problem Relation Age of Onset    Breast cancer Mother     Alcohol abuse Father     Celiac disease Neg Hx     Cirrhosis Neg Hx     Colon cancer Neg Hx     Colon polyps Neg Hx     Crohn's disease Neg Hx     Cystic fibrosis Neg Hx     Esophageal cancer Neg Hx     Hemochromatosis Neg Hx     Inflammatory bowel disease Neg Hx     Irritable bowel syndrome Neg Hx     Liver cancer Neg Hx     Liver disease Neg Hx     Rectal cancer Neg Hx     Stomach cancer Neg Hx     Ulcerative colitis Neg Hx     Jacob's disease Neg Hx     Ovarian cancer Neg Hx     Arrhythmia Neg Hx     Cardiomyopathy Neg Hx     Congenital heart disease Neg Hx     Early death Neg Hx     Heart attacks under age 50 Neg Hx     Pacemaker/defibrilator Neg Hx        Physical Exam  /82   Pulse 78   Ht 5' 2" (1.575 m)   Wt 111.1 kg (244 lb 14.9 oz)   BMI 44.80 kg/m²     General appearance: Well-developed, well-groomed.     Neurologic Exam: The patient is awake, alert and oriented. Language is fluent. Recent and remote memory are normal. Attention span and concentration are normal. Fund of knowledge is appropriate.     Cranial nerves: pupils are round and reactive to light and accommodation. Visual fields are full to confrontation. Ocular motility is full in all cardinal positions of gaze. Facial sensation is normal to pinprick and light touch. Facial activation is symmetric. Hearing is normal bilaterally. Palate elevates symmetrically and gag reflex is intact bilaterally. Shoulder elevation is symmetric and full strength bilaterally. Tongue is midline and neck rotation strength is normal bilaterally. Neck range of motion is normal.     Motor " examination of all extremities demonstrates normal bulk and tone in all four limbs. There are no atrophy or fasciculations. Strength is 5/5 in the upper and lower extremities bilaterally without pronator drift.     Sensory examination is normal to pinprick, proprioception in the upper and lower extremities bilaterally. Romberg is negative.    Deep tendon reflexes are 2+ and symmetric in the upper and lower extremities bilaterally. Toes are mute bilaterally.     Gait: Normal heel, toe, tandem, and casual gait.    Coordination: Finger to nose and heel to shin testing is normal in both upper and lower extremities. Rapid alternating movements are normal in both upper and lower extremities.     General exam  Cardiovascular: regular rate and rhythm with no murmurs, rubs or gallops. There are no carotid or vertebral artery bruits. Pulses in both upper and lower extremities are symmetric. There is no peripheral edema.   Head and neck: no cervical lymphadenopathy      Lab and Test Results    WBC   Date Value Ref Range Status   10/06/2019 8.05 3.90 - 12.70 K/uL Final   09/30/2019 6.86 3.90 - 12.70 K/uL Final   06/14/2019 10.89 3.90 - 12.70 K/uL Final     Hemoglobin   Date Value Ref Range Status   10/06/2019 12.8 12.0 - 16.0 g/dL Final   09/30/2019 13.3 12.0 - 16.0 g/dL Final   06/14/2019 9.2 (L) 12.0 - 16.0 g/dL Final     Hematocrit   Date Value Ref Range Status   10/06/2019 39.8 37.0 - 48.5 % Final   09/30/2019 40.9 37.0 - 48.5 % Final   06/14/2019 30.0 (L) 37.0 - 48.5 % Final     Platelets   Date Value Ref Range Status   10/06/2019 362 (H) 150 - 350 K/uL Final   09/30/2019 325 150 - 350 K/uL Final   06/14/2019 290 150 - 350 K/uL Final     Glucose   Date Value Ref Range Status   10/06/2019 91 70 - 110 mg/dL Final   09/30/2019 89 70 - 110 mg/dL Final   06/12/2019 88 70 - 110 mg/dL Final     Sodium   Date Value Ref Range Status   10/06/2019 138 136 - 145 mmol/L Final   09/30/2019 136 136 - 145 mmol/L Final   06/12/2019 137 136  - 145 mmol/L Final     Potassium   Date Value Ref Range Status   10/06/2019 3.9 3.5 - 5.1 mmol/L Final   09/30/2019 4.3 3.5 - 5.1 mmol/L Final   06/12/2019 4.6 3.5 - 5.1 mmol/L Final     Comment:     Specimen moderately hemolyzed     Chloride   Date Value Ref Range Status   10/06/2019 107 95 - 110 mmol/L Final   09/30/2019 108 95 - 110 mmol/L Final   06/12/2019 106 95 - 110 mmol/L Final     CO2   Date Value Ref Range Status   10/06/2019 22 (L) 23 - 29 mmol/L Final   09/30/2019 20 (L) 23 - 29 mmol/L Final   06/12/2019 18 (L) 23 - 29 mmol/L Final     BUN, Bld   Date Value Ref Range Status   10/06/2019 10 6 - 20 mg/dL Final   09/30/2019 13 6 - 20 mg/dL Final   06/12/2019 13 6 - 20 mg/dL Final     Creatinine   Date Value Ref Range Status   10/06/2019 0.7 0.5 - 1.4 mg/dL Final   09/30/2019 0.8 0.5 - 1.4 mg/dL Final   06/12/2019 0.7 0.5 - 1.4 mg/dL Final     Calcium   Date Value Ref Range Status   10/06/2019 8.3 (L) 8.7 - 10.5 mg/dL Final   09/30/2019 8.9 8.7 - 10.5 mg/dL Final   06/12/2019 8.5 (L) 8.7 - 10.5 mg/dL Final     Magnesium   Date Value Ref Range Status   11/24/2010 2.4 1.6 - 2.6 mg/dl Final   06/13/2010 2.1 1.6 - 2.6 mg/dl Final     Phosphorus   Date Value Ref Range Status   06/13/2010 3.6 2.7 - 4.5 mg/dl Final     Alkaline Phosphatase   Date Value Ref Range Status   09/30/2019 71 55 - 135 U/L Final   06/12/2019 165 (H) 55 - 135 U/L Final   06/09/2019 153 (H) 55 - 135 U/L Final     ALT   Date Value Ref Range Status   09/30/2019 12 10 - 44 U/L Final   06/12/2019 13 10 - 44 U/L Final   06/09/2019 11 10 - 44 U/L Final     AST   Date Value Ref Range Status   09/30/2019 16 10 - 40 U/L Final   06/12/2019 27 10 - 40 U/L Final   06/09/2019 13 10 - 40 U/L Final         Images: Independently reviewed   Other Tests: Independently reviewed     Assessment and Plan    Sonam Hidalgo is a 29 y.o. female     Chronic migraine w/o aura w/o status migrainosus, not intractable  Onset 12 years / Failed preventive therapy: Only  antidepressant   - Complex presentation of RSW / sensory deficits   - Active pregnancy plans     Plans:   Abortive regimen: avoid triptans / continue ibuprofen, tylenol   Preventive: Add on MgO2 / B2   Discussed on alternative options / its pregnancy risk  Headache diary / sleep hygiene / life style modifications     Complex migraine / 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, not intractable  - same as above     Intracranial aneurysm  Incidental: 4-5 mm fusiform outpouching the right undersurface of the cavernous ICA concerning for fusiform aneurysm.    - Interventional radiology referral       F/u 3 months     Christina Reece MD  Neurology Resident   [facility] [address] [place] [address]  Pager: [phone]

## 2019-10-24 NOTE — ASSESSMENT & PLAN NOTE
Onset 12 years / Failed preventive therapy: Only antidepressant   - Complex presentation of RSW / sensory deficits   - Active pregnancy plans     Plans:   Abortive regimen: avoid triptans / continue ibuprofen, tylenol   Preventive: Add on MgO2 / B2   Discussed on alternative options / its pregnancy risk  Headache diary / sleep hygiene / life style modifications

## 2019-10-24 NOTE — ASSESSMENT & PLAN NOTE
Incidental: 4-5 mm fusiform outpouching the right undersurface of the cavernous ICA concerning for fusiform aneurysm.    - Interventional radiology referral

## 2019-10-30 ENCOUNTER — HOSPITAL ENCOUNTER (EMERGENCY)
Facility: HOSPITAL | Age: 29
Discharge: HOME | End: 2019-10-30
Attending: EMERGENCY MEDICINE
Payer: COMMERCIAL

## 2019-10-30 VITALS
BODY MASS INDEX: 43.23 KG/M2 | TEMPERATURE: 98 F | HEIGHT: 63 IN | HEART RATE: 66 BPM | RESPIRATION RATE: 16 BRPM | WEIGHT: 244 LBS | OXYGEN SATURATION: 100 % | SYSTOLIC BLOOD PRESSURE: 138 MMHG | DIASTOLIC BLOOD PRESSURE: 76 MMHG

## 2019-10-30 DIAGNOSIS — R10.2 PELVIC PAIN: ICD-10-CM

## 2019-10-30 DIAGNOSIS — B96.89 BACTERIAL VAGINOSIS: ICD-10-CM

## 2019-10-30 DIAGNOSIS — N76.0 BACTERIAL VAGINOSIS: ICD-10-CM

## 2019-10-30 DIAGNOSIS — N83.202 CYST OF LEFT OVARY: Primary | ICD-10-CM

## 2019-10-30 LAB
ALBUMIN SERPL BCP-MCNC: 3.5 G/DL (ref 3.5–5.2)
ALP SERPL-CCNC: 78 U/L (ref 55–135)
ALT SERPL W/O P-5'-P-CCNC: 16 U/L (ref 10–44)
ANION GAP SERPL CALC-SCNC: 7 MMOL/L (ref 8–16)
AST SERPL-CCNC: 16 U/L (ref 10–40)
B-HCG UR QL: NEGATIVE
BACTERIA #/AREA URNS AUTO: NORMAL /HPF
BACTERIA GENITAL QL WET PREP: ABNORMAL
BASOPHILS # BLD AUTO: 0.05 K/UL (ref 0–0.2)
BASOPHILS NFR BLD: 0.7 % (ref 0–1.9)
BILIRUB SERPL-MCNC: 0.4 MG/DL (ref 0.1–1)
BILIRUB UR QL STRIP: NEGATIVE
BUN SERPL-MCNC: 12 MG/DL (ref 6–20)
CALCIUM SERPL-MCNC: 9 MG/DL (ref 8.7–10.5)
CHLORIDE SERPL-SCNC: 105 MMOL/L (ref 95–110)
CLARITY UR REFRACT.AUTO: CLEAR
CLUE CELLS VAG QL WET PREP: ABNORMAL
CO2 SERPL-SCNC: 23 MMOL/L (ref 23–29)
COLOR UR AUTO: COLORLESS
CREAT SERPL-MCNC: 0.8 MG/DL (ref 0.5–1.4)
CTP QC/QA: YES
DIFFERENTIAL METHOD: ABNORMAL
EOSINOPHIL # BLD AUTO: 0.1 K/UL (ref 0–0.5)
EOSINOPHIL NFR BLD: 1.7 % (ref 0–8)
ERYTHROCYTE [DISTWIDTH] IN BLOOD BY AUTOMATED COUNT: 12.8 % (ref 11.5–14.5)
EST. GFR  (AFRICAN AMERICAN): >60 ML/MIN/1.73 M^2
EST. GFR  (NON AFRICAN AMERICAN): >60 ML/MIN/1.73 M^2
FILAMENT FUNGI VAG WET PREP-#/AREA: ABNORMAL
GLUCOSE SERPL-MCNC: 108 MG/DL (ref 70–110)
GLUCOSE UR QL STRIP: NEGATIVE
HCG INTACT+B SERPL-ACNC: 1.4 MIU/ML
HCT VFR BLD AUTO: 41.4 % (ref 37–48.5)
HGB BLD-MCNC: 13.1 G/DL (ref 12–16)
HGB UR QL STRIP: ABNORMAL
IMM GRANULOCYTES # BLD AUTO: 0.04 K/UL (ref 0–0.04)
IMM GRANULOCYTES NFR BLD AUTO: 0.6 % (ref 0–0.5)
KETONES UR QL STRIP: NEGATIVE
LEUKOCYTE ESTERASE UR QL STRIP: NEGATIVE
LYMPHOCYTES # BLD AUTO: 1.7 K/UL (ref 1–4.8)
LYMPHOCYTES NFR BLD: 24.1 % (ref 18–48)
MCH RBC QN AUTO: 26.9 PG (ref 27–31)
MCHC RBC AUTO-ENTMCNC: 31.6 G/DL (ref 32–36)
MCV RBC AUTO: 85 FL (ref 82–98)
MICROSCOPIC COMMENT: NORMAL
MONOCYTES # BLD AUTO: 0.4 K/UL (ref 0.3–1)
MONOCYTES NFR BLD: 5.8 % (ref 4–15)
NEUTROPHILS # BLD AUTO: 4.7 K/UL (ref 1.8–7.7)
NEUTROPHILS NFR BLD: 67.1 % (ref 38–73)
NITRITE UR QL STRIP: NEGATIVE
NRBC BLD-RTO: 0 /100 WBC
PH UR STRIP: 7 [PH] (ref 5–8)
PLATELET # BLD AUTO: 325 K/UL (ref 150–350)
PMV BLD AUTO: 9.6 FL (ref 9.2–12.9)
POTASSIUM SERPL-SCNC: 4.3 MMOL/L (ref 3.5–5.1)
PROT SERPL-MCNC: 7.1 G/DL (ref 6–8.4)
PROT UR QL STRIP: NEGATIVE
RBC # BLD AUTO: 4.87 M/UL (ref 4–5.4)
RBC #/AREA URNS AUTO: 2 /HPF (ref 0–4)
SODIUM SERPL-SCNC: 135 MMOL/L (ref 136–145)
SP GR UR STRIP: 1 (ref 1–1.03)
SPECIMEN SOURCE: ABNORMAL
SQUAMOUS #/AREA URNS AUTO: 1 /HPF
T VAGINALIS GENITAL QL WET PREP: ABNORMAL
URN SPEC COLLECT METH UR: ABNORMAL
WBC # BLD AUTO: 6.93 K/UL (ref 3.9–12.7)
WBC #/AREA URNS AUTO: 1 /HPF (ref 0–5)
WBC #/AREA VAG WET PREP: ABNORMAL
YEAST GENITAL QL WET PREP: ABNORMAL

## 2019-10-30 PROCEDURE — 87210 SMEAR WET MOUNT SALINE/INK: CPT

## 2019-10-30 PROCEDURE — 99285 EMERGENCY DEPT VISIT HI MDM: CPT | Mod: ,,, | Performed by: PHYSICIAN ASSISTANT

## 2019-10-30 PROCEDURE — 96374 THER/PROPH/DIAG INJ IV PUSH: CPT | Mod: 59

## 2019-10-30 PROCEDURE — 81001 URINALYSIS AUTO W/SCOPE: CPT

## 2019-10-30 PROCEDURE — 25500020 PHARM REV CODE 255: Performed by: EMERGENCY MEDICINE

## 2019-10-30 PROCEDURE — 80053 COMPREHEN METABOLIC PANEL: CPT

## 2019-10-30 PROCEDURE — 87491 CHLMYD TRACH DNA AMP PROBE: CPT

## 2019-10-30 PROCEDURE — 81025 URINE PREGNANCY TEST: CPT | Performed by: EMERGENCY MEDICINE

## 2019-10-30 PROCEDURE — 99285 PR EMERGENCY DEPT VISIT,LEVEL V: ICD-10-PCS | Mod: ,,, | Performed by: PHYSICIAN ASSISTANT

## 2019-10-30 PROCEDURE — 63600175 PHARM REV CODE 636 W HCPCS: Performed by: PHYSICIAN ASSISTANT

## 2019-10-30 PROCEDURE — 96375 TX/PRO/DX INJ NEW DRUG ADDON: CPT

## 2019-10-30 PROCEDURE — 85025 COMPLETE CBC W/AUTO DIFF WBC: CPT

## 2019-10-30 PROCEDURE — 99285 EMERGENCY DEPT VISIT HI MDM: CPT | Mod: 25

## 2019-10-30 PROCEDURE — 84702 CHORIONIC GONADOTROPIN TEST: CPT

## 2019-10-30 RX ORDER — METRONIDAZOLE 500 MG/1
500 TABLET ORAL EVERY 12 HOURS
Qty: 21 TABLET | Refills: 0 | Status: SHIPPED | OUTPATIENT
Start: 2019-10-30 | End: 2019-11-06

## 2019-10-30 RX ORDER — MORPHINE SULFATE 4 MG/ML
4 INJECTION, SOLUTION INTRAMUSCULAR; INTRAVENOUS
Status: COMPLETED | OUTPATIENT
Start: 2019-10-30 | End: 2019-10-30

## 2019-10-30 RX ORDER — IBUPROFEN 600 MG/1
600 TABLET ORAL EVERY 6 HOURS PRN
Qty: 20 TABLET | Refills: 0 | Status: SHIPPED | OUTPATIENT
Start: 2019-10-30 | End: 2021-02-17

## 2019-10-30 RX ORDER — KETOROLAC TROMETHAMINE 30 MG/ML
10 INJECTION, SOLUTION INTRAMUSCULAR; INTRAVENOUS
Status: COMPLETED | OUTPATIENT
Start: 2019-10-30 | End: 2019-10-30

## 2019-10-30 RX ORDER — ONDANSETRON 2 MG/ML
4 INJECTION INTRAMUSCULAR; INTRAVENOUS
Status: COMPLETED | OUTPATIENT
Start: 2019-10-30 | End: 2019-10-30

## 2019-10-30 RX ADMIN — KETOROLAC TROMETHAMINE 10 MG: 30 INJECTION, SOLUTION INTRAMUSCULAR; INTRAVENOUS at 08:10

## 2019-10-30 RX ADMIN — ONDANSETRON 4 MG: 2 INJECTION INTRAMUSCULAR; INTRAVENOUS at 10:10

## 2019-10-30 RX ADMIN — IOHEXOL 100 ML: 350 INJECTION, SOLUTION INTRAVENOUS at 01:10

## 2019-10-30 RX ADMIN — MORPHINE SULFATE 4 MG: 4 INJECTION INTRAVENOUS at 12:10

## 2019-10-30 NOTE — ED PROVIDER NOTES
Encounter Date: 10/30/2019       History     Chief Complaint   Patient presents with    Abdominal Pain     hx ovarian cysts     This is a 29 year old  female with a PMH of hypertension, prior pregnancy complicated by preeclampsia, complex migraine headaches who presents to the ED with a chief complaint of pelvic pain.  Patient reports sharp, cramping, 10/10 pain to the left low pelvis since 1 hr prior to arrival.  The pain is constant and radiates into the back.  Patient states that she has a history of ovarian cyst.  She recently underwent a miscarriage with initial ultrasound demonstrating an IUP on 2019 and follow-up ultrasound on 10/06 with no IUP.  Her most recent beta HCG was 1578.  Patient has scheduled follow-up with her Ob later this week.  She denies fever, chills, chest pain, shortness of breath, vomiting, dysuria, vaginal discharge, vaginal bleeding, diarrhea or additional complaints.  Surgical history of cholecystectomy and median arcuate ligament surgery. Patient denies concern for sexually transmitted infection.        Review of patient's allergies indicates:  No Known Allergies  Past Medical History:   Diagnosis Date    Abnormal Pap smear of vagina     Allergy     Anxiety     Hypertension      Past Surgical History:   Procedure Laterality Date    CHOLECYSTECTOMY      median arcuate ligament        Family History   Problem Relation Age of Onset    Breast cancer Mother     Alcohol abuse Father     Celiac disease Neg Hx     Cirrhosis Neg Hx     Colon cancer Neg Hx     Colon polyps Neg Hx     Crohn's disease Neg Hx     Cystic fibrosis Neg Hx     Esophageal cancer Neg Hx     Hemochromatosis Neg Hx     Inflammatory bowel disease Neg Hx     Irritable bowel syndrome Neg Hx     Liver cancer Neg Hx     Liver disease Neg Hx     Rectal cancer Neg Hx     Stomach cancer Neg Hx     Ulcerative colitis Neg Hx     Jacob's disease Neg Hx     Ovarian cancer Neg Hx     Arrhythmia  Neg Hx     Cardiomyopathy Neg Hx     Congenital heart disease Neg Hx     Early death Neg Hx     Heart attacks under age 50 Neg Hx     Pacemaker/defibrilator Neg Hx      Social History     Tobacco Use    Smoking status: Never Smoker    Smokeless tobacco: Never Used   Substance Use Topics    Alcohol use: Yes     Comment: occas, none recently    Drug use: No     Review of Systems   Constitutional: Negative for chills and fever.   HENT: Negative for sore throat.    Respiratory: Negative for shortness of breath.    Cardiovascular: Negative for chest pain.   Gastrointestinal: Positive for nausea. Negative for vomiting.   Genitourinary: Positive for pelvic pain. Negative for dysuria, frequency, vaginal bleeding and vaginal discharge.   Musculoskeletal: Negative for back pain.   Skin: Negative for rash.   Neurological: Negative for weakness.   Hematological: Does not bruise/bleed easily.       Physical Exam     Initial Vitals [10/30/19 0813]   BP Pulse Resp Temp SpO2   121/71 (!) 59 18 97.9 °F (36.6 °C) 97 %      MAP       --         Physical Exam    Constitutional: She appears well-developed and well-nourished. She is Obese . No distress.   HENT:   Head: Atraumatic.   Eyes: Conjunctivae and EOM are normal. Pupils are equal, round, and reactive to light.   Cardiovascular: Normal rate, regular rhythm and normal heart sounds.   Pulmonary/Chest: Breath sounds normal. No respiratory distress. She has no wheezes. She has no rhonchi. She has no rales.   Abdominal: Soft. Bowel sounds are normal. There is tenderness in the suprapubic area and left lower quadrant. There is no rigidity, no rebound, no guarding and no CVA tenderness.   Genitourinary: Cervix exhibits discharge and friability. Cervix exhibits no motion tenderness. Left adnexum displays tenderness. Vaginal discharge found.   Neurological: She is alert and oriented to person, place, and time.   Skin: Skin is warm and dry. No rash noted.         ED Course    Procedures  Labs Reviewed   VAGINAL SCREEN - Abnormal; Notable for the following components:       Result Value    Clue Cells Rare (*)     WBC - Vaginal Screen Rare (*)     Bacteria - Vaginal Screen Few (*)     All other components within normal limits   CBC W/ AUTO DIFFERENTIAL - Abnormal; Notable for the following components:    Mean Corpuscular Hemoglobin 26.9 (*)     Mean Corpuscular Hemoglobin Conc 31.6 (*)     Immature Granulocytes 0.6 (*)     All other components within normal limits   COMPREHENSIVE METABOLIC PANEL - Abnormal; Notable for the following components:    Sodium 135 (*)     Anion Gap 7 (*)     All other components within normal limits   URINALYSIS, REFLEX TO URINE CULTURE - Abnormal; Notable for the following components:    Color, UA Colorless (*)     Specific Coy, UA 1.000 (*)     Occult Blood UA 1+ (*)     All other components within normal limits   C. TRACHOMATIS/N. GONORRHOEAE BY AMP DNA   HCG, QUANTITATIVE, PREGNANCY   URINALYSIS MICROSCOPIC   POCT URINE PREGNANCY          Imaging Results          CT Abdomen Pelvis With Contrast (Final result)  Result time 10/30/19 13:29:45    Final result by Hira Barroso MD (10/30/19 13:29:45)                 Impression:      Left ovarian cyst with a few thin septations measuring 9.4 x 6.0 x 7.3 cm.  No evidence of enlargement of the ovarian tissue or adjacent inflammatory changes to suggest torsion.  Consider ultrasound follow-up in 6 weeks.      Electronically signed by: Hira Barroso MD  Date:    10/30/2019  Time:    13:29             Narrative:    EXAMINATION:  CT ABDOMEN PELVIS WITH CONTRAST    CLINICAL HISTORY:  Infection, abdomen-pelvis;    TECHNIQUE:  Axial images of the abdomen and pelvis were acquired  after the use of 100 cc [id number] IV contrast.  Coronal and sagittal reconstructions were also obtained    COMPARISON:  Ultrasound from 10/30/2019.    FINDINGS:  Heart: Normal in size. No pericardial effusion. No significant calcific  coronary atherosclerosis.    Lungs: Visualized portions of the lungs are clear.    Liver: Normal in size and contour.  No focal hepatic lesion.    Gallbladder: Surgically absent.    Bile Ducts: No evidence of dilated ducts.    Pancreas: No mass or peripancreatic fat stranding.    Spleen: Unremarkable.    Stomach and duodenum: Unremarkable.    Adrenals: Unremarkable.    Kidneys/ Ureters: Normal in size and location. Normal enhancement.  Bilateral extrarenal pelvises.  No hydronephrosis or nephrolithiasis. No ureteral dilatation.    Bladder: No evidence of wall thickening.    Reproductive organs: Uterus is normal in appearance.  Right ovary is small in size.  The left ovary measures 9.4 x 6.0 by 7.3 cm.  There are a few thin septations noted in the left ovarian cyst.  Of note, comparison was made to the ultrasound from 10/30/2019 and will was labeled as the right ovary likely represented the left ovary as this is more midline.    Bowel/Mesentery: Small bowel is normal in caliber with no evidence of obstruction. No evidence of inflammation or wall thickening.  Normal appendix.  Colon demonstrates no focal wall thickening.    Lymph nodes: No lymphadenapathy.    Vasculature: No aneurysm. No significant calcific atherosclerosis.    Abdominal wall:  Unremarkable.    Bones: No acute fracture. No suspicious osseous lesions.                               US Pelvis Comp with Transvag NON-OB (xpd) (Final result)  Result time 10/30/19 11:17:45   Procedure changed from US OB Less Than 14 Wks with Transvaginal (xpd)     Final result by Tyler Lange MD (10/30/19 11:17:45)                 Impression:      No acute sonographic abnormality.  The left ovary is not visualized on today's exam.    There is slight interval enlargement of right ovarian cyst.    Electronically signed by resident: Monserrat Aviles  Date:    10/30/2019  Time:    10:54    Electronically signed by: Tyler Lange MD  Date:    10/30/2019  Time:    11:17              Narrative:    EXAMINATION:  US PELVIS COMP WITH TRANSVAG NON-OB (XPD)    CLINICAL HISTORY:  pelvic pain, hx of miscarriage 10/3/19;    TECHNIQUE:  Transabdominal sonography of the pelvis was performed, followed by transvaginal sonography to better evaluate the uterus and ovaries.    COMPARISON:  Ultrasound obstetrics 10/06/2019    FINDINGS:  Uterus:    Size: 8.9 x 5.6 x 6.0 cm    Masses: None.  Nabothian cyst.    Endometrium: Normal in this pre menopausal patient, measuring 8 mm.    Right ovary:    Size: 7 1 x 7.7 x 8.0 cm    Appearance: There is a predominantly anechoic cyst measuring 6.5 x 7.2 x 7.3 cm (previously 5.7 x 5.0 x 6.6 cm) with thin internal septation.    Vascular flow: Normal.    Left ovary:    Not visualized    Free Fluid:    Small volume free fluid in the cul-de-sac, likely physiological.                                 Medical Decision Making:   History:   Old Medical Records: I decided to obtain old medical records.  Old Records Summarized: records from clinic visits and records from previous admission(s).  Clinical Tests:   Lab Tests: Ordered and Reviewed  Radiological Study: Ordered and Reviewed  Other:   I have discussed this case with another health care provider.       <> Summary of the Discussion: OBGYN       APC / Resident Notes:   29-year-old female presenting with pelvic pain, recent history of miscarriage 3 weeks ago.  On exam she is afebrile and nontoxic.  Vitals stable. Abdomen is soft with tenderness in the left low abdomen and pelvis.    DDx includes but is not limited to endometritis, retained products, ovarian cyst, ovarian torsion, ectopic pregnancy.    Labs without leukocytosis or anemia.  Stable metabolic panel.  Urine pregnancy is negative, beta HCG 1.4.  1+ occult blood on urinalysis.  Vaginal screen positive for clue cells.  GC/chlamydia pending, negative on 10/06/2019 evaluation.    Initially ultrasound ordered to evaluate this, however the left ovary was not  definitively visualized.  A CT scan was obtained for further characterization, which notes a large a 9 x 6 x 7 cm left ovarian cyst with septation.  I discussed with the on-call OBGYN, Dr. James.  Given the patient's pain has significantly improved and she has scheduled outpatient follow-up in 2 days, recommend outpatient follow-up as planned.  Detailed return to ED precautions were given.  The patient is tolerating oral intake and her symptoms have improved significantly.  I feel that she is stable for discharge. I discussed the care of this patient with my supervising MD.          Attending Attestation:     Physician Attestation Statement for NP/PA:   I discussed this assessment and plan of this patient with the NP/PA, but I did not personally examine the patient. The face to face encounter was performed by the NP/PA.                     Clinical Impression:       ICD-10-CM ICD-9-CM   1. Cyst of left ovary N83.202 620.2   2. Pelvic pain R10.2 [id number]   3. Bacterial vaginosis N76.0 616.10    B96.89 041.9         Disposition:   Disposition: Discharged  Condition: Stable                        FLACO Dutta-C  10/30/19 8980       Arelis Mcdonald MD  10/30/19 [id number]

## 2019-10-30 NOTE — PROGRESS NOTES
I have reviewed the history and physical, assessments, and plan, I concur with her/his documentation of Sonam Hidalgo. Patient was seen and examined with the resident.    Norma Dupont MD  General Neurology Staff  [facility]

## 2019-10-30 NOTE — ED TRIAGE NOTES
Tot he ED with c/o left lower abdomen pain.  Recent miscarriage., Oct 6. Has had on-off bleeding since.  Seeing GYN on Friday.

## 2019-10-30 NOTE — ED NOTES
Pt waiting comfortably in Recliner 1. RR e/u. VS being monitoring continuously per MD orders. Pt offered bathroom assistance and denies need at this time. Explanation of care/wait provided. Call bell in reach. Will continue to monitor.

## 2019-10-31 LAB
C TRACH DNA SPEC QL NAA+PROBE: NOT DETECTED
N GONORRHOEA DNA SPEC QL NAA+PROBE: NOT DETECTED

## 2019-11-01 ENCOUNTER — PROCEDURE VISIT (OUTPATIENT)
Dept: OBSTETRICS AND GYNECOLOGY | Facility: CLINIC | Age: 29
End: 2019-11-01
Payer: COMMERCIAL

## 2019-11-01 VITALS
SYSTOLIC BLOOD PRESSURE: 121 MMHG | WEIGHT: 248 LBS | HEIGHT: 63 IN | BODY MASS INDEX: 43.94 KG/M2 | DIASTOLIC BLOOD PRESSURE: 80 MMHG

## 2019-11-01 DIAGNOSIS — Z30.9 ENCOUNTER FOR CONTRACEPTIVE MANAGEMENT, UNSPECIFIED TYPE: Primary | ICD-10-CM

## 2019-11-01 DIAGNOSIS — N83.209 CYST OF OVARY, UNSPECIFIED LATERALITY: ICD-10-CM

## 2019-11-01 DIAGNOSIS — R10.2 PELVIC PAIN IN FEMALE: ICD-10-CM

## 2019-11-01 LAB
B-HCG UR QL: NEGATIVE
CTP QC/QA: YES

## 2019-11-01 PROCEDURE — 81025 POCT URINE PREGNANCY: ICD-10-PCS | Mod: S$GLB,,, | Performed by: OBSTETRICS & GYNECOLOGY

## 2019-11-01 PROCEDURE — 58300 INSERT INTRAUTERINE DEVICE: CPT | Mod: S$GLB,,, | Performed by: OBSTETRICS & GYNECOLOGY

## 2019-11-01 PROCEDURE — 58300 INSERTION OF IUD-TODAY: ICD-10-PCS | Mod: S$GLB,,, | Performed by: OBSTETRICS & GYNECOLOGY

## 2019-11-01 PROCEDURE — 81025 URINE PREGNANCY TEST: CPT | Mod: S$GLB,,, | Performed by: OBSTETRICS & GYNECOLOGY

## 2019-11-01 NOTE — PROCEDURES
Insertion of IUD-Today  Date/Time: 11/1/2019 3:00 PM  Performed by: Merary Benton MD  Authorized by: Merary Benton MD     Consent:     Consent obtained:  Written    Consent given by:  Patient    Procedure risks and benefits discussed: yes      Patient questions answered: yes      Patient agrees, verbalizes understanding, and wants to proceed: yes      Educational handouts given: yes      Instructions and paperwork completed: yes    Procedure:     Pelvic exam performed: yes      Negative GC/chlamydia test: no      Negative urine pregnancy test: yes      Negative serum pregnancy test: no      Cervix cleaned and prepped: yes      Speculum placed in vagina: yes      Uterus sounded: yes      Uterus sound depth (cm):  8    IUD inserted with no complications: yes      Strings trimmed: yes    Post-procedure:     Patient tolerated procedure well: yes      Patient will follow up after next period: yes      discussed patient US and CT scan with the ovarian cyst. Will repeat the US in 6 wks unless the pain worsens and we will plan for a LAP ovarian cystectomy AP

## 2019-11-04 ENCOUNTER — TELEPHONE (OUTPATIENT)
Dept: OBSTETRICS AND GYNECOLOGY | Facility: CLINIC | Age: 29
End: 2019-11-04

## 2019-11-04 ENCOUNTER — PATIENT MESSAGE (OUTPATIENT)
Dept: OBSTETRICS AND GYNECOLOGY | Facility: CLINIC | Age: 29
End: 2019-11-04

## 2019-11-06 ENCOUNTER — TELEPHONE (OUTPATIENT)
Dept: OBSTETRICS AND GYNECOLOGY | Facility: CLINIC | Age: 29
End: 2019-11-06

## 2019-11-06 DIAGNOSIS — N83.209 CYST OF OVARY, UNSPECIFIED LATERALITY: Primary | ICD-10-CM

## 2019-11-06 NOTE — TELEPHONE ENCOUNTER
Patient is requesting to have her surgery on 11/20/19.     Patient has been scheduled with pre op and pre admit. Patient verbalized understanding.

## 2019-11-13 ENCOUNTER — PATIENT OUTREACH (OUTPATIENT)
Dept: ADMINISTRATIVE | Facility: OTHER | Age: 29
End: 2019-11-13

## 2019-11-18 ENCOUNTER — OFFICE VISIT (OUTPATIENT)
Dept: OBSTETRICS AND GYNECOLOGY | Facility: CLINIC | Age: 29
End: 2019-11-18
Payer: COMMERCIAL

## 2019-11-18 ENCOUNTER — HOSPITAL ENCOUNTER (OUTPATIENT)
Dept: PREADMISSION TESTING | Facility: OTHER | Age: 29
Discharge: HOME | End: 2019-11-18
Attending: OBSTETRICS & GYNECOLOGY
Payer: COMMERCIAL

## 2019-11-18 ENCOUNTER — ANESTHESIA EVENT (OUTPATIENT)
Dept: SURGERY | Facility: OTHER | Age: 29
End: 2019-11-18
Payer: COMMERCIAL

## 2019-11-18 VITALS
BODY MASS INDEX: 43.51 KG/M2 | HEIGHT: 63 IN | OXYGEN SATURATION: 97 % | DIASTOLIC BLOOD PRESSURE: 68 MMHG | WEIGHT: 245 LBS | SYSTOLIC BLOOD PRESSURE: 102 MMHG | HEART RATE: 73 BPM | TEMPERATURE: 99 F | WEIGHT: 245.56 LBS | HEIGHT: 63 IN | BODY MASS INDEX: 43.41 KG/M2 | SYSTOLIC BLOOD PRESSURE: 134 MMHG | DIASTOLIC BLOOD PRESSURE: 77 MMHG

## 2019-11-18 DIAGNOSIS — Z97.5 IUD (INTRAUTERINE DEVICE) IN PLACE: ICD-10-CM

## 2019-11-18 DIAGNOSIS — N83.209 CYST OF OVARY, UNSPECIFIED LATERALITY: Primary | ICD-10-CM

## 2019-11-18 PROCEDURE — 99999 PR PBB SHADOW E&M-EST. PATIENT-LVL III: CPT | Mod: PBBFAC,,, | Performed by: OBSTETRICS & GYNECOLOGY

## 2019-11-18 PROCEDURE — 99499 UNLISTED E&M SERVICE: CPT | Mod: S$GLB,,, | Performed by: OBSTETRICS & GYNECOLOGY

## 2019-11-18 PROCEDURE — 99999 PR PBB SHADOW E&M-EST. PATIENT-LVL III: ICD-10-PCS | Mod: PBBFAC,,, | Performed by: OBSTETRICS & GYNECOLOGY

## 2019-11-18 PROCEDURE — 99499 NO LOS: ICD-10-PCS | Mod: S$GLB,,, | Performed by: OBSTETRICS & GYNECOLOGY

## 2019-11-18 RX ORDER — LIDOCAINE HYDROCHLORIDE 10 MG/ML
0.5 INJECTION, SOLUTION EPIDURAL; INFILTRATION; INTRACAUDAL; PERINEURAL ONCE
Status: CANCELLED | OUTPATIENT
Start: 2019-11-18 | End: 2019-11-18

## 2019-11-18 RX ORDER — HYDROCODONE BITARTRATE AND ACETAMINOPHEN 5; 325 MG/1; MG/1
1 TABLET ORAL EVERY 6 HOURS PRN
Qty: 20 TABLET | Refills: 0 | Status: SHIPPED | OUTPATIENT
Start: 2019-11-18 | End: 2020-02-09

## 2019-11-18 RX ORDER — MUPIROCIN 20 MG/G
OINTMENT TOPICAL
Status: CANCELLED | OUTPATIENT
Start: 2019-11-18

## 2019-11-18 RX ORDER — ACETAMINOPHEN 500 MG
1000 TABLET ORAL
Status: CANCELLED | OUTPATIENT
Start: 2019-11-18 | End: 2019-11-18

## 2019-11-18 RX ORDER — PREGABALIN 75 MG/1
75 CAPSULE ORAL ONCE
Status: CANCELLED | OUTPATIENT
Start: 2019-11-18 | End: 2019-11-18

## 2019-11-18 RX ORDER — SODIUM CHLORIDE, SODIUM LACTATE, POTASSIUM CHLORIDE, CALCIUM CHLORIDE 600; 310; 30; 20 MG/100ML; MG/100ML; MG/100ML; MG/100ML
INJECTION, SOLUTION INTRAVENOUS CONTINUOUS
Status: CANCELLED | OUTPATIENT
Start: 2019-11-18

## 2019-11-18 NOTE — H&P (VIEW-ONLY)
C.C Pre-op Exam      HPI : Sonam Hidalgo is a 29 y.o. female  for evaluation and discussion of treatment options for Pre-op Exam  pelvic pain is significant.  From 10/10 pain it is down to 4/10.  She is ready for surgical intervention and ovarian cystectomy.  SHE HAS MIRENA IUD IN PLACE.   US shows  Endometrium: Normal in this pre menopausal patient, measuring 8 mm.    Right ovary:    Size: 7 1 x 7.7 x 8.0 cm    Appearance: There is a predominantly anechoic cyst measuring 6.5 x 7.2 x 7.3 cm (previously 5.7 x 5.0 x 6.6 cm) with thin internal septation.    Vascular flow: Normal.    Left ovary:    Not visualized    Free Fluid:    Small volume free fluid in the cul-de-sac, likely physiological.      Impression       No acute sonographic abnormality.  The left ovary is not visualized on today's exam.    There is slight interval enlargement of right ovarian cyst.         Past Medical History:   Diagnosis Date    Abnormal Pap smear of vagina     Allergy     Anxiety     Hypertension      Past Surgical History:   Procedure Laterality Date    CHOLECYSTECTOMY      median arcuate ligament        Family History   Problem Relation Age of Onset    Breast cancer Mother     Alcohol abuse Father     Celiac disease Neg Hx     Cirrhosis Neg Hx     Colon cancer Neg Hx     Colon polyps Neg Hx     Crohn's disease Neg Hx     Cystic fibrosis Neg Hx     Esophageal cancer Neg Hx     Hemochromatosis Neg Hx     Inflammatory bowel disease Neg Hx     Irritable bowel syndrome Neg Hx     Liver cancer Neg Hx     Liver disease Neg Hx     Rectal cancer Neg Hx     Stomach cancer Neg Hx     Ulcerative colitis Neg Hx     Jacob's disease Neg Hx     Ovarian cancer Neg Hx     Arrhythmia Neg Hx     Cardiomyopathy Neg Hx     Congenital heart disease Neg Hx     Early death Neg Hx     Heart attacks under age 50 Neg Hx     Pacemaker/defibrilator Neg Hx      Social History     Tobacco Use    Smoking status: Never  "Smoker    Smokeless tobacco: Never Used   Substance Use Topics    Alcohol use: Yes     Comment: occas, none recently    Drug use: No     OB History    Para Term  AB Living   4 2 2 0 1 2   SAB TAB Ectopic Multiple Live Births   0 1 0 0 2      # Outcome Date GA Lbr Angelito/2nd Weight Sex Delivery Anes PTL Lv   4             3 Term 19 37w2d / 00:10 3.033 kg (6 lb 11 oz) F Vag-Spont None N ZENA   2 Term 11/30/15 38w3d  2.81 kg (6 lb 3.1 oz) M Vag-Spont EPI N ZENA      Birth Comments: NICU x 4 days.    Needed HFNC and CPAP but no intubation.  Abx for 48 hour rule out, blood cx negative.   Hepatitis B given on 12/3/15   1 TAB                /68   Ht 5' 3" (1.6 m)   Wt 111.4 kg (245 lb 9.5 oz)   LMP 2019 (Exact Date)   Breastfeeding? No   BMI 43.50 kg/m²     ROS:    GENERAL: Denies weight gain or weight loss. Feeling well overall.   SKIN: Denies rash or lesions.   HEAD: Denies head injury or headache.   NODES: Denies enlarged lymph nodes.   CHEST: Denies chest pain or shortness of breath.   CARDIOVASCULAR: Denies palpitations or left sided chest pain.   ABDOMEN: No abdominal pain, constipation, diarrhea, nausea, vomiting or rectal bleeding.   URINARY: No frequency, dysuria, hematuria, or burning on urination.  REPRODUCTIVE: See HPI.   BREASTS: The patient performs breast self-examination and denies pain, lumps, or nipple discharge.   HEMATOLOGIC: No easy bruisability or excessive bleeding with the exception of menstrual cycles.  MUSCULOSKELETAL: Denies joint pain or swelling.   NEUROLOGIC: Denies syncope or weakness.   PSYCHIATRIC: Denies depression, anxiety or mood swings.    PHYSICAL EXAM:    APPEARANCE: Well nourished, well developed, in no acute distress.  AFFECT: WNL, alert and oriented x 3  SKIN: No acne or hirsutism  NECK: Neck symmetric without masses or thyromegaly  NODES: No inguinal, cervical, axillary, or femoral lymph node enlargement  CHEST: Good respiratory " effect  ABDOMEN: Soft.  No tenderness or masses.  No hepatosplenomegaly.  No hernias.  PELVIC: Normal external genitalia without lesions.  Normal hair distribution.  Adequate perineal body, normal urethral meatus.  Vagina moist and well rugated without lesions or discharge.  Cervix pink, IUD present without lesions, discharge or tenderness.  No significant cystocele or rectocele.  Bimanual exam shows uterus to be normal size, regular, mobile and nontender.  Adnexa without masses or tenderness.    EXTREMITIES: No edema.    ASSESSMENT & PLAN:    1. Cyst of ovary, unspecified laterality    2. IUD (intrauterine device) in place        I have discussed the risks, benefits, indications, and alternatives of the procedure in detail.  The patient verbalizes her understanding.  All questions answered.  Consents signed.  The patient agrees to proceed to proceed as planned.      Will procede with DX scope and ovarian cystectomy  She wants to keep the IUD in place  Post op appt in four weeks   Vicodin rx sent for pain

## 2019-11-18 NOTE — ANESTHESIA PREPROCEDURE EVALUATION
11/18/2019  Sonam Hidalgo is a 29 y.o., female.    Anesthesia Evaluation    I have reviewed the Patient Summary Reports.    I have reviewed the Nursing Notes.   I have reviewed the Medications.     Review of Systems  Anesthesia Hx:  No problems with previous Anesthesia  Denies Family Hx of Anesthesia complications.   Denies Personal Hx of Anesthesia complications.   Social:  Non-Smoker    Hematology/Oncology:  Hematology Normal   Oncology Normal     EENT/Dental:EENT/Dental Normal   Cardiovascular:  Cardiovascular Normal     Pulmonary:  Pulmonary Normal    Renal/:  Renal/ Normal     Hepatic/GI:  Hepatic/GI Normal    Musculoskeletal:  Musculoskeletal Normal    Neurological:  Neurology Normal    Endocrine:  Endocrine Normal    Dermatological:  Skin Normal    Psych:  Psychiatric Normal           Physical Exam  General:  Well nourished    Airway/Jaw/Neck:  Airway Findings: Mouth Opening: Normal Mallampati: II      Dental:  Dental Findings: In tact         Mental Status:  Mental Status Findings:  Cooperative, Alert and Oriented         Anesthesia Plan  Type of Anesthesia, risks & benefits discussed:  Anesthesia Type:  general  Patient's Preference:   Intra-op Monitoring Plan: standard ASA monitors  Intra-op Monitoring Plan Comments:   Post Op Pain Control Plan: multimodal analgesia  Post Op Pain Control Plan Comments:   Induction:   IV  Beta Blocker:         Informed Consent: Patient understands risks and agrees with Anesthesia plan.  Questions answered. Anesthesia consent signed with patient.  ASA Score: 3     Day of Surgery Review of History & Physical:    H&P update referred to the surgeon.         Ready For Surgery From Anesthesia Perspective.

## 2019-11-18 NOTE — DISCHARGE INSTRUCTIONS
PRE-ADMIT TESTING -  [phone]    [address]  [place] [facility]          Your surgery has been scheduled at [facility]. We are pleased to have the opportunity to serve you. For Further Information please call [phone].    On the day of surgery please report to the Information Desk on the 1st floor.    · CONTACT YOUR PHYSICIAN'S OFFICE THE DAY PRIOR TO YOUR SURGERY TO OBTAIN YOUR ARRIVAL TIME.     · The evening before surgery do not eat anything after 9 p.m. ( this includes hard candy, chewing gum and mints).  You may only have GATORADE, POWERADE AND WATER  from 9 p.m. until you leave your home.   DO NOT DRINK ANY LIQUIDS ON THE WAY TO THE HOSPITAL.      SPECIAL MEDICATION INSTRUCTIONS: TAKE medications checked off by the Anesthesiologist on your Medication List.    Angiogram Patients: Take medications as instructed by your physician, including aspirin.     Surgery Patients:    If you take ASPIRIN - Your PHYSICIAN/SURGEON will need to inform you IF/OR when you need to stop taking aspirin prior to your surgery.     Do Not take any medications containing IBUPROFEN.  Do Not Wear any make-up or dark nail polish   (especially eye make-up) to surgery. If you come to surgery with makeup on you will be required to remove the makeup or nail polish.    Do not shave your surgical area at least 5 days prior to your surgery. The surgical prep will be performed at the hospital according to Infection Control regulations.    Leave all valuables at home.   Do Not wear any jewelry or watches, including any metal in body piercings. Jewelry must be removed prior to coming to the hospital.  There is a possibility that rings that are unable to be removed may be cut off if they are on the surgical extremity.    Contact Lens must be removed before surgery. Either do not wear the contact lens or bring a case and solution for storage.  Please bring a container for eyeglasses or dentures as required.  Bring  any paperwork your physician has provided, such as consent forms,  history and physicals, doctor's orders, etc.   Bring comfortable clothes that are loose fitting to wear upon discharge. Take into consideration the type of surgery being performed.  Maintain your diet as advised per your physician the day prior to surgery.      Adequate rest the night before surgery is advised.   Park in the Parking lot behind the hospital or in the [facility] across the street from the parking lot. Parking is complimentary.  If you will be discharged the same day as your procedure, please arrange for a responsible adult to drive you home or to accompany you if traveling by taxi.   YOU WILL NOT BE PERMITTED TO DRIVE OR TO LEAVE THE HOSPITAL ALONE AFTER SURGERY.   It is strongly recommended that you arrange for someone to remain with you for the first 24 hrs following your surgery.    The Surgeon will speak to your family/visitor after your surgery regarding the outcome of your surgery and post op care.  The Surgeon may speak to you after your surgery, but there is a possibility you may not remember the details.  Please check with your family members regarding the conversation with the Surgeon.    We strongly recommend whoever is bringing you home be present for discharge instructions.  This will ensure a thorough understanding for your post op home care.    EACH PATIENT IS ALLOWED TWO FAMILY MEMBERS OR VISITORS IN THE ROOM AND IN THE WAITING ROOMS WHILE YOU ARE IN SURGERY. ALL CHILDREN MUST ALWAYS BE ACCOMPANIED BY AN ADULT.    Thank you for your cooperation.  The Staff of [facility].                Bathing Instructions with Hibiclens     Shower the evening before and morning of your procedure with Hibiclens:   Wash your face with water and your regular face wash/soap   Apply Hibiclens directly on your skin or on a wet washcloth and wash gently. When showering: Move away from the shower stream when  applying Hibiclens to avoid rinsing off too soon.   Rinse thoroughly with warm water   Do not dilute Hibiclens         Dry off as usual, do not use any deodorant, powder, body lotions, perfume, after shave or cologne.

## 2019-11-19 ENCOUNTER — TELEPHONE (OUTPATIENT)
Dept: OBSTETRICS AND GYNECOLOGY | Facility: CLINIC | Age: 29
End: 2019-11-19

## 2019-11-19 NOTE — LETTER
November 19, 2019                   [place] - Obstetrics and Gynecology  Obstetrics and Gynecology  [address] [place] [address]  Phone: [phone]  Fax: [phone]   November 19, 2019     Patient: Sonam Hidalgo   YOB: 1990   Date of Visit: 11/19/2019       To Whom it May Concern:    Sonam Hidalgo was seen in my clinic on 11/18/19. She will be having surgery on 11/20/19. Please excuse her from any court appearances until 11/25/19. Also, please excuse her partner Yakov More, as he is her care giver.     If you have any questions or concerns, please don't hesitate to call.    Sincerely,       Merary Benton MD

## 2019-11-19 NOTE — TELEPHONE ENCOUNTER
----- Message from Stefany Avila sent at 11/19/2019  9:36 AM CST -----  Contact: CLARA THOMPSON  Type: Patient Call Back    Who called:CLARA THOMPSON    What is the request in detail: Patient is requesting a call back. She states that she needs a doctor note for her court appearence tomorrow. Please advise.     Can the clinic reply by [facility]? No    Best call back number: [phone]    Additional Information: N/A

## 2019-11-20 ENCOUNTER — ANESTHESIA (OUTPATIENT)
Dept: SURGERY | Facility: OTHER | Age: 29
End: 2019-11-20
Payer: COMMERCIAL

## 2019-11-20 ENCOUNTER — HOSPITAL ENCOUNTER (OUTPATIENT)
Facility: OTHER | Age: 29
Discharge: HOME | End: 2019-11-20
Attending: OBSTETRICS & GYNECOLOGY | Admitting: OBSTETRICS & GYNECOLOGY
Payer: COMMERCIAL

## 2019-11-20 VITALS
HEIGHT: 63 IN | WEIGHT: 245 LBS | BODY MASS INDEX: 43.41 KG/M2 | DIASTOLIC BLOOD PRESSURE: 73 MMHG | SYSTOLIC BLOOD PRESSURE: 122 MMHG | TEMPERATURE: 98 F | RESPIRATION RATE: 18 BRPM | OXYGEN SATURATION: 100 % | HEART RATE: 65 BPM

## 2019-11-20 DIAGNOSIS — N83.209 CYST OF OVARY, UNSPECIFIED LATERALITY: ICD-10-CM

## 2019-11-20 DIAGNOSIS — Z98.890 S/P OVARIAN CYSTECTOMY: Primary | ICD-10-CM

## 2019-11-20 DIAGNOSIS — Z87.42 S/P OVARIAN CYSTECTOMY: Primary | ICD-10-CM

## 2019-11-20 LAB
B-HCG UR QL: NEGATIVE
CTP QC/QA: YES

## 2019-11-20 PROCEDURE — 58662 PR LAP,FULGURATE/EXCISE LESIONS: ICD-10-PCS | Mod: ,,, | Performed by: OBSTETRICS & GYNECOLOGY

## 2019-11-20 PROCEDURE — 25000003 PHARM REV CODE 250: Performed by: OBSTETRICS & GYNECOLOGY

## 2019-11-20 PROCEDURE — 71000039 HC RECOVERY, EACH ADD'L HOUR: Performed by: OBSTETRICS & GYNECOLOGY

## 2019-11-20 PROCEDURE — 37000008 HC ANESTHESIA 1ST 15 MINUTES: Performed by: OBSTETRICS & GYNECOLOGY

## 2019-11-20 PROCEDURE — 71000016 HC POSTOP RECOV ADDL HR: Performed by: OBSTETRICS & GYNECOLOGY

## 2019-11-20 PROCEDURE — 63600175 PHARM REV CODE 636 W HCPCS: Performed by: ANESTHESIOLOGY

## 2019-11-20 PROCEDURE — 81025 URINE PREGNANCY TEST: CPT | Performed by: ANESTHESIOLOGY

## 2019-11-20 PROCEDURE — 63600175 PHARM REV CODE 636 W HCPCS: Performed by: NURSE ANESTHETIST, CERTIFIED REGISTERED

## 2019-11-20 PROCEDURE — 36000708 HC OR TIME LEV III 1ST 15 MIN: Performed by: OBSTETRICS & GYNECOLOGY

## 2019-11-20 PROCEDURE — 71000015 HC POSTOP RECOV 1ST HR: Performed by: OBSTETRICS & GYNECOLOGY

## 2019-11-20 PROCEDURE — 49322 LAPAROSCOPY ASPIRATION: CPT | Mod: 51,,, | Performed by: OBSTETRICS & GYNECOLOGY

## 2019-11-20 PROCEDURE — 37000009 HC ANESTHESIA EA ADD 15 MINS: Performed by: OBSTETRICS & GYNECOLOGY

## 2019-11-20 PROCEDURE — 88307 PR  SURG PATH,LEVEL V: ICD-10-PCS | Mod: 26,,, | Performed by: PATHOLOGY

## 2019-11-20 PROCEDURE — 88307 TISSUE EXAM BY PATHOLOGIST: CPT | Performed by: PATHOLOGY

## 2019-11-20 PROCEDURE — 36000709 HC OR TIME LEV III EA ADD 15 MIN: Performed by: OBSTETRICS & GYNECOLOGY

## 2019-11-20 PROCEDURE — 90471 IMMUNIZATION ADMIN: CPT | Performed by: OBSTETRICS & GYNECOLOGY

## 2019-11-20 PROCEDURE — 25000003 PHARM REV CODE 250: Performed by: ANESTHESIOLOGY

## 2019-11-20 PROCEDURE — 58662 LAPAROSCOPY EXCISE LESIONS: CPT | Mod: ,,, | Performed by: OBSTETRICS & GYNECOLOGY

## 2019-11-20 PROCEDURE — 71000033 HC RECOVERY, INTIAL HOUR: Performed by: OBSTETRICS & GYNECOLOGY

## 2019-11-20 PROCEDURE — 49322 PR LAP,ABDOMEN,ASPIRATE CYST: ICD-10-PCS | Mod: 51,,, | Performed by: OBSTETRICS & GYNECOLOGY

## 2019-11-20 PROCEDURE — 63600175 PHARM REV CODE 636 W HCPCS: Performed by: OBSTETRICS & GYNECOLOGY

## 2019-11-20 PROCEDURE — 88307 TISSUE EXAM BY PATHOLOGIST: CPT | Mod: 26,,, | Performed by: PATHOLOGY

## 2019-11-20 PROCEDURE — 25000003 PHARM REV CODE 250: Performed by: NURSE ANESTHETIST, CERTIFIED REGISTERED

## 2019-11-20 PROCEDURE — 90686 IIV4 VACC NO PRSV 0.5 ML IM: CPT | Performed by: OBSTETRICS & GYNECOLOGY

## 2019-11-20 PROCEDURE — 27201423 OPTIME MED/SURG SUP & DEVICES STERILE SUPPLY: Performed by: OBSTETRICS & GYNECOLOGY

## 2019-11-20 RX ORDER — PROPOFOL 10 MG/ML
VIAL (ML) INTRAVENOUS
Status: DISCONTINUED | OUTPATIENT
Start: 2019-11-20 | End: 2019-11-20

## 2019-11-20 RX ORDER — SODIUM CHLORIDE 0.9 % (FLUSH) 0.9 %
3 SYRINGE (ML) INJECTION
Status: DISCONTINUED | OUTPATIENT
Start: 2019-11-20 | End: 2019-11-20 | Stop reason: HOSPADM

## 2019-11-20 RX ORDER — MUPIROCIN 20 MG/G
OINTMENT TOPICAL
Status: DISCONTINUED | OUTPATIENT
Start: 2019-11-20 | End: 2019-11-20 | Stop reason: HOSPADM

## 2019-11-20 RX ORDER — FENTANYL CITRATE 50 UG/ML
INJECTION, SOLUTION INTRAMUSCULAR; INTRAVENOUS
Status: DISCONTINUED | OUTPATIENT
Start: 2019-11-20 | End: 2019-11-20

## 2019-11-20 RX ORDER — DIPHENHYDRAMINE HYDROCHLORIDE 50 MG/ML
25 INJECTION INTRAMUSCULAR; INTRAVENOUS EVERY 4 HOURS PRN
Status: CANCELLED | OUTPATIENT
Start: 2019-11-20

## 2019-11-20 RX ORDER — DEXAMETHASONE SODIUM PHOSPHATE 4 MG/ML
INJECTION, SOLUTION INTRA-ARTICULAR; INTRALESIONAL; INTRAMUSCULAR; INTRAVENOUS; SOFT TISSUE
Status: DISCONTINUED | OUTPATIENT
Start: 2019-11-20 | End: 2019-11-20

## 2019-11-20 RX ORDER — SODIUM CHLORIDE 9 MG/ML
INJECTION, SOLUTION INTRAVENOUS CONTINUOUS
Status: CANCELLED | OUTPATIENT
Start: 2019-11-20

## 2019-11-20 RX ORDER — PREGABALIN 75 MG/1
75 CAPSULE ORAL ONCE
Status: COMPLETED | OUTPATIENT
Start: 2019-11-20 | End: 2019-11-20

## 2019-11-20 RX ORDER — GLYCOPYRROLATE 0.2 MG/ML
INJECTION INTRAMUSCULAR; INTRAVENOUS
Status: DISCONTINUED | OUTPATIENT
Start: 2019-11-20 | End: 2019-11-20

## 2019-11-20 RX ORDER — ROCURONIUM BROMIDE 10 MG/ML
INJECTION, SOLUTION INTRAVENOUS
Status: DISCONTINUED | OUTPATIENT
Start: 2019-11-20 | End: 2019-11-20

## 2019-11-20 RX ORDER — HYDROCODONE BITARTRATE AND ACETAMINOPHEN 5; 325 MG/1; MG/1
1 TABLET ORAL EVERY 4 HOURS PRN
Status: CANCELLED | OUTPATIENT
Start: 2019-11-20

## 2019-11-20 RX ORDER — HYDROMORPHONE HYDROCHLORIDE 2 MG/ML
0.4 INJECTION, SOLUTION INTRAMUSCULAR; INTRAVENOUS; SUBCUTANEOUS EVERY 5 MIN PRN
Status: DISCONTINUED | OUTPATIENT
Start: 2019-11-20 | End: 2019-11-20 | Stop reason: HOSPADM

## 2019-11-20 RX ORDER — DIPHENHYDRAMINE HYDROCHLORIDE 50 MG/ML
25 INJECTION INTRAMUSCULAR; INTRAVENOUS EVERY 6 HOURS PRN
Status: DISCONTINUED | OUTPATIENT
Start: 2019-11-20 | End: 2019-11-20 | Stop reason: HOSPADM

## 2019-11-20 RX ORDER — OXYCODONE HYDROCHLORIDE 5 MG/1
5 TABLET ORAL
Status: DISCONTINUED | OUTPATIENT
Start: 2019-11-20 | End: 2019-11-20 | Stop reason: HOSPADM

## 2019-11-20 RX ORDER — NEOSTIGMINE METHYLSULFATE 1 MG/ML
INJECTION, SOLUTION INTRAVENOUS
Status: DISCONTINUED | OUTPATIENT
Start: 2019-11-20 | End: 2019-11-20

## 2019-11-20 RX ORDER — DIPHENHYDRAMINE HCL 25 MG
25 CAPSULE ORAL EVERY 4 HOURS PRN
Status: CANCELLED | OUTPATIENT
Start: 2019-11-20

## 2019-11-20 RX ORDER — MIDAZOLAM HYDROCHLORIDE 1 MG/ML
INJECTION INTRAMUSCULAR; INTRAVENOUS
Status: DISCONTINUED | OUTPATIENT
Start: 2019-11-20 | End: 2019-11-20

## 2019-11-20 RX ORDER — ACETAMINOPHEN 500 MG
1000 TABLET ORAL
Status: COMPLETED | OUTPATIENT
Start: 2019-11-20 | End: 2019-11-20

## 2019-11-20 RX ORDER — LIDOCAINE HYDROCHLORIDE 10 MG/ML
0.5 INJECTION, SOLUTION EPIDURAL; INFILTRATION; INTRACAUDAL; PERINEURAL ONCE
Status: DISCONTINUED | OUTPATIENT
Start: 2019-11-20 | End: 2019-11-20 | Stop reason: HOSPADM

## 2019-11-20 RX ORDER — LIDOCAINE HCL/PF 100 MG/5ML
SYRINGE (ML) INTRAVENOUS
Status: DISCONTINUED | OUTPATIENT
Start: 2019-11-20 | End: 2019-11-20

## 2019-11-20 RX ORDER — IBUPROFEN 600 MG/1
600 TABLET ORAL EVERY 6 HOURS PRN
Status: CANCELLED | OUTPATIENT
Start: 2019-11-20

## 2019-11-20 RX ORDER — SODIUM CHLORIDE, SODIUM LACTATE, POTASSIUM CHLORIDE, CALCIUM CHLORIDE 600; 310; 30; 20 MG/100ML; MG/100ML; MG/100ML; MG/100ML
INJECTION, SOLUTION INTRAVENOUS CONTINUOUS
Status: DISCONTINUED | OUTPATIENT
Start: 2019-11-20 | End: 2019-11-20 | Stop reason: HOSPADM

## 2019-11-20 RX ORDER — ONDANSETRON 8 MG/1
8 TABLET, ORALLY DISINTEGRATING ORAL EVERY 8 HOURS PRN
Status: DISCONTINUED | OUTPATIENT
Start: 2019-11-20 | End: 2019-11-20 | Stop reason: HOSPADM

## 2019-11-20 RX ORDER — ONDANSETRON 2 MG/ML
4 INJECTION INTRAMUSCULAR; INTRAVENOUS DAILY PRN
Status: DISCONTINUED | OUTPATIENT
Start: 2019-11-20 | End: 2019-11-20 | Stop reason: HOSPADM

## 2019-11-20 RX ORDER — MEPERIDINE HYDROCHLORIDE 25 MG/ML
12.5 INJECTION INTRAMUSCULAR; INTRAVENOUS; SUBCUTANEOUS ONCE AS NEEDED
Status: COMPLETED | OUTPATIENT
Start: 2019-11-20 | End: 2019-11-20

## 2019-11-20 RX ADMIN — PREGABALIN 75 MG: 75 CAPSULE ORAL at 07:11

## 2019-11-20 RX ADMIN — HYDROMORPHONE HYDROCHLORIDE 0.4 MG: 2 INJECTION, SOLUTION INTRAMUSCULAR; INTRAVENOUS; SUBCUTANEOUS at 11:11

## 2019-11-20 RX ADMIN — PROPOFOL 200 MG: 10 INJECTION, EMULSION INTRAVENOUS at 09:11

## 2019-11-20 RX ADMIN — INFLUENZA VIRUS VACCINE 0.5 ML: 15; 15; 15; 15 SUSPENSION INTRAMUSCULAR at 12:11

## 2019-11-20 RX ADMIN — MEPERIDINE HYDROCHLORIDE 12.5 MG: 25 INJECTION INTRAMUSCULAR; INTRAVENOUS; SUBCUTANEOUS at 10:11

## 2019-11-20 RX ADMIN — GLYCOPYRROLATE 0.8 MG: 0.2 INJECTION, SOLUTION INTRAMUSCULAR; INTRAVENOUS at 10:11

## 2019-11-20 RX ADMIN — MUPIROCIN: 20 OINTMENT TOPICAL at 07:11

## 2019-11-20 RX ADMIN — NEOSTIGMINE METHYLSULFATE 5 MG: 1 INJECTION INTRAVENOUS at 10:11

## 2019-11-20 RX ADMIN — OXYCODONE HYDROCHLORIDE 5 MG: 5 TABLET ORAL at 10:11

## 2019-11-20 RX ADMIN — LIDOCAINE HYDROCHLORIDE 100 MG: 20 INJECTION, SOLUTION INTRAVENOUS at 09:11

## 2019-11-20 RX ADMIN — FENTANYL CITRATE 100 MCG: 50 INJECTION, SOLUTION INTRAMUSCULAR; INTRAVENOUS at 09:11

## 2019-11-20 RX ADMIN — MIDAZOLAM HYDROCHLORIDE 2 MG: 1 INJECTION, SOLUTION INTRAMUSCULAR; INTRAVENOUS at 09:11

## 2019-11-20 RX ADMIN — ROCURONIUM BROMIDE 40 MG: 10 INJECTION, SOLUTION INTRAVENOUS at 09:11

## 2019-11-20 RX ADMIN — SODIUM CHLORIDE, SODIUM LACTATE, POTASSIUM CHLORIDE, AND CALCIUM CHLORIDE: 600; 310; 30; 20 INJECTION, SOLUTION INTRAVENOUS at 08:11

## 2019-11-20 RX ADMIN — ACETAMINOPHEN 1000 MG: 500 TABLET, FILM COATED ORAL at 07:11

## 2019-11-20 RX ADMIN — SODIUM CHLORIDE, SODIUM LACTATE, POTASSIUM CHLORIDE, AND CALCIUM CHLORIDE: 600; 310; 30; 20 INJECTION, SOLUTION INTRAVENOUS at 10:11

## 2019-11-20 RX ADMIN — DEXAMETHASONE SODIUM PHOSPHATE 8 MG: 4 INJECTION, SOLUTION INTRAMUSCULAR; INTRAVENOUS at 09:11

## 2019-11-20 NOTE — ANESTHESIA POSTPROCEDURE EVALUATION
Anesthesia Post Evaluation    Patient: Sonam Hidalgo    Procedure(s) Performed: Procedure(s) (LRB):  EXCISION, CYST, OVARY, LAPAROSCOPIC (Left)    Final Anesthesia Type: general    Patient location during evaluation: PACU  Patient participation: Yes- Able to Participate  Level of consciousness: awake and alert  Post-procedure vital signs: reviewed and stable  Pain management: adequate  Airway patency: patent    PONV status at discharge: No PONV  Anesthetic complications: no      Cardiovascular status: blood pressure returned to baseline  Respiratory status: unassisted and room air  Hydration status: euvolemic  Follow-up not needed.          Vitals Value Taken Time   /73 11/20/2019  1:00 PM   Temp 36.6 °C (97.8 °F) 11/20/2019 12:00 PM   Pulse 65 11/20/2019  1:00 PM   Resp 18 11/20/2019  1:00 PM   SpO2 100 % 11/20/2019  1:00 PM         Event Time     Out of Recovery 11:52:10          Pain/Vj Score: Pain Rating Prior to Med Admin: 5 (11/20/2019 11:30 AM)  Pain Rating Post Med Admin: 3 (11/20/2019 11:40 AM)  Vj Score: 10 (11/20/2019  1:00 PM)

## 2019-11-20 NOTE — PLAN OF CARE
Sonam Hidalgo has met all discharge criteria from Phase II. Vital Signs are stable, ambulating  without difficulty. Discharge instructions given, patient verbalized understanding. Discharged from facility via wheelchair in stable condition.

## 2019-11-20 NOTE — OP NOTE
OPERATIVE REPORT     DATE OF OPERATION: 11/20/2019       PREOPERATIVE DIAGNOSIS:   1. Left ovarian cyst  2. Pelvic pain      POSTOPERATIVE DIAGNOSIS:   Same     OPERATION PERFORMED:   Laparoscopic left ovarian cystectomy     SURGEON:  Merary Benton MD      ASSISTANT: Mily Tran MD (PGY-4)     ANESTHESIA: General.      COMPLICATIONS: None.      EBL: 50 mL     IVF: 1300 mL     UOP: 150 mL     FINDINGS:   - 5 cm left ovarian cyst, simple in appearance  - Cyst wall scored and inadvertently entered, draining clear fluid  - Cyst wall removed from normal surrounding ovary  - 2 cm paratubal cyst drained. Additional hemorrhagic cyst on left ovary drained  - Eli applied to surgical bed and hemostasis confirmed   - Normal appearing ovary, fallopian tubes and right ovary         SPECIMENS:  1. Left ovarian cyst        PROCEDURE IN DETAIL:   The patient was taken to the operating room where general anesthesia was administered and found to be adequate. She was then placed in dorsal lithotomy position using Ed stirrups and prepped and draped in the usual sterile manner, both vaginally and abdominally. A surgical timeout was performed with patient's name, date of birth, allergies, and procedure to be performed verbalized. All OR staff were in agreement. No preoperative antibiotics were administered as none were indicated.     Speculum was then placed in the vagina, and the anterior lip of the cervix was grasped with a single tooth tenaculum.  IUD strings were visualized.  An intrauterine manipulator was not placed in order to avoid disrupting patient's IUD.  A spongestick was then placed in the vagina.    The speculum was removed and a valentine catheter was placed.     Attention was then turned to the abdomen where perforating towel clamps were placed on either side of the umbilicus, and the anterior abdominal wall was tented upward. A Veress needle was introduced into the peritoneal cavity with placement confirmed via  saline drop test. A pneumoperitoneum was then created with CO2 gas. Then, an incision was made below the umbilicus. A 5 mm trocar was introduced through the infraumbilical incision site under direct visualization. This showed good entry to the peritoneal cavity. The abdominal cavity was thoroughly surveyed noting the above findings including 5 cm left ovarian cyst and small left paratubal cyst.  Normal appearing uterus, right ovary, and fallopian tubes were noted.    Then, an incision was made in the left lower quadrant, and 5 mm trocar was introduced under direct visualization. An incision was also made in the right lower quadrant, and 5 mm trocar was introduced, also under direct visualization. Good hemostasis was noted and there was no evidence of injury from abdominal entry.     The left ovarian cyst was then stabilized and scored using monopolar scissors.  During this portion of the case, the cyst was inadvertently ruptured draining clear fluid.  There was difficulty defining the plane between the cyst wall and normal ovarian tissue.  Eventually, the cyst was able to be  from the surrounding normal ovarian tissue using countertraction.   The cyst was then removed from the left ovary using Ligasure.  A small hemorrhagic cyst on the left ovary was then scored and drained.  An additional 2 cm simple paratubal cyst was also scored and drained.    Irrigation and suctioning was performed and cautery and Eli were used to ensure hemostasis of the surgical bed.         The procedure was then completed by removing the right and left lateral trocars under direct visualization. The laparoscope was removed and the abdomen was deflated. Finally, the infraumbilical trocar sheath was removed.  The skin was closed with 4-0 monocryl in a subcuticular fashion. All incisions were covered with Steri-Strips and small bandages.      All the instruments were removed from the cervix and vagina. The IUD strings were  visualized postoperatively.  Sponge, instrument, and needle counts were correct x2. The patient was awakened from anesthesia without difficulty and was transferred to the recovery room in stable condition.      Mily Tran MD  Ob/Gyn PGY-4

## 2019-11-20 NOTE — DISCHARGE SUMMARY
[facility]  Brief Operative Note     SUMMARY     Surgery Date: 11/20/2019     Surgeon(s) and Role:     * Merary Benton MD - Primary    Assisting Surgeon:      * Mily Tran MD- Resident    Pre-op Diagnosis:  Cyst of ovary, unspecified laterality [N83.209]    Post-op Diagnosis:  Post-Op Diagnosis Codes:     * Cyst of ovary, unspecified laterality [N83.209]    Procedure(s) (LRB):  EXCISION, CYST, OVARY, LAPAROSCOPIC (Left)    Anesthesia: General    Description of the findings of the procedure:   - 5 cm left ovarian cyst, simple in appearance  - Cyst wall scored and inadvertently entered, draining clear fluid  - Cyst wall removed from normal surrounding ovary  - 2 cm paratubal cyst drained. Additional hemorrhagic cyst on left ovary drained  - Eli applied to surgical bed and hemostasis confirmed   - Normal appearing ovary, fallopian tubes and right ovary         Estimated Blood Loss: 50 mL         Specimens:   Specimen (12h ago, onward)    None          Discharge Note    SUMMARY     Admit Date: 11/20/2019    Discharge Date and Time:  11/20/2019 10:40 AM    Hospital Course (synopsis of major diagnoses, care, treatment, and services provided during the course of the hospital stay): Patient presented for scheduled procedure. Patient was passed back to OR for laparoscopic ovarian cystectomy. Please see OP note for further details. Tolerated procedure well and patient was taken to recovery in a stable condition. Prior to discharge patient was able to void, ambulate, tolerate PO and pain was well controlled with PO meds. Patient was given routine post-op instructions for which patient voiced understanding. Patient was subsequently discharged home.       Final Diagnosis: Post-Op Diagnosis Codes:     * Cyst of ovary, unspecified laterality [N83.209]    Disposition: Home or Self Care    Follow Up/Patient Instructions:     Medications:  Reconciled Home Medications:      Medication List       CONTINUE taking these medications    HYDROcodone-acetaminophen 5-325 mg per tablet  Commonly known as:  NORCO  Take 1 tablet by mouth every 6 (six) hours as needed for Pain.     ibuprofen 600 MG tablet  Commonly known as:  ADVIL,MOTRIN  Take 1 tablet (600 mg total) by mouth every 6 (six) hours as needed for Pain.     magnesium oxide 400 mg (241.3 mg magnesium) tablet  Commonly known as:  MAG-OX  Take 1 tablet (400 mg total) by mouth once daily.     riboflavin (vitamin B2) 100 mg Tab tablet  Commonly known as:  VITAMIN B-2  Take 1 tablet (100 mg total) by mouth once daily.     sertraline 100 MG tablet  Commonly known as:  ZOLOFT  Take 0.5 tablets (50 mg total) by mouth once daily.          Discharge Procedure Orders   Diet general     Call MD for:  extreme fatigue     Call MD for:  persistent dizziness or light-headedness     Call MD for:  hives     Call MD for:  redness, tenderness, or signs of infection (pain, swelling, redness, odor or green/yellow discharge around incision site)     Call MD for:  difficulty breathing, headache or visual disturbances     Call MD for:  severe uncontrolled pain     Call MD for:  persistent nausea and vomiting     Call MD for:  temperature >100.4     Follow-up Information     Merary Benton MD In 6 weeks.    Specialties:  Obstetrics, Obstetrics and Gynecology  Why:  Post Operative Appointment  Contact information:  [address] [id number] [phone]

## 2019-11-20 NOTE — DISCHARGE INSTRUCTIONS
Abdominal Laparoscopy Discharge Instructions      Activity  · Limit your activity for 4-6 weeks.  · Dont lift anything heavier than 5-10 pounds.  · Avoid strenuous activities, such as mowing the lawn, vacuuming, or playing sports.  · Limit your activity to short, slow walks. Gradually increase your pace and distance as you feel able.  · Listen to your body. If an activity causes pain, stop.  · Rest when you are tired.  · Dont have sexual intercourse or use tampons or douches until your doctor says its safe to do so.    Home Care  · Always keep your incision clean and dry.  · Shower as instructed in 24 hours. You may wash your incision gently with mild soap and warm water and pat dry.  Avoid tub baths, hot tubs and swimming pools until seen by your physician for a post-op follow up.  · If you have steri strips they should fall off in 7-10 days.    · If you have band aids covering your incisions change daily or when soiled.  The band aids may be removed post op day 1 if they are clean and dry.  · Take your medication exactly as instructed by your doctor.  · Return to your diet as you feel able. Eat a healthy, well-balanced diet.  · Avoid constipation.  ¨ Use laxatives, stool softeners, or enemas as directed by your doctor.  ¨ Eat more high-fiber foods.  ¨ Drink 6-8 glasses of water every day, unless directed otherwise.    Follow-Up  Make a follow-up appointment as directed by our staff.       When to Call Your Doctor  Call your doctor right away if you have any of the following:  · Fever above 101.5°F  (38.6°C) or chills  · Bright red vaginal bleeding or a foul-smelling discharge  · Vaginal bleeding that soaks more than one sanitary pad per hour  · Trouble urinating or burning sensation when you urinate  · Severe abdominal pain or bloating  · Redness, swelling, or drainage at your incision site  · Shortness of breath        ________________________________________________________________  FOR EMERGENCIES:  If any  unusual problems or difficulties occur, contact Dr. Mattson.        Anesthesia: After Your Surgery  Youve just had surgery. During surgery, you received medication called anesthesia to keep you comfortable and pain-free. After surgery, you may experience some pain or nausea. This is common. Here are some tips for feeling better and recovering after surgery.    Going home  Your doctor or nurse will show you how to take care of yourself when you go home. He or she will also answer your questions. Have an adult family member or friend drive you home. For the first 24 hours after your surgery:    · Do not drive or use heavy equipment.  · Do not make important decisions or sign legal documents.  · Avoid alcohol.  · Have someone stay with you, if needed. He or she can watch for problems and help keep you safe.  · Take your time getting up from a seated or lying position. You may experience dizziness for 24 hours.    Be sure to keep all follow-up appointments with your doctor. And rest after your procedure for as long as your doctor tells you to.    Coping with pain  If you have pain after surgery, pain medication will help you feel better. Take it as directed, before pain becomes severe. Also, ask your doctor or pharmacist about other ways to control pain, such as with heat, ice, and relaxation. And follow any other instructions your surgeon or nurse gives you.    URINARY RETENTION  Should you experience a decrease in your urine output or are unable to urinate following surgery, this can be due to the medications given during surgery.  We recommend you going to the nearest Emergency Department.    Tips for taking pain medication  To get the best relief possible, remember these points:    · Pain medications can upset your stomach. Taking them with a little food may help.  · Most pain relievers taken by mouth need at least 20 to 30 minutes to take effect.  · Taking medication on a schedule can help you remember to take it.  Try to time your medication so that you can take it before beginning an activity, such as dressing, walking, or sitting down for dinner.  · Constipation is a common side effect of pain medications. Contact your doctor before taking any medications like laxatives or stool softeners to help relieve constipation. Also ask about any dietary restrictions, because drinking lots of fluids and eating foods like fruits and vegetables that are high in fiber can also help. Remember, dont take laxatives unless your surgeon has prescribed them.  · Mixing alcohol and pain medication can cause dizziness and slow your breathing. It can even be fatal. Dont drink alcohol while taking pain medication.  · Pain medication can slow your reflexes. Dont drive or operate machinery while taking pain medication.    If your health care provider tells you to take acetaminophen to help relieve your pain, ask him or her how much you are supposed to take each day. (Acetaminophen is the generic name for Tylenol and other brand-name pain relievers.) Acetaminophen or other pain relievers may interact with your prescription medicines or other over-the-counter (OTC) drugs. Some prescription medications contain acetaminophen along with other active ingredients. Using both prescription and OTC acetaminophen for pain can cause you to overdose. The FDA recommends that you read the labels on your OTC medications carefully. This will help you to clearly understand the list of active ingredients, dosing instructions, and any warnings. It may also help you avoid taking too much acetaminophen. If you have questions or don't understand the information, ask your pharmacist or health care provider to explain it to you before you take the OTC medication.    Managing nausea  Some people have an upset stomach after surgery. This is often due to anesthesia, pain, pain medications, or the stress of surgery. The following tips will help you manage nausea and get good  nutrition as you recover. If you were on a special diet before surgery, ask your doctor if you should follow it during recovery. These tips may help:    · Dont push yourself to eat. Your body will tell you when to eat and how much.  · Start off with clear liquids and soup. They are easier to digest.  · Progress to semi-solid foods (mashed potatoes, applesauce, and gelatin) as you feel ready.  · Slowly move to solid foods. Dont eat fatty, rich, or spicy foods at first.  · Dont force yourself to have three large meals a day. Instead, eat smaller amounts more often.  · Take pain medications with a small amount of solid food, such as crackers or toast to avoid nausea.      Call your surgeon if    · You feel too sleepy, dizzy, or groggy (medication may be too strong).  · You have side effects like nausea, vomiting, or skin changes (rash, itching, or hives).     © 6025-6331 [employer]. [address] [place] [address], [place] [address]. All rights reserved. This information is not intended as a substitute for professional medical care. Always follow your healthcare professional's instructions.    PLEASE FOLLOW ANY OTHER INSTRUCTIONS PROVIDED TO YOU BY DR. LYNNE!

## 2019-11-20 NOTE — TRANSFER OF CARE
"Anesthesia Transfer of Care Note    Patient: Sonam Hidalgo    Procedure(s) Performed: Procedure(s) (LRB):  EXCISION, CYST, OVARY, LAPAROSCOPIC (Left)    Patient location: PACU    Anesthesia Type: general    Transport from OR: Transported from OR on 2-3 L/min O2 by NC with adequate spontaneous ventilation    Post pain: adequate analgesia    Post assessment: no apparent anesthetic complications    Post vital signs: stable    Level of consciousness: awake    Nausea/Vomiting: no nausea/vomiting    Complications: none    Transfer of care protocol was followed      Last vitals:   Visit Vitals  /83   Pulse (P) 84   Temp 36.8 °C (98.2 °F) (Oral)   Resp (P) 16   Ht 5' 3" (1.6 m)   Wt 111.1 kg (245 lb)   LMP 11/16/2019 (Exact Date)   SpO2 (P) 98%   Breastfeeding? No   BMI 43.40 kg/m²     "

## 2019-11-20 NOTE — OR NURSING
Reviewed 's laparoscopic discharge instructions, prior to surgery, with verbalized understanding per patient and family.  In addition, reviewed with the patient symptoms to report after your surgery: Fever > 101. F, painful urination that gets worse, increase in vaginal bleeding, persistent nausea/vomiting, incisions become reddened or have foul smelling or bloody or discolored drainage and worsening pain.

## 2019-11-20 NOTE — TRANSFER OF CARE
"Anesthesia Transfer of Care Note    Patient: Sonam Hidalgo    Procedure(s) Performed: Procedure(s) (LRB):  EXCISION, CYST, OVARY, LAPAROSCOPIC (Left)    Patient location: PACU    Anesthesia Type: general    Transport from OR: Transported from OR on 2-3 L/min O2 by NC with adequate spontaneous ventilation    Post pain: adequate analgesia    Post assessment: no apparent anesthetic complications    Post vital signs: stable    Level of consciousness: awake    Nausea/Vomiting: no nausea/vomiting    Complications: none    Transfer of care protocol was followed      Last vitals:   Visit Vitals  /73 (BP Location: Right arm, Patient Position: Lying)   Pulse 71   Temp 36.4 °C (97.6 °F) (Oral)   Resp 16   Ht 5' 3" (1.6 m)   Wt 111.1 kg (245 lb)   LMP 11/16/2019 (Exact Date)   SpO2 98%   Breastfeeding? No   BMI 43.40 kg/m²     "

## 2019-11-20 NOTE — INTERVAL H&P NOTE
The patient has been examined and the H&P has been reviewed:    I concur with the findings and no changes have occurred since H&P was written.    Surgery risks, benefits and alternative options discussed and understood by patient/family.      Mily Tran MD  Ob/Gyn PGY-4          Active Hospital Problems    Diagnosis  POA    Cyst of ovary [N83.209]  Yes      Resolved Hospital Problems   No resolved problems to display.

## 2019-11-20 NOTE — ANESTHESIA PROCEDURE NOTES
Intubation  Performed by: Jurgen Shepard CRNA  Authorized by: Chaz Mcguire MD     Intubation:     Induction:  Intravenous    Intubated:  Postinduction    Mask Ventilation:  Easy mask    Attempts:  1    Attempted By:  CRNA    Method of Intubation:  Video laryngoscopy    Blade:  Jayce 2    Laryngeal View Grade: Grade I - full view of chords      Difficult Airway Encountered?: No      Complications:  None    Airway Device:  Oral endotracheal tube    Airway Device Size:  7.5    Style/Cuff Inflation:  Cuffed    Inflation Amount (mL):  4    Tube secured:  21    Secured at:  The lips    Placement Verified By:  Capnometry    Findings Post-Intubation:  BS equal bilateral

## 2019-12-04 LAB
FINAL PATHOLOGIC DIAGNOSIS: NORMAL
GROSS: NORMAL

## 2019-12-10 ENCOUNTER — PATIENT MESSAGE (OUTPATIENT)
Dept: OBSTETRICS AND GYNECOLOGY | Facility: CLINIC | Age: 29
End: 2019-12-10

## 2020-01-23 NOTE — ADDENDUM NOTE
Addended by: SHAMEKA TOSCANO on: 11/15/2018 01:42 PM     Modules accepted: Orders     Patient will follow-up in 2 months. Patient will check blood sugars more frequently. - POCT glycosylated hemoglobin (Hb A1C)  - glipiZIDE (GLUCOTROL) 5 MG tablet; TAKE ONE TABLET BY MOUTH TWICE A DAY  Dispense: 180 tablet; Refill: 0  - Comprehensive Metabolic Panel  - Blood Glucose Monitoring Suppl (ACURA BLOOD GLUCOSE METER) w/Device KIT; 1 Device by Does not apply route 2 times daily  Dispense: 1 kit;  Refill: 0                      LINDA Forrest - CNP

## 2020-02-09 ENCOUNTER — HOSPITAL ENCOUNTER (EMERGENCY)
Facility: HOSPITAL | Age: 30
Discharge: HOME | End: 2020-02-09
Attending: EMERGENCY MEDICINE
Payer: COMMERCIAL

## 2020-02-09 ENCOUNTER — NURSE TRIAGE (OUTPATIENT)
Dept: ADMINISTRATIVE | Facility: CLINIC | Age: 30
End: 2020-02-09

## 2020-02-09 VITALS
OXYGEN SATURATION: 99 % | BODY MASS INDEX: 45.08 KG/M2 | WEIGHT: 245 LBS | SYSTOLIC BLOOD PRESSURE: 134 MMHG | DIASTOLIC BLOOD PRESSURE: 76 MMHG | HEART RATE: 88 BPM | HEIGHT: 62 IN | TEMPERATURE: 99 F | RESPIRATION RATE: 15 BRPM

## 2020-02-09 DIAGNOSIS — G43.009 MIGRAINE WITHOUT AURA AND WITHOUT STATUS MIGRAINOSUS, NOT INTRACTABLE: Primary | ICD-10-CM

## 2020-02-09 PROCEDURE — 63600175 PHARM REV CODE 636 W HCPCS: Performed by: EMERGENCY MEDICINE

## 2020-02-09 PROCEDURE — 99284 EMERGENCY DEPT VISIT MOD MDM: CPT | Mod: ,,, | Performed by: EMERGENCY MEDICINE

## 2020-02-09 PROCEDURE — 99284 PR EMERGENCY DEPT VISIT,LEVEL IV: ICD-10-PCS | Mod: ,,, | Performed by: EMERGENCY MEDICINE

## 2020-02-09 PROCEDURE — 96372 THER/PROPH/DIAG INJ SC/IM: CPT

## 2020-02-09 PROCEDURE — 99284 EMERGENCY DEPT VISIT MOD MDM: CPT | Mod: 25

## 2020-02-09 RX ORDER — BUTALBITAL, ACETAMINOPHEN AND CAFFEINE 50; 325; 40 MG/1; MG/1; MG/1
1 TABLET ORAL EVERY 6 HOURS PRN
Qty: 12 TABLET | Refills: 0 | Status: SHIPPED | OUTPATIENT
Start: 2020-02-09 | End: 2020-02-12

## 2020-02-09 RX ORDER — KETOROLAC TROMETHAMINE 30 MG/ML
15 INJECTION, SOLUTION INTRAMUSCULAR; INTRAVENOUS
Status: COMPLETED | OUTPATIENT
Start: 2020-02-09 | End: 2020-02-09

## 2020-02-09 RX ORDER — PROCHLORPERAZINE EDISYLATE 5 MG/ML
10 INJECTION INTRAMUSCULAR; INTRAVENOUS
Status: COMPLETED | OUTPATIENT
Start: 2020-02-09 | End: 2020-02-09

## 2020-02-09 RX ORDER — DIPHENHYDRAMINE HYDROCHLORIDE 50 MG/ML
25 INJECTION INTRAMUSCULAR; INTRAVENOUS
Status: COMPLETED | OUTPATIENT
Start: 2020-02-09 | End: 2020-02-09

## 2020-02-09 RX ADMIN — KETOROLAC TROMETHAMINE 15 MG: 30 INJECTION, SOLUTION INTRAMUSCULAR; INTRAVENOUS at 09:02

## 2020-02-09 RX ADMIN — PROCHLORPERAZINE EDISYLATE 10 MG: 5 INJECTION INTRAMUSCULAR; INTRAVENOUS at 09:02

## 2020-02-09 RX ADMIN — DIPHENHYDRAMINE HYDROCHLORIDE 25 MG: 50 INJECTION, SOLUTION INTRAMUSCULAR; INTRAVENOUS at 09:02

## 2020-02-10 ENCOUNTER — TELEPHONE (OUTPATIENT)
Dept: NEUROLOGY | Facility: CLINIC | Age: 30
End: 2020-02-10

## 2020-02-10 NOTE — TELEPHONE ENCOUNTER
"    Reason for Disposition   Severe pain in one eye    Additional Information   Negative: [1] ACUTE NEURO SYMPTOM AND [2] present now  (DEFINITION: difficult to awaken OR confused thinking and talking OR slurred speech OR weakness of arms OR unsteady walking)   Negative: Knocked out (unconscious) > 1 minute   Negative: Seizure (convulsion) occurred  (Exception: prior history of seizures and now alert and without Acute Neuro Symptoms)   Negative: Penetrating head injury (e.g., knife, gun shot wound, metal object)   Negative: [1] Major bleeding (e.g., actively dripping or spurting) AND [2] can't be stopped   Negative: [1] Dangerous mechanism of injury (e.g., MVA, diving, trampoline, contact sports, fall > 10 feet or 3 meters) AND [2] NECK pain AND [3] began < 1 hour after injury   Negative: Sounds like a life-threatening emergency to the triager   Negative: [1] Recently examined and diagnosed with a concussion by a healthcare provider AND [2] questions about concussion symptoms   Negative: Can't remember what happened (amnesia)   Negative: Vomiting once or more   Negative: [1] Loss of vision or double vision AND [2] present now   Negative: Watery or blood-tinged fluid dripping from the NOSE or EARS now  (Exception: tears from crying or nosebleed from nasal trauma)   Negative: One or two "black eyes" (bruising, purple color of eyelids)   Negative: Difficult to awaken or acting confused (e.g., disoriented, slurred speech)   Negative: [1] Weakness of the face, arm or leg on one side of the body AND [2] new onset   Negative: [1] Numbness of the face, arm or leg on one side of the body AND [2] new onset   Negative: [1] Loss of speech or garbled speech AND [2] new onset   Negative: Passed out (i.e., lost consciousness, collapsed and was not responding)   Negative: Sounds like a life-threatening emergency to the triager   Negative: Pregnant   Negative: Followed a head injury within last 3 days   " Negative: Traumatic Brain Injury (TBI) is suspected   Negative: Unable to walk, or can only walk with assistance (e.g., requires support)   Negative: Stiff neck (can't touch chin to chest)    Protocols used: HEADACHE-A-AH, HEAD INJURY-A-AH    Pt stated she has a really bad headache and complains of severe pain in left eye. Per triage protocol, advised to go to ED for evaluation and not to drive. Pt verbalized understanding.

## 2020-02-10 NOTE — ED TRIAGE NOTES
Pt is a 29 yr old female that presents to the ED today with complaints of headache x 3 days and pain behind the L eye. Pt denies any vision changes. Pt called the on call line where the nurse told her to come to the ER. Pt states that she was diagnosed with an aneurysm in October of 2019.

## 2020-02-10 NOTE — TELEPHONE ENCOUNTER
----- Message from Sasha Graf sent at 2/10/2020 11:58 AM CST -----  Contact: MyChart Request  Appointment Request From: Sonam Hidalgo    With Provider: Christina Reece MD [[facility] - Neurology]    Preferred Date Range: 2/10/2020 - 2/20/2020    Preferred Times: Any time    Reason for visit: Existing Patient    Comments:  Follow up; continued migraines

## 2020-02-10 NOTE — ED PROVIDER NOTES
Encounter Date: 2/9/2020       History     Chief Complaint   Patient presents with    Headache     Pt c/o headache behind left eye with neck pain and nausea x 3 days. She took 600mg ibuprofen at 1815 tonight with minimal relief. Denies vision changes.      Patient is a 29-year-old female with a past medical history of hypertension anxiety longstanding migraines and in September 2019 she was diagnosed with an intracranial aneurysm.  See below (Approximately 4-5 mm fusiform outpouching the right undersurface of the cavernous ICA concerning for fusiform aneurysm)    Patient presents the emergency room today for mild frontal headache that some present for the past 3 days with no significant focal weakness numbness photophobia meningismus. This is not the worst headache of her life.  It is not a thunderclap headache.  She denies any fevers.  It feels similar to her prior migraines and is not improved with Motrin for 3 days.            Review of patient's allergies indicates:  No Known Allergies  Past Medical History:   Diagnosis Date    Abnormal Pap smear of vagina     Allergy     Anxiety     Hypertension      Past Surgical History:   Procedure Laterality Date    CHOLECYSTECTOMY      LAPAROSCOPIC SURGICAL REMOVAL OF CYST OF OVARY Left 11/20/2019    Procedure: EXCISION, CYST, OVARY, LAPAROSCOPIC;  Surgeon: Merary Benton MD;  [facility];  Service: OB/GYN;  Laterality: Left;    median arcuate ligament        Family History   Problem Relation Age of Onset    Breast cancer Mother     Alcohol abuse Father     Celiac disease Neg Hx     Cirrhosis Neg Hx     Colon cancer Neg Hx     Colon polyps Neg Hx     Crohn's disease Neg Hx     Cystic fibrosis Neg Hx     Esophageal cancer Neg Hx     Hemochromatosis Neg Hx     Inflammatory bowel disease Neg Hx     Irritable bowel syndrome Neg Hx     Liver cancer Neg Hx     Liver disease Neg Hx     Rectal cancer Neg Hx     Stomach cancer Neg Hx     Ulcerative  colitis Neg Hx     Jacob's disease Neg Hx     Ovarian cancer Neg Hx     Arrhythmia Neg Hx     Cardiomyopathy Neg Hx     Congenital heart disease Neg Hx     Early death Neg Hx     Heart attacks under age 50 Neg Hx     Pacemaker/defibrilator Neg Hx      Social History     Tobacco Use    Smoking status: Never Smoker    Smokeless tobacco: Never Used   Substance Use Topics    Alcohol use: Yes     Comment: occas, none recently    Drug use: No     Review of Systems   Constitutional: Negative for appetite change, fatigue and fever.   HENT: Negative for congestion, ear pain, rhinorrhea and sore throat.    Eyes: Positive for pain (Mild behind the left eye.). Negative for photophobia, redness and visual disturbance.   Respiratory: Negative for cough and shortness of breath.    Cardiovascular: Negative for chest pain.   Gastrointestinal: Negative for abdominal pain, diarrhea, nausea and vomiting.   Genitourinary: Negative for difficulty urinating and dysuria.   Musculoskeletal: Negative for arthralgias.   Skin: Negative for rash.   Neurological: Positive for headaches. Negative for dizziness, tremors, seizures, syncope, facial asymmetry, speech difficulty, weakness, light-headedness and numbness.   Psychiatric/Behavioral: Negative for agitation and confusion.   All other systems reviewed and are negative.      Physical Exam     Initial Vitals [02/09/20 2001]   BP Pulse Resp Temp SpO2   134/76 88 15 98.6 °F (37 °C) 99 %      MAP       --         Physical Exam    Nursing note and vitals reviewed.  Constitutional: She appears well-developed and well-nourished. No distress.   Nontoxic, well appearing female.    HENT:   Head: Normocephalic and atraumatic.   Right Ear: External ear normal.   Left Ear: External ear normal.   Mouth/Throat: Oropharynx is clear and moist.   Eyes: EOM are normal. Pupils are equal, round, and reactive to light.   Neck: Normal range of motion. Neck supple.   Cardiovascular: Normal rate, regular  rhythm, normal heart sounds and intact distal pulses. Exam reveals no gallop and no friction rub.    No murmur heard.  Pulmonary/Chest: Breath sounds normal. No respiratory distress. She has no decreased breath sounds. She has no wheezes. She has no rhonchi. She has no rales.   Abdominal: Soft. Bowel sounds are normal. She exhibits no distension. There is no tenderness.   Musculoskeletal: Normal range of motion. She exhibits no edema.   Neurological: She is alert and oriented to person, place, and time. She has normal strength. No sensory deficit. GCS score is 15. GCS eye subscore is 4. GCS verbal subscore is 5. GCS motor subscore is 6.   Skin: Skin is warm and dry.   Psychiatric: She has a normal mood and affect.         ED Course   Procedures  Labs Reviewed - No data to display       Imaging Results    None          Medical Decision Making:   I understand patient has a history of intracranial aneurysm diagnosed in September last year however she is neurologically intact and does not appear to be in any significant distress and her vital signs are stable.  Feel that she is stable to be treated with Toradol Compazine and Benadryl.  She needs to follow up with her neurologist.  She was supposed to follow up with interventional radiology but did not.  The patient states she will call her neurologist tomorrow to schedule the next available appointment.  I do not believe this is infectious in nature.  There has been no trauma. I do not think she needs repeat imaging.  She is stable for discharge.                                 Clinical Impression:       ICD-10-CM ICD-9-CM   1. Migraine without aura and without status migrainosus, not intractable G43.009 346.10                             Franklyn Thomas MD  02/09/20 213

## 2020-03-02 ENCOUNTER — OFFICE VISIT (OUTPATIENT)
Dept: NEUROLOGY | Facility: CLINIC | Age: 30
End: 2020-03-02
Payer: COMMERCIAL

## 2020-03-02 VITALS
BODY MASS INDEX: 43.23 KG/M2 | HEART RATE: 85 BPM | SYSTOLIC BLOOD PRESSURE: 119 MMHG | DIASTOLIC BLOOD PRESSURE: 79 MMHG | WEIGHT: 244 LBS | HEIGHT: 63 IN

## 2020-03-02 DIAGNOSIS — G43.709 CHRONIC MIGRAINE W/O AURA W/O STATUS MIGRAINOSUS, NOT INTRACTABLE: ICD-10-CM

## 2020-03-02 DIAGNOSIS — I67.1 INTRACRANIAL ANEURYSM: ICD-10-CM

## 2020-03-02 DIAGNOSIS — G43.409 HEMIPLEGIC MIGRAINE WITHOUT STATUS MIGRAINOSUS, NOT INTRACTABLE: Primary | ICD-10-CM

## 2020-03-02 PROCEDURE — 99214 PR OFFICE/OUTPT VISIT, EST, LEVL IV, 30-39 MIN: ICD-10-PCS | Mod: S$GLB,,, | Performed by: STUDENT IN AN ORGANIZED HEALTH CARE EDUCATION/TRAINING PROGRAM

## 2020-03-02 PROCEDURE — 3008F BODY MASS INDEX DOCD: CPT | Mod: CPTII,S$GLB,, | Performed by: STUDENT IN AN ORGANIZED HEALTH CARE EDUCATION/TRAINING PROGRAM

## 2020-03-02 PROCEDURE — 99999 PR PBB SHADOW E&M-EST. PATIENT-LVL III: CPT | Mod: PBBFAC,,, | Performed by: STUDENT IN AN ORGANIZED HEALTH CARE EDUCATION/TRAINING PROGRAM

## 2020-03-02 PROCEDURE — 99999 PR PBB SHADOW E&M-EST. PATIENT-LVL III: ICD-10-PCS | Mod: PBBFAC,,, | Performed by: STUDENT IN AN ORGANIZED HEALTH CARE EDUCATION/TRAINING PROGRAM

## 2020-03-02 PROCEDURE — 99214 OFFICE O/P EST MOD 30 MIN: CPT | Mod: S$GLB,,, | Performed by: STUDENT IN AN ORGANIZED HEALTH CARE EDUCATION/TRAINING PROGRAM

## 2020-03-02 PROCEDURE — 3008F PR BODY MASS INDEX (BMI) DOCUMENTED: ICD-10-PCS | Mod: CPTII,S$GLB,, | Performed by: STUDENT IN AN ORGANIZED HEALTH CARE EDUCATION/TRAINING PROGRAM

## 2020-03-02 RX ORDER — RIBOFLAVIN (VITAMIN B2) 100 MG
400 TABLET ORAL DAILY
Refills: 0 | COMMUNITY
Start: 2020-03-02 | End: 2021-02-17

## 2020-03-03 NOTE — PROGRESS NOTES
Patient Name: Sonam Hidalgo  MRN: [id number]    CC: Follow-up headaches     Interval History: 3/2/20  - Increase in frequency of headaches over the last 1 month - daily headaches / unclear on triggers / Prn Fioricet use (less concerns for over use) / Prn ibuprofen (concerns for over-use > 4 per week). Reported of an episode of blurry vision with headaches; otherwise no other prior reported complex migraine features.   - Non compliance with regards to MgO2 / Riboflavin - self discontinuation for no Rx response post 1 month of trial   - + Pregnancy plans / Limited options on migraine preventive therapy options  - On zoloft / SE - Headache (25%)    HPI: Sonam Hidalgo is a 29 y.o. female  with medical history of depression / recent miscarriage / prior pr-eclampsia / chronic migraines presenting for evaluation and management for headaches    Reason for visit: Complex headache presentation with RSW / sensory deficits on 9/30/19      Headache Type I: (Migraine)  Onset: Since childhood / At the age of 13 yrs   Quality/Character: ice-pricking to throbbing / Right > left forehead / No aura / Associated symptoms of intermittent increased sensitivity to light, sound + nausea/ + complex focal sensory or motor deficits on 9/30/19  Duration: > 4hr   Frequency: Average 3-4 per week  Intensity: Average 4/10 - Max 8/10   Triggers: Stress   Medications: Abortive regimen - ibuprofen / aleeve / tylenol. Preventive regimen: zoloft     Pertinent past medical history:   - TBI: None   - Neck trauma: None   - Tension headaches / Cluster headaches / Occipital neuralgia / Other headache syndromes: None   - Depression + / anxiety / bipolar / schizophrenia: None   - Seizures: None    - Stroke: None   - Motion sickness : None     Pertinent investigations:   CT head: : None   MRI brain: 9/30 WNL   EEG: None  Vessel imaging:  MRA + fusiform 4-5 mm of cavernous right ICA     Family history:   - Migraines / Tension headaches  / Cluster headaches / Occipital neuralgia / Other headache syndromes: None     ROS:   Review of Systems   Constitutional: Negative for malaise/fatigue. Negative for weight loss.   HENT: Negative for hearing loss.   Eyes: Negative for blurred vision and double vision.   Respiratory: Negative for shortness of breath and stridor.   Cardiovascular: Negative for chest pain and palpitations.   Gastrointestinal: Negative for nausea, vomiting and constipation.   Genitourinary: Negative for frequency. Negative for urgency.   Musculoskeletal: Negative for joint pain. Negative for myalgias and falls.   Skin: Negative for rash.   Neurological: Negative for dizziness and tremors. Negative for focal weakness and seizures.   Endo/Heme/Allergies: Does not bruise/bleed easily.   Psychiatric/Behavioral: Negative for memory loss. Negative for depression and hallucinations. The patient is not nervous/anxious.      Past Medical History  Past Medical History:   Diagnosis Date    Abnormal Pap smear of vagina     Allergy     Anxiety     Hypertension        Medications    Current Outpatient Medications:     ibuprofen (ADVIL,MOTRIN) 600 MG tablet, Take 1 tablet (600 mg total) by mouth every 6 (six) hours as needed for Pain., Disp: 20 tablet, Rfl: 0    sertraline (ZOLOFT) 100 MG tablet, Take 0.5 tablets (50 mg total) by mouth once daily., Disp: 30 tablet, Rfl: 3    magnesium oxide (MAG-OX) 400 mg (241.3 mg magnesium) tablet, Take 1 tablet (400 mg total) by mouth once daily., Disp: , Rfl: 0    riboflavin, vitamin B2, (VITAMIN B-2) 100 mg Tab tablet, Take 4 tablets (400 mg total) by mouth once daily., Disp: , Rfl: 0    Current Facility-Administered Medications:     levonorgestrel 20 mcg/24 hours (5 yrs) 52 mg IUD 1 Intra Uterine Device, 1 each, Intrauterine, 1 time in Clinic/HOD, Merary Benton MD    levonorgestrel 20 mcg/24 hours (5 yrs) 52 mg IUD 1 Intra Uterine Device, 1 Intra Uterine Device, Intrauterine, , Merary Benton,  MD, 1 Intra Uterine Device at 11/01/19 1500    Allergies  Review of patient's allergies indicates:  No Known Allergies    Social History  Social History     Socioeconomic History    Marital status: Single     Spouse name: Not on file    Number of children: Not on file    Years of education: Not on file    Highest education level: Not on file   Occupational History    Not on file   Social Needs    Financial resource strain: Not on file    Food insecurity:     Worry: Not on file     Inability: Not on file    Transportation needs:     Medical: Not on file     Non-medical: Not on file   Tobacco Use    Smoking status: Never Smoker    Smokeless tobacco: Never Used   Substance and Sexual Activity    Alcohol use: Yes     Comment: occas, none recently    Drug use: No    Sexual activity: Yes     Partners: Male     Birth control/protection: None   Lifestyle    Physical activity:     Days per week: Not on file     Minutes per session: Not on file    Stress: Not on file   Relationships    Social connections:     Talks on phone: Not on file     Gets together: Not on file     Attends Sikh service: Not on file     Active member of club or organization: Not on file     Attends meetings of clubs or organizations: Not on file     Relationship status: Not on file   Other Topics Concern    Not on file   Social History Narrative    Not on file       Family History  Family History   Problem Relation Age of Onset    Breast cancer Mother     Alcohol abuse Father     Celiac disease Neg Hx     Cirrhosis Neg Hx     Colon cancer Neg Hx     Colon polyps Neg Hx     Crohn's disease Neg Hx     Cystic fibrosis Neg Hx     Esophageal cancer Neg Hx     Hemochromatosis Neg Hx     Inflammatory bowel disease Neg Hx     Irritable bowel syndrome Neg Hx     Liver cancer Neg Hx     Liver disease Neg Hx     Rectal cancer Neg Hx     Stomach cancer Neg Hx     Ulcerative colitis Neg Hx     Jacob's disease Neg Hx      "Ovarian cancer Neg Hx     Arrhythmia Neg Hx     Cardiomyopathy Neg Hx     Congenital heart disease Neg Hx     Early death Neg Hx     Heart attacks under age 50 Neg Hx     Pacemaker/defibrilator Neg Hx        Physical Exam  /79   Pulse 85   Ht 5' 3" (1.6 m)   Wt 110.7 kg (244 lb)   BMI 43.22 kg/m²     General appearance: Well-developed, well-groomed.     Neurologic Exam: The patient is awake, alert and oriented. Language is fluent. Recent and remote memory are normal. Attention span and concentration are normal. Fund of knowledge is appropriate.     Cranial nerves: pupils are round and reactive to light and accommodation. Visual fields are full to confrontation. Ocular motility is full in all cardinal positions of gaze. Facial sensation is normal to pinprick and light touch. Facial activation is symmetric. Hearing is normal bilaterally. Palate elevates symmetrically and gag reflex is intact bilaterally. Shoulder elevation is symmetric and full strength bilaterally. Tongue is midline and neck rotation strength is normal bilaterally. Neck range of motion is normal.     Motor examination of all extremities demonstrates normal bulk and tone in all four limbs. There are no atrophy or fasciculations. Strength is 5/5 in the upper and lower extremities bilaterally without pronator drift.     Sensory examination is normal to pinprick, proprioception in the upper and lower extremities bilaterally. Romberg is negative.    Deep tendon reflexes are 2+ and symmetric in the upper and lower extremities bilaterally. Toes are mute bilaterally.     Gait: Normal heel, toe, tandem, and casual gait.    Coordination: Finger to nose and heel to shin testing is normal in both upper and lower extremities. Rapid alternating movements are normal in both upper and lower extremities.     General exam  Cardiovascular: regular rate and rhythm with no murmurs, rubs or gallops. There are no carotid or vertebral artery bruits. Pulses " in both upper and lower extremities are symmetric. There is no peripheral edema.   Head and neck: no cervical lymphadenopathy      Lab and Test Results    WBC   Date Value Ref Range Status   10/30/2019 6.93 3.90 - 12.70 K/uL Final   10/06/2019 8.05 3.90 - 12.70 K/uL Final   09/30/2019 6.86 3.90 - 12.70 K/uL Final     Hemoglobin   Date Value Ref Range Status   10/30/2019 13.1 12.0 - 16.0 g/dL Final   10/06/2019 12.8 12.0 - 16.0 g/dL Final   09/30/2019 13.3 12.0 - 16.0 g/dL Final     Hematocrit   Date Value Ref Range Status   10/30/2019 41.4 37.0 - 48.5 % Final   10/06/2019 39.8 37.0 - 48.5 % Final   09/30/2019 40.9 37.0 - 48.5 % Final     Platelets   Date Value Ref Range Status   10/30/2019 325 150 - 350 K/uL Final   10/06/2019 362 (H) 150 - 350 K/uL Final   09/30/2019 325 150 - 350 K/uL Final     Glucose   Date Value Ref Range Status   10/30/2019 108 70 - 110 mg/dL Final   10/06/2019 91 70 - 110 mg/dL Final   09/30/2019 89 70 - 110 mg/dL Final     Sodium   Date Value Ref Range Status   10/30/2019 135 (L) 136 - 145 mmol/L Final   10/06/2019 138 136 - 145 mmol/L Final   09/30/2019 136 136 - 145 mmol/L Final     Potassium   Date Value Ref Range Status   10/30/2019 4.3 3.5 - 5.1 mmol/L Final   10/06/2019 3.9 3.5 - 5.1 mmol/L Final   09/30/2019 4.3 3.5 - 5.1 mmol/L Final     Chloride   Date Value Ref Range Status   10/30/2019 105 95 - 110 mmol/L Final   10/06/2019 107 95 - 110 mmol/L Final   09/30/2019 108 95 - 110 mmol/L Final     CO2   Date Value Ref Range Status   10/30/2019 23 23 - 29 mmol/L Final   10/06/2019 22 (L) 23 - 29 mmol/L Final   09/30/2019 20 (L) 23 - 29 mmol/L Final     BUN, Bld   Date Value Ref Range Status   10/30/2019 12 6 - 20 mg/dL Final   10/06/2019 10 6 - 20 mg/dL Final   09/30/2019 13 6 - 20 mg/dL Final     Creatinine   Date Value Ref Range Status   10/30/2019 0.8 0.5 - 1.4 mg/dL Final   10/06/2019 0.7 0.5 - 1.4 mg/dL Final   09/30/2019 0.8 0.5 - 1.4 mg/dL Final     Calcium   Date Value Ref Range  Status   10/30/2019 9.0 8.7 - 10.5 mg/dL Final   10/06/2019 8.3 (L) 8.7 - 10.5 mg/dL Final   09/30/2019 8.9 8.7 - 10.5 mg/dL Final     Magnesium   Date Value Ref Range Status   11/24/2010 2.4 1.6 - 2.6 mg/dl Final   06/13/2010 2.1 1.6 - 2.6 mg/dl Final     Phosphorus   Date Value Ref Range Status   06/13/2010 3.6 2.7 - 4.5 mg/dl Final     Alkaline Phosphatase   Date Value Ref Range Status   10/30/2019 78 55 - 135 U/L Final   09/30/2019 71 55 - 135 U/L Final   06/12/2019 165 (H) 55 - 135 U/L Final     ALT   Date Value Ref Range Status   10/30/2019 16 10 - 44 U/L Final   09/30/2019 12 10 - 44 U/L Final   06/12/2019 13 10 - 44 U/L Final     AST   Date Value Ref Range Status   10/30/2019 16 10 - 40 U/L Final   09/30/2019 16 10 - 40 U/L Final   06/12/2019 27 10 - 40 U/L Final         Images: Independently reviewed   Other Tests: Independently reviewed     Assessment and Plan    Sonam Stanleymyrnacas is a 29 y.o. female     Chronic migraine w/o aura w/o status migrainosus, not intractable  Onset 12 years / Failed preventive therapy: Only antidepressant   - Complex presentation of RSW / sensory deficits / Latest: Blurry vision   - Active pregnancy plans / Limited options on preventive regimens   - Non compliance with regards to MgO2 / Riboflavin     Plans:   Abortive regimen: avoid triptans / continue ibuprofen, tylenol (reviewed medication over use component from ibuprofen / Fioricet)   Preventive: Optimize on MgO2 400 mg daily / B2 400 mg daily (emphazised on compliance)   Reviewed zoloft and headache SE / Recommened primary psych f/u for alternative regimen for depression   Discussed on alternative options / its pregnancy risk  Headache diary / sleep hygiene / life style modifications     Complex migraine / 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, not intractable  - same as above     Intracranial aneurysm  Incidental: 4-5 mm fusiform outpouching the right undersurface of the cavernous ICA concerning for  fusiform aneurysm.    - Interventional radiology referral     Depression   Reviewed zoloft and headache SE / Recommened primary psych f/u for alternative regimen for depression     F/u 3-4 months / Transition of care to other neuro resident.     Christina Reece MD  Neurology Resident   [facility]  [address] [place] [address]  Pager: [phone]

## 2020-05-25 PROBLEM — Z3A.37 37 WEEKS GESTATION OF PREGNANCY: Status: RESOLVED | Noted: 2019-06-12 | Resolved: 2020-05-25

## 2020-07-17 ENCOUNTER — OFFICE VISIT (OUTPATIENT)
Dept: OBSTETRICS AND GYNECOLOGY | Facility: CLINIC | Age: 30
End: 2020-07-17
Payer: COMMERCIAL

## 2020-07-17 VITALS
BODY MASS INDEX: 44.56 KG/M2 | SYSTOLIC BLOOD PRESSURE: 132 MMHG | DIASTOLIC BLOOD PRESSURE: 88 MMHG | WEIGHT: 251.56 LBS

## 2020-07-17 DIAGNOSIS — Z30.432 ENCOUNTER FOR IUD REMOVAL: Primary | ICD-10-CM

## 2020-07-17 PROCEDURE — 99999 PR PBB SHADOW E&M-EST. PATIENT-LVL III: ICD-10-PCS | Mod: PBBFAC,,, | Performed by: NURSE PRACTITIONER

## 2020-07-17 PROCEDURE — 58301 PR REMOVE, INTRAUTERINE DEVICE: ICD-10-PCS | Mod: S$GLB,,, | Performed by: NURSE PRACTITIONER

## 2020-07-17 PROCEDURE — 58301 REMOVE INTRAUTERINE DEVICE: CPT | Mod: S$GLB,,, | Performed by: NURSE PRACTITIONER

## 2020-07-17 PROCEDURE — 99999 PR PBB SHADOW E&M-EST. PATIENT-LVL III: CPT | Mod: PBBFAC,,, | Performed by: NURSE PRACTITIONER

## 2020-07-17 PROCEDURE — 99499 UNLISTED E&M SERVICE: CPT | Mod: S$GLB,,, | Performed by: NURSE PRACTITIONER

## 2020-07-17 PROCEDURE — 99499 NO LOS: ICD-10-PCS | Mod: S$GLB,,, | Performed by: NURSE PRACTITIONER

## 2020-07-21 ENCOUNTER — HOSPITAL ENCOUNTER (EMERGENCY)
Facility: HOSPITAL | Age: 30
Discharge: HOME | End: 2020-07-21
Attending: EMERGENCY MEDICINE
Payer: COMMERCIAL

## 2020-07-21 VITALS
BODY MASS INDEX: 46.01 KG/M2 | RESPIRATION RATE: 18 BRPM | DIASTOLIC BLOOD PRESSURE: 88 MMHG | HEIGHT: 62 IN | WEIGHT: 250 LBS | SYSTOLIC BLOOD PRESSURE: 147 MMHG | TEMPERATURE: 98 F | OXYGEN SATURATION: 100 % | HEART RATE: 69 BPM

## 2020-07-21 DIAGNOSIS — N93.9 VAGINAL BLEEDING: Primary | ICD-10-CM

## 2020-07-21 LAB
ALBUMIN SERPL BCP-MCNC: 3.8 G/DL (ref 3.5–5.2)
ALP SERPL-CCNC: 70 U/L (ref 55–135)
ALT SERPL W/O P-5'-P-CCNC: 15 U/L (ref 10–44)
AMORPH CRY UR QL COMP ASSIST: ABNORMAL
ANION GAP SERPL CALC-SCNC: 6 MMOL/L (ref 8–16)
AST SERPL-CCNC: 14 U/L (ref 10–40)
B-HCG UR QL: NEGATIVE
BACTERIA #/AREA URNS AUTO: ABNORMAL /HPF
BACTERIA GENITAL QL WET PREP: ABNORMAL
BASOPHILS # BLD AUTO: 0.06 K/UL (ref 0–0.2)
BASOPHILS NFR BLD: 0.7 % (ref 0–1.9)
BILIRUB SERPL-MCNC: 0.4 MG/DL (ref 0.1–1)
BILIRUB UR QL STRIP: NEGATIVE
BUN SERPL-MCNC: 13 MG/DL (ref 6–20)
CALCIUM SERPL-MCNC: 9.3 MG/DL (ref 8.7–10.5)
CHLORIDE SERPL-SCNC: 107 MMOL/L (ref 95–110)
CLARITY UR REFRACT.AUTO: ABNORMAL
CLUE CELLS VAG QL WET PREP: ABNORMAL
CO2 SERPL-SCNC: 24 MMOL/L (ref 23–29)
COLOR UR AUTO: YELLOW
CREAT SERPL-MCNC: 0.9 MG/DL (ref 0.5–1.4)
CTP QC/QA: YES
DIFFERENTIAL METHOD: ABNORMAL
EOSINOPHIL # BLD AUTO: 0.2 K/UL (ref 0–0.5)
EOSINOPHIL NFR BLD: 2.4 % (ref 0–8)
ERYTHROCYTE [DISTWIDTH] IN BLOOD BY AUTOMATED COUNT: 14.8 % (ref 11.5–14.5)
EST. GFR  (AFRICAN AMERICAN): >60 ML/MIN/1.73 M^2
EST. GFR  (NON AFRICAN AMERICAN): >60 ML/MIN/1.73 M^2
FILAMENT FUNGI VAG WET PREP-#/AREA: ABNORMAL
GLUCOSE SERPL-MCNC: 98 MG/DL (ref 70–110)
GLUCOSE UR QL STRIP: NEGATIVE
HCT VFR BLD AUTO: 39.9 % (ref 37–48.5)
HGB BLD-MCNC: 11.9 G/DL (ref 12–16)
HGB UR QL STRIP: ABNORMAL
IMM GRANULOCYTES # BLD AUTO: 0.02 K/UL (ref 0–0.04)
IMM GRANULOCYTES NFR BLD AUTO: 0.2 % (ref 0–0.5)
KETONES UR QL STRIP: NEGATIVE
LEUKOCYTE ESTERASE UR QL STRIP: ABNORMAL
LYMPHOCYTES # BLD AUTO: 3.8 K/UL (ref 1–4.8)
LYMPHOCYTES NFR BLD: 46 % (ref 18–48)
MCH RBC QN AUTO: 24.2 PG (ref 27–31)
MCHC RBC AUTO-ENTMCNC: 29.8 G/DL (ref 32–36)
MCV RBC AUTO: 81 FL (ref 82–98)
MICROSCOPIC COMMENT: ABNORMAL
MONOCYTES # BLD AUTO: 0.7 K/UL (ref 0.3–1)
MONOCYTES NFR BLD: 8.3 % (ref 4–15)
NEUTROPHILS # BLD AUTO: 3.5 K/UL (ref 1.8–7.7)
NEUTROPHILS NFR BLD: 42.4 % (ref 38–73)
NITRITE UR QL STRIP: NEGATIVE
NRBC BLD-RTO: 0 /100 WBC
PH UR STRIP: 6 [PH] (ref 5–8)
PLATELET # BLD AUTO: 338 K/UL (ref 150–350)
PMV BLD AUTO: 9.8 FL (ref 9.2–12.9)
POTASSIUM SERPL-SCNC: 3.9 MMOL/L (ref 3.5–5.1)
PROT SERPL-MCNC: 7.5 G/DL (ref 6–8.4)
PROT UR QL STRIP: NEGATIVE
RBC # BLD AUTO: 4.91 M/UL (ref 4–5.4)
RBC #/AREA URNS AUTO: 2 /HPF (ref 0–4)
SODIUM SERPL-SCNC: 137 MMOL/L (ref 136–145)
SP GR UR STRIP: 1.03 (ref 1–1.03)
SPECIMEN SOURCE: ABNORMAL
SQUAMOUS #/AREA URNS AUTO: 9 /HPF
T VAGINALIS GENITAL QL WET PREP: ABNORMAL
URN SPEC COLLECT METH UR: ABNORMAL
WBC # BLD AUTO: 8.31 K/UL (ref 3.9–12.7)
WBC #/AREA URNS AUTO: 3 /HPF (ref 0–5)
WBC #/AREA VAG WET PREP: ABNORMAL
YEAST GENITAL QL WET PREP: ABNORMAL

## 2020-07-21 PROCEDURE — 81025 URINE PREGNANCY TEST: CPT | Performed by: PHYSICIAN ASSISTANT

## 2020-07-21 PROCEDURE — 87491 CHLMYD TRACH DNA AMP PROBE: CPT

## 2020-07-21 PROCEDURE — 99284 EMERGENCY DEPT VISIT MOD MDM: CPT | Mod: 25

## 2020-07-21 PROCEDURE — 99285 EMERGENCY DEPT VISIT HI MDM: CPT | Mod: ,,, | Performed by: PHYSICIAN ASSISTANT

## 2020-07-21 PROCEDURE — 80053 COMPREHEN METABOLIC PANEL: CPT

## 2020-07-21 PROCEDURE — 81001 URINALYSIS AUTO W/SCOPE: CPT

## 2020-07-21 PROCEDURE — 87210 SMEAR WET MOUNT SALINE/INK: CPT

## 2020-07-21 PROCEDURE — 85025 COMPLETE CBC W/AUTO DIFF WBC: CPT

## 2020-07-21 PROCEDURE — 99285 PR EMERGENCY DEPT VISIT,LEVEL V: ICD-10-PCS | Mod: ,,, | Performed by: PHYSICIAN ASSISTANT

## 2020-07-21 NOTE — ED PROVIDER NOTES
Encounter Date: 7/21/2020       History     Chief Complaint   Patient presents with    Vaginal Bleeding     took iud out on friday, now passing clots     HPI   Sonam Hidalgo is a 30 y.o. female with medical history of HTN, OBesity presenting to the ED with the chief complaint of vaginal bleeding. Patient has been experiencing vaginal bleeding for the past 3 days after IUD removed (Mirena) (OBGYN, Dr. Nicholson). Reports using 6 pads and 1 tampon today. She has associated right lower abdominal pain. No vaginal discharge or concerns for STD. No fever, lightheadedness, dizziness, chest pain, SOB, nausea, vomiting, dysuria, hematuria, diarrhea, constipation.       Review of patient's allergies indicates:  No Known Allergies  Past Medical History:   Diagnosis Date    Abnormal Pap smear of vagina     Allergy     Anxiety     Hypertension      Past Surgical History:   Procedure Laterality Date    CHOLECYSTECTOMY      LAPAROSCOPIC SURGICAL REMOVAL OF CYST OF OVARY Left 11/20/2019    Procedure: EXCISION, CYST, OVARY, LAPAROSCOPIC;  Surgeon: Merary Benton MD;  Location: [facility];  Service: OB/GYN;  Laterality: Left;    median arcuate ligament        Family History   Problem Relation Age of Onset    Breast cancer Mother     Alcohol abuse Father     Celiac disease Neg Hx     Cirrhosis Neg Hx     Colon cancer Neg Hx     Colon polyps Neg Hx     Crohn's disease Neg Hx     Cystic fibrosis Neg Hx     Esophageal cancer Neg Hx     Hemochromatosis Neg Hx     Inflammatory bowel disease Neg Hx     Irritable bowel syndrome Neg Hx     Liver cancer Neg Hx     Liver disease Neg Hx     Rectal cancer Neg Hx     Stomach cancer Neg Hx     Ulcerative colitis Neg Hx     Jacob's disease Neg Hx     Ovarian cancer Neg Hx     Arrhythmia Neg Hx     Cardiomyopathy Neg Hx     Congenital heart disease Neg Hx     Early death Neg Hx     Heart attacks under age 50 Neg Hx     Pacemaker/defibrilator Neg Hx       Social History     Tobacco Use    Smoking status: Never Smoker    Smokeless tobacco: Never Used   Substance Use Topics    Alcohol use: Yes     Comment: occas, none recently    Drug use: No     Review of Systems   Constitutional: Negative for fever.   HENT: Negative for sore throat.    Eyes: Negative for pain and redness.   Respiratory: Negative for shortness of breath.    Cardiovascular: Negative for chest pain.   Gastrointestinal: Positive for abdominal pain. Negative for nausea.   Genitourinary: Positive for vaginal bleeding. Negative for dysuria.   Musculoskeletal: Negative for back pain.   Skin: Negative for rash.   Neurological: Negative for weakness.   Hematological: Does not bruise/bleed easily.       Physical Exam     Initial Vitals [07/21/20 1747]   BP Pulse Resp Temp SpO2   (!) 141/61 79 18 98.2 °F (36.8 °C) 100 %      MAP       --         Physical Exam    Constitutional: She appears well-developed and well-nourished. She is not diaphoretic. No distress.   HENT:   Head: Normocephalic and atraumatic.   Mouth/Throat: Oropharynx is clear and moist. No oropharyngeal exudate.   Eyes: EOM are normal. Pupils are equal, round, and reactive to light.   Neck: Normal range of motion. Neck supple.   Cardiovascular: Normal rate and regular rhythm.   Pulmonary/Chest: Breath sounds normal. No respiratory distress. She has no wheezes.   Abdominal: Soft. Bowel sounds are normal. There is no abdominal tenderness. There is no guarding.   Genitourinary:    Genitourinary Comments: Vaginal exam - Mild amount of bright red blood in vaginal vault. No CMT or adnexal tenderness. No visible foreign body.     Musculoskeletal: Normal range of motion. No tenderness or edema.   Lymphadenopathy:     She has no cervical adenopathy.   Neurological: She is alert and oriented to person, place, and time. She has normal strength. No cranial nerve deficit.   Skin: Skin is warm and dry. No erythema.       ED Course   Procedures  Labs  Reviewed   CBC W/ AUTO DIFFERENTIAL - Abnormal; Notable for the following components:       Result Value    Hemoglobin 11.9 (*)     Mean Corpuscular Volume 81 (*)     Mean Corpuscular Hemoglobin 24.2 (*)     Mean Corpuscular Hemoglobin Conc 29.8 (*)     RDW 14.8 (*)     All other components within normal limits   COMPREHENSIVE METABOLIC PANEL - Abnormal; Notable for the following components:    Anion Gap 6 (*)     All other components within normal limits   URINALYSIS, REFLEX TO URINE CULTURE - Abnormal; Notable for the following components:    Appearance, UA Cloudy (*)     Occult Blood UA 3+ (*)     Leukocytes, UA Trace (*)     All other components within normal limits    Narrative:     Specimen Source->Urine   VAGINAL SCREEN - Abnormal; Notable for the following components:    WBC - Vaginal Screen Rare (*)     Bacteria - Vaginal Screen Rare (*)     All other components within normal limits   URINALYSIS MICROSCOPIC - Abnormal; Notable for the following components:    Bacteria Few (*)     All other components within normal limits    Narrative:     Specimen Source->Urine   C. TRACHOMATIS/N. GONORRHOEAE BY AMP DNA   POCT URINE PREGNANCY          Imaging Results          US Pelvis Comp with Transvag NON-OB (xpd (Final result)  Result time 07/21/20 21:11:56    Final result by Rehan Gregg MD (07/21/20 21:11:56)                 Impression:      No acute sonographic abnormality identified in the pelvis.    Nonvisualization of the left ovary.      Electronically signed by: Rehan Gregg MD  Date:    07/21/2020  Time:    21:11             Narrative:    EXAMINATION:  US PELVIS COMP WITH TRANSVAG NON-OB (XPD)    CLINICAL HISTORY:  vaginal bleeding s/p IUD removal;    COMPARISON:  Pelvic ultrasound, 10/30/2019.    TECHNIQUE:  Transabdominal and transvaginal pelvic ultrasound were performed utilizing grayscale ultrasound and color Doppler.    FINDINGS:  Uterus measures 7.6 x 4.9 x 5.0 cm.  No focal abnormality.  The  endometrial stripe is not thickened measuring 0.9 cm.    Left ovary is not visualized.  Right ovary is only visualized on transabdominal images.    Right ovary measures 3.7 x 2.7 x 2.9 cm.  Previously seen left ovarian cyst is no longer identified and may have been removed.  Normal arterial and venous flow is demonstrated in the right ovary.    There is no evidence of free fluid within the pelvis.                                 Medical Decision Making:   History:   Old Medical Records: I decided to obtain old medical records.  Old Records Summarized: records from clinic visits.  Clinical Tests:   Lab Tests: Ordered and Reviewed  Radiological Study: Ordered and Reviewed       APC / Resident Notes:   30 y.o. female with medical history of HTN, OBesity presenting to the ED c/o vaginal bleeding after IUD removal 3 days ago. DDx includes but not limited to vaginal trauma s/p IUD removal, retained foreign body, uterine fibroids, symptomatic anemia, ovarian cyst, menorrhagia.      Work-up shows H/H 11.9/39.9 (baseline 13.1/41.4). CMP, Vaginal Screen, UA unremarkable. U/S pelvis without significant findings. No foreign body seen. Patient stable for outpatient follow-up with OBGYN for further evaluation. Patient received vaginal bleeding precautions among other return to ED precautions. She expresses understanding and agreeable to the plan. I have discussed the care of this patient with my supervising physician.         Attending Attestation:     Physician Attestation Statement for NP/PA:   I discussed this assessment and plan of this patient with the NP/PA, but I did not personally examine the patient. The face to face encounter was performed by the NP/PA.                                Clinical Impression:       ICD-10-CM ICD-9-CM   1. Vaginal bleeding  N93.9 623.8         Disposition:   Disposition: Discharged  Condition: Stable     ED Disposition Condition    Discharge Stable        ED Prescriptions     None         Follow-up Information     Follow up With Specialties Details Why Contact Info    Your OBGYN

## 2020-07-21 NOTE — Clinical Note
Sonam Stanleymyrnacas was seen and treated in our emergency department on 7/21/2020.  She may return to work on 07/24/2020.       If you have any questions or concerns, please don't hesitate to call.      Ambreen Beal RN

## 2020-07-21 NOTE — Clinical Note
Sonam Hidalgo was seen and treated in our emergency department on 7/21/2020.  She may return to work on 07/24/2020.       If you have any questions or concerns, please don't hesitate to call.      Ambreen Beal RN RN

## 2020-07-21 NOTE — ED NOTES
LOC: The patient is awake and alert; oriented x 3 and speaking appropriately.  APPEARANCE: Patient resting comfortably, patient is clean and well groomed  SKIN: warm and dry, normal skin turgor & moist mucus membranes, skin intact, no breakdown noted.  MUSCULOSKELETAL: Patient moving all extremities well, no obvious swelling or deformities noted  RESPIRATORY: Airway is open and patent, breath sounds clear throughout all lung fields; respirations are spontaneous, normal effort and rate  CARDIAC: Patient has a normal rate, no peripheral edema noted, capillary refill < 3 seconds; No complaints of chest pain   ABDOMEN: Soft and non tender to palpation, no distention noted. Bowel sounds present x 4, abd cramping w/ clots.

## 2020-07-21 NOTE — ED TRIAGE NOTES
IUD removed 4 days ago.  Onset abd cramping  And began w/ 2 days ago and  Heavier today. Passing 1/2 dollar size clots. Has used 3 pads today.

## 2020-07-22 NOTE — DISCHARGE INSTRUCTIONS
Your vaginal bleeding is stable and most likely from mild trauma from your IUD device being removed versus changes in your hormones. Please follow-up with your OBGYN for further evaluation. If you develop vaginal bleeding where you are using more than 1 pad per 2 hours then you should return to the emergency room. Additionally if you develop worsening abdominal pain, lightheadedness, dizziness, or if you pass out.     Your ED labs today are below and will be available in care everywhere in the next 48 hours:  Labs Reviewed   CBC W/ AUTO DIFFERENTIAL - Abnormal; Notable for the following components:       Result Value    Hemoglobin 11.9 (*)     Mean Corpuscular Volume 81 (*)     Mean Corpuscular Hemoglobin 24.2 (*)     Mean Corpuscular Hemoglobin Conc 29.8 (*)     RDW 14.8 (*)     All other components within normal limits   COMPREHENSIVE METABOLIC PANEL - Abnormal; Notable for the following components:    Anion Gap 6 (*)     All other components within normal limits   URINALYSIS, REFLEX TO URINE CULTURE - Abnormal; Notable for the following components:    Appearance, UA Cloudy (*)     Occult Blood UA 3+ (*)     Leukocytes, UA Trace (*)     All other components within normal limits    Narrative:     Specimen Source->Urine   VAGINAL SCREEN - Abnormal; Notable for the following components:    WBC - Vaginal Screen Rare (*)     Bacteria - Vaginal Screen Rare (*)     All other components within normal limits   URINALYSIS MICROSCOPIC - Abnormal; Notable for the following components:    Bacteria Few (*)     All other components within normal limits    Narrative:     Specimen Source->Urine   C. TRACHOMATIS/N. GONORRHOEAE BY AMP DNA   POCT URINE PREGNANCY     Your imaging results are showed below and will be available in care everywhere:  Imaging Results              US Pelvis Comp with Transvag NON-OB (xpd (Final result)  Result time 07/21/20 21:11:56      Final result by Rehan Gregg MD (07/21/20 21:11:56)                    Impression:      No acute sonographic abnormality identified in the pelvis.    Nonvisualization of the left ovary.      Electronically signed by: Rehan Gregg MD  Date:    07/21/2020  Time:    21:11               Narrative:    EXAMINATION:  US PELVIS COMP WITH TRANSVAG NON-OB (XPD)    CLINICAL HISTORY:  vaginal bleeding s/p IUD removal;    COMPARISON:  Pelvic ultrasound, 10/30/2019.    TECHNIQUE:  Transabdominal and transvaginal pelvic ultrasound were performed utilizing grayscale ultrasound and color Doppler.    FINDINGS:  Uterus measures 7.6 x 4.9 x 5.0 cm.  No focal abnormality.  The endometrial stripe is not thickened measuring 0.9 cm.    Left ovary is not visualized.  Right ovary is only visualized on transabdominal images.    Right ovary measures 3.7 x 2.7 x 2.9 cm.  Previously seen left ovarian cyst is no longer identified and may have been removed.  Normal arterial and venous flow is demonstrated in the right ovary.    There is no evidence of free fluid within the pelvis.

## 2020-07-24 LAB
C TRACH DNA SPEC QL NAA+PROBE: NOT DETECTED
N GONORRHOEA DNA SPEC QL NAA+PROBE: NOT DETECTED

## 2020-11-18 ENCOUNTER — OFFICE VISIT (OUTPATIENT)
Dept: URGENT CARE | Facility: CLINIC | Age: 30
End: 2020-11-18
Payer: COMMERCIAL

## 2020-11-18 VITALS
BODY MASS INDEX: 43.41 KG/M2 | RESPIRATION RATE: 19 BRPM | DIASTOLIC BLOOD PRESSURE: 82 MMHG | SYSTOLIC BLOOD PRESSURE: 135 MMHG | HEIGHT: 63 IN | HEART RATE: 82 BPM | TEMPERATURE: 98 F | OXYGEN SATURATION: 98 % | WEIGHT: 245 LBS

## 2020-11-18 DIAGNOSIS — B34.9 ACUTE VIRAL SYNDROME: Primary | ICD-10-CM

## 2020-11-18 DIAGNOSIS — R11.0 NAUSEA: ICD-10-CM

## 2020-11-18 DIAGNOSIS — R52 GENERALIZED BODY ACHES: ICD-10-CM

## 2020-11-18 LAB
CTP QC/QA: YES
SARS-COV-2 RDRP RESP QL NAA+PROBE: NEGATIVE

## 2020-11-18 PROCEDURE — U0002 COVID-19 LAB TEST NON-CDC: HCPCS | Mod: QW,S$GLB,, | Performed by: NURSE PRACTITIONER

## 2020-11-18 PROCEDURE — 99213 PR OFFICE/OUTPT VISIT, EST, LEVL III, 20-29 MIN: ICD-10-PCS | Mod: S$GLB,,, | Performed by: NURSE PRACTITIONER

## 2020-11-18 PROCEDURE — 3008F BODY MASS INDEX DOCD: CPT | Mod: CPTII,S$GLB,, | Performed by: NURSE PRACTITIONER

## 2020-11-18 PROCEDURE — 3008F PR BODY MASS INDEX (BMI) DOCUMENTED: ICD-10-PCS | Mod: CPTII,S$GLB,, | Performed by: NURSE PRACTITIONER

## 2020-11-18 PROCEDURE — U0002: ICD-10-PCS | Mod: QW,S$GLB,, | Performed by: NURSE PRACTITIONER

## 2020-11-18 PROCEDURE — 99213 OFFICE O/P EST LOW 20 MIN: CPT | Mod: S$GLB,,, | Performed by: NURSE PRACTITIONER

## 2020-11-18 RX ORDER — MECLIZINE HYDROCHLORIDE 25 MG/1
25 TABLET ORAL 3 TIMES DAILY PRN
Qty: 30 TABLET | Refills: 0 | Status: SHIPPED | OUTPATIENT
Start: 2020-11-18 | End: 2021-02-17

## 2020-11-18 NOTE — PROGRESS NOTES
"Subjective:       Patient ID: Sonam Hidalgo is a 30 y.o. female.    Vitals:  height is 5' 2.75" (1.594 m) and weight is 111.1 kg (245 lb). Her oral temperature is 97.6 °F (36.4 °C). Her blood pressure is 135/82 and her pulse is 82. Her respiration is 19 and oxygen saturation is 98%.     Chief Complaint: URI and COVID-19 Concerns    URI   This is a new problem. The current episode started in the past 7 days (2 days ). The problem has been unchanged. There has been no fever. Associated symptoms include headaches and nausea. Pertinent negatives include no abdominal pain, chest pain, congestion, coughing, diarrhea, dysuria, ear pain, joint pain, joint swelling, neck pain, plugged ear sensation, rash, rhinorrhea, sinus pain, sneezing, sore throat, swollen glands, vomiting or wheezing. She has tried NSAIDs for the symptoms. The treatment provided mild relief.       Constitution: Negative for chills, sweating, fatigue and fever.   HENT: Negative for ear pain, congestion, sinus pain, sinus pressure, sore throat and voice change.    Neck: Negative for neck pain and painful lymph nodes.   Cardiovascular: Negative for chest pain.   Eyes: Negative for eye redness.   Respiratory: Negative for chest tightness, cough, sputum production, bloody sputum, COPD, shortness of breath, stridor, wheezing and asthma.    Gastrointestinal: Positive for nausea. Negative for abdominal pain, vomiting and diarrhea.   Genitourinary: Negative for dysuria.   Musculoskeletal: Positive for muscle ache.   Skin: Negative for rash.   Allergic/Immunologic: Negative for seasonal allergies, asthma and sneezing.   Neurological: Positive for headaches.   Hematologic/Lymphatic: Negative for swollen lymph nodes.       Objective:      Physical Exam   Constitutional: She is oriented to person, place, and time. She appears well-developed. She is cooperative.  Non-toxic appearance. She does not appear ill. No distress.   HENT:   Head: Normocephalic " and atraumatic.   Ears:   Right Ear: Hearing, external ear and ear canal normal. A middle ear effusion is present.   Left Ear: Hearing, external ear and ear canal normal. A middle ear effusion is present.   Nose: Nose normal. No mucosal edema, rhinorrhea or nasal deformity. No epistaxis. Right sinus exhibits no maxillary sinus tenderness and no frontal sinus tenderness. Left sinus exhibits no maxillary sinus tenderness and no frontal sinus tenderness.   Mouth/Throat: Uvula is midline, oropharynx is clear and moist and mucous membranes are normal. No trismus in the jaw. Normal dentition. No uvula swelling. No oropharyngeal exudate, posterior oropharyngeal edema or posterior oropharyngeal erythema.   Eyes: Conjunctivae and lids are normal. No scleral icterus.   Neck: Trachea normal, full passive range of motion without pain and phonation normal. Neck supple. No neck rigidity. No edema and no erythema present.   Cardiovascular: Normal rate, regular rhythm, normal heart sounds and normal pulses.   Pulmonary/Chest: Effort normal and breath sounds normal. No respiratory distress. She has no decreased breath sounds. She has no rhonchi.   Abdominal: Normal appearance.   Musculoskeletal: Normal range of motion.         General: No deformity.   Neurological: She is alert and oriented to person, place, and time. She exhibits normal muscle tone. Coordination normal.   Skin: Skin is warm, dry, intact, not diaphoretic and not pale. Psychiatric: Her speech is normal and behavior is normal. Judgment and thought content normal.   Nursing note and vitals reviewed.        Results for orders placed or performed in visit on 11/18/20   POCT COVID-19 Rapid Screening   Result Value Ref Range    POC Rapid COVID Negative Negative     Acceptable Yes      Assessment:       1. Acute viral syndrome    2. Generalized body aches    3. Nausea        Plan:     COVID negative.   Meclizine for nausea and intermittent dizziness    Acute  viral syndrome  -     meclizine (ANTIVERT) 25 mg tablet; Take 1 tablet (25 mg total) by mouth 3 (three) times daily as needed for Dizziness or Nausea.  Dispense: 30 tablet; Refill: 0    Generalized body aches  -     POCT COVID-19 Rapid Screening    Nausea  -     meclizine (ANTIVERT) 25 mg tablet; Take 1 tablet (25 mg total) by mouth 3 (three) times daily as needed for Dizziness or Nausea.  Dispense: 30 tablet; Refill: 0         Patient Instructions   Nausea & Vomiting  If your condition worsens or fails to improve we recommend that you receive another evaluation at the ER immediately or contact your PCP to discuss your concerns or return here. You must understand that you've received an urgent care treatment only and that you may be released before all your medical problems are known or treated. You the patient will arrange for followup care as instructed.   Use prescribed anti-nausea medicine as needed and prescribed   Increase fluids and rest is important   Start off with liquid diet and progress as tolerated (see below)  · Water and clear liquids are important so you do not get dehydrated. Drink a small amount at a time.  · Do not force yourself to eat, especially if you have cramps, vomiting, or diarrhea. When you finally decide to start eating, do not eat large amounts at a time, even if you are hungry.  · If you eat, avoid fatty, greasy, spicy, or fried foods.  · Do not eat dairy products if you have diarrhea; they can make the diarrhea worse  Watch for any increase pain, fever, localized pain to right lower abdomen or continued vomiting or diarrhea.                                                         Viral Illness  If your condition worsens or fails to improve we recommend that you receive another evaluation at the ER immediately or contact your PCP to discuss your concerns or return here. You must understand that you've received an urgent care treatment only and that you may be released before all your  medical problems are known or treated. You the patient will arrange for follouwp care as instructed.   We discussed that your illness is viral in nature so you will treat this symptomatically.    Tylenol or Motrin for fever, pain or sore throat  Rest and fluids are important

## 2020-11-19 NOTE — PATIENT INSTRUCTIONS
Nausea & Vomiting  If your condition worsens or fails to improve we recommend that you receive another evaluation at the ER immediately or contact your PCP to discuss your concerns or return here. You must understand that you've received an urgent care treatment only and that you may be released before all your medical problems are known or treated. You the patient will arrange for followup care as instructed.   Use prescribed anti-nausea medicine as needed and prescribed   Increase fluids and rest is important   Start off with liquid diet and progress as tolerated (see below)  · Water and clear liquids are important so you do not get dehydrated. Drink a small amount at a time.  · Do not force yourself to eat, especially if you have cramps, vomiting, or diarrhea. When you finally decide to start eating, do not eat large amounts at a time, even if you are hungry.  · If you eat, avoid fatty, greasy, spicy, or fried foods.  · Do not eat dairy products if you have diarrhea; they can make the diarrhea worse  Watch for any increase pain, fever, localized pain to right lower abdomen or continued vomiting or diarrhea.                                                         Viral Illness  If your condition worsens or fails to improve we recommend that you receive another evaluation at the ER immediately or contact your PCP to discuss your concerns or return here. You must understand that you've received an urgent care treatment only and that you may be released before all your medical problems are known or treated. You the patient will arrange for follouwp care as instructed.   We discussed that your illness is viral in nature so you will treat this symptomatically.    Tylenol or Motrin for fever, pain or sore throat  Rest and fluids are important

## 2020-11-22 ENCOUNTER — HOSPITAL ENCOUNTER (EMERGENCY)
Facility: HOSPITAL | Age: 30
Discharge: HOME | End: 2020-11-22
Attending: EMERGENCY MEDICINE
Payer: COMMERCIAL

## 2020-11-22 ENCOUNTER — NURSE TRIAGE (OUTPATIENT)
Dept: ADMINISTRATIVE | Facility: CLINIC | Age: 30
End: 2020-11-22

## 2020-11-22 VITALS
WEIGHT: 245 LBS | SYSTOLIC BLOOD PRESSURE: 120 MMHG | OXYGEN SATURATION: 98 % | HEIGHT: 63 IN | DIASTOLIC BLOOD PRESSURE: 70 MMHG | BODY MASS INDEX: 43.41 KG/M2 | TEMPERATURE: 99 F | HEART RATE: 79 BPM | RESPIRATION RATE: 16 BRPM

## 2020-11-22 DIAGNOSIS — R53.83 FATIGUE, UNSPECIFIED TYPE: ICD-10-CM

## 2020-11-22 DIAGNOSIS — N39.0 URINARY TRACT INFECTION WITHOUT HEMATURIA, SITE UNSPECIFIED: Primary | ICD-10-CM

## 2020-11-22 DIAGNOSIS — R07.9 CHEST PAIN: ICD-10-CM

## 2020-11-22 LAB
B-HCG UR QL: NEGATIVE
BACTERIA #/AREA URNS AUTO: ABNORMAL /HPF
BILIRUB UR QL STRIP: NEGATIVE
CLARITY UR REFRACT.AUTO: ABNORMAL
COLOR UR AUTO: YELLOW
CTP QC/QA: YES
GLUCOSE UR QL STRIP: NEGATIVE
HGB UR QL STRIP: NEGATIVE
KETONES UR QL STRIP: NEGATIVE
LEUKOCYTE ESTERASE UR QL STRIP: ABNORMAL
MICROSCOPIC COMMENT: ABNORMAL
NITRITE UR QL STRIP: NEGATIVE
PH UR STRIP: 5 [PH] (ref 5–8)
POC MOLECULAR INFLUENZA A AGN: NEGATIVE
POC MOLECULAR INFLUENZA B AGN: NEGATIVE
PROT UR QL STRIP: NEGATIVE
SARS-COV-2 RDRP RESP QL NAA+PROBE: NEGATIVE
SARS-COV-2 RDRP RESP QL NAA+PROBE: NEGATIVE
SP GR UR STRIP: 1.03 (ref 1–1.03)
SQUAMOUS #/AREA URNS AUTO: 7 /HPF
URN SPEC COLLECT METH UR: ABNORMAL
WBC #/AREA URNS AUTO: 12 /HPF (ref 0–5)

## 2020-11-22 PROCEDURE — 81001 URINALYSIS AUTO W/SCOPE: CPT

## 2020-11-22 PROCEDURE — 87502 INFLUENZA DNA AMP PROBE: CPT

## 2020-11-22 PROCEDURE — 99285 PR EMERGENCY DEPT VISIT,LEVEL V: ICD-10-PCS | Mod: ,,, | Performed by: PHYSICIAN ASSISTANT

## 2020-11-22 PROCEDURE — 99284 EMERGENCY DEPT VISIT MOD MDM: CPT | Mod: 25

## 2020-11-22 PROCEDURE — U0002 COVID-19 LAB TEST NON-CDC: HCPCS | Performed by: PSYCHIATRY & NEUROLOGY

## 2020-11-22 PROCEDURE — U0002 COVID-19 LAB TEST NON-CDC: HCPCS

## 2020-11-22 PROCEDURE — 87086 URINE CULTURE/COLONY COUNT: CPT

## 2020-11-22 PROCEDURE — 99285 EMERGENCY DEPT VISIT HI MDM: CPT | Mod: ,,, | Performed by: PHYSICIAN ASSISTANT

## 2020-11-22 PROCEDURE — 81025 URINE PREGNANCY TEST: CPT | Performed by: PHYSICIAN ASSISTANT

## 2020-11-22 PROCEDURE — 25000003 PHARM REV CODE 250: Performed by: PHYSICIAN ASSISTANT

## 2020-11-22 RX ORDER — CEPHALEXIN 500 MG/1
500 CAPSULE ORAL EVERY 12 HOURS
Qty: 14 CAPSULE | Refills: 0 | Status: SHIPPED | OUTPATIENT
Start: 2020-11-22 | End: 2020-11-22 | Stop reason: SDUPTHER

## 2020-11-22 RX ORDER — NAPROXEN 500 MG/1
500 TABLET ORAL 2 TIMES DAILY WITH MEALS
Qty: 60 TABLET | Refills: 0 | Status: SHIPPED | OUTPATIENT
Start: 2020-11-22 | End: 2021-02-17

## 2020-11-22 RX ORDER — ONDANSETRON 8 MG/1
8 TABLET, ORALLY DISINTEGRATING ORAL
Status: COMPLETED | OUTPATIENT
Start: 2020-11-22 | End: 2020-11-22

## 2020-11-22 RX ORDER — METOCLOPRAMIDE 10 MG/1
10 TABLET ORAL EVERY 6 HOURS PRN
Qty: 20 TABLET | Refills: 0 | Status: SHIPPED | OUTPATIENT
Start: 2020-11-22 | End: 2021-02-17

## 2020-11-22 RX ORDER — ACETAMINOPHEN 500 MG
1000 TABLET ORAL
Status: COMPLETED | OUTPATIENT
Start: 2020-11-22 | End: 2020-11-22

## 2020-11-22 RX ORDER — CEPHALEXIN 500 MG/1
500 CAPSULE ORAL
Status: COMPLETED | OUTPATIENT
Start: 2020-11-22 | End: 2020-11-22

## 2020-11-22 RX ORDER — NAPROXEN 500 MG/1
500 TABLET ORAL 2 TIMES DAILY WITH MEALS
Qty: 60 TABLET | Refills: 0 | Status: SHIPPED | OUTPATIENT
Start: 2020-11-22 | End: 2020-11-22 | Stop reason: SDUPTHER

## 2020-11-22 RX ORDER — METOCLOPRAMIDE 10 MG/1
10 TABLET ORAL EVERY 6 HOURS PRN
Qty: 20 TABLET | Refills: 0 | Status: SHIPPED | OUTPATIENT
Start: 2020-11-22 | End: 2020-11-22 | Stop reason: SDUPTHER

## 2020-11-22 RX ORDER — CEPHALEXIN 500 MG/1
500 CAPSULE ORAL EVERY 12 HOURS
Qty: 14 CAPSULE | Refills: 0 | Status: SHIPPED | OUTPATIENT
Start: 2020-11-22 | End: 2020-11-29

## 2020-11-22 RX ADMIN — ACETAMINOPHEN 1000 MG: 500 TABLET ORAL at 02:11

## 2020-11-22 RX ADMIN — ONDANSETRON 8 MG: 8 TABLET, ORALLY DISINTEGRATING ORAL at 02:11

## 2020-11-22 RX ADMIN — CEPHALEXIN 500 MG: 500 CAPSULE ORAL at 04:11

## 2020-11-22 NOTE — Clinical Note
"Sonam"Jennifer Hidalgo was seen and treated in our emergency department on 11/22/2020.     COVID-19 is present in our communities across the state. There is limited testing for COVID at this time, so not all patients can be tested. In this situation, your employee meets the following criteria:    Sonam Hidalgo has met the criteria for COVID-19 testing and has a NEGATIVE result. The employee can return to work once they are asymptomatic for 72 hours without the use of fever reducing medications (Tylenol, Motrin, etc).     If you have any questions or concerns, or if I can be of further assistance, please do not hesitate to contact me.    Sincerely,             Melodie Aragon PA-C"

## 2020-11-22 NOTE — ED PROVIDER NOTES
Encounter Date: 11/22/2020       History     Chief Complaint   Patient presents with    COVID-19 Concerns     was around someone +, feeling tired     30-year-old female with anxiety and hypertension presents for concern for COVID-19.  She has been feeling very fatigued over the past 4 days with nausea, headache, cough, chest pain and malaise.  She is unsure if she has had a fever.  She denies shortness of breath, abdominal pain, vomiting, diarrhea, loss of taste or smell, sinus congestion or sore throat.  She was exposed to someone from her daughter school with COVID-19.        Review of patient's allergies indicates:  No Known Allergies  Past Medical History:   Diagnosis Date    Abnormal Pap smear of vagina     Allergy     Anxiety     Hypertension      Past Surgical History:   Procedure Laterality Date    CHOLECYSTECTOMY      LAPAROSCOPIC SURGICAL REMOVAL OF CYST OF OVARY Left 11/20/2019    Procedure: EXCISION, CYST, OVARY, LAPAROSCOPIC;  Surgeon: Merary Benton MD;  Location[facility];  Service: OB/GYN;  Laterality: Left;    median arcuate ligament        Family History   Problem Relation Age of Onset    Breast cancer Mother     Alcohol abuse Father     Celiac disease Neg Hx     Cirrhosis Neg Hx     Colon cancer Neg Hx     Colon polyps Neg Hx     Crohn's disease Neg Hx     Cystic fibrosis Neg Hx     Esophageal cancer Neg Hx     Hemochromatosis Neg Hx     Inflammatory bowel disease Neg Hx     Irritable bowel syndrome Neg Hx     Liver cancer Neg Hx     Liver disease Neg Hx     Rectal cancer Neg Hx     Stomach cancer Neg Hx     Ulcerative colitis Neg Hx     Jacob's disease Neg Hx     Ovarian cancer Neg Hx     Arrhythmia Neg Hx     Cardiomyopathy Neg Hx     Congenital heart disease Neg Hx     Early death Neg Hx     Heart attacks under age 50 Neg Hx     Pacemaker/defibrilator Neg Hx      Social History     Tobacco Use    Smoking status: Never Smoker    Smokeless tobacco: Never Used    Substance Use Topics    Alcohol use: Yes     Comment: occas, none recently    Drug use: No     Review of Systems   Constitutional: Positive for chills and fatigue. Negative for diaphoresis and fever.   HENT: Negative for congestion and sore throat.    Respiratory: Positive for cough. Negative for shortness of breath.    Cardiovascular: Positive for chest pain.   Gastrointestinal: Positive for nausea. Negative for diarrhea and vomiting.   Genitourinary: Negative for dysuria, flank pain, hematuria and urgency.   Musculoskeletal: Negative for back pain.   Skin: Negative for rash.   Allergic/Immunologic: Negative for immunocompromised state.   Neurological: Positive for headaches. Negative for weakness.   Hematological: Does not bruise/bleed easily.       Physical Exam     Initial Vitals [11/22/20 1357]   BP Pulse Resp Temp SpO2   (!) 163/95 75 18 99.2 °F (37.3 °C) 97 %      MAP       --         Physical Exam    Nursing note and vitals reviewed.  Constitutional: She appears well-developed and well-nourished.   HENT:   Head: Normocephalic and atraumatic.   Eyes: EOM are normal. Pupils are equal, round, and reactive to light.   Neck: Normal range of motion. Neck supple.   Cardiovascular: Normal rate, regular rhythm, normal heart sounds and intact distal pulses. Exam reveals no gallop and no friction rub.    No murmur heard.  Pulmonary/Chest: Breath sounds normal. No respiratory distress. She has no wheezes. She has no rhonchi. She has no rales. She exhibits no tenderness.   Musculoskeletal: Normal range of motion.   Neurological: She is alert and oriented to person, place, and time.   Skin: Skin is warm and dry.   Psychiatric: She has a normal mood and affect.         ED Course   Procedures  Labs Reviewed   URINALYSIS, REFLEX TO URINE CULTURE - Abnormal; Notable for the following components:       Result Value    Appearance, UA Hazy (*)     Leukocytes, UA 2+ (*)     All other components within normal limits     Narrative:     Specimen Source->Urine   URINALYSIS MICROSCOPIC - Abnormal; Notable for the following components:    WBC, UA 12 (*)     Bacteria Moderate (*)     All other components within normal limits    Narrative:     Specimen Source->Urine   CULTURE, URINE   SARS-COV-2 RNA AMPLIFICATION, QUAL    Narrative:     Is the patient symptomatic?->Yes  Is this needed for pre-procedure or pre-op testing?->No   SARS-COV-2 RDRP GENE    Narrative:     This test utilizes isothermal nucleic acid amplification   technology to detect the SARS-CoV-2 RdRp nucleic acid segment.   The analytical sensitivity (limit of detection) is 125 genome   equivalents/mL.   A POSITIVE result implies infection with the SARS-CoV-2 virus;   the patient is presumed to be contagious.     A NEGATIVE result means that SARS-CoV-2 nucleic acids are not   present above the limit of detection. A NEGATIVE result should be   treated as presumptive. It does not rule out the possibility of   COVID-19 and should not be the sole basis for treatment decisions.   If COVID-19 is strongly suspected based on clinical and exposure   history, re-testing using an alternate molecular assay should be   considered.   This test is only for use under the Food and Drug   Administration s Emergency Use Authorization (EUA).   Commercial kits are provided by [employer].   Performance characteristics of the EUA have been independently   verified by [facility] Department of   Pathology and Laboratory Medicine.   _________________________________________________________________   The authorized Fact Sheet for Healthcare Providers and the authorized Fact   Sheet for Patients of the ID NOW COVID-19 are available on the FDA   website:     https://www.fda.gov/media/947520/download  https://www.fda.gov/media/462588/download         POCT INFLUENZA A/B MOLECULAR   POCT URINE PREGNANCY          Imaging Results          X-Ray Chest PA And Lateral (Final result)  Result  time 11/22/20 16:08:08    Final result by Lacho Grigsby MD (11/22/20 16:08:08)                 Impression:      No acute abnormality.      Electronically signed by: Lacho Grigsby  Date:    11/22/2020  Time:    16:08             Narrative:    EXAMINATION:  XR CHEST PA AND LATERAL    CLINICAL HISTORY:  Chest pain, unspecified    TECHNIQUE:  PA and lateral views of the chest were performed.    COMPARISON:  02/20/2018    FINDINGS:  The lungs are clear, with normal appearance of pulmonary vasculature and no pleural effusion or pneumothorax.    The cardiac silhouette is normal in size. The hilar and mediastinal contours are unremarkable.    Bones are intact.                                 Medical Decision Making:   History:   Old Medical Records: I decided to obtain old medical records.  Old Records Summarized: records from clinic visits.       <> Summary of Records: Patient seen urgent care clinic 11/18/2020 and had a negative point of care COVID-19 test.  Initial Assessment:   30-year-old female presenting for fatigue, cough, nausea , headache and malaise.  She is hypertensive 163/95 with otherwise normal vitals.  Lungs CTA.  Differential Diagnosis:   COVID-19  Influenza  URI  Pregnancy  Lower suspicion for UTI  Lower suspicion for pneumonia, however given her chest discomfort will do chest x-ray  Independently Interpreted Test(s):   I have ordered and independently interpreted X-rays - see summary below.       <> Summary of X-Ray Reading(s): No consolidation or pulmonary edema  Clinical Tests:   Lab Tests: Ordered and Reviewed  ED Management:  Will check labs, do chest x-ray, give Tylenol, Zofran and reassess.    Lab workup with few WBCs in urine although contaminated sample.  Given patient's reported nausea and subjective fever will treat with Keflex.  Otherwise unremarkable, with point of care and PCR COVID-19 tests are negative, flu negative chest x-ray without any signs of pneumonia.  Will discharge with Keflex  for UTI, Reglan for nausea and naproxen for headache. Stressed the importance of follow-up, strict ED return precautions given.  Patient voiced understanding and is comfortable with discharge.                   ED Course as of Nov 23 0704   Sun Nov 22, 2020   1512 POCT urine pregnancy [CC]   1524 COVID-19 Rapid Screening [CC]   1531 POCT Influenza A/B Molecular [CC]   1611 X-Ray Chest PA And Lateral [CC]      ED Course User Index  [CC] Melodie Aragon PA-C            Clinical Impression:       ICD-10-CM ICD-9-CM   1. Urinary tract infection without hematuria, site unspecified  N39.0 599.0   2. Chest pain  R07.9 786.50   3. Fatigue, unspecified type  R53.83 780.79                      Disposition:   Disposition: Discharged  Condition: Stable     ED Disposition Condition    Discharge Stable        ED Prescriptions     Medication Sig Dispense Start Date End Date Auth. Provider    naproxen (NAPROSYN) 500 MG tablet  (Status: Discontinued) Take 1 tablet (500 mg total) by mouth 2 (two) times daily with meals. 60 tablet 11/22/2020 11/22/2020 FLACO Beckham-C    metoclopramide HCl (REGLAN) 10 MG tablet  (Status: Discontinued) Take 1 tablet (10 mg total) by mouth every 6 (six) hours as needed (Nausea). 20 tablet 11/22/2020 11/22/2020 Melodie Aragon PA-C    cephALEXin (KEFLEX) 500 MG capsule  (Status: Discontinued) Take 1 capsule (500 mg total) by mouth every 12 (twelve) hours. for 7 days 14 capsule 11/22/2020 11/22/2020 Melodie Viera-Suleman, PA-C    cephALEXin (KEFLEX) 500 MG capsule Take 1 capsule (500 mg total) by mouth every 12 (twelve) hours. for 7 days 14 capsule 11/22/2020 11/29/2020 Melodie Viera-Suleman, PA-C    metoclopramide HCl (REGLAN) 10 MG tablet Take 1 tablet (10 mg total) by mouth every 6 (six) hours as needed (Nausea). 20 tablet 11/22/2020  Melodie Florese-Link PA-C    naproxen (NAPROSYN) 500 MG tablet Take 1 tablet (500 mg total) by mouth 2 (two) times daily with meals. 60 tablet 11/22/2020   Melodie Aragon PA-C        Follow-up Information     Follow up With Specialties Details Why Contact Info    Fili Joshua MD Internal Medicine Schedule an appointment as soon as possible for a visit in 1 week  [address]  [place] [address]  [phone]      [facility] Emergency Medicine Go to  If symptoms worsen [address] [facility] [id number] [phone]                                       Melodie Aragon PA-C  11/23/20 0705

## 2020-11-22 NOTE — DISCHARGE INSTRUCTIONS
Diagnosis:  Fatigue, urinary tract infection, headache    I am not sure what is causing your symptoms.  You do have a urinary tract infection, however the sample was contaminated.  I am prescribing antibiotics for this as well as medicine for nausea and headache.  Your flu test and both COVID-19 tests were negative.  Your chest x-ray shows no signs of pneumonia.    Please schedule a follow-up appointment with your primary care doctor.  If you start to have any worsening symptoms, especially chest pain, shortness of breath or high fever please return to the emergency department.

## 2020-11-22 NOTE — ED TRIAGE NOTES
"Pt arrived from home with c/o headache, chills, nausea, emesis, dizziness, "hard to breathe," and general malaise for the past few days. Was exposed to someone with COVID several times this past week. Pt tested negative earlier this week, but still feeling bad. Rates pain 5/10. Denies cough.   "

## 2020-11-22 NOTE — TELEPHONE ENCOUNTER
Patient recently exposed to Covid and c/o constant chest pain 5/10. Denies any SOB. Advised per protocol to go to the nearest ED now. Advised the patient to call back with any further questions or if symptoms worsen.        Reason for Disposition   SEVERE or constant chest pain (Exception: mild central chest pain, present only when coughing)    Additional Information   Negative: Severe difficulty breathing (e.g., struggling for each breath, speaks in single words)   Negative: Difficult to awaken or acting confused (e.g., disoriented, slurred speech)   Negative: Bluish (or gray) lips or face now   Negative: Shock suspected (e.g., cold/pale/clammy skin, too weak to stand, low BP, rapid pulse)   Negative: Sounds like a life-threatening emergency to the triager   Negative: [1] COVID-19 suspected (e.g., cough, fever, shortness of breath) AND [2] mild symptoms AND [3] public health department recommends testing   Negative: [1] COVID-19 exposure AND [2] no symptoms   Negative: COVID-19 and Breastfeeding, questions about    Protocols used: CORONAVIRUS (COVID-19) - DIAGNOSED OR SMDBHKORY-O-DT

## 2020-11-24 LAB
BACTERIA UR CULT: NORMAL
BACTERIA UR CULT: NORMAL

## 2021-01-05 ENCOUNTER — CLINICAL SUPPORT (OUTPATIENT)
Dept: URGENT CARE | Facility: CLINIC | Age: 31
End: 2021-01-05
Payer: COMMERCIAL

## 2021-01-05 VITALS — OXYGEN SATURATION: 97 % | HEART RATE: 101 BPM | TEMPERATURE: 99 F

## 2021-01-05 DIAGNOSIS — R09.81 HEAD CONGESTION: Primary | ICD-10-CM

## 2021-01-05 DIAGNOSIS — J06.9 UPPER RESPIRATORY TRACT INFECTION, UNSPECIFIED TYPE: ICD-10-CM

## 2021-01-05 LAB
CTP QC/QA: YES
SARS-COV-2 RDRP RESP QL NAA+PROBE: POSITIVE

## 2021-01-05 PROCEDURE — U0002 COVID-19 LAB TEST NON-CDC: HCPCS | Mod: QW,S$GLB,, | Performed by: STUDENT IN AN ORGANIZED HEALTH CARE EDUCATION/TRAINING PROGRAM

## 2021-01-05 PROCEDURE — 99211 OFF/OP EST MAY X REQ PHY/QHP: CPT | Mod: S$GLB,,, | Performed by: STUDENT IN AN ORGANIZED HEALTH CARE EDUCATION/TRAINING PROGRAM

## 2021-01-05 PROCEDURE — 99211 PR OFFICE/OUTPT VISIT, EST, LEVL I: ICD-10-PCS | Mod: S$GLB,,, | Performed by: STUDENT IN AN ORGANIZED HEALTH CARE EDUCATION/TRAINING PROGRAM

## 2021-01-05 PROCEDURE — U0002: ICD-10-PCS | Mod: QW,S$GLB,, | Performed by: STUDENT IN AN ORGANIZED HEALTH CARE EDUCATION/TRAINING PROGRAM

## 2021-01-06 DIAGNOSIS — U07.1 COVID-19 VIRUS DETECTED: ICD-10-CM

## 2021-01-07 ENCOUNTER — HOSPITAL ENCOUNTER (EMERGENCY)
Facility: HOSPITAL | Age: 31
Discharge: HOME | End: 2021-01-07
Attending: EMERGENCY MEDICINE
Payer: COMMERCIAL

## 2021-01-07 ENCOUNTER — NURSE TRIAGE (OUTPATIENT)
Dept: ADMINISTRATIVE | Facility: CLINIC | Age: 31
End: 2021-01-07

## 2021-01-07 VITALS
WEIGHT: 250 LBS | HEART RATE: 98 BPM | OXYGEN SATURATION: 96 % | SYSTOLIC BLOOD PRESSURE: 140 MMHG | TEMPERATURE: 99 F | BODY MASS INDEX: 44.3 KG/M2 | DIASTOLIC BLOOD PRESSURE: 82 MMHG | RESPIRATION RATE: 17 BRPM | HEIGHT: 63 IN

## 2021-01-07 DIAGNOSIS — U07.1 COVID-19 VIRUS INFECTION: ICD-10-CM

## 2021-01-07 LAB
B-HCG UR QL: NEGATIVE
CTP QC/QA: YES

## 2021-01-07 PROCEDURE — 99284 EMERGENCY DEPT VISIT MOD MDM: CPT | Mod: 25

## 2021-01-07 PROCEDURE — 99284 PR EMERGENCY DEPT VISIT,LEVEL IV: ICD-10-PCS | Mod: ,,, | Performed by: EMERGENCY MEDICINE

## 2021-01-07 PROCEDURE — 99284 EMERGENCY DEPT VISIT MOD MDM: CPT | Mod: ,,, | Performed by: EMERGENCY MEDICINE

## 2021-01-07 PROCEDURE — 81025 URINE PREGNANCY TEST: CPT | Performed by: EMERGENCY MEDICINE

## 2021-01-07 RX ORDER — METHYLPREDNISOLONE 4 MG/1
TABLET ORAL
Qty: 1 PACKAGE | Refills: 0 | Status: SHIPPED | OUTPATIENT
Start: 2021-01-07 | End: 2021-01-28

## 2021-01-07 RX ORDER — BENZONATATE 100 MG/1
100 CAPSULE ORAL 3 TIMES DAILY PRN
Qty: 20 CAPSULE | Refills: 0 | Status: SHIPPED | OUTPATIENT
Start: 2021-01-07 | End: 2021-01-17

## 2021-02-03 ENCOUNTER — PATIENT OUTREACH (OUTPATIENT)
Dept: ADMINISTRATIVE | Facility: HOSPITAL | Age: 31
End: 2021-02-03

## 2021-02-05 NOTE — PROGRESS NOTES
PROCEDURE:     PRE-IUD REMOVAL COUNSELING:  The patient was advised of minimal risks of bleeding and pain and she agrees to proceed.    PROCEDURE:  TIME OUT PERFORMED.  IUD strings were visualized at the os and grasped. IUD removed with gentle traction.  The patient tolerated the procedure well      POST IUD REMOVAL COUNSELING:  Expect period-like flow to occur after Mirena IUD removal and periods to return to pre-IUD pattern.  Manage post IUD removal cramping with NSAIDs, Tylenol or Rx per [employer].    POST IUD REMOVAL CONTRACEPTION:[Post IUD removal contraception- none]    Counseling lasted approximately 15 minutes and all her questions were answered.    FOLLOW-UP: With me for annual gyn exam or prn.       ESSU2 PABLO Blair

## 2021-02-17 ENCOUNTER — TELEPHONE (OUTPATIENT)
Dept: INTERNAL MEDICINE | Facility: CLINIC | Age: 31
End: 2021-02-17

## 2021-02-17 ENCOUNTER — OFFICE VISIT (OUTPATIENT)
Dept: INTERNAL MEDICINE | Facility: CLINIC | Age: 31
End: 2021-02-17
Payer: COMMERCIAL

## 2021-02-17 DIAGNOSIS — E66.01 CLASS 3 SEVERE OBESITY DUE TO EXCESS CALORIES WITH SERIOUS COMORBIDITY IN ADULT, UNSPECIFIED BMI: ICD-10-CM

## 2021-02-17 DIAGNOSIS — F32.0 CURRENT MILD EPISODE OF MAJOR DEPRESSIVE DISORDER WITHOUT PRIOR EPISODE: Primary | ICD-10-CM

## 2021-02-17 DIAGNOSIS — R03.0 ELEVATED SYSTOLIC BLOOD PRESSURE READING WITHOUT DIAGNOSIS OF HYPERTENSION: ICD-10-CM

## 2021-02-17 PROCEDURE — 99213 OFFICE O/P EST LOW 20 MIN: CPT | Mod: 95,,, | Performed by: INTERNAL MEDICINE

## 2021-02-17 PROCEDURE — 99213 PR OFFICE/OUTPT VISIT, EST, LEVL III, 20-29 MIN: ICD-10-PCS | Mod: 95,,, | Performed by: INTERNAL MEDICINE

## 2021-02-17 RX ORDER — SERTRALINE HYDROCHLORIDE 50 MG/1
50 TABLET, FILM COATED ORAL DAILY
Qty: 30 TABLET | Refills: 2 | Status: SHIPPED | OUTPATIENT
Start: 2021-02-17 | End: 2021-02-24 | Stop reason: SDUPTHER

## 2021-02-24 ENCOUNTER — PATIENT MESSAGE (OUTPATIENT)
Dept: INTERNAL MEDICINE | Facility: CLINIC | Age: 31
End: 2021-02-24

## 2021-02-24 DIAGNOSIS — F32.0 CURRENT MILD EPISODE OF MAJOR DEPRESSIVE DISORDER WITHOUT PRIOR EPISODE: ICD-10-CM

## 2021-02-24 RX ORDER — SERTRALINE HYDROCHLORIDE 50 MG/1
50 TABLET, FILM COATED ORAL DAILY
Qty: 30 TABLET | Refills: 2 | Status: SHIPPED | OUTPATIENT
Start: 2021-02-24 | End: 2021-05-19

## 2021-04-16 ENCOUNTER — PATIENT MESSAGE (OUTPATIENT)
Dept: RESEARCH | Facility: HOSPITAL | Age: 31
End: 2021-04-16

## 2021-05-13 ENCOUNTER — OFFICE VISIT (OUTPATIENT)
Dept: PSYCHIATRY | Facility: CLINIC | Age: 31
End: 2021-05-13
Payer: COMMERCIAL

## 2021-05-13 DIAGNOSIS — F32.0 CURRENT MILD EPISODE OF MAJOR DEPRESSIVE DISORDER WITHOUT PRIOR EPISODE: ICD-10-CM

## 2021-05-13 PROCEDURE — 90791 PSYCH DIAGNOSTIC EVALUATION: CPT | Mod: 95,,, | Performed by: SOCIAL WORKER

## 2021-05-13 PROCEDURE — 90791 PR PSYCHIATRIC DIAGNOSTIC EVALUATION: ICD-10-PCS | Mod: 95,,, | Performed by: SOCIAL WORKER

## 2021-07-02 ENCOUNTER — OFFICE VISIT (OUTPATIENT)
Dept: INTERNAL MEDICINE | Facility: CLINIC | Age: 31
End: 2021-07-02
Payer: COMMERCIAL

## 2021-07-02 DIAGNOSIS — R47.89 WORD FINDING DIFFICULTY: Primary | ICD-10-CM

## 2021-07-02 PROCEDURE — 99213 PR OFFICE/OUTPT VISIT, EST, LEVL III, 20-29 MIN: ICD-10-PCS | Mod: 95,,, | Performed by: INTERNAL MEDICINE

## 2021-07-02 PROCEDURE — 99213 OFFICE O/P EST LOW 20 MIN: CPT | Mod: 95,,, | Performed by: INTERNAL MEDICINE

## 2021-08-03 ENCOUNTER — OFFICE VISIT (OUTPATIENT)
Dept: URGENT CARE | Facility: CLINIC | Age: 31
End: 2021-08-03
Payer: COMMERCIAL

## 2021-08-03 VITALS
HEART RATE: 87 BPM | SYSTOLIC BLOOD PRESSURE: 114 MMHG | DIASTOLIC BLOOD PRESSURE: 81 MMHG | OXYGEN SATURATION: 97 % | RESPIRATION RATE: 18 BRPM | TEMPERATURE: 98 F

## 2021-08-03 DIAGNOSIS — R53.83 FATIGUE, UNSPECIFIED TYPE: Primary | ICD-10-CM

## 2021-08-03 LAB
CTP QC/QA: YES
SARS-COV-2 RDRP RESP QL NAA+PROBE: NEGATIVE

## 2021-08-03 PROCEDURE — 1159F MED LIST DOCD IN RCRD: CPT | Mod: CPTII,S$GLB,, | Performed by: FAMILY MEDICINE

## 2021-08-03 PROCEDURE — U0002 COVID-19 LAB TEST NON-CDC: HCPCS | Mod: QW,S$GLB,, | Performed by: FAMILY MEDICINE

## 2021-08-03 PROCEDURE — 1159F PR MEDICATION LIST DOCUMENTED IN MEDICAL RECORD: ICD-10-PCS | Mod: CPTII,S$GLB,, | Performed by: FAMILY MEDICINE

## 2021-08-03 PROCEDURE — 3079F PR MOST RECENT DIASTOLIC BLOOD PRESSURE 80-89 MM HG: ICD-10-PCS | Mod: CPTII,S$GLB,, | Performed by: FAMILY MEDICINE

## 2021-08-03 PROCEDURE — 3079F DIAST BP 80-89 MM HG: CPT | Mod: CPTII,S$GLB,, | Performed by: FAMILY MEDICINE

## 2021-08-03 PROCEDURE — U0002: ICD-10-PCS | Mod: QW,S$GLB,, | Performed by: FAMILY MEDICINE

## 2021-08-03 PROCEDURE — 99213 PR OFFICE/OUTPT VISIT, EST, LEVL III, 20-29 MIN: ICD-10-PCS | Mod: S$GLB,,, | Performed by: FAMILY MEDICINE

## 2021-08-03 PROCEDURE — 3074F PR MOST RECENT SYSTOLIC BLOOD PRESSURE < 130 MM HG: ICD-10-PCS | Mod: CPTII,S$GLB,, | Performed by: FAMILY MEDICINE

## 2021-08-03 PROCEDURE — 99213 OFFICE O/P EST LOW 20 MIN: CPT | Mod: S$GLB,,, | Performed by: FAMILY MEDICINE

## 2021-08-03 PROCEDURE — 3074F SYST BP LT 130 MM HG: CPT | Mod: CPTII,S$GLB,, | Performed by: FAMILY MEDICINE

## 2021-08-03 RX ORDER — TRIAMCINOLONE ACETONIDE 1 MG/G
OINTMENT TOPICAL
COMMUNITY
Start: 2021-04-27 | End: 2021-08-29

## 2021-08-03 RX ORDER — LORATADINE 10 MG/1
10 TABLET ORAL DAILY
Qty: 30 TABLET | Refills: 2 | Status: SHIPPED | OUTPATIENT
Start: 2021-08-03 | End: 2021-12-16

## 2021-08-15 ENCOUNTER — PATIENT MESSAGE (OUTPATIENT)
Dept: INTERNAL MEDICINE | Facility: CLINIC | Age: 31
End: 2021-08-15

## 2021-08-26 ENCOUNTER — OFFICE VISIT (OUTPATIENT)
Dept: INTERNAL MEDICINE | Facility: CLINIC | Age: 31
End: 2021-08-26
Payer: COMMERCIAL

## 2021-08-26 ENCOUNTER — LAB VISIT (OUTPATIENT)
Dept: LAB | Facility: HOSPITAL | Age: 31
End: 2021-08-26
Attending: INTERNAL MEDICINE
Payer: COMMERCIAL

## 2021-08-26 VITALS
SYSTOLIC BLOOD PRESSURE: 128 MMHG | RESPIRATION RATE: 18 BRPM | BODY MASS INDEX: 46.49 KG/M2 | WEIGHT: 262.38 LBS | HEIGHT: 63 IN | OXYGEN SATURATION: 100 % | DIASTOLIC BLOOD PRESSURE: 88 MMHG | HEART RATE: 100 BPM

## 2021-08-26 DIAGNOSIS — R03.0 ELEVATED SYSTOLIC BLOOD PRESSURE READING WITHOUT DIAGNOSIS OF HYPERTENSION: ICD-10-CM

## 2021-08-26 DIAGNOSIS — E66.01 SEVERE OBESITY WITH BODY MASS INDEX (BMI) OF 35.0 TO 35.9 AND COMORBIDITY: ICD-10-CM

## 2021-08-26 DIAGNOSIS — E66.01 SEVERE OBESITY WITH BODY MASS INDEX (BMI) OF 35.0 TO 35.9 AND COMORBIDITY: Primary | ICD-10-CM

## 2021-08-26 LAB
ALBUMIN SERPL BCP-MCNC: 3.6 G/DL (ref 3.5–5.2)
ALP SERPL-CCNC: 68 U/L (ref 55–135)
ALT SERPL W/O P-5'-P-CCNC: 20 U/L (ref 10–44)
ANION GAP SERPL CALC-SCNC: 9 MMOL/L (ref 8–16)
AST SERPL-CCNC: 16 U/L (ref 10–40)
BASOPHILS # BLD AUTO: 0.05 K/UL (ref 0–0.2)
BASOPHILS NFR BLD: 0.6 % (ref 0–1.9)
BILIRUB SERPL-MCNC: 0.6 MG/DL (ref 0.1–1)
BUN SERPL-MCNC: 11 MG/DL (ref 6–20)
CALCIUM SERPL-MCNC: 9.4 MG/DL (ref 8.7–10.5)
CHLORIDE SERPL-SCNC: 103 MMOL/L (ref 95–110)
CO2 SERPL-SCNC: 21 MMOL/L (ref 23–29)
CREAT SERPL-MCNC: 0.8 MG/DL (ref 0.5–1.4)
DIFFERENTIAL METHOD: NORMAL
EOSINOPHIL # BLD AUTO: 0.2 K/UL (ref 0–0.5)
EOSINOPHIL NFR BLD: 2.3 % (ref 0–8)
ERYTHROCYTE [DISTWIDTH] IN BLOOD BY AUTOMATED COUNT: 14 % (ref 11.5–14.5)
EST. GFR  (AFRICAN AMERICAN): >60 ML/MIN/1.73 M^2
EST. GFR  (NON AFRICAN AMERICAN): >60 ML/MIN/1.73 M^2
ESTIMATED AVG GLUCOSE: 97 MG/DL (ref 68–131)
GLUCOSE SERPL-MCNC: 85 MG/DL (ref 70–110)
HBA1C MFR BLD: 5 % (ref 4–5.6)
HCT VFR BLD AUTO: 41.2 % (ref 37–48.5)
HGB BLD-MCNC: 13.5 G/DL (ref 12–16)
IMM GRANULOCYTES # BLD AUTO: 0.04 K/UL (ref 0–0.04)
IMM GRANULOCYTES NFR BLD AUTO: 0.5 % (ref 0–0.5)
LYMPHOCYTES # BLD AUTO: 1.6 K/UL (ref 1–4.8)
LYMPHOCYTES NFR BLD: 20.3 % (ref 18–48)
MCH RBC QN AUTO: 27.4 PG (ref 27–31)
MCHC RBC AUTO-ENTMCNC: 32.8 G/DL (ref 32–36)
MCV RBC AUTO: 84 FL (ref 82–98)
MONOCYTES # BLD AUTO: 0.6 K/UL (ref 0.3–1)
MONOCYTES NFR BLD: 7.4 % (ref 4–15)
NEUTROPHILS # BLD AUTO: 5.3 K/UL (ref 1.8–7.7)
NEUTROPHILS NFR BLD: 68.9 % (ref 38–73)
NRBC BLD-RTO: 0 /100 WBC
PLATELET # BLD AUTO: 324 K/UL (ref 150–450)
PMV BLD AUTO: 9.8 FL (ref 9.2–12.9)
POTASSIUM SERPL-SCNC: 4 MMOL/L (ref 3.5–5.1)
PROT SERPL-MCNC: 7.2 G/DL (ref 6–8.4)
RBC # BLD AUTO: 4.93 M/UL (ref 4–5.4)
SODIUM SERPL-SCNC: 133 MMOL/L (ref 136–145)
WBC # BLD AUTO: 7.75 K/UL (ref 3.9–12.7)

## 2021-08-26 PROCEDURE — 3079F DIAST BP 80-89 MM HG: CPT | Mod: CPTII,S$GLB,, | Performed by: INTERNAL MEDICINE

## 2021-08-26 PROCEDURE — 3079F PR MOST RECENT DIASTOLIC BLOOD PRESSURE 80-89 MM HG: ICD-10-PCS | Mod: CPTII,S$GLB,, | Performed by: INTERNAL MEDICINE

## 2021-08-26 PROCEDURE — 36415 COLL VENOUS BLD VENIPUNCTURE: CPT | Performed by: INTERNAL MEDICINE

## 2021-08-26 PROCEDURE — 3008F BODY MASS INDEX DOCD: CPT | Mod: CPTII,S$GLB,, | Performed by: INTERNAL MEDICINE

## 2021-08-26 PROCEDURE — 3074F PR MOST RECENT SYSTOLIC BLOOD PRESSURE < 130 MM HG: ICD-10-PCS | Mod: CPTII,S$GLB,, | Performed by: INTERNAL MEDICINE

## 2021-08-26 PROCEDURE — 99214 PR OFFICE/OUTPT VISIT, EST, LEVL IV, 30-39 MIN: ICD-10-PCS | Mod: S$GLB,,, | Performed by: INTERNAL MEDICINE

## 2021-08-26 PROCEDURE — 3044F PR MOST RECENT HEMOGLOBIN A1C LEVEL <7.0%: ICD-10-PCS | Mod: CPTII,S$GLB,, | Performed by: INTERNAL MEDICINE

## 2021-08-26 PROCEDURE — 1160F RVW MEDS BY RX/DR IN RCRD: CPT | Mod: CPTII,S$GLB,, | Performed by: INTERNAL MEDICINE

## 2021-08-26 PROCEDURE — 1160F PR REVIEW ALL MEDS BY PRESCRIBER/CLIN PHARMACIST DOCUMENTED: ICD-10-PCS | Mod: CPTII,S$GLB,, | Performed by: INTERNAL MEDICINE

## 2021-08-26 PROCEDURE — 3074F SYST BP LT 130 MM HG: CPT | Mod: CPTII,S$GLB,, | Performed by: INTERNAL MEDICINE

## 2021-08-26 PROCEDURE — 80053 COMPREHEN METABOLIC PANEL: CPT | Performed by: INTERNAL MEDICINE

## 2021-08-26 PROCEDURE — 1159F MED LIST DOCD IN RCRD: CPT | Mod: CPTII,S$GLB,, | Performed by: INTERNAL MEDICINE

## 2021-08-26 PROCEDURE — 99999 PR PBB SHADOW E&M-EST. PATIENT-LVL IV: CPT | Mod: PBBFAC,,, | Performed by: INTERNAL MEDICINE

## 2021-08-26 PROCEDURE — 3044F HG A1C LEVEL LT 7.0%: CPT | Mod: CPTII,S$GLB,, | Performed by: INTERNAL MEDICINE

## 2021-08-26 PROCEDURE — 3008F PR BODY MASS INDEX (BMI) DOCUMENTED: ICD-10-PCS | Mod: CPTII,S$GLB,, | Performed by: INTERNAL MEDICINE

## 2021-08-26 PROCEDURE — 99999 PR PBB SHADOW E&M-EST. PATIENT-LVL IV: ICD-10-PCS | Mod: PBBFAC,,, | Performed by: INTERNAL MEDICINE

## 2021-08-26 PROCEDURE — 83036 HEMOGLOBIN GLYCOSYLATED A1C: CPT | Performed by: INTERNAL MEDICINE

## 2021-08-26 PROCEDURE — 1159F PR MEDICATION LIST DOCUMENTED IN MEDICAL RECORD: ICD-10-PCS | Mod: CPTII,S$GLB,, | Performed by: INTERNAL MEDICINE

## 2021-08-26 PROCEDURE — 99214 OFFICE O/P EST MOD 30 MIN: CPT | Mod: S$GLB,,, | Performed by: INTERNAL MEDICINE

## 2021-08-26 PROCEDURE — 85025 COMPLETE CBC W/AUTO DIFF WBC: CPT | Performed by: INTERNAL MEDICINE

## 2021-09-23 ENCOUNTER — TELEPHONE (OUTPATIENT)
Dept: BARIATRICS | Facility: CLINIC | Age: 31
End: 2021-09-23

## 2021-09-24 ENCOUNTER — PATIENT MESSAGE (OUTPATIENT)
Dept: INTERNAL MEDICINE | Facility: CLINIC | Age: 31
End: 2021-09-24

## 2021-10-04 ENCOUNTER — PATIENT MESSAGE (OUTPATIENT)
Dept: ADMINISTRATIVE | Facility: HOSPITAL | Age: 31
End: 2021-10-04

## 2021-11-08 ENCOUNTER — PATIENT MESSAGE (OUTPATIENT)
Dept: OBSTETRICS AND GYNECOLOGY | Facility: CLINIC | Age: 31
End: 2021-11-08
Payer: COMMERCIAL

## 2021-11-23 ENCOUNTER — PATIENT MESSAGE (OUTPATIENT)
Dept: PSYCHIATRY | Facility: CLINIC | Age: 31
End: 2021-11-23
Payer: COMMERCIAL

## 2021-11-29 ENCOUNTER — TELEPHONE (OUTPATIENT)
Dept: OBSTETRICS AND GYNECOLOGY | Facility: CLINIC | Age: 31
End: 2021-11-29
Payer: COMMERCIAL

## 2021-11-29 DIAGNOSIS — Z34.90 PREGNANCY: Primary | ICD-10-CM

## 2021-11-29 DIAGNOSIS — O20.0 THREATENED ABORTION: Primary | ICD-10-CM

## 2021-11-30 ENCOUNTER — LAB VISIT (OUTPATIENT)
Dept: LAB | Facility: HOSPITAL | Age: 31
End: 2021-11-30
Attending: OBSTETRICS & GYNECOLOGY
Payer: COMMERCIAL

## 2021-11-30 DIAGNOSIS — O20.0 THREATENED ABORTION: ICD-10-CM

## 2021-11-30 LAB
ABO + RH BLD: NORMAL
BLD GP AB SCN CELLS X3 SERPL QL: NORMAL
HCG INTACT+B SERPL-ACNC: NORMAL MIU/ML
PROGEST SERPL-MCNC: 15.2 NG/ML

## 2021-11-30 PROCEDURE — 84144 ASSAY OF PROGESTERONE: CPT | Performed by: OBSTETRICS & GYNECOLOGY

## 2021-11-30 PROCEDURE — 86900 BLOOD TYPING SEROLOGIC ABO: CPT | Performed by: OBSTETRICS & GYNECOLOGY

## 2021-11-30 PROCEDURE — 36415 COLL VENOUS BLD VENIPUNCTURE: CPT | Performed by: OBSTETRICS & GYNECOLOGY

## 2021-11-30 PROCEDURE — 84702 CHORIONIC GONADOTROPIN TEST: CPT | Performed by: OBSTETRICS & GYNECOLOGY

## 2021-12-01 ENCOUNTER — PATIENT MESSAGE (OUTPATIENT)
Dept: OBSTETRICS AND GYNECOLOGY | Facility: CLINIC | Age: 31
End: 2021-12-01
Payer: COMMERCIAL

## 2021-12-02 ENCOUNTER — LAB VISIT (OUTPATIENT)
Dept: LAB | Facility: HOSPITAL | Age: 31
End: 2021-12-02
Attending: OBSTETRICS & GYNECOLOGY
Payer: COMMERCIAL

## 2021-12-02 DIAGNOSIS — O20.0 THREATENED ABORTION: ICD-10-CM

## 2021-12-02 LAB — HCG INTACT+B SERPL-ACNC: NORMAL MIU/ML

## 2021-12-02 PROCEDURE — 84702 CHORIONIC GONADOTROPIN TEST: CPT | Performed by: OBSTETRICS & GYNECOLOGY

## 2021-12-02 PROCEDURE — 36415 COLL VENOUS BLD VENIPUNCTURE: CPT | Performed by: OBSTETRICS & GYNECOLOGY

## 2021-12-07 ENCOUNTER — OFFICE VISIT (OUTPATIENT)
Dept: OBSTETRICS AND GYNECOLOGY | Facility: CLINIC | Age: 31
End: 2021-12-07
Payer: COMMERCIAL

## 2021-12-07 ENCOUNTER — LAB VISIT (OUTPATIENT)
Dept: LAB | Facility: OTHER | Age: 31
End: 2021-12-07
Attending: ADVANCED PRACTICE MIDWIFE
Payer: COMMERCIAL

## 2021-12-07 ENCOUNTER — PROCEDURE VISIT (OUTPATIENT)
Dept: OBSTETRICS AND GYNECOLOGY | Facility: CLINIC | Age: 31
End: 2021-12-07
Payer: COMMERCIAL

## 2021-12-07 VITALS
SYSTOLIC BLOOD PRESSURE: 128 MMHG | BODY MASS INDEX: 46.95 KG/M2 | WEIGHT: 265 LBS | DIASTOLIC BLOOD PRESSURE: 88 MMHG | HEIGHT: 63 IN

## 2021-12-07 DIAGNOSIS — Z34.90 PREGNANCY: ICD-10-CM

## 2021-12-07 DIAGNOSIS — Z34.90 PREGNANCY, UNSPECIFIED GESTATIONAL AGE: Primary | ICD-10-CM

## 2021-12-07 DIAGNOSIS — Z34.90 PREGNANCY, UNSPECIFIED GESTATIONAL AGE: ICD-10-CM

## 2021-12-07 LAB
B-HCG UR QL: POSITIVE
CTP QC/QA: YES

## 2021-12-07 PROCEDURE — 87340 HEPATITIS B SURFACE AG IA: CPT | Performed by: ADVANCED PRACTICE MIDWIFE

## 2021-12-07 PROCEDURE — 86592 SYPHILIS TEST NON-TREP QUAL: CPT | Performed by: ADVANCED PRACTICE MIDWIFE

## 2021-12-07 PROCEDURE — 86762 RUBELLA ANTIBODY: CPT | Performed by: ADVANCED PRACTICE MIDWIFE

## 2021-12-07 PROCEDURE — 99212 OFFICE O/P EST SF 10 MIN: CPT | Mod: S$GLB,,, | Performed by: ADVANCED PRACTICE MIDWIFE

## 2021-12-07 PROCEDURE — 87591 N.GONORRHOEAE DNA AMP PROB: CPT | Performed by: ADVANCED PRACTICE MIDWIFE

## 2021-12-07 PROCEDURE — 86787 VARICELLA-ZOSTER ANTIBODY: CPT | Performed by: ADVANCED PRACTICE MIDWIFE

## 2021-12-07 PROCEDURE — 99999 PR PBB SHADOW E&M-EST. PATIENT-LVL III: CPT | Mod: PBBFAC,,, | Performed by: ADVANCED PRACTICE MIDWIFE

## 2021-12-07 PROCEDURE — 81025 POCT URINE PREGNANCY: ICD-10-PCS | Mod: S$GLB,,, | Performed by: ADVANCED PRACTICE MIDWIFE

## 2021-12-07 PROCEDURE — 76801 OB US < 14 WKS SINGLE FETUS: CPT | Mod: S$GLB,,, | Performed by: OBSTETRICS & GYNECOLOGY

## 2021-12-07 PROCEDURE — 87491 CHLMYD TRACH DNA AMP PROBE: CPT | Performed by: ADVANCED PRACTICE MIDWIFE

## 2021-12-07 PROCEDURE — 36415 COLL VENOUS BLD VENIPUNCTURE: CPT | Performed by: ADVANCED PRACTICE MIDWIFE

## 2021-12-07 PROCEDURE — 87389 HIV-1 AG W/HIV-1&-2 AB AG IA: CPT | Performed by: ADVANCED PRACTICE MIDWIFE

## 2021-12-07 PROCEDURE — 81025 URINE PREGNANCY TEST: CPT | Mod: S$GLB,,, | Performed by: ADVANCED PRACTICE MIDWIFE

## 2021-12-07 PROCEDURE — 99212 PR OFFICE/OUTPT VISIT, EST, LEVL II, 10-19 MIN: ICD-10-PCS | Mod: S$GLB,,, | Performed by: ADVANCED PRACTICE MIDWIFE

## 2021-12-07 PROCEDURE — 99999 PR PBB SHADOW E&M-EST. PATIENT-LVL III: ICD-10-PCS | Mod: PBBFAC,,, | Performed by: ADVANCED PRACTICE MIDWIFE

## 2021-12-07 PROCEDURE — 76801 PR US, OB <14WKS, TRANSABD, SINGLE GESTATION: ICD-10-PCS | Mod: S$GLB,,, | Performed by: OBSTETRICS & GYNECOLOGY

## 2021-12-08 ENCOUNTER — PATIENT MESSAGE (OUTPATIENT)
Dept: OBSTETRICS AND GYNECOLOGY | Facility: CLINIC | Age: 31
End: 2021-12-08
Payer: COMMERCIAL

## 2021-12-08 LAB
HBV SURFACE AG SERPL QL IA: NEGATIVE
HIV 1+2 AB+HIV1 P24 AG SERPL QL IA: NEGATIVE
RUBV IGG SER-ACNC: 18.9 IU/ML
RUBV IGG SER-IMP: REACTIVE
VARICELLA INTERPRETATION: POSITIVE
VARICELLA ZOSTER IGG: 2 ISR (ref 0–0.9)

## 2021-12-09 LAB — RPR SER QL: NORMAL

## 2021-12-13 LAB
C TRACH DNA SPEC QL NAA+PROBE: NOT DETECTED
N GONORRHOEA DNA SPEC QL NAA+PROBE: NOT DETECTED

## 2021-12-21 ENCOUNTER — PATIENT MESSAGE (OUTPATIENT)
Dept: OBSTETRICS AND GYNECOLOGY | Facility: CLINIC | Age: 31
End: 2021-12-21
Payer: COMMERCIAL

## 2022-01-11 ENCOUNTER — OFFICE VISIT (OUTPATIENT)
Dept: OBSTETRICS AND GYNECOLOGY | Facility: CLINIC | Age: 32
End: 2022-01-11
Payer: COMMERCIAL

## 2022-01-11 VITALS
BODY MASS INDEX: 47.57 KG/M2 | SYSTOLIC BLOOD PRESSURE: 118 MMHG | HEIGHT: 63 IN | WEIGHT: 268.5 LBS | DIASTOLIC BLOOD PRESSURE: 74 MMHG

## 2022-01-11 DIAGNOSIS — Z3A.11 11 WEEKS GESTATION OF PREGNANCY: Primary | ICD-10-CM

## 2022-01-11 DIAGNOSIS — Z87.59 HISTORY OF PRE-ECLAMPSIA: ICD-10-CM

## 2022-01-11 DIAGNOSIS — Z01.419 ENCOUNTER FOR GYNECOLOGICAL EXAMINATION WITHOUT ABNORMAL FINDING: ICD-10-CM

## 2022-01-11 DIAGNOSIS — E66.01 MORBID OBESITY: ICD-10-CM

## 2022-01-11 PROCEDURE — 3074F SYST BP LT 130 MM HG: CPT | Mod: CPTII,S$GLB,, | Performed by: OBSTETRICS & GYNECOLOGY

## 2022-01-11 PROCEDURE — 3074F PR MOST RECENT SYSTOLIC BLOOD PRESSURE < 130 MM HG: ICD-10-PCS | Mod: CPTII,S$GLB,, | Performed by: OBSTETRICS & GYNECOLOGY

## 2022-01-11 PROCEDURE — 1159F PR MEDICATION LIST DOCUMENTED IN MEDICAL RECORD: ICD-10-PCS | Mod: CPTII,S$GLB,, | Performed by: OBSTETRICS & GYNECOLOGY

## 2022-01-11 PROCEDURE — 0500F INITIAL PRENATAL CARE VISIT: CPT | Mod: CPTII,S$GLB,, | Performed by: OBSTETRICS & GYNECOLOGY

## 2022-01-11 PROCEDURE — 3078F DIAST BP <80 MM HG: CPT | Mod: CPTII,S$GLB,, | Performed by: OBSTETRICS & GYNECOLOGY

## 2022-01-11 PROCEDURE — 3008F BODY MASS INDEX DOCD: CPT | Mod: CPTII,S$GLB,, | Performed by: OBSTETRICS & GYNECOLOGY

## 2022-01-11 PROCEDURE — 1160F PR REVIEW ALL MEDS BY PRESCRIBER/CLIN PHARMACIST DOCUMENTED: ICD-10-PCS | Mod: CPTII,S$GLB,, | Performed by: OBSTETRICS & GYNECOLOGY

## 2022-01-11 PROCEDURE — 0500F PR INITIAL PRENATAL CARE VISIT: ICD-10-PCS | Mod: CPTII,S$GLB,, | Performed by: OBSTETRICS & GYNECOLOGY

## 2022-01-11 PROCEDURE — 99999 PR PBB SHADOW E&M-EST. PATIENT-LVL III: CPT | Mod: PBBFAC,,, | Performed by: OBSTETRICS & GYNECOLOGY

## 2022-01-11 PROCEDURE — 3078F PR MOST RECENT DIASTOLIC BLOOD PRESSURE < 80 MM HG: ICD-10-PCS | Mod: CPTII,S$GLB,, | Performed by: OBSTETRICS & GYNECOLOGY

## 2022-01-11 PROCEDURE — 99999 PR PBB SHADOW E&M-EST. PATIENT-LVL III: ICD-10-PCS | Mod: PBBFAC,,, | Performed by: OBSTETRICS & GYNECOLOGY

## 2022-01-11 PROCEDURE — 1159F MED LIST DOCD IN RCRD: CPT | Mod: CPTII,S$GLB,, | Performed by: OBSTETRICS & GYNECOLOGY

## 2022-01-11 PROCEDURE — 3008F PR BODY MASS INDEX (BMI) DOCUMENTED: ICD-10-PCS | Mod: CPTII,S$GLB,, | Performed by: OBSTETRICS & GYNECOLOGY

## 2022-01-11 PROCEDURE — 88175 CYTOPATH C/V AUTO FLUID REDO: CPT | Performed by: OBSTETRICS & GYNECOLOGY

## 2022-01-11 PROCEDURE — 1160F RVW MEDS BY RX/DR IN RCRD: CPT | Mod: CPTII,S$GLB,, | Performed by: OBSTETRICS & GYNECOLOGY

## 2022-01-11 NOTE — PROGRESS NOTES
CC: early IUP    Soanm Hidalgo is a 31 y.o. female  presents for OB visit  No bleeding or cramping at this time  Early US confirmed 6 wk pregnancy with FHTs and EDC    Past Medical History:   Diagnosis Date    Abnormal Pap smear of vagina     Allergy     Anxiety     Hypertension     Pre-eclampsia        Past Surgical History:   Procedure Laterality Date    CHOLECYSTECTOMY      LAPAROSCOPIC SURGICAL REMOVAL OF CYST OF OVARY Left 2019    Procedure: EXCISION, CYST, OVARY, LAPAROSCOPIC;  Surgeon: Merary Benton MD;  Location: [facility];  Service: OB/GYN;  Laterality: Left;    median arcuate ligament          OB History    Para Term  AB Living   5 2 2 0 1 2   SAB IAB Ectopic Multiple Live Births   0 1 0 0 2      # Outcome Date GA Lbr Angelito/2nd Weight Sex Delivery Anes PTL Lv   5 Current            4 Term 19 37w2d / 00:10 3.033 kg (6 lb 11 oz) F Vag-Spont None N ZENA   3 Term 11/30/15 38w3d  2.81 kg (6 lb 3.1 oz) M Vag-Spont EPI N ZENA      Birth Comments: NICU x 4 days.    Needed HFNC and CPAP but no intubation.  Abx for 48 hour rule out, blood cx negative.   Hepatitis B given on 12/3/15   2             1 IAB                Family History   Problem Relation Age of Onset    Breast cancer Mother     Alcohol abuse Father     Dementia Father         vascular and ? lewey    Hypertension Brother     Celiac disease Neg Hx     Cirrhosis Neg Hx     Colon cancer Neg Hx     Colon polyps Neg Hx     Crohn's disease Neg Hx     Cystic fibrosis Neg Hx     Esophageal cancer Neg Hx     Hemochromatosis Neg Hx     Inflammatory bowel disease Neg Hx     Irritable bowel syndrome Neg Hx     Liver cancer Neg Hx     Liver disease Neg Hx     Rectal cancer Neg Hx     Stomach cancer Neg Hx     Ulcerative colitis Neg Hx     Jacob's disease Neg Hx     Ovarian cancer Neg Hx     Arrhythmia Neg Hx     Cardiomyopathy Neg Hx     Congenital heart disease Neg Hx     Early  "death Neg Hx     Heart attacks under age 50 Neg Hx     Pacemaker/defibrilator Neg Hx        Social History     Tobacco Use    Smoking status: Never Smoker    Smokeless tobacco: Never Used   Substance Use Topics    Alcohol use: Yes     Comment: 1 glass wine on occ    Drug use: No       /74   Ht 5' 3" (1.6 m)   Wt 121.8 kg (268 lb 8.3 oz)   LMP 10/01/2021   BMI 47.57 kg/m²     ROS:  GENERAL: Denies weight gain or weight loss. Feeling well overall.   SKIN: Denies rash or lesions.   HEAD: Denies head injury or headache.   NODES: Denies enlarged lymph nodes.   CHEST: Denies chest pain or shortness of breath.   CARDIOVASCULAR: Denies palpitations or left sided chest pain.   ABDOMEN: No abdominal pain, constipation, diarrhea, nausea, vomiting or rectal bleeding.   URINARY: No frequency, dysuria, hematuria, or burning on urination.  REPRODUCTIVE: See HPI.   BREASTS: The patient performs breast self-examination and denies pain, lumps, or nipple discharge.   HEMATOLOGIC: No easy bruisability or excessive bleeding.  MUSCULOSKELETAL: Denies joint pain or swelling.   NEUROLOGIC: Denies syncope or weakness.   PSYCHIATRIC: Denies depression, anxiety or mood swings.    Physical Exam:    APPEARANCE: Well nourished, well developed, in no acute distress.  AFFECT: WNL, alert and oriented x 3  SKIN: No acne or hirsutism  NECK: Neck symmetric without masses or thyromegaly  NODES: No inguinal, cervical, axillary, or femoral lymph node enlargement  CHEST: Good respiratory effect  ABDOMEN: Soft.  No tenderness or masses.  No hepatosplenomegaly.  No hernias.  BREASTS: Symmetrical, no skin changes or visible lesions.  No palpable masses, nipple discharge bilaterally.  PELVIC: Normal external genitalia without lesions.  Normal hair distribution.  Adequate perineal body, normal urethral meatus.  Vagina moist and well rugated without lesions or discharge.  Cervix pink, without lesions, discharge or tenderness.  No significant " cystocele or rectocele.  Bimanual exam shows uterus to be normal size, regular, mobile and nontender.  Adnexa without masses or tenderness.    EXTREMITIES: No edema.    ASSESSMENT AND PLAN  1. 11 weeks gestation of pregnancy  Connected MOM Enrollment    Assign Connected MOM Program Consent Questionnaire   2. Encounter for gynecological examination without abnormal finding  Liquid-Based Pap Smear, Screening   will start on ASA 81 mg daily for history of PRE E     Declines genetic testing     Patient was counseled today on pregnancy  Diet and exericise, nutritional counseling     F/U in four weeks

## 2022-01-14 ENCOUNTER — PATIENT MESSAGE (OUTPATIENT)
Dept: ADMINISTRATIVE | Facility: OTHER | Age: 32
End: 2022-01-14
Payer: COMMERCIAL

## 2022-01-18 ENCOUNTER — PATIENT MESSAGE (OUTPATIENT)
Dept: ADMINISTRATIVE | Facility: HOSPITAL | Age: 32
End: 2022-01-18
Payer: COMMERCIAL

## 2022-01-19 LAB
FINAL PATHOLOGIC DIAGNOSIS: NORMAL
Lab: NORMAL

## 2022-01-25 ENCOUNTER — PATIENT MESSAGE (OUTPATIENT)
Dept: OBSTETRICS AND GYNECOLOGY | Facility: CLINIC | Age: 32
End: 2022-01-25
Payer: COMMERCIAL

## 2022-01-26 ENCOUNTER — ROUTINE PRENATAL (OUTPATIENT)
Dept: OBSTETRICS AND GYNECOLOGY | Facility: CLINIC | Age: 32
End: 2022-01-26
Payer: COMMERCIAL

## 2022-01-26 VITALS
SYSTOLIC BLOOD PRESSURE: 120 MMHG | BODY MASS INDEX: 47.02 KG/M2 | DIASTOLIC BLOOD PRESSURE: 60 MMHG | WEIGHT: 265.44 LBS

## 2022-01-26 DIAGNOSIS — Z3A.13 13 WEEKS GESTATION OF PREGNANCY: Primary | ICD-10-CM

## 2022-01-26 DIAGNOSIS — N93.9 VAGINAL BLEEDING: ICD-10-CM

## 2022-01-26 DIAGNOSIS — O21.9 NAUSEA AND VOMITING DURING PREGNANCY: ICD-10-CM

## 2022-01-26 DIAGNOSIS — N89.8 VAGINAL DISCHARGE: ICD-10-CM

## 2022-01-26 PROCEDURE — 88305 TISSUE EXAM BY PATHOLOGIST: ICD-10-PCS | Mod: 26,,, | Performed by: PATHOLOGY

## 2022-01-26 PROCEDURE — 99999 PR PBB SHADOW E&M-EST. PATIENT-LVL III: ICD-10-PCS | Mod: PBBFAC,,, | Performed by: OBSTETRICS & GYNECOLOGY

## 2022-01-26 PROCEDURE — 99999 PR PBB SHADOW E&M-EST. PATIENT-LVL III: CPT | Mod: PBBFAC,,, | Performed by: OBSTETRICS & GYNECOLOGY

## 2022-01-26 PROCEDURE — 0502F PR SUBSEQUENT PRENATAL CARE: ICD-10-PCS | Mod: S$GLB,,, | Performed by: OBSTETRICS & GYNECOLOGY

## 2022-01-26 PROCEDURE — 87086 URINE CULTURE/COLONY COUNT: CPT | Performed by: OBSTETRICS & GYNECOLOGY

## 2022-01-26 PROCEDURE — 87481 CANDIDA DNA AMP PROBE: CPT | Mod: 59 | Performed by: OBSTETRICS & GYNECOLOGY

## 2022-01-26 PROCEDURE — 0502F SUBSEQUENT PRENATAL CARE: CPT | Mod: S$GLB,,, | Performed by: OBSTETRICS & GYNECOLOGY

## 2022-01-26 PROCEDURE — 88305 TISSUE EXAM BY PATHOLOGIST: CPT | Mod: 26,,, | Performed by: PATHOLOGY

## 2022-01-26 PROCEDURE — 88305 TISSUE EXAM BY PATHOLOGIST: CPT | Performed by: PATHOLOGY

## 2022-01-26 RX ORDER — ONDANSETRON 4 MG/1
4 TABLET, FILM COATED ORAL DAILY PRN
Qty: 30 TABLET | Refills: 1 | Status: SHIPPED | OUTPATIENT
Start: 2022-01-26 | End: 2022-04-07 | Stop reason: SDUPTHER

## 2022-01-26 NOTE — PROGRESS NOTES
HERE for routine OB visit at 13 3/7 wks, with Nausea and not able to take in food due to Nasuea and gagging.  .  Had vaginal bleeding and cramping two days ago, minor cramping/ NO ctx, + LOF.   Vaginal discharge constantly.  Vaginosis screen  SVE- Cervix closed, cervical Is friable and bleeding She denies any STD testing.  Hand held US shows IUP with + FHTs, good fluid.     Cervical tissue sent to path.  Bleeding and pain precautions,..   US with MFM   F/U in one week

## 2022-01-27 ENCOUNTER — PROCEDURE VISIT (OUTPATIENT)
Dept: MATERNAL FETAL MEDICINE | Facility: CLINIC | Age: 32
End: 2022-01-27
Payer: COMMERCIAL

## 2022-01-27 DIAGNOSIS — Z36.89 ENCOUNTER FOR FETAL ANATOMIC SURVEY: Primary | ICD-10-CM

## 2022-01-27 DIAGNOSIS — O21.9 NAUSEA AND VOMITING DURING PREGNANCY: ICD-10-CM

## 2022-01-27 DIAGNOSIS — N89.8 VAGINAL DISCHARGE: ICD-10-CM

## 2022-01-27 DIAGNOSIS — N93.9 VAGINAL BLEEDING: ICD-10-CM

## 2022-01-27 PROCEDURE — 76816 OB US FOLLOW-UP PER FETUS: CPT | Mod: S$GLB,,, | Performed by: OBSTETRICS & GYNECOLOGY

## 2022-01-27 PROCEDURE — 76816 US MFM PROCEDURE (VIEWPOINT): ICD-10-PCS | Mod: S$GLB,,, | Performed by: OBSTETRICS & GYNECOLOGY

## 2022-01-28 LAB — BACTERIA UR CULT: NO GROWTH

## 2022-01-29 LAB
BACTERIAL VAGINOSIS DNA: POSITIVE
CANDIDA GLABRATA DNA: NEGATIVE
CANDIDA KRUSEI DNA: NEGATIVE
CANDIDA RRNA VAG QL PROBE: NEGATIVE
T VAGINALIS RRNA GENITAL QL PROBE: NEGATIVE

## 2022-02-02 LAB
FINAL PATHOLOGIC DIAGNOSIS: NORMAL
GROSS: NORMAL
Lab: NORMAL

## 2022-02-06 ENCOUNTER — TELEPHONE (OUTPATIENT)
Dept: OBSTETRICS AND GYNECOLOGY | Facility: CLINIC | Age: 32
End: 2022-02-06
Payer: COMMERCIAL

## 2022-02-06 DIAGNOSIS — N76.0 VAGINAL BACTERIOSIS: ICD-10-CM

## 2022-02-06 DIAGNOSIS — B96.89 VAGINAL BACTERIOSIS: ICD-10-CM

## 2022-02-06 DIAGNOSIS — B96.89 BACTERIAL VAGINOSIS: ICD-10-CM

## 2022-02-06 DIAGNOSIS — N76.0 BACTERIAL VAGINOSIS: ICD-10-CM

## 2022-02-06 RX ORDER — METRONIDAZOLE 500 MG/1
500 TABLET ORAL 2 TIMES DAILY
Qty: 14 TABLET | Refills: 0 | Status: SHIPPED | OUTPATIENT
Start: 2022-02-06 | End: 2022-02-20

## 2022-02-10 ENCOUNTER — ROUTINE PRENATAL (OUTPATIENT)
Dept: OBSTETRICS AND GYNECOLOGY | Facility: CLINIC | Age: 32
End: 2022-02-10
Payer: COMMERCIAL

## 2022-02-10 VITALS
BODY MASS INDEX: 47.18 KG/M2 | SYSTOLIC BLOOD PRESSURE: 122 MMHG | DIASTOLIC BLOOD PRESSURE: 76 MMHG | WEIGHT: 266.31 LBS

## 2022-02-10 DIAGNOSIS — Z3A.15 15 WEEKS GESTATION OF PREGNANCY: Primary | ICD-10-CM

## 2022-02-10 PROCEDURE — 0502F SUBSEQUENT PRENATAL CARE: CPT | Mod: CPTII,S$GLB,, | Performed by: OBSTETRICS & GYNECOLOGY

## 2022-02-10 PROCEDURE — 0502F PR SUBSEQUENT PRENATAL CARE: ICD-10-PCS | Mod: CPTII,S$GLB,, | Performed by: OBSTETRICS & GYNECOLOGY

## 2022-02-10 PROCEDURE — 99999 PR PBB SHADOW E&M-EST. PATIENT-LVL III: CPT | Mod: PBBFAC,,, | Performed by: OBSTETRICS & GYNECOLOGY

## 2022-02-10 PROCEDURE — 99999 PR PBB SHADOW E&M-EST. PATIENT-LVL III: ICD-10-PCS | Mod: PBBFAC,,, | Performed by: OBSTETRICS & GYNECOLOGY

## 2022-02-10 NOTE — PROGRESS NOTES
HERE for routine OB visit at 15 4/7 wks, with NO complaints. Bleeding has significantly stopped. Occ spotting.  CX bx was benign.  MFM scheduled   Denies vaginal bleeding, cramping/ ctx, or LOF.  + FM.    F/U in four weeks

## 2022-02-22 ENCOUNTER — OFFICE VISIT (OUTPATIENT)
Dept: INTERNAL MEDICINE | Facility: CLINIC | Age: 32
End: 2022-02-22
Payer: COMMERCIAL

## 2022-02-22 VITALS
HEIGHT: 63 IN | BODY MASS INDEX: 47.65 KG/M2 | HEART RATE: 66 BPM | DIASTOLIC BLOOD PRESSURE: 74 MMHG | SYSTOLIC BLOOD PRESSURE: 115 MMHG | OXYGEN SATURATION: 98 % | WEIGHT: 268.94 LBS | RESPIRATION RATE: 18 BRPM

## 2022-02-22 DIAGNOSIS — F32.0 CURRENT MILD EPISODE OF MAJOR DEPRESSIVE DISORDER WITHOUT PRIOR EPISODE: ICD-10-CM

## 2022-02-22 DIAGNOSIS — Z00.00 ROUTINE GENERAL MEDICAL EXAMINATION AT A HEALTH CARE FACILITY: Primary | ICD-10-CM

## 2022-02-22 PROCEDURE — 1160F RVW MEDS BY RX/DR IN RCRD: CPT | Mod: CPTII,S$GLB,, | Performed by: INTERNAL MEDICINE

## 2022-02-22 PROCEDURE — 1159F PR MEDICATION LIST DOCUMENTED IN MEDICAL RECORD: ICD-10-PCS | Mod: CPTII,S$GLB,, | Performed by: INTERNAL MEDICINE

## 2022-02-22 PROCEDURE — 3078F DIAST BP <80 MM HG: CPT | Mod: CPTII,S$GLB,, | Performed by: INTERNAL MEDICINE

## 2022-02-22 PROCEDURE — 99999 PR PBB SHADOW E&M-EST. PATIENT-LVL III: ICD-10-PCS | Mod: PBBFAC,,, | Performed by: INTERNAL MEDICINE

## 2022-02-22 PROCEDURE — 3008F PR BODY MASS INDEX (BMI) DOCUMENTED: ICD-10-PCS | Mod: CPTII,S$GLB,, | Performed by: INTERNAL MEDICINE

## 2022-02-22 PROCEDURE — 1160F PR REVIEW ALL MEDS BY PRESCRIBER/CLIN PHARMACIST DOCUMENTED: ICD-10-PCS | Mod: CPTII,S$GLB,, | Performed by: INTERNAL MEDICINE

## 2022-02-22 PROCEDURE — 99395 PR PREVENTIVE VISIT,EST,18-39: ICD-10-PCS | Mod: S$GLB,,, | Performed by: INTERNAL MEDICINE

## 2022-02-22 PROCEDURE — 99395 PREV VISIT EST AGE 18-39: CPT | Mod: S$GLB,,, | Performed by: INTERNAL MEDICINE

## 2022-02-22 PROCEDURE — 3008F BODY MASS INDEX DOCD: CPT | Mod: CPTII,S$GLB,, | Performed by: INTERNAL MEDICINE

## 2022-02-22 PROCEDURE — 99999 PR PBB SHADOW E&M-EST. PATIENT-LVL III: CPT | Mod: PBBFAC,,, | Performed by: INTERNAL MEDICINE

## 2022-02-22 PROCEDURE — 3078F PR MOST RECENT DIASTOLIC BLOOD PRESSURE < 80 MM HG: ICD-10-PCS | Mod: CPTII,S$GLB,, | Performed by: INTERNAL MEDICINE

## 2022-02-22 PROCEDURE — 1159F MED LIST DOCD IN RCRD: CPT | Mod: CPTII,S$GLB,, | Performed by: INTERNAL MEDICINE

## 2022-02-22 PROCEDURE — 3074F SYST BP LT 130 MM HG: CPT | Mod: CPTII,S$GLB,, | Performed by: INTERNAL MEDICINE

## 2022-02-22 PROCEDURE — 3074F PR MOST RECENT SYSTOLIC BLOOD PRESSURE < 130 MM HG: ICD-10-PCS | Mod: CPTII,S$GLB,, | Performed by: INTERNAL MEDICINE

## 2022-02-22 NOTE — PROGRESS NOTES
Subjective:       Patient ID: Sonam Hidalgo is a 31 y.o. female.    Chief Complaint: No chief complaint on file.    Patient is here for followup for chronic conditions.    Her father  from dementia in November. She is pregnant, had been having some slight bleeding which has resolved.    She has stopped zoloft due to pregnancy. Mood is still up and down. She still thinks about being there for her father the moment he passed.    No other new health issues.    She has not been exercising, partially due to bleeding she has been having.    Says she is eating btr since being pregnant.    OB pressures have been nml.    Review of Systems   Constitutional: Negative for activity change and unexpected weight change.   HENT: Negative for hearing loss, rhinorrhea and trouble swallowing.    Eyes: Negative for discharge and visual disturbance.   Respiratory: Negative for chest tightness and wheezing.    Cardiovascular: Negative for chest pain and palpitations.   Gastrointestinal: Negative for blood in stool, constipation, diarrhea and vomiting.   Endocrine: Negative for polydipsia and polyuria.   Genitourinary: Negative for difficulty urinating, dysuria, hematuria and menstrual problem.   Musculoskeletal: Negative for arthralgias, joint swelling and neck pain.   Neurological: Negative for weakness.   Psychiatric/Behavioral: Positive for dysphoric mood. Negative for confusion. The patient is nervous/anxious.            Objective:      Physical Exam  Vitals reviewed.   Constitutional:       General: She is not in acute distress.     Appearance: Normal appearance. She is well-developed. She is obese. She is not ill-appearing, toxic-appearing or diaphoretic.   HENT:      Head: Normocephalic and atraumatic.   Eyes:      General: No scleral icterus.     Pupils: Pupils are equal, round, and reactive to light.   Neck:      Thyroid: No thyromegaly.   Cardiovascular:      Rate and Rhythm: Normal rate and regular rhythm.       Heart sounds: Normal heart sounds. No murmur heard.    No friction rub. No gallop.   Pulmonary:      Effort: Pulmonary effort is normal. No respiratory distress.      Breath sounds: Normal breath sounds. No wheezing or rales.   Abdominal:      General: Bowel sounds are normal. There is no distension.      Palpations: Abdomen is soft. There is no mass.      Tenderness: There is no abdominal tenderness. There is no guarding or rebound.   Musculoskeletal:         General: No tenderness. Normal range of motion.      Cervical back: Normal range of motion.   Lymphadenopathy:      Cervical: No cervical adenopathy.   Neurological:      General: No focal deficit present.      Mental Status: She is alert and oriented to person, place, and time.   Psychiatric:         Mood and Affect: Mood normal.         Speech: Speech normal.         Behavior: Behavior normal.         Assessment:       1. Routine general medical examination at a health care facility    2. Current mild episode of major depressive disorder without prior episode        Plan:       Diagnoses and all orders for this visit:    Routine general medical examination at a health care facility  Discussed trying to gte back to exercise, ask if OK with OB    Current mild episode of major depressive disorder without prior episode  Will reach out to LSW to see if she can get an appt    Pregnancy -- pressures OK so far    Health Maintenance       Date Due Completion Date    COVID-19 Vaccine (3 - Booster for Moderna series) 09/19/2021 4/19/2021    Influenza Vaccine (1) 06/30/2022 (Originally 9/1/2021) 2/25/2021    Cervical Cancer Screening 01/11/2025 1/11/2022    TETANUS VACCINE 04/04/2029 4/4/2019      Get booster    Follow up in about 1 year (around 2/22/2023) for Moderna booster please.    Future Appointments   Date Time Provider Department Center   3/10/2022  1:30 PM Merary Benton MD [facility] [place]   3/14/2022  2:40 PM ROOM 5, [facility]    4/7/2022  1:15 PM MD [facility] [place]   5/5/2022  1:15 PM Merary Benton, MD [facility] [place]   5/19/2022  1:15 PM MD [facility] [place]   6/2/2022  1:15 PM MD [facility] [place]   6/16/2022  1:15 PM MD [facility] [place]   6/30/2022  1:15 PM MD [facility] [place]   7/7/2022  1:15 PM MD [facility] [place]   7/14/2022  1:15 PM MD [facility] [place]   7/21/2022  1:15 PM MD [facility] [place]   7/28/2022  1:15 PM MD [facility] [place]

## 2022-03-08 ENCOUNTER — PATIENT MESSAGE (OUTPATIENT)
Dept: INTERNAL MEDICINE | Facility: CLINIC | Age: 32
End: 2022-03-08
Payer: COMMERCIAL

## 2022-03-09 NOTE — TELEPHONE ENCOUNTER
Hi, I recommend that she come in for an appt. I will be away tomorrow. Please ask if she can see -- Dr Da Silva in resident clinic.  Thank you, Fili Joshua

## 2022-03-10 ENCOUNTER — ROUTINE PRENATAL (OUTPATIENT)
Dept: OBSTETRICS AND GYNECOLOGY | Facility: CLINIC | Age: 32
End: 2022-03-10
Payer: COMMERCIAL

## 2022-03-10 VITALS — DIASTOLIC BLOOD PRESSURE: 80 MMHG | BODY MASS INDEX: 47.8 KG/M2 | WEIGHT: 269.81 LBS | SYSTOLIC BLOOD PRESSURE: 120 MMHG

## 2022-03-10 DIAGNOSIS — R09.81 SINUS CONGESTION: ICD-10-CM

## 2022-03-10 DIAGNOSIS — Z3A.19 19 WEEKS GESTATION OF PREGNANCY: Primary | ICD-10-CM

## 2022-03-10 PROCEDURE — 99999 PR PBB SHADOW E&M-EST. PATIENT-LVL III: ICD-10-PCS | Mod: PBBFAC,,, | Performed by: OBSTETRICS & GYNECOLOGY

## 2022-03-10 PROCEDURE — 99999 PR PBB SHADOW E&M-EST. PATIENT-LVL III: CPT | Mod: PBBFAC,,, | Performed by: OBSTETRICS & GYNECOLOGY

## 2022-03-10 PROCEDURE — 0502F PR SUBSEQUENT PRENATAL CARE: ICD-10-PCS | Mod: CPTII,S$GLB,, | Performed by: OBSTETRICS & GYNECOLOGY

## 2022-03-10 PROCEDURE — 0502F SUBSEQUENT PRENATAL CARE: CPT | Mod: CPTII,S$GLB,, | Performed by: OBSTETRICS & GYNECOLOGY

## 2022-03-10 RX ORDER — AZITHROMYCIN 250 MG/1
TABLET, FILM COATED ORAL
Qty: 6 TABLET | Refills: 1 | Status: SHIPPED | OUTPATIENT
Start: 2022-03-10 | End: 2022-03-15

## 2022-03-10 RX ORDER — MAGNESIUM 30 MG
TABLET ORAL ONCE
COMMUNITY
End: 2023-04-20

## 2022-03-10 NOTE — PROGRESS NOTES
HERE for routine OB visit at 19 4/7 wks, with NO complaints.  Denies vaginal bleeding, cramping/ ctx, or LOF.  + FM.  FMC BID.   F/U in four weeks

## 2022-03-11 ENCOUNTER — PATIENT MESSAGE (OUTPATIENT)
Dept: MATERNAL FETAL MEDICINE | Facility: CLINIC | Age: 32
End: 2022-03-11
Payer: COMMERCIAL

## 2022-03-14 ENCOUNTER — PATIENT MESSAGE (OUTPATIENT)
Dept: MATERNAL FETAL MEDICINE | Facility: CLINIC | Age: 32
End: 2022-03-14

## 2022-03-14 ENCOUNTER — PROCEDURE VISIT (OUTPATIENT)
Dept: MATERNAL FETAL MEDICINE | Facility: CLINIC | Age: 32
End: 2022-03-14
Payer: COMMERCIAL

## 2022-03-14 DIAGNOSIS — Z36.89 ENCOUNTER FOR FETAL ANATOMIC SURVEY: ICD-10-CM

## 2022-03-14 DIAGNOSIS — Z36.2 ENCOUNTER FOR FOLLOW-UP ULTRASOUND OF FETAL ANATOMY: ICD-10-CM

## 2022-03-14 DIAGNOSIS — Z36.89 ENCOUNTER FOR ULTRASOUND TO CHECK FETAL GROWTH: Primary | ICD-10-CM

## 2022-03-14 PROCEDURE — 76811 US MFM PROCEDURE (VIEWPOINT): ICD-10-PCS | Mod: S$GLB,,, | Performed by: OBSTETRICS & GYNECOLOGY

## 2022-03-14 PROCEDURE — 76811 OB US DETAILED SNGL FETUS: CPT | Mod: S$GLB,,, | Performed by: OBSTETRICS & GYNECOLOGY

## 2022-04-07 ENCOUNTER — LAB VISIT (OUTPATIENT)
Dept: LAB | Facility: HOSPITAL | Age: 32
End: 2022-04-07
Attending: OBSTETRICS & GYNECOLOGY
Payer: COMMERCIAL

## 2022-04-07 ENCOUNTER — ROUTINE PRENATAL (OUTPATIENT)
Dept: OBSTETRICS AND GYNECOLOGY | Facility: CLINIC | Age: 32
End: 2022-04-07
Payer: COMMERCIAL

## 2022-04-07 VITALS
BODY MASS INDEX: 48.15 KG/M2 | SYSTOLIC BLOOD PRESSURE: 132 MMHG | WEIGHT: 271.81 LBS | DIASTOLIC BLOOD PRESSURE: 82 MMHG

## 2022-04-07 DIAGNOSIS — Z3A.23 23 WEEKS GESTATION OF PREGNANCY: Primary | ICD-10-CM

## 2022-04-07 DIAGNOSIS — Z3A.19 19 WEEKS GESTATION OF PREGNANCY: ICD-10-CM

## 2022-04-07 DIAGNOSIS — O21.9 NAUSEA AND VOMITING DURING PREGNANCY: ICD-10-CM

## 2022-04-07 DIAGNOSIS — E66.9 OBESITY, UNSPECIFIED CLASSIFICATION, UNSPECIFIED OBESITY TYPE, UNSPECIFIED WHETHER SERIOUS COMORBIDITY PRESENT: ICD-10-CM

## 2022-04-07 LAB
BASOPHILS # BLD AUTO: 0.04 K/UL (ref 0–0.2)
BASOPHILS NFR BLD: 0.4 % (ref 0–1.9)
DIFFERENTIAL METHOD: ABNORMAL
EOSINOPHIL # BLD AUTO: 0.2 K/UL (ref 0–0.5)
EOSINOPHIL NFR BLD: 1.6 % (ref 0–8)
ERYTHROCYTE [DISTWIDTH] IN BLOOD BY AUTOMATED COUNT: 14.9 % (ref 11.5–14.5)
GLUCOSE SERPL-MCNC: 118 MG/DL (ref 70–140)
HCT VFR BLD AUTO: 35.4 % (ref 37–48.5)
HGB BLD-MCNC: 11 G/DL (ref 12–16)
IMM GRANULOCYTES # BLD AUTO: 0.08 K/UL (ref 0–0.04)
IMM GRANULOCYTES NFR BLD AUTO: 0.8 % (ref 0–0.5)
LYMPHOCYTES # BLD AUTO: 2.4 K/UL (ref 1–4.8)
LYMPHOCYTES NFR BLD: 23.5 % (ref 18–48)
MCH RBC QN AUTO: 26.8 PG (ref 27–31)
MCHC RBC AUTO-ENTMCNC: 31.1 G/DL (ref 32–36)
MCV RBC AUTO: 86 FL (ref 82–98)
MONOCYTES # BLD AUTO: 0.5 K/UL (ref 0.3–1)
MONOCYTES NFR BLD: 5.3 % (ref 4–15)
NEUTROPHILS # BLD AUTO: 7 K/UL (ref 1.8–7.7)
NEUTROPHILS NFR BLD: 68.4 % (ref 38–73)
NRBC BLD-RTO: 0 /100 WBC
PLATELET # BLD AUTO: 337 K/UL (ref 150–450)
PMV BLD AUTO: 10 FL (ref 9.2–12.9)
RBC # BLD AUTO: 4.1 M/UL (ref 4–5.4)
WBC # BLD AUTO: 10.25 K/UL (ref 3.9–12.7)

## 2022-04-07 PROCEDURE — 99999 PR PBB SHADOW E&M-EST. PATIENT-LVL III: CPT | Mod: PBBFAC,,, | Performed by: OBSTETRICS & GYNECOLOGY

## 2022-04-07 PROCEDURE — 0502F PR SUBSEQUENT PRENATAL CARE: ICD-10-PCS | Mod: CPTII,S$GLB,, | Performed by: OBSTETRICS & GYNECOLOGY

## 2022-04-07 PROCEDURE — 82950 GLUCOSE TEST: CPT | Performed by: OBSTETRICS & GYNECOLOGY

## 2022-04-07 PROCEDURE — 36415 COLL VENOUS BLD VENIPUNCTURE: CPT | Mod: PN | Performed by: OBSTETRICS & GYNECOLOGY

## 2022-04-07 PROCEDURE — 85025 COMPLETE CBC W/AUTO DIFF WBC: CPT | Performed by: OBSTETRICS & GYNECOLOGY

## 2022-04-07 PROCEDURE — 99999 PR PBB SHADOW E&M-EST. PATIENT-LVL III: ICD-10-PCS | Mod: PBBFAC,,, | Performed by: OBSTETRICS & GYNECOLOGY

## 2022-04-07 PROCEDURE — 0502F SUBSEQUENT PRENATAL CARE: CPT | Mod: CPTII,S$GLB,, | Performed by: OBSTETRICS & GYNECOLOGY

## 2022-04-07 RX ORDER — ONDANSETRON 4 MG/1
4 TABLET, FILM COATED ORAL DAILY PRN
Qty: 30 TABLET | Refills: 3 | Status: SHIPPED | OUTPATIENT
Start: 2022-04-07 | End: 2022-08-20

## 2022-04-07 NOTE — PROGRESS NOTES
HERE for routine OB visit at 23 4/7 wks, with NO complaints.  Denies vaginal bleeding, cramping/ ctx, or LOF.  + FM.  Monitor BP. A few elevated BP at home with Connected MOMS- will monitor and asymptomatic.  PTL precautions.  OB screen CBC today  F/U in four weeks

## 2022-04-08 ENCOUNTER — PATIENT MESSAGE (OUTPATIENT)
Dept: MATERNAL FETAL MEDICINE | Facility: CLINIC | Age: 32
End: 2022-04-08
Payer: COMMERCIAL

## 2022-04-11 ENCOUNTER — PROCEDURE VISIT (OUTPATIENT)
Dept: MATERNAL FETAL MEDICINE | Facility: CLINIC | Age: 32
End: 2022-04-11
Payer: COMMERCIAL

## 2022-04-11 DIAGNOSIS — Z36.89 ENCOUNTER FOR ULTRASOUND TO CHECK FETAL GROWTH: ICD-10-CM

## 2022-04-11 DIAGNOSIS — Z36.2 ENCOUNTER FOR FOLLOW-UP ULTRASOUND OF FETAL ANATOMY: ICD-10-CM

## 2022-04-11 PROCEDURE — 76816 OB US FOLLOW-UP PER FETUS: CPT | Mod: S$GLB,,, | Performed by: OBSTETRICS & GYNECOLOGY

## 2022-04-11 PROCEDURE — 76816 US MFM PROCEDURE (VIEWPOINT): ICD-10-PCS | Mod: S$GLB,,, | Performed by: OBSTETRICS & GYNECOLOGY

## 2022-05-06 ENCOUNTER — CLINICAL SUPPORT (OUTPATIENT)
Dept: OBSTETRICS AND GYNECOLOGY | Facility: CLINIC | Age: 32
End: 2022-05-06
Payer: COMMERCIAL

## 2022-05-06 ENCOUNTER — ROUTINE PRENATAL (OUTPATIENT)
Dept: OBSTETRICS AND GYNECOLOGY | Facility: CLINIC | Age: 32
End: 2022-05-06
Payer: COMMERCIAL

## 2022-05-06 VITALS
SYSTOLIC BLOOD PRESSURE: 118 MMHG | BODY MASS INDEX: 49.01 KG/M2 | DIASTOLIC BLOOD PRESSURE: 76 MMHG | WEIGHT: 276.69 LBS

## 2022-05-06 DIAGNOSIS — Z3A.27 27 WEEKS GESTATION OF PREGNANCY: Primary | ICD-10-CM

## 2022-05-06 DIAGNOSIS — E66.8 MODERATE OBESITY: ICD-10-CM

## 2022-05-06 PROCEDURE — 0502F PR SUBSEQUENT PRENATAL CARE: ICD-10-PCS | Mod: CPTII,S$GLB,, | Performed by: OBSTETRICS & GYNECOLOGY

## 2022-05-06 PROCEDURE — 90715 TDAP VACCINE GREATER THAN OR EQUAL TO 7YO IM: ICD-10-PCS | Mod: S$GLB,,, | Performed by: OBSTETRICS & GYNECOLOGY

## 2022-05-06 PROCEDURE — 90471 TDAP VACCINE GREATER THAN OR EQUAL TO 7YO IM: ICD-10-PCS | Mod: S$GLB,,, | Performed by: OBSTETRICS & GYNECOLOGY

## 2022-05-06 PROCEDURE — 90471 IMMUNIZATION ADMIN: CPT | Mod: S$GLB,,, | Performed by: OBSTETRICS & GYNECOLOGY

## 2022-05-06 PROCEDURE — 99999 PR PBB SHADOW E&M-EST. PATIENT-LVL III: CPT | Mod: PBBFAC,,, | Performed by: OBSTETRICS & GYNECOLOGY

## 2022-05-06 PROCEDURE — 99999 PR PBB SHADOW E&M-EST. PATIENT-LVL II: ICD-10-PCS | Mod: PBBFAC,,,

## 2022-05-06 PROCEDURE — 99999 PR PBB SHADOW E&M-EST. PATIENT-LVL II: CPT | Mod: PBBFAC,,,

## 2022-05-06 PROCEDURE — 90715 TDAP VACCINE 7 YRS/> IM: CPT | Mod: S$GLB,,, | Performed by: OBSTETRICS & GYNECOLOGY

## 2022-05-06 PROCEDURE — 0502F SUBSEQUENT PRENATAL CARE: CPT | Mod: CPTII,S$GLB,, | Performed by: OBSTETRICS & GYNECOLOGY

## 2022-05-06 PROCEDURE — 99999 PR PBB SHADOW E&M-EST. PATIENT-LVL III: ICD-10-PCS | Mod: PBBFAC,,, | Performed by: OBSTETRICS & GYNECOLOGY

## 2022-05-06 NOTE — PROGRESS NOTES
HERE for routine OB visit at 27 5/7 wks, with NO complaints.  Denies vaginal bleeding, cramping/ ctx, or LOF.  + FM.  FMC BID. Discussed weight gain.    PTL precautions.  Hip pain-  PT.  Breast pump F/U in two weeks

## 2022-05-06 NOTE — PROGRESS NOTES
After obtaining consent, and per orders of Dr. LYNNE, injection of TDAP Lot [id number] 1 APR 2024 given in the RD by NYLA MENDOSA. Patient tolerated well and band aid applied. Patient instructed to remain in clinic for 15 minutes afterwards, and to report any adverse reaction to me immediately.

## 2022-05-11 ENCOUNTER — PATIENT MESSAGE (OUTPATIENT)
Dept: OBSTETRICS AND GYNECOLOGY | Facility: CLINIC | Age: 32
End: 2022-05-11
Payer: COMMERCIAL

## 2022-05-11 DIAGNOSIS — O99.013 ANEMIA DURING PREGNANCY IN THIRD TRIMESTER: Primary | ICD-10-CM

## 2022-05-11 DIAGNOSIS — O99.019 ANEMIA IN PREGNANCY, UNSPECIFIED TRIMESTER: ICD-10-CM

## 2022-05-12 ENCOUNTER — PATIENT MESSAGE (OUTPATIENT)
Dept: OBSTETRICS AND GYNECOLOGY | Facility: CLINIC | Age: 32
End: 2022-05-12
Payer: COMMERCIAL

## 2022-05-19 ENCOUNTER — LAB VISIT (OUTPATIENT)
Dept: LAB | Facility: HOSPITAL | Age: 32
End: 2022-05-19
Attending: OBSTETRICS & GYNECOLOGY
Payer: COMMERCIAL

## 2022-05-19 ENCOUNTER — ROUTINE PRENATAL (OUTPATIENT)
Dept: OBSTETRICS AND GYNECOLOGY | Facility: CLINIC | Age: 32
End: 2022-05-19
Payer: COMMERCIAL

## 2022-05-19 VITALS — BODY MASS INDEX: 48.89 KG/M2 | WEIGHT: 276 LBS | DIASTOLIC BLOOD PRESSURE: 76 MMHG | SYSTOLIC BLOOD PRESSURE: 120 MMHG

## 2022-05-19 DIAGNOSIS — Z3A.29 29 WEEKS GESTATION OF PREGNANCY: Primary | ICD-10-CM

## 2022-05-19 DIAGNOSIS — O36.63X0 EXCESSIVE FETAL GROWTH AFFECTING MANAGEMENT OF PREGNANCY IN THIRD TRIMESTER, SINGLE OR UNSPECIFIED FETUS: ICD-10-CM

## 2022-05-19 DIAGNOSIS — O99.019 ANEMIA IN PREGNANCY, UNSPECIFIED TRIMESTER: ICD-10-CM

## 2022-05-19 LAB
BASOPHILS # BLD AUTO: 0.04 K/UL (ref 0–0.2)
BASOPHILS NFR BLD: 0.4 % (ref 0–1.9)
DIFFERENTIAL METHOD: ABNORMAL
EOSINOPHIL # BLD AUTO: 0.1 K/UL (ref 0–0.5)
EOSINOPHIL NFR BLD: 1.2 % (ref 0–8)
ERYTHROCYTE [DISTWIDTH] IN BLOOD BY AUTOMATED COUNT: 15.3 % (ref 11.5–14.5)
HCT VFR BLD AUTO: 33.7 % (ref 37–48.5)
HGB BLD-MCNC: 11 G/DL (ref 12–16)
IMM GRANULOCYTES # BLD AUTO: 0.08 K/UL (ref 0–0.04)
IMM GRANULOCYTES NFR BLD AUTO: 0.9 % (ref 0–0.5)
LYMPHOCYTES # BLD AUTO: 2.2 K/UL (ref 1–4.8)
LYMPHOCYTES NFR BLD: 24.1 % (ref 18–48)
MCH RBC QN AUTO: 27 PG (ref 27–31)
MCHC RBC AUTO-ENTMCNC: 32.6 G/DL (ref 32–36)
MCV RBC AUTO: 83 FL (ref 82–98)
MONOCYTES # BLD AUTO: 0.6 K/UL (ref 0.3–1)
MONOCYTES NFR BLD: 6.4 % (ref 4–15)
NEUTROPHILS # BLD AUTO: 6 K/UL (ref 1.8–7.7)
NEUTROPHILS NFR BLD: 67 % (ref 38–73)
NRBC BLD-RTO: 0 /100 WBC
PLATELET # BLD AUTO: 306 K/UL (ref 150–450)
PMV BLD AUTO: 9.8 FL (ref 9.2–12.9)
RBC # BLD AUTO: 4.08 M/UL (ref 4–5.4)
WBC # BLD AUTO: 9.02 K/UL (ref 3.9–12.7)

## 2022-05-19 PROCEDURE — 0502F PR SUBSEQUENT PRENATAL CARE: ICD-10-PCS | Mod: CPTII,S$GLB,, | Performed by: OBSTETRICS & GYNECOLOGY

## 2022-05-19 PROCEDURE — 0502F SUBSEQUENT PRENATAL CARE: CPT | Mod: CPTII,S$GLB,, | Performed by: OBSTETRICS & GYNECOLOGY

## 2022-05-19 PROCEDURE — 99999 PR PBB SHADOW E&M-EST. PATIENT-LVL III: ICD-10-PCS | Mod: PBBFAC,,, | Performed by: OBSTETRICS & GYNECOLOGY

## 2022-05-19 PROCEDURE — 99999 PR PBB SHADOW E&M-EST. PATIENT-LVL III: CPT | Mod: PBBFAC,,, | Performed by: OBSTETRICS & GYNECOLOGY

## 2022-05-19 PROCEDURE — 36415 COLL VENOUS BLD VENIPUNCTURE: CPT | Mod: PN | Performed by: OBSTETRICS & GYNECOLOGY

## 2022-05-19 PROCEDURE — 85025 COMPLETE CBC W/AUTO DIFF WBC: CPT | Performed by: OBSTETRICS & GYNECOLOGY

## 2022-05-19 NOTE — LETTER
May 19, 2022    Sonamjyoti Hidalgo  [address] [place] [address]              [facility]  [address] [place] [address]  [place] [address]    To Whom It May Concern:    Ms. Hidalgo, partner of Yakov More, is currently under our care for pregnancy.  Estimated Date of Delivery: 7/24/2022        Sincerely,    Merary Benton MD

## 2022-05-19 NOTE — PROGRESS NOTES
HERE for routine OB visit at 29 4/7 wks, ctx that resolved.  Denies vaginal bleeding,had an episode of cramping/ ctx, NO  LOF.  + FM.  FMC BID.   NO ctx today.  F/U in two weeks  PTL precautions./  Plan for induction at 39 wks  F/U US scheduled

## 2022-05-26 ENCOUNTER — CLINICAL SUPPORT (OUTPATIENT)
Dept: REHABILITATION | Facility: HOSPITAL | Age: 32
End: 2022-05-26
Attending: OBSTETRICS & GYNECOLOGY
Payer: COMMERCIAL

## 2022-05-26 DIAGNOSIS — M53.2X6 LUMBAR SPINE INSTABILITY: ICD-10-CM

## 2022-05-26 DIAGNOSIS — Z3A.27 27 WEEKS GESTATION OF PREGNANCY: ICD-10-CM

## 2022-05-26 DIAGNOSIS — R29.898 DECREASED STRENGTH OF TRUNK AND BACK: ICD-10-CM

## 2022-05-26 PROCEDURE — 97110 THERAPEUTIC EXERCISES: CPT | Mod: PO

## 2022-05-26 PROCEDURE — 97161 PT EVAL LOW COMPLEX 20 MIN: CPT | Mod: PO

## 2022-05-26 NOTE — PLAN OF CARE
"[facility]   Physical Therapy Initial Evaluation     Date: 5/26/2022   Name: Sonam Hidalgo  Clinic Number: [id number]    Therapy Diagnosis:   Encounter Diagnoses   Name Primary?    27 weeks gestation of pregnancy     Decreased strength of trunk and back     Lumbar spine instability      Physician: Merary Benton MD    Physician Orders: PT Eval and Treat   Medical Diagnosis from Referral: 27 weeks gestation of pregnancy  Evaluation Date: 5/26/2022  Authorization Period Expiration: 5/6/23  Plan of Care Expiration: 7/7/22  Progress Note Due: 6/26/22  Visit # / Visits authorized: 1/1  FOTO: 1/3    Precautions: 30 weeks pregnant     Time In: 340 PM  Time Out: 415 PM  Total Appointment Time (timed & untimed codes): 35 minutes      SUBJECTIVE     Date of onset: 1 month   History of current condition - Sonam reports: she has right hip pain that has been going on the last month or so. She is 30 weeks pregnant. She has 2 other children and this did not happen with the other pregnancies. She used to be stretch at home but recently the stretches are causing numbness in the right leg.  Every three days or so she will get a numbness/tingling down the leg.     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   Prior Level of Function: independent   Current Level of Function: independent, but is limiting in sleeping on right side (this is her normal sleeping position)    Pain:  Current 4/10, worst 8/10, best 2/10   Location: right hip (lateral)   Description: constant soreness   Aggravating Factors: sleeping on the right side, prolonged standing  Easing Factors: medication, "just when I can crack it"    Patients goals: "to be able to move it better"      Medical History:   Past Medical History:   Diagnosis Date    Abnormal Pap smear of vagina     Allergy     Anxiety     " "Hypertension     Pre-eclampsia        Surgical History:   Sonam Hidalgo  has a past surgical history that includes median arcuate ligament ; Cholecystectomy; and Laparoscopic surgical removal of cyst of ovary (Left, 11/20/2019).    Medications:   Sonam has a current medication list which includes the following prescription(s): aspirin, loratadine, magnesium, ondansetron, and prenatal vit/iron fum/folic ac.    Allergies:   Review of patient's allergies indicates:  No Known Allergies       OBJECTIVE       Observation: enters clinic independently     Posture: increased lumbar lordosis     Gait: genu valgum, right hip circumduction during swing, impaired hip extension bilaterally       Sensation: dulled sensation to light touch on RLE along L5/S1 dermatome     Lower Extremity Strength: (graded 1-5 out of 5)    RLE LLE   Hip flexion: 4/5*pain 5/5   Hip ER 4+/5 4+/5   Hip IR 4+/5 4+/5   Knee extension: 5/5 5/5   Ankle dorsiflexion: 5/5 5/5   Posterior fibers of Gluteus medius 4/5 4/5   Knee flexion: 5/5 5/5   Ankle plantarflexion: 5/5 5/5     Special Tests: ((+): pos.; (-): neg.)   SLR: +R, - L   Slump: -   Bridge test: +core weakness     Function:   Pain with bed mobility such as rolling, improves with abdominal bracing        Limitation/Restriction for FOTO Hip Survey    Therapist reviewed FOTO scores for Sonam Hidalgo on 5/26/2022.   FOTO documents entered into Enbase - see Media section.    Limitation Score: 38%         TREATMENT     Total Treatment time (time-based codes) separate from Evaluation: 10 minutes      Sonam received the treatments listed below:      therapeutic exercises to develop strength, endurance and core stabilization for 10 minutes including:  PPT 10x5" holds   TrA contraction in hooklying 10x5"  Supine Clamshells BTB x10 x3" holds     - education on TrA contraction with transitional movements        PATIENT EDUCATION AND HOME EXERCISES     Education provided:   - " "PT role and POC  - HEP    Written Home Exercises Provided: yes. Exercises were reviewed and Sonam was able to demonstrate them prior to the end of the session.  Sonam demonstrated good  understanding of the education provided. See EMR under Patient Instructions for exercises provided during therapy sessions.    ASSESSMENT     Sonam is a 32 y.o. female referred to outpatient Physical Therapy with medical dx of "27 weeks gestation of pregnancy." Pt is currently 30 weeks pregnant and presents with right sided hip pain.  Patient presents with impaired back, hip, and core strength associated with pregnancy. Also complains of frequent numbness in the L5/S1 dermatome pattern which could be associated with lumbar instability. Pt will benefit from hip and core strengthening to address deficits.    Patient prognosis is Excellent.   Patient will benefit from skilled outpatient Physical Therapy to address the deficits stated above and in the chart below, provide patient /family education, and to maximize patientt's level of independence.     Plan of care discussed with patient: Yes  Patient's spiritual, cultural and educational needs considered and patient is agreeable to the plan of care and goals as stated below:     Anticipated Barriers for therapy: none     Medical Necessity is demonstrated by the following  History  Co-morbidities and personal factors that may impact the plan of care Co-morbidities:   See past medical hx per chart     Personal Factors:   no deficits     low   Examination  Body Structures and Functions, activity limitations and participation restrictions that may impact the plan of care Body Regions:   back  lower extremities  trunk    Body Systems:    gross symmetry  strength  gross coordinated movement  gait  transfers  motor control    Participation Restrictions:   none    Activity limitations:   Learning and applying knowledge  no deficits    General Tasks and Commands  no " deficits    Communication  no deficits    Mobility  no deficits    Self care  sleeping    Domestic Life  no deficits    Interactions/Relationships  no deficits    Life Areas  no deficits    Community and Social Life  community life  recreation and leisure         low   Clinical Presentation stable and uncomplicated low   Decision Making/ Complexity Score: low     Goals:    Short Term Goals (3 Weeks):  1. Pt will be compliant with HEP to supplement PT in decreasing pain with functional mobility.  2. Pt will perform pallof press with good control to demonstrate improved core strength.  3. Pt will improve impaired LE MMTs to >/=4+/5 to improve strength for functional tasks.    Long Term Goals (6 Weeks):   1. Pt will improve FOTO score to </= 22% limited to decrease perceived limitation with mobility.  2. Pt will perform bridge with good control to demonstrate improved core strength.  3. Pt will improve impaired LE MMTs to 5/5 bilaterally  to improve strength for functional tasks.  4. Pt will report a 75% improvement in symptoms in order to improve QoL.       PLAN   Plan of care Certification: 5/26/2022 to 7/7/22.    Outpatient Physical Therapy 1 times weekly for 6 weeks to include the following interventions: Gait Training, Manual Therapy, Moist Heat/ Ice, Neuromuscular Re-ed, Patient Education, Self Care, Therapeutic Activities, Therapeutic Exercise and modalities as appropriate.     Deedee Rae, PT      I CERTIFY THE NEED FOR THESE SERVICES FURNISHED UNDER THIS PLAN OF TREATMENT AND WHILE UNDER MY CARE   Physician's comments:     Physician's Signature: ___________________________________________________

## 2022-06-02 ENCOUNTER — ROUTINE PRENATAL (OUTPATIENT)
Dept: OBSTETRICS AND GYNECOLOGY | Facility: CLINIC | Age: 32
End: 2022-06-02
Payer: COMMERCIAL

## 2022-06-02 VITALS
SYSTOLIC BLOOD PRESSURE: 110 MMHG | BODY MASS INDEX: 49.44 KG/M2 | DIASTOLIC BLOOD PRESSURE: 76 MMHG | WEIGHT: 279.13 LBS

## 2022-06-02 DIAGNOSIS — Z3A.31 31 WEEKS GESTATION OF PREGNANCY: Primary | ICD-10-CM

## 2022-06-02 PROCEDURE — 99999 PR PBB SHADOW E&M-EST. PATIENT-LVL III: CPT | Mod: PBBFAC,,, | Performed by: OBSTETRICS & GYNECOLOGY

## 2022-06-02 PROCEDURE — 99999 PR PBB SHADOW E&M-EST. PATIENT-LVL III: ICD-10-PCS | Mod: PBBFAC,,, | Performed by: OBSTETRICS & GYNECOLOGY

## 2022-06-02 PROCEDURE — 0502F SUBSEQUENT PRENATAL CARE: CPT | Mod: CPTII,S$GLB,, | Performed by: OBSTETRICS & GYNECOLOGY

## 2022-06-02 PROCEDURE — 0502F PR SUBSEQUENT PRENATAL CARE: ICD-10-PCS | Mod: CPTII,S$GLB,, | Performed by: OBSTETRICS & GYNECOLOGY

## 2022-06-02 NOTE — PROGRESS NOTES
HERE for routine OB visit at 31 4/7 wks, with NO complaints.  Denies vaginal bleeding, cramping/ ctx, or LOF.  + FM.  FMC BID. MFM scan scheduled.  PRE E / PTL precautions given  F/U in two weeks

## 2022-06-03 ENCOUNTER — PATIENT MESSAGE (OUTPATIENT)
Dept: MATERNAL FETAL MEDICINE | Facility: CLINIC | Age: 32
End: 2022-06-03
Payer: COMMERCIAL

## 2022-06-03 ENCOUNTER — TELEPHONE (OUTPATIENT)
Dept: OBSTETRICS AND GYNECOLOGY | Facility: CLINIC | Age: 32
End: 2022-06-03
Payer: COMMERCIAL

## 2022-06-06 ENCOUNTER — PROCEDURE VISIT (OUTPATIENT)
Dept: MATERNAL FETAL MEDICINE | Facility: CLINIC | Age: 32
End: 2022-06-06
Payer: COMMERCIAL

## 2022-06-06 DIAGNOSIS — Z36.89 ENCOUNTER FOR ULTRASOUND TO CHECK FETAL GROWTH: ICD-10-CM

## 2022-06-07 ENCOUNTER — PATIENT MESSAGE (OUTPATIENT)
Dept: OBSTETRICS AND GYNECOLOGY | Facility: CLINIC | Age: 32
End: 2022-06-07
Payer: COMMERCIAL

## 2022-06-07 ENCOUNTER — TELEPHONE (OUTPATIENT)
Dept: OBSTETRICS AND GYNECOLOGY | Facility: CLINIC | Age: 32
End: 2022-06-07
Payer: COMMERCIAL

## 2022-06-07 DIAGNOSIS — O99.213 OBESITY AFFECTING PREGNANCY IN THIRD TRIMESTER: ICD-10-CM

## 2022-06-07 DIAGNOSIS — E66.8 MODERATE OBESITY: Primary | ICD-10-CM

## 2022-06-07 NOTE — TELEPHONE ENCOUNTER
----- Message from Cristina Claire sent at 6/6/2022  3:48 PM CDT -----  Name of Who is Calling: CLARA THOMPSON          What is the request in detail: The patient is returning a call back to the office. Please advise          Can the clinic reply by [facility]:No         What Number to Call Back if not in [facility]: [phone]

## 2022-06-17 ENCOUNTER — ROUTINE PRENATAL (OUTPATIENT)
Dept: OBSTETRICS AND GYNECOLOGY | Facility: CLINIC | Age: 32
End: 2022-06-17
Payer: COMMERCIAL

## 2022-06-17 VITALS — WEIGHT: 281.5 LBS | DIASTOLIC BLOOD PRESSURE: 80 MMHG | BODY MASS INDEX: 49.87 KG/M2 | SYSTOLIC BLOOD PRESSURE: 100 MMHG

## 2022-06-17 DIAGNOSIS — O99.213 OBESITY AFFECTING PREGNANCY IN THIRD TRIMESTER: ICD-10-CM

## 2022-06-17 DIAGNOSIS — Z3A.33 33 WEEKS GESTATION OF PREGNANCY: Primary | ICD-10-CM

## 2022-06-17 PROCEDURE — 99999 PR PBB SHADOW E&M-EST. PATIENT-LVL III: ICD-10-PCS | Mod: PBBFAC,,, | Performed by: OBSTETRICS & GYNECOLOGY

## 2022-06-17 PROCEDURE — 0502F PR SUBSEQUENT PRENATAL CARE: ICD-10-PCS | Mod: CPTII,S$GLB,, | Performed by: OBSTETRICS & GYNECOLOGY

## 2022-06-17 PROCEDURE — 0502F SUBSEQUENT PRENATAL CARE: CPT | Mod: CPTII,S$GLB,, | Performed by: OBSTETRICS & GYNECOLOGY

## 2022-06-17 PROCEDURE — 99999 PR PBB SHADOW E&M-EST. PATIENT-LVL III: CPT | Mod: PBBFAC,,, | Performed by: OBSTETRICS & GYNECOLOGY

## 2022-06-17 NOTE — PROGRESS NOTES
HERE for routine OB visit at 33 5/7 wks, with NO complaints.  Denies vaginal bleeding, cramping/ ctx, or LOF.  + FM.  FMC BID.   Discussed AC 99 %,.  Continue with PNT and serial growth US.  F/U 2 wks

## 2022-06-20 ENCOUNTER — HOSPITAL ENCOUNTER (OUTPATIENT)
Dept: PERINATAL CARE | Facility: OTHER | Age: 32
Discharge: HOME | End: 2022-06-20
Attending: OBSTETRICS & GYNECOLOGY
Payer: COMMERCIAL

## 2022-06-20 DIAGNOSIS — O99.213 OBESITY AFFECTING PREGNANCY IN THIRD TRIMESTER: ICD-10-CM

## 2022-06-20 PROCEDURE — 59025 FETAL NON-STRESS TEST: CPT | Mod: 26,,, | Performed by: OBSTETRICS & GYNECOLOGY

## 2022-06-20 PROCEDURE — 59025 PRENATAL TESTING - NST/AFI: ICD-10-PCS | Mod: 26,,, | Performed by: OBSTETRICS & GYNECOLOGY

## 2022-06-20 PROCEDURE — 76815 OB US LIMITED FETUS(S): CPT | Mod: 26,,, | Performed by: OBSTETRICS & GYNECOLOGY

## 2022-06-20 PROCEDURE — 76815 PRENATAL TESTING - NST/AFI: ICD-10-PCS | Mod: 26,,, | Performed by: OBSTETRICS & GYNECOLOGY

## 2022-06-20 PROCEDURE — 76815 OB US LIMITED FETUS(S): CPT

## 2022-06-20 PROCEDURE — 59025 FETAL NON-STRESS TEST: CPT

## 2022-06-30 ENCOUNTER — HOSPITAL ENCOUNTER (EMERGENCY)
Facility: OTHER | Age: 32
Discharge: HOME | End: 2022-06-30
Attending: OBSTETRICS & GYNECOLOGY
Payer: COMMERCIAL

## 2022-06-30 ENCOUNTER — PATIENT MESSAGE (OUTPATIENT)
Dept: OBSTETRICS AND GYNECOLOGY | Facility: CLINIC | Age: 32
End: 2022-06-30
Payer: COMMERCIAL

## 2022-06-30 ENCOUNTER — HOSPITAL ENCOUNTER (OUTPATIENT)
Dept: PERINATAL CARE | Facility: OTHER | Age: 32
Discharge: HOME | End: 2022-06-30
Attending: OBSTETRICS & GYNECOLOGY
Payer: COMMERCIAL

## 2022-06-30 VITALS
RESPIRATION RATE: 16 BRPM | TEMPERATURE: 98 F | OXYGEN SATURATION: 95 % | HEART RATE: 79 BPM | DIASTOLIC BLOOD PRESSURE: 97 MMHG | SYSTOLIC BLOOD PRESSURE: 142 MMHG

## 2022-06-30 VITALS — SYSTOLIC BLOOD PRESSURE: 141 MMHG | DIASTOLIC BLOOD PRESSURE: 89 MMHG

## 2022-06-30 DIAGNOSIS — R51.9 PREGNANCY HEADACHE IN THIRD TRIMESTER: ICD-10-CM

## 2022-06-30 DIAGNOSIS — R00.0 TACHYCARDIA: ICD-10-CM

## 2022-06-30 DIAGNOSIS — Z3A.35 35 WEEKS GESTATION OF PREGNANCY: Primary | ICD-10-CM

## 2022-06-30 DIAGNOSIS — O99.213 OBESITY AFFECTING PREGNANCY IN THIRD TRIMESTER: ICD-10-CM

## 2022-06-30 DIAGNOSIS — O26.893 PREGNANCY HEADACHE IN THIRD TRIMESTER: ICD-10-CM

## 2022-06-30 LAB
ALBUMIN SERPL BCP-MCNC: 2.5 G/DL (ref 3.5–5.2)
ALP SERPL-CCNC: 106 U/L (ref 55–135)
ALT SERPL W/O P-5'-P-CCNC: 11 U/L (ref 10–44)
ANION GAP SERPL CALC-SCNC: 10 MMOL/L (ref 8–16)
AST SERPL-CCNC: 11 U/L (ref 10–40)
BASOPHILS # BLD AUTO: 0.03 K/UL (ref 0–0.2)
BASOPHILS NFR BLD: 0.4 % (ref 0–1.9)
BILIRUB SERPL-MCNC: 0.3 MG/DL (ref 0.1–1)
BILIRUB SERPL-MCNC: NORMAL MG/DL
BLOOD URINE, POC: NEGATIVE
BUN SERPL-MCNC: 9 MG/DL (ref 6–20)
CALCIUM SERPL-MCNC: 8.9 MG/DL (ref 8.7–10.5)
CHLORIDE SERPL-SCNC: 107 MMOL/L (ref 95–110)
CO2 SERPL-SCNC: 19 MMOL/L (ref 23–29)
COLOR, POC UA: YELLOW
CREAT SERPL-MCNC: 0.7 MG/DL (ref 0.5–1.4)
CREAT UR-MCNC: 143 MG/DL (ref 15–325)
DIFFERENTIAL METHOD: ABNORMAL
EOSINOPHIL # BLD AUTO: 0.1 K/UL (ref 0–0.5)
EOSINOPHIL NFR BLD: 1.2 % (ref 0–8)
ERYTHROCYTE [DISTWIDTH] IN BLOOD BY AUTOMATED COUNT: 15.6 % (ref 11.5–14.5)
EST. GFR  (AFRICAN AMERICAN): >60 ML/MIN/1.73 M^2
EST. GFR  (NON AFRICAN AMERICAN): >60 ML/MIN/1.73 M^2
GLUCOSE SERPL-MCNC: 104 MG/DL (ref 70–110)
GLUCOSE UR QL STRIP: NORMAL
HCT VFR BLD AUTO: 36.5 % (ref 37–48.5)
HGB BLD-MCNC: 12 G/DL (ref 12–16)
IMM GRANULOCYTES # BLD AUTO: 0.05 K/UL (ref 0–0.04)
IMM GRANULOCYTES NFR BLD AUTO: 0.6 % (ref 0–0.5)
KETONES UR QL STRIP: NEGATIVE
LEUKOCYTE ESTERASE URINE, POC: NORMAL
LYMPHOCYTES # BLD AUTO: 1.8 K/UL (ref 1–4.8)
LYMPHOCYTES NFR BLD: 23 % (ref 18–48)
MCH RBC QN AUTO: 27.6 PG (ref 27–31)
MCHC RBC AUTO-ENTMCNC: 32.9 G/DL (ref 32–36)
MCV RBC AUTO: 84 FL (ref 82–98)
MONOCYTES # BLD AUTO: 0.5 K/UL (ref 0.3–1)
MONOCYTES NFR BLD: 6.9 % (ref 4–15)
NEUTROPHILS # BLD AUTO: 5.3 K/UL (ref 1.8–7.7)
NEUTROPHILS NFR BLD: 67.9 % (ref 38–73)
NITRITE, POC UA: NEGATIVE
NRBC BLD-RTO: 0 /100 WBC
PH, POC UA: 6
PLATELET # BLD AUTO: 312 K/UL (ref 150–450)
PMV BLD AUTO: 10.2 FL (ref 9.2–12.9)
POTASSIUM SERPL-SCNC: 3.9 MMOL/L (ref 3.5–5.1)
PROT SERPL-MCNC: 6.5 G/DL (ref 6–8.4)
PROT UR-MCNC: 24 MG/DL (ref 0–15)
PROT/CREAT UR: 0.17 MG/G{CREAT} (ref 0–0.2)
PROTEIN, POC: NEGATIVE
RBC # BLD AUTO: 4.34 M/UL (ref 4–5.4)
SODIUM SERPL-SCNC: 136 MMOL/L (ref 136–145)
SPECIFIC GRAVITY, POC UA: 1.02
TROPONIN I SERPL DL<=0.01 NG/ML-MCNC: <0.006 NG/ML (ref 0–0.03)
UROBILINOGEN, POC UA: NORMAL
WBC # BLD AUTO: 7.82 K/UL (ref 3.9–12.7)

## 2022-06-30 PROCEDURE — 96375 TX/PRO/DX INJ NEW DRUG ADDON: CPT

## 2022-06-30 PROCEDURE — 81002 URINALYSIS NONAUTO W/O SCOPE: CPT

## 2022-06-30 PROCEDURE — 76815 PRENATAL TESTING - NST/AFI: ICD-10-PCS | Mod: 26,,, | Performed by: OBSTETRICS & GYNECOLOGY

## 2022-06-30 PROCEDURE — 25000003 PHARM REV CODE 250: Performed by: STUDENT IN AN ORGANIZED HEALTH CARE EDUCATION/TRAINING PROGRAM

## 2022-06-30 PROCEDURE — 59025 FETAL NON-STRESS TEST: CPT | Mod: 26,,, | Performed by: OBSTETRICS & GYNECOLOGY

## 2022-06-30 PROCEDURE — 59025 FETAL NON-STRESS TEST: CPT

## 2022-06-30 PROCEDURE — 84484 ASSAY OF TROPONIN QUANT: CPT | Performed by: STUDENT IN AN ORGANIZED HEALTH CARE EDUCATION/TRAINING PROGRAM

## 2022-06-30 PROCEDURE — 84156 ASSAY OF PROTEIN URINE: CPT | Mod: 91 | Performed by: OBSTETRICS & GYNECOLOGY

## 2022-06-30 PROCEDURE — 96374 THER/PROPH/DIAG INJ IV PUSH: CPT

## 2022-06-30 PROCEDURE — 99284 PR EMERGENCY DEPT VISIT,LEVEL IV: ICD-10-PCS | Mod: 25,,, | Performed by: OBSTETRICS & GYNECOLOGY

## 2022-06-30 PROCEDURE — 93010 EKG 12-LEAD: ICD-10-PCS | Mod: ,,, | Performed by: INTERNAL MEDICINE

## 2022-06-30 PROCEDURE — 85025 COMPLETE CBC W/AUTO DIFF WBC: CPT | Performed by: STUDENT IN AN ORGANIZED HEALTH CARE EDUCATION/TRAINING PROGRAM

## 2022-06-30 PROCEDURE — 80053 COMPREHEN METABOLIC PANEL: CPT | Performed by: STUDENT IN AN ORGANIZED HEALTH CARE EDUCATION/TRAINING PROGRAM

## 2022-06-30 PROCEDURE — 59025 FETAL NON-STRESS TEST: CPT | Mod: 76

## 2022-06-30 PROCEDURE — 59025 PR FETAL 2N-STRESS TEST: ICD-10-PCS | Mod: 26,,, | Performed by: OBSTETRICS & GYNECOLOGY

## 2022-06-30 PROCEDURE — 76815 OB US LIMITED FETUS(S): CPT | Mod: 26,,, | Performed by: OBSTETRICS & GYNECOLOGY

## 2022-06-30 PROCEDURE — 99284 EMERGENCY DEPT VISIT MOD MDM: CPT | Mod: 25,,, | Performed by: OBSTETRICS & GYNECOLOGY

## 2022-06-30 PROCEDURE — 99284 EMERGENCY DEPT VISIT MOD MDM: CPT | Mod: 25

## 2022-06-30 PROCEDURE — 76815 OB US LIMITED FETUS(S): CPT

## 2022-06-30 PROCEDURE — 63600175 PHARM REV CODE 636 W HCPCS: Performed by: STUDENT IN AN ORGANIZED HEALTH CARE EDUCATION/TRAINING PROGRAM

## 2022-06-30 PROCEDURE — 82570 ASSAY OF URINE CREATININE: CPT | Performed by: STUDENT IN AN ORGANIZED HEALTH CARE EDUCATION/TRAINING PROGRAM

## 2022-06-30 PROCEDURE — 93010 ELECTROCARDIOGRAM REPORT: CPT | Mod: ,,, | Performed by: INTERNAL MEDICINE

## 2022-06-30 PROCEDURE — 59025 PRENATAL TESTING - NST/AFI: ICD-10-PCS | Mod: 26,,, | Performed by: OBSTETRICS & GYNECOLOGY

## 2022-06-30 PROCEDURE — 93005 ELECTROCARDIOGRAM TRACING: CPT | Mod: 59

## 2022-06-30 RX ORDER — METOCLOPRAMIDE HYDROCHLORIDE 5 MG/ML
10 INJECTION INTRAMUSCULAR; INTRAVENOUS ONCE
Status: COMPLETED | OUTPATIENT
Start: 2022-06-30 | End: 2022-06-30

## 2022-06-30 RX ORDER — DIPHENHYDRAMINE HYDROCHLORIDE 50 MG/ML
25 INJECTION INTRAMUSCULAR; INTRAVENOUS ONCE
Status: COMPLETED | OUTPATIENT
Start: 2022-06-30 | End: 2022-06-30

## 2022-06-30 RX ORDER — ACETAMINOPHEN 500 MG
1000 TABLET ORAL ONCE
Status: COMPLETED | OUTPATIENT
Start: 2022-06-30 | End: 2022-06-30

## 2022-06-30 RX ADMIN — DIPHENHYDRAMINE HYDROCHLORIDE 25 MG: 50 INJECTION, SOLUTION INTRAMUSCULAR; INTRAVENOUS at 01:06

## 2022-06-30 RX ADMIN — ACETAMINOPHEN 1000 MG: 500 TABLET ORAL at 12:06

## 2022-06-30 RX ADMIN — METOCLOPRAMIDE 10 MG: 5 INJECTION, SOLUTION INTRAMUSCULAR; INTRAVENOUS at 01:06

## 2022-06-30 NOTE — DISCHARGE INSTRUCTIONS
Labor and Delivery: [phone]    Call for:    1)Any vaginal bleeding other than spotting  2)Contractions every 5 minutes for an hour  3)Leaking of fluid like your water broke  4)Headache unrelieved by tylenol  5)Any blurry vision or seeing spots    Make sure to drink plenty of fluids

## 2022-06-30 NOTE — ED PROVIDER NOTES
Encounter Date: 2022  Sonam Hidalgo is a 32 y.o. [id number] at 35w4d presents complaining of lightheadedness/dizziness. Reports this acute episode of lightheadedness that occurred randomly. Denies associated symptoms. Denies chest pain, SOB, palpitations, syncope, fevers/chills, nausea/vomiting. Denies visual changes. This IUP is complicated by hx pre-E, obesity, AC >:99%tile, intracranial aneurysm.      Patient denies contractions, denies vaginal bleeding, denies LOF.   Fetal Movement: normal.        History   No chief complaint on file.    HPI  Review of patient's allergies indicates:  No Known Allergies  Past Medical History:   Diagnosis Date    Abnormal Pap smear of vagina     Allergy     Anxiety     Hypertension     Pre-eclampsia      Past Surgical History:   Procedure Laterality Date    CHOLECYSTECTOMY      LAPAROSCOPIC SURGICAL REMOVAL OF CYST OF OVARY Left 2019    Procedure: EXCISION, CYST, OVARY, LAPAROSCOPIC;  Surgeon: Merary Benton MD;  Location: [facility];  Service: OB/GYN;  Laterality: Left;    median arcuate ligament        Family History   Problem Relation Age of Onset    Breast cancer Mother     Alcohol abuse Father     Dementia Father         vascular and ? lewey    Hypertension Brother     Celiac disease Neg Hx     Cirrhosis Neg Hx     Colon cancer Neg Hx     Colon polyps Neg Hx     Crohn's disease Neg Hx     Cystic fibrosis Neg Hx     Esophageal cancer Neg Hx     Hemochromatosis Neg Hx     Inflammatory bowel disease Neg Hx     Irritable bowel syndrome Neg Hx     Liver cancer Neg Hx     Liver disease Neg Hx     Rectal cancer Neg Hx     Stomach cancer Neg Hx     Ulcerative colitis Neg Hx     Jacob's disease Neg Hx     Ovarian cancer Neg Hx     Arrhythmia Neg Hx     Cardiomyopathy Neg Hx     Congenital heart disease Neg Hx     Early death Neg Hx     Heart attacks under age 50 Neg Hx     Pacemaker/defibrilator Neg Hx      Social History      Tobacco Use    Smoking status: Never Smoker    Smokeless tobacco: Never Used   Substance Use Topics    Alcohol use: Yes     Comment: 1 glass wine on occ    Drug use: No     Review of Systems   Constitutional: Negative for chills and fever.   HENT: Negative for sore throat.    Eyes: Negative for visual disturbance.   Respiratory: Negative for shortness of breath.    Cardiovascular: Negative for chest pain.   Gastrointestinal: Negative for nausea.   Genitourinary: Negative for dysuria and vaginal bleeding.   Musculoskeletal: Negative for back pain.   Skin: Negative for rash.   Neurological: Positive for dizziness and light-headedness. Negative for headaches.   Psychiatric/Behavioral: Negative for confusion.       Physical Exam     Initial Vitals   BP Pulse Resp Temp SpO2   06/30/22 1132 06/30/22 1132 06/30/22 1233 06/30/22 1301 06/30/22 1136   137/87 (!) 123 16 97.9 °F (36.6 °C) 97 %      MAP       --                Physical Exam    ED Course   Fetal non-stress test    Date/Time: 6/30/2022 6:30 PM  Performed by: Martina Kruse MD  Authorized by: Gely Reyez MD     Nonstress Test:     Variability:  6-25 BPM    Decelerations:  None    Accelerations:  15 bpm    Baseline:  120    Contractions:  Not present  Biophysical Profile:     Nonstress Test Interpretation: reactive      Overall Impression:  Reassuring      Labs Reviewed   CBC W/ AUTO DIFFERENTIAL - Abnormal; Notable for the following components:       Result Value    Hematocrit 36.5 (*)     RDW 15.6 (*)     Immature Granulocytes 0.6 (*)     Immature Grans (Abs) 0.05 (*)     All other components within normal limits   COMPREHENSIVE METABOLIC PANEL - Abnormal; Notable for the following components:    CO2 19 (*)     Albumin 2.5 (*)     All other components within normal limits   PROTEIN / CREATININE RATIO, URINE - Abnormal; Notable for the following components:    Protein, Urine Random 24 (*)     All other components within normal limits    Narrative:      Specimen Source->Urine   TROPONIN I   PROTEIN, URINE, TIMED   POCT URINALYSIS, DIPSTICK OR TABLET REAGENT, AUTOMATED, WITH MICROSCOP        ECG Results          EKG 12-lead (In process)  Result time 06/30/22 13:38:22    In process by Interface, Lab In [facility] (06/30/22 13:38:22)                 Narrative:    Test Reason : R00.0,    Vent. Rate : 104 BPM     Atrial Rate : 104 BPM     P-R Int : 128 ms          QRS Dur : 086 ms      QT Int : 320 ms       P-R-T Axes : 072 085 021 degrees     QTc Int : 420 ms    Sinus tachycardia  Right ventricular conduction delay  Borderline Abnormal ECG  When compared with ECG of 30-SEP-2019 08:42,  No significant change was found    Referred By: [email or web address]   SELF           Confirmed By:                             Imaging Results    None          Medications   acetaminophen tablet 1,000 mg (1,000 mg Oral Given 6/30/22 1208)   metoclopramide HCl injection 10 mg (10 mg Intravenous Given 6/30/22 1302)   diphenhydrAMINE injection 25 mg (25 mg Intravenous Given 6/30/22 1304)     Medical Decision Making:   ED Management:  - Initially tachycardic, resolved with IVF. All other vitals wnl  - FHT reactive/reassuring  - Urine dip wnl  - CBC/CMP wnl  - EKG sinus tachy with right ventricular conduction delay. Compared to previous EKG and no significant changes noted. EKG reviewed by Dr. Hernández in the main ED and compared to previous EKG. EKG wnl  - Troponin normal  - Patient had normal workup and felt better following IVF    Stable for discharge with strict return precautions    Martina Kruse MD PGY-3  Obstetrics and Gynecology              Attending Attestation:   Physician Attestation Statement for Resident:  As the supervising MD   Physician Attestation Statement: I have personally seen and examined this patient.   I agree with the above history. -:   As the supervising MD I agree with the above PE.    As the supervising MD I agree with the above treatment, course, plan, and disposition.   -:    NST  I independently reviewed the fetal non-stress test with the following interpretation:  120 BPM baseline  Variability: moderate  Accelerations: present  Decelerations: absent  Contractions: none  Category 1    Clinical Interpretation:reactive    Patient evaluated and found to be stable, agree with resident's assessment and plan.  Labs wnl.EKG evaluated by Ed physician and wnl. Tropnin negative. Symptoms improved with IVF. Reassurenace provided and precautions reviewed.    I was personally present during the critical portions of the procedure(s) performed by the resident and was immediately available in the ED to provide services and assistance as needed during the entire procedure.                         Clinical Impression:   Final diagnoses:  [Z3A.35] 35 weeks gestation of pregnancy (Primary)  [O26.893, R51.9] Pregnancy headache in third trimester          ED Disposition Condition    Discharge Stable        ED Prescriptions     None        Follow-up Information    None          Martina Kruse MD  Resident  06/30/22 1410       Gely Reyez MD  06/30/22 5016

## 2022-07-01 LAB
PROT 24H UR-MRATE: NORMAL MG/SPEC (ref 0–100)
PROT UR-MCNC: <7 MG/DL (ref 0–15)
URINE COLLECTION DURATION: 24 HR
URINE VOLUME: 2600 ML

## 2022-07-06 ENCOUNTER — PATIENT MESSAGE (OUTPATIENT)
Dept: MATERNAL FETAL MEDICINE | Facility: CLINIC | Age: 32
End: 2022-07-06
Payer: COMMERCIAL

## 2022-07-07 ENCOUNTER — ROUTINE PRENATAL (OUTPATIENT)
Dept: OBSTETRICS AND GYNECOLOGY | Facility: CLINIC | Age: 32
End: 2022-07-07
Payer: COMMERCIAL

## 2022-07-07 ENCOUNTER — PROCEDURE VISIT (OUTPATIENT)
Dept: MATERNAL FETAL MEDICINE | Facility: CLINIC | Age: 32
End: 2022-07-07
Payer: COMMERCIAL

## 2022-07-07 ENCOUNTER — LAB VISIT (OUTPATIENT)
Dept: LAB | Facility: HOSPITAL | Age: 32
End: 2022-07-07
Payer: COMMERCIAL

## 2022-07-07 VITALS
WEIGHT: 282.63 LBS | SYSTOLIC BLOOD PRESSURE: 128 MMHG | BODY MASS INDEX: 50.07 KG/M2 | DIASTOLIC BLOOD PRESSURE: 82 MMHG

## 2022-07-07 DIAGNOSIS — O99.213 OBESITY AFFECTING PREGNANCY IN THIRD TRIMESTER: ICD-10-CM

## 2022-07-07 DIAGNOSIS — Z3A.33 33 WEEKS GESTATION OF PREGNANCY: ICD-10-CM

## 2022-07-07 DIAGNOSIS — Z3A.36 36 WEEKS GESTATION OF PREGNANCY: Primary | ICD-10-CM

## 2022-07-07 DIAGNOSIS — Z3A.36 36 WEEKS GESTATION OF PREGNANCY: ICD-10-CM

## 2022-07-07 LAB
BASOPHILS # BLD AUTO: 0.04 K/UL (ref 0–0.2)
BASOPHILS NFR BLD: 0.4 % (ref 0–1.9)
DIFFERENTIAL METHOD: ABNORMAL
EOSINOPHIL # BLD AUTO: 0.1 K/UL (ref 0–0.5)
EOSINOPHIL NFR BLD: 1.2 % (ref 0–8)
ERYTHROCYTE [DISTWIDTH] IN BLOOD BY AUTOMATED COUNT: 16 % (ref 11.5–14.5)
HCT VFR BLD AUTO: 38.6 % (ref 37–48.5)
HGB BLD-MCNC: 12.4 G/DL (ref 12–16)
IMM GRANULOCYTES # BLD AUTO: 0.09 K/UL (ref 0–0.04)
IMM GRANULOCYTES NFR BLD AUTO: 1 % (ref 0–0.5)
LYMPHOCYTES # BLD AUTO: 2.4 K/UL (ref 1–4.8)
LYMPHOCYTES NFR BLD: 26.2 % (ref 18–48)
MCH RBC QN AUTO: 27.9 PG (ref 27–31)
MCHC RBC AUTO-ENTMCNC: 32.1 G/DL (ref 32–36)
MCV RBC AUTO: 87 FL (ref 82–98)
MONOCYTES # BLD AUTO: 0.6 K/UL (ref 0.3–1)
MONOCYTES NFR BLD: 6.2 % (ref 4–15)
NEUTROPHILS # BLD AUTO: 5.9 K/UL (ref 1.8–7.7)
NEUTROPHILS NFR BLD: 65 % (ref 38–73)
NRBC BLD-RTO: 0 /100 WBC
PLATELET # BLD AUTO: 300 K/UL (ref 150–450)
PMV BLD AUTO: 10.7 FL (ref 9.2–12.9)
RBC # BLD AUTO: 4.45 M/UL (ref 4–5.4)
WBC # BLD AUTO: 9.04 K/UL (ref 3.9–12.7)

## 2022-07-07 PROCEDURE — 99999 PR PBB SHADOW E&M-EST. PATIENT-LVL III: CPT | Mod: PBBFAC,,, | Performed by: OBSTETRICS & GYNECOLOGY

## 2022-07-07 PROCEDURE — 87081 CULTURE SCREEN ONLY: CPT | Performed by: OBSTETRICS & GYNECOLOGY

## 2022-07-07 PROCEDURE — 99999 PR PBB SHADOW E&M-EST. PATIENT-LVL III: ICD-10-PCS | Mod: PBBFAC,,, | Performed by: OBSTETRICS & GYNECOLOGY

## 2022-07-07 PROCEDURE — 86592 SYPHILIS TEST NON-TREP QUAL: CPT | Performed by: OBSTETRICS & GYNECOLOGY

## 2022-07-07 PROCEDURE — 36415 COLL VENOUS BLD VENIPUNCTURE: CPT | Mod: PN | Performed by: OBSTETRICS & GYNECOLOGY

## 2022-07-07 PROCEDURE — 85025 COMPLETE CBC W/AUTO DIFF WBC: CPT | Performed by: OBSTETRICS & GYNECOLOGY

## 2022-07-07 PROCEDURE — 76816 OB US FOLLOW-UP PER FETUS: CPT | Mod: S$GLB,,, | Performed by: OBSTETRICS & GYNECOLOGY

## 2022-07-07 PROCEDURE — 76816 US MFM PROCEDURE (VIEWPOINT): ICD-10-PCS | Mod: S$GLB,,, | Performed by: OBSTETRICS & GYNECOLOGY

## 2022-07-07 PROCEDURE — 87389 HIV-1 AG W/HIV-1&-2 AB AG IA: CPT | Performed by: OBSTETRICS & GYNECOLOGY

## 2022-07-07 PROCEDURE — 76819 FETAL BIOPHYS PROFIL W/O NST: CPT | Mod: S$GLB,,, | Performed by: OBSTETRICS & GYNECOLOGY

## 2022-07-07 PROCEDURE — 0502F SUBSEQUENT PRENATAL CARE: CPT | Mod: CPTII,S$GLB,, | Performed by: OBSTETRICS & GYNECOLOGY

## 2022-07-07 PROCEDURE — 76819 US MFM PROCEDURE (VIEWPOINT): ICD-10-PCS | Mod: S$GLB,,, | Performed by: OBSTETRICS & GYNECOLOGY

## 2022-07-07 PROCEDURE — 0502F PR SUBSEQUENT PRENATAL CARE: ICD-10-PCS | Mod: CPTII,S$GLB,, | Performed by: OBSTETRICS & GYNECOLOGY

## 2022-07-07 PROCEDURE — 87147 CULTURE TYPE IMMUNOLOGIC: CPT | Performed by: OBSTETRICS & GYNECOLOGY

## 2022-07-07 NOTE — PROGRESS NOTES
HERE for routine OB visit at 36 4/7 wks, with pelvic pain and cramping.  Denies vaginal bleeding, cramping/ ctx, or LOF.  + FM.  [facility] BID.    US today -  EFW   - 2985,    44% , AC plotting at the 82%. .  PTL precautions.  PNT Reassuring.  Consents done  F/U wkly

## 2022-07-08 LAB
HIV 1+2 AB+HIV1 P24 AG SERPL QL IA: NEGATIVE
RPR SER QL: NORMAL

## 2022-07-09 LAB — BACTERIA SPEC AEROBE CULT: ABNORMAL

## 2022-07-14 ENCOUNTER — HOSPITAL ENCOUNTER (OUTPATIENT)
Dept: PERINATAL CARE | Facility: OTHER | Age: 32
Discharge: HOME | End: 2022-07-14
Attending: OBSTETRICS & GYNECOLOGY
Payer: COMMERCIAL

## 2022-07-14 ENCOUNTER — ROUTINE PRENATAL (OUTPATIENT)
Dept: OBSTETRICS AND GYNECOLOGY | Facility: CLINIC | Age: 32
End: 2022-07-14
Payer: COMMERCIAL

## 2022-07-14 VITALS
WEIGHT: 281.94 LBS | SYSTOLIC BLOOD PRESSURE: 132 MMHG | BODY MASS INDEX: 49.95 KG/M2 | DIASTOLIC BLOOD PRESSURE: 84 MMHG

## 2022-07-14 DIAGNOSIS — Z3A.37 37 WEEKS GESTATION OF PREGNANCY: Primary | ICD-10-CM

## 2022-07-14 DIAGNOSIS — O99.213 OBESITY AFFECTING PREGNANCY IN THIRD TRIMESTER: ICD-10-CM

## 2022-07-14 PROCEDURE — 76815 OB US LIMITED FETUS(S): CPT | Mod: 26,,, | Performed by: OBSTETRICS & GYNECOLOGY

## 2022-07-14 PROCEDURE — 59025 FETAL NON-STRESS TEST: CPT | Mod: 26,,, | Performed by: OBSTETRICS & GYNECOLOGY

## 2022-07-14 PROCEDURE — 99999 PR PBB SHADOW E&M-EST. PATIENT-LVL III: CPT | Mod: PBBFAC,,, | Performed by: OBSTETRICS & GYNECOLOGY

## 2022-07-14 PROCEDURE — 59025 FETAL NON-STRESS TEST: CPT

## 2022-07-14 PROCEDURE — 0502F SUBSEQUENT PRENATAL CARE: CPT | Mod: CPTII,S$GLB,, | Performed by: OBSTETRICS & GYNECOLOGY

## 2022-07-14 PROCEDURE — 76815 OB US LIMITED FETUS(S): CPT

## 2022-07-14 PROCEDURE — 76815 PRENATAL TESTING - NST/AFI: ICD-10-PCS | Mod: 26,,, | Performed by: OBSTETRICS & GYNECOLOGY

## 2022-07-14 PROCEDURE — 0502F PR SUBSEQUENT PRENATAL CARE: ICD-10-PCS | Mod: CPTII,S$GLB,, | Performed by: OBSTETRICS & GYNECOLOGY

## 2022-07-14 PROCEDURE — 59025 PRENATAL TESTING - NST/AFI: ICD-10-PCS | Mod: 26,,, | Performed by: OBSTETRICS & GYNECOLOGY

## 2022-07-14 PROCEDURE — 99999 PR PBB SHADOW E&M-EST. PATIENT-LVL III: ICD-10-PCS | Mod: PBBFAC,,, | Performed by: OBSTETRICS & GYNECOLOGY

## 2022-07-14 NOTE — PROGRESS NOTES
HERE for routine OB visit at 37 4/7 wks, with NO complaints.  Denies vaginal bleeding, cramping/ ctx, or LOF.  + FM.  FMC BID.  Continue on PNT .  PRE E precautions.  F/U for PNT and induction

## 2022-07-15 ENCOUNTER — PATIENT MESSAGE (OUTPATIENT)
Dept: OBSTETRICS AND GYNECOLOGY | Facility: CLINIC | Age: 32
End: 2022-07-15
Payer: COMMERCIAL

## 2022-07-16 ENCOUNTER — HOSPITAL ENCOUNTER (INPATIENT)
Facility: OTHER | Age: 32
LOS: 2 days | Discharge: HOME | End: 2022-07-18
Attending: OBSTETRICS & GYNECOLOGY | Admitting: OBSTETRICS & GYNECOLOGY
Payer: COMMERCIAL

## 2022-07-16 ENCOUNTER — ANESTHESIA (OUTPATIENT)
Dept: OBSTETRICS AND GYNECOLOGY | Facility: OTHER | Age: 32
End: 2022-07-16
Payer: COMMERCIAL

## 2022-07-16 ENCOUNTER — ANESTHESIA EVENT (OUTPATIENT)
Dept: OBSTETRICS AND GYNECOLOGY | Facility: OTHER | Age: 32
End: 2022-07-16
Payer: COMMERCIAL

## 2022-07-16 DIAGNOSIS — Z34.90 ENCOUNTER FOR INDUCTION OF LABOR: ICD-10-CM

## 2022-07-16 DIAGNOSIS — O41.03X1 OLIGOHYDRAMNIOS IN THIRD TRIMESTER, FETUS 1 OF MULTIPLE GESTATION: Primary | ICD-10-CM

## 2022-07-16 DIAGNOSIS — Z3A.37 37 WEEKS GESTATION OF PREGNANCY: ICD-10-CM

## 2022-07-16 DIAGNOSIS — O47.9 IRREGULAR UTERINE CONTRACTIONS: ICD-10-CM

## 2022-07-16 PROBLEM — O13.3 GESTATIONAL HYPERTENSION, THIRD TRIMESTER: Status: ACTIVE | Noted: 2022-07-16

## 2022-07-16 LAB
ABO + RH BLD: NORMAL
ALBUMIN SERPL BCP-MCNC: 2.7 G/DL (ref 3.5–5.2)
ALP SERPL-CCNC: 131 U/L (ref 55–135)
ALT SERPL W/O P-5'-P-CCNC: 12 U/L (ref 10–44)
ANION GAP SERPL CALC-SCNC: 13 MMOL/L (ref 8–16)
AST SERPL-CCNC: 15 U/L (ref 10–40)
BASOPHILS # BLD AUTO: 0.02 K/UL (ref 0–0.2)
BASOPHILS NFR BLD: 0.2 % (ref 0–1.9)
BILIRUB SERPL-MCNC: 0.8 MG/DL (ref 0.1–1)
BLD GP AB SCN CELLS X3 SERPL QL: NORMAL
BUN SERPL-MCNC: 8 MG/DL (ref 6–20)
CALCIUM SERPL-MCNC: 9.4 MG/DL (ref 8.7–10.5)
CHLORIDE SERPL-SCNC: 103 MMOL/L (ref 95–110)
CO2 SERPL-SCNC: 18 MMOL/L (ref 23–29)
CREAT SERPL-MCNC: 0.7 MG/DL (ref 0.5–1.4)
CREAT UR-MCNC: 136.7 MG/DL (ref 15–325)
DIFFERENTIAL METHOD: ABNORMAL
EOSINOPHIL # BLD AUTO: 0 K/UL (ref 0–0.5)
EOSINOPHIL NFR BLD: 0.1 % (ref 0–8)
ERYTHROCYTE [DISTWIDTH] IN BLOOD BY AUTOMATED COUNT: 15.3 % (ref 11.5–14.5)
EST. GFR  (AFRICAN AMERICAN): >60 ML/MIN/1.73 M^2
EST. GFR  (NON AFRICAN AMERICAN): >60 ML/MIN/1.73 M^2
GLUCOSE SERPL-MCNC: 82 MG/DL (ref 70–110)
HCT VFR BLD AUTO: 38 % (ref 37–48.5)
HGB BLD-MCNC: 12.5 G/DL (ref 12–16)
IMM GRANULOCYTES # BLD AUTO: 0.05 K/UL (ref 0–0.04)
IMM GRANULOCYTES NFR BLD AUTO: 0.6 % (ref 0–0.5)
LYMPHOCYTES # BLD AUTO: 0.7 K/UL (ref 1–4.8)
LYMPHOCYTES NFR BLD: 8.6 % (ref 18–48)
MCH RBC QN AUTO: 28.1 PG (ref 27–31)
MCHC RBC AUTO-ENTMCNC: 32.9 G/DL (ref 32–36)
MCV RBC AUTO: 85 FL (ref 82–98)
MONOCYTES # BLD AUTO: 0.3 K/UL (ref 0.3–1)
MONOCYTES NFR BLD: 3.6 % (ref 4–15)
NEUTROPHILS # BLD AUTO: 7.2 K/UL (ref 1.8–7.7)
NEUTROPHILS NFR BLD: 86.9 % (ref 38–73)
NRBC BLD-RTO: 0 /100 WBC
PLATELET # BLD AUTO: 256 K/UL (ref 150–450)
PMV BLD AUTO: 10 FL (ref 9.2–12.9)
POTASSIUM SERPL-SCNC: 4.2 MMOL/L (ref 3.5–5.1)
PROT SERPL-MCNC: 6.7 G/DL (ref 6–8.4)
PROT UR-MCNC: 25 MG/DL (ref 0–15)
PROT/CREAT UR: 0.18 MG/G{CREAT} (ref 0–0.2)
RBC # BLD AUTO: 4.45 M/UL (ref 4–5.4)
SODIUM SERPL-SCNC: 134 MMOL/L (ref 136–145)
WBC # BLD AUTO: 8.25 K/UL (ref 3.9–12.7)

## 2022-07-16 PROCEDURE — 27200710 HC EPIDURAL INFUSION PUMP SET: Performed by: ANESTHESIOLOGY

## 2022-07-16 PROCEDURE — 25000003 PHARM REV CODE 250: Performed by: STUDENT IN AN ORGANIZED HEALTH CARE EDUCATION/TRAINING PROGRAM

## 2022-07-16 PROCEDURE — 59025 FETAL NON-STRESS TEST: CPT

## 2022-07-16 PROCEDURE — 59025 FETAL NON-STRESS TEST: CPT | Mod: 26,,, | Performed by: OBSTETRICS & GYNECOLOGY

## 2022-07-16 PROCEDURE — 62326 NJX INTERLAMINAR LMBR/SAC: CPT | Performed by: STUDENT IN AN ORGANIZED HEALTH CARE EDUCATION/TRAINING PROGRAM

## 2022-07-16 PROCEDURE — 86850 RBC ANTIBODY SCREEN: CPT | Performed by: OBSTETRICS & GYNECOLOGY

## 2022-07-16 PROCEDURE — 59400 OBSTETRICAL CARE: CPT | Mod: AT,,, | Performed by: OBSTETRICS & GYNECOLOGY

## 2022-07-16 PROCEDURE — 63600175 PHARM REV CODE 636 W HCPCS

## 2022-07-16 PROCEDURE — C1751 CATH, INF, PER/CENT/MIDLINE: HCPCS | Performed by: ANESTHESIOLOGY

## 2022-07-16 PROCEDURE — 63600175 PHARM REV CODE 636 W HCPCS: Performed by: STUDENT IN AN ORGANIZED HEALTH CARE EDUCATION/TRAINING PROGRAM

## 2022-07-16 PROCEDURE — 59400 OBSTETRICAL CARE: CPT | Mod: QY,,, | Performed by: ANESTHESIOLOGY

## 2022-07-16 PROCEDURE — 80053 COMPREHEN METABOLIC PANEL: CPT

## 2022-07-16 PROCEDURE — 99284 PR EMERGENCY DEPT VISIT,LEVEL IV: ICD-10-PCS | Mod: 25,,, | Performed by: OBSTETRICS & GYNECOLOGY

## 2022-07-16 PROCEDURE — 59025 PR FETAL 2N-STRESS TEST: ICD-10-PCS | Mod: 26,,, | Performed by: OBSTETRICS & GYNECOLOGY

## 2022-07-16 PROCEDURE — 84156 ASSAY OF PROTEIN URINE: CPT

## 2022-07-16 PROCEDURE — 99285 EMERGENCY DEPT VISIT HI MDM: CPT | Mod: 25

## 2022-07-16 PROCEDURE — 99284 EMERGENCY DEPT VISIT MOD MDM: CPT | Mod: 25,,, | Performed by: OBSTETRICS & GYNECOLOGY

## 2022-07-16 PROCEDURE — 59400 PR FULL ROUT OBSTE CARE,VAGINAL DELIV: ICD-10-PCS | Mod: AT,,, | Performed by: OBSTETRICS & GYNECOLOGY

## 2022-07-16 PROCEDURE — 59400 PRA FULL ROUT OBSTE CARE,VAGINAL DELIV: ICD-10-PCS | Mod: QY,,, | Performed by: ANESTHESIOLOGY

## 2022-07-16 PROCEDURE — 11000001 HC ACUTE MED/SURG PRIVATE ROOM

## 2022-07-16 PROCEDURE — 51702 INSERT TEMP BLADDER CATH: CPT

## 2022-07-16 PROCEDURE — 36415 COLL VENOUS BLD VENIPUNCTURE: CPT

## 2022-07-16 PROCEDURE — 85025 COMPLETE CBC W/AUTO DIFF WBC: CPT | Performed by: OBSTETRICS & GYNECOLOGY

## 2022-07-16 RX ORDER — PROCHLORPERAZINE EDISYLATE 5 MG/ML
5 INJECTION INTRAMUSCULAR; INTRAVENOUS EVERY 6 HOURS PRN
Status: DISCONTINUED | OUTPATIENT
Start: 2022-07-16 | End: 2022-07-18 | Stop reason: HOSPADM

## 2022-07-16 RX ORDER — ONDANSETRON 8 MG/1
8 TABLET, ORALLY DISINTEGRATING ORAL EVERY 8 HOURS PRN
Status: DISCONTINUED | OUTPATIENT
Start: 2022-07-16 | End: 2022-07-18 | Stop reason: HOSPADM

## 2022-07-16 RX ORDER — NALBUPHINE HYDROCHLORIDE 10 MG/ML
10 INJECTION, SOLUTION INTRAMUSCULAR; INTRAVENOUS; SUBCUTANEOUS ONCE AS NEEDED
Status: COMPLETED | OUTPATIENT
Start: 2022-07-16 | End: 2022-07-16

## 2022-07-16 RX ORDER — OXYTOCIN/RINGER'S LACTATE 30/500 ML
95 PLASTIC BAG, INJECTION (ML) INTRAVENOUS ONCE
Status: COMPLETED | OUTPATIENT
Start: 2022-07-16 | End: 2022-07-16

## 2022-07-16 RX ORDER — DIPHENHYDRAMINE HCL 25 MG
25 CAPSULE ORAL EVERY 4 HOURS PRN
Status: DISCONTINUED | OUTPATIENT
Start: 2022-07-16 | End: 2022-07-18 | Stop reason: HOSPADM

## 2022-07-16 RX ORDER — FENTANYL CITRATE 50 UG/ML
INJECTION, SOLUTION INTRAMUSCULAR; INTRAVENOUS
Status: COMPLETED
Start: 2022-07-16 | End: 2022-07-16

## 2022-07-16 RX ORDER — HYDROCORTISONE 25 MG/G
CREAM TOPICAL 3 TIMES DAILY PRN
Status: DISCONTINUED | OUTPATIENT
Start: 2022-07-16 | End: 2022-07-18 | Stop reason: HOSPADM

## 2022-07-16 RX ORDER — TRANEXAMIC ACID 100 MG/ML
INJECTION, SOLUTION INTRAVENOUS
Status: DISCONTINUED
Start: 2022-07-16 | End: 2022-07-16 | Stop reason: WASHOUT

## 2022-07-16 RX ORDER — CARBOPROST TROMETHAMINE 250 UG/ML
250 INJECTION, SOLUTION INTRAMUSCULAR
Status: DISCONTINUED | OUTPATIENT
Start: 2022-07-16 | End: 2022-07-18 | Stop reason: HOSPADM

## 2022-07-16 RX ORDER — TRANEXAMIC ACID 100 MG/ML
1000 INJECTION, SOLUTION INTRAVENOUS ONCE AS NEEDED
Status: DISCONTINUED | OUTPATIENT
Start: 2022-07-16 | End: 2022-07-16

## 2022-07-16 RX ORDER — DOCUSATE SODIUM 100 MG/1
200 CAPSULE, LIQUID FILLED ORAL 2 TIMES DAILY PRN
Status: DISCONTINUED | OUTPATIENT
Start: 2022-07-16 | End: 2022-07-18 | Stop reason: HOSPADM

## 2022-07-16 RX ORDER — DIPHENHYDRAMINE HYDROCHLORIDE 50 MG/ML
25 INJECTION INTRAMUSCULAR; INTRAVENOUS EVERY 4 HOURS PRN
Status: DISCONTINUED | OUTPATIENT
Start: 2022-07-16 | End: 2022-07-18 | Stop reason: HOSPADM

## 2022-07-16 RX ORDER — ONDANSETRON 8 MG/1
8 TABLET, ORALLY DISINTEGRATING ORAL EVERY 8 HOURS PRN
Status: DISCONTINUED | OUTPATIENT
Start: 2022-07-16 | End: 2022-07-16

## 2022-07-16 RX ORDER — OXYCODONE AND ACETAMINOPHEN 5; 325 MG/1; MG/1
1 TABLET ORAL EVERY 4 HOURS PRN
Status: DISCONTINUED | OUTPATIENT
Start: 2022-07-16 | End: 2022-07-18 | Stop reason: HOSPADM

## 2022-07-16 RX ORDER — FENTANYL/BUPIVACAINE/NS/PF 2MCG/ML-.1
PLASTIC BAG, INJECTION (ML) INJECTION CONTINUOUS PRN
Status: DISCONTINUED | OUTPATIENT
Start: 2022-07-16 | End: 2022-07-16

## 2022-07-16 RX ORDER — FENTANYL/BUPIVACAINE/NS/PF 2MCG/ML-.1
PLASTIC BAG, INJECTION (ML) INJECTION
Status: DISCONTINUED | OUTPATIENT
Start: 2022-07-16 | End: 2022-07-16

## 2022-07-16 RX ORDER — ACETAMINOPHEN 325 MG/1
650 TABLET ORAL EVERY 6 HOURS PRN
Status: DISCONTINUED | OUTPATIENT
Start: 2022-07-16 | End: 2022-07-18 | Stop reason: HOSPADM

## 2022-07-16 RX ORDER — CALCIUM CARBONATE 200(500)MG
500 TABLET,CHEWABLE ORAL 3 TIMES DAILY PRN
Status: DISCONTINUED | OUTPATIENT
Start: 2022-07-16 | End: 2022-07-16

## 2022-07-16 RX ORDER — IBUPROFEN 600 MG/1
600 TABLET ORAL EVERY 6 HOURS
Status: DISCONTINUED | OUTPATIENT
Start: 2022-07-17 | End: 2022-07-18 | Stop reason: HOSPADM

## 2022-07-16 RX ORDER — METHYLERGONOVINE MALEATE 0.2 MG/ML
200 INJECTION INTRAVENOUS
Status: DISCONTINUED | OUTPATIENT
Start: 2022-07-16 | End: 2022-07-18 | Stop reason: HOSPADM

## 2022-07-16 RX ORDER — DIPHENOXYLATE HYDROCHLORIDE AND ATROPINE SULFATE 2.5; .025 MG/1; MG/1
1 TABLET ORAL 4 TIMES DAILY PRN
Status: DISCONTINUED | OUTPATIENT
Start: 2022-07-16 | End: 2022-07-18 | Stop reason: HOSPADM

## 2022-07-16 RX ORDER — OXYTOCIN/RINGER'S LACTATE 30/500 ML
95 PLASTIC BAG, INJECTION (ML) INTRAVENOUS ONCE
Status: DISCONTINUED | OUTPATIENT
Start: 2022-07-16 | End: 2022-07-16

## 2022-07-16 RX ORDER — FENTANYL CITRATE 50 UG/ML
INJECTION, SOLUTION INTRAMUSCULAR; INTRAVENOUS
Status: DISCONTINUED | OUTPATIENT
Start: 2022-07-16 | End: 2022-07-16

## 2022-07-16 RX ORDER — FENTANYL/BUPIVACAINE/NS/PF 2MCG/ML-.1
PLASTIC BAG, INJECTION (ML) INJECTION CONTINUOUS
Status: DISCONTINUED | OUTPATIENT
Start: 2022-07-16 | End: 2022-07-16

## 2022-07-16 RX ORDER — SODIUM CHLORIDE, SODIUM LACTATE, POTASSIUM CHLORIDE, CALCIUM CHLORIDE 600; 310; 30; 20 MG/100ML; MG/100ML; MG/100ML; MG/100ML
INJECTION, SOLUTION INTRAVENOUS CONTINUOUS
Status: DISCONTINUED | OUTPATIENT
Start: 2022-07-16 | End: 2022-07-16

## 2022-07-16 RX ORDER — CEFAZOLIN SODIUM 2 G/50ML
2 SOLUTION INTRAVENOUS ONCE AS NEEDED
Status: DISCONTINUED | OUTPATIENT
Start: 2022-07-16 | End: 2022-07-16

## 2022-07-16 RX ORDER — METHYLERGONOVINE MALEATE 0.2 MG/ML
INJECTION INTRAVENOUS
Status: DISCONTINUED
Start: 2022-07-16 | End: 2022-07-16 | Stop reason: WASHOUT

## 2022-07-16 RX ORDER — OXYTOCIN/RINGER'S LACTATE 30/500 ML
334 PLASTIC BAG, INJECTION (ML) INTRAVENOUS ONCE
Status: DISCONTINUED | OUTPATIENT
Start: 2022-07-16 | End: 2022-07-16

## 2022-07-16 RX ORDER — FENTANYL/BUPIVACAINE/NS/PF 2MCG/ML-.1
PLASTIC BAG, INJECTION (ML) INJECTION
Status: COMPLETED
Start: 2022-07-16 | End: 2022-07-16

## 2022-07-16 RX ORDER — PROCHLORPERAZINE EDISYLATE 5 MG/ML
5 INJECTION INTRAMUSCULAR; INTRAVENOUS EVERY 6 HOURS PRN
Status: DISCONTINUED | OUTPATIENT
Start: 2022-07-16 | End: 2022-07-16

## 2022-07-16 RX ORDER — ENOXAPARIN SODIUM 100 MG/ML
40 INJECTION SUBCUTANEOUS EVERY 12 HOURS
Status: DISCONTINUED | OUTPATIENT
Start: 2022-07-17 | End: 2022-07-18 | Stop reason: HOSPADM

## 2022-07-16 RX ORDER — MISOPROSTOL 200 UG/1
TABLET ORAL
Status: DISCONTINUED
Start: 2022-07-16 | End: 2022-07-16 | Stop reason: WASHOUT

## 2022-07-16 RX ORDER — LIDOCAINE HYDROCHLORIDE 10 MG/ML
10 INJECTION INFILTRATION; PERINEURAL ONCE AS NEEDED
Status: DISCONTINUED | OUTPATIENT
Start: 2022-07-16 | End: 2022-07-16

## 2022-07-16 RX ORDER — SIMETHICONE 80 MG
1 TABLET,CHEWABLE ORAL EVERY 6 HOURS PRN
Status: DISCONTINUED | OUTPATIENT
Start: 2022-07-16 | End: 2022-07-18 | Stop reason: HOSPADM

## 2022-07-16 RX ORDER — SIMETHICONE 80 MG
1 TABLET,CHEWABLE ORAL 4 TIMES DAILY PRN
Status: DISCONTINUED | OUTPATIENT
Start: 2022-07-16 | End: 2022-07-16

## 2022-07-16 RX ORDER — SODIUM CITRATE AND CITRIC ACID MONOHYDRATE 334; 500 MG/5ML; MG/5ML
30 SOLUTION ORAL ONCE
Status: DISCONTINUED | OUTPATIENT
Start: 2022-07-16 | End: 2022-07-16

## 2022-07-16 RX ORDER — OXYCODONE AND ACETAMINOPHEN 10; 325 MG/1; MG/1
1 TABLET ORAL EVERY 4 HOURS PRN
Status: DISCONTINUED | OUTPATIENT
Start: 2022-07-16 | End: 2022-07-18 | Stop reason: HOSPADM

## 2022-07-16 RX ORDER — MISOPROSTOL 200 UG/1
800 TABLET ORAL
Status: DISCONTINUED | OUTPATIENT
Start: 2022-07-16 | End: 2022-07-18 | Stop reason: HOSPADM

## 2022-07-16 RX ORDER — SODIUM CHLORIDE 0.9 % (FLUSH) 0.9 %
10 SYRINGE (ML) INJECTION
Status: DISCONTINUED | OUTPATIENT
Start: 2022-07-16 | End: 2022-07-18 | Stop reason: HOSPADM

## 2022-07-16 RX ORDER — SODIUM CHLORIDE 9 MG/ML
INJECTION, SOLUTION INTRAVENOUS
Status: DISCONTINUED | OUTPATIENT
Start: 2022-07-16 | End: 2022-07-16

## 2022-07-16 RX ORDER — OXYTOCIN/RINGER'S LACTATE 30/500 ML
0-30 PLASTIC BAG, INJECTION (ML) INTRAVENOUS CONTINUOUS
Status: DISCONTINUED | OUTPATIENT
Start: 2022-07-16 | End: 2022-07-16

## 2022-07-16 RX ORDER — PRENATAL WITH FERROUS FUM AND FOLIC ACID 3080; 920; 120; 400; 22; 1.84; 3; 20; 10; 1; 12; 200; 27; 25; 2 [IU]/1; [IU]/1; MG/1; [IU]/1; MG/1; MG/1; MG/1; MG/1; MG/1; MG/1; UG/1; MG/1; MG/1; MG/1; MG/1
1 TABLET ORAL DAILY
Status: DISCONTINUED | OUTPATIENT
Start: 2022-07-17 | End: 2022-07-18 | Stop reason: HOSPADM

## 2022-07-16 RX ADMIN — DEXTROSE 5 MILLION UNITS: 50 INJECTION, SOLUTION INTRAVENOUS at 02:07

## 2022-07-16 RX ADMIN — IBUPROFEN 600 MG: 600 TABLET ORAL at 11:07

## 2022-07-16 RX ADMIN — NALBUPHINE HYDROCHLORIDE 10 MG: 10 INJECTION, SOLUTION INTRAMUSCULAR; INTRAVENOUS; SUBCUTANEOUS at 08:07

## 2022-07-16 RX ADMIN — Medication 10 ML/HR: at 06:07

## 2022-07-16 RX ADMIN — Medication 95 MILLI-UNITS/MIN: at 09:07

## 2022-07-16 RX ADMIN — FENTANYL CITRATE 100 MCG: 50 INJECTION, SOLUTION INTRAMUSCULAR; INTRAVENOUS at 08:07

## 2022-07-16 RX ADMIN — FENTANYL CITRATE 10 MCG: 50 INJECTION, SOLUTION INTRAMUSCULAR; INTRAVENOUS at 06:07

## 2022-07-16 RX ADMIN — SODIUM CHLORIDE, SODIUM LACTATE, POTASSIUM CHLORIDE, AND CALCIUM CHLORIDE: .6; .31; .03; .02 INJECTION, SOLUTION INTRAVENOUS at 02:07

## 2022-07-16 RX ADMIN — DEXTROSE 3 MILLION UNITS: 50 INJECTION, SOLUTION INTRAVENOUS at 05:07

## 2022-07-16 RX ADMIN — Medication 4 MILLI-UNITS/MIN: at 02:07

## 2022-07-16 RX ADMIN — Medication 1 ML: at 06:07

## 2022-07-16 NOTE — ED PROVIDER NOTES
Encounter Date: 2022       History     Chief Complaint   Patient presents with    Contractions     Sonam Hidalgo is a 32 y.o.  at 37w6d gestation presenting for concerns of contractions and decreased fetal movement. She reports contractions starting this morning. She has not been timing them but states they will be close together at some times then space out significantly. She also reports she has not felt fetal movement for 2 days. Denies vaginal bleeding or loss of fluid.     This IUP is complicated by intracranial aneurysm, MO (BMI 49), h/o PreE x2, GBS positive, oligohydramnios, depression.    Review of patient's allergies indicates:  No Known Allergies  Past Medical History:   Diagnosis Date    Abnormal Pap smear of vagina     Allergy     Anxiety     Hypertension     Pre-eclampsia      Past Surgical History:   Procedure Laterality Date    CHOLECYSTECTOMY      LAPAROSCOPIC SURGICAL REMOVAL OF CYST OF OVARY Left 2019    Procedure: EXCISION, CYST, OVARY, LAPAROSCOPIC;  Surgeon: Merary Benton MD;  Location: [facility];  Service: OB/GYN;  Laterality: Left;    median arcuate ligament        Family History   Problem Relation Age of Onset    Breast cancer Mother     Alcohol abuse Father     Dementia Father         vascular and ? lewey    Hypertension Brother     Celiac disease Neg Hx     Cirrhosis Neg Hx     Colon cancer Neg Hx     Colon polyps Neg Hx     Crohn's disease Neg Hx     Cystic fibrosis Neg Hx     Esophageal cancer Neg Hx     Hemochromatosis Neg Hx     Inflammatory bowel disease Neg Hx     Irritable bowel syndrome Neg Hx     Liver cancer Neg Hx     Liver disease Neg Hx     Rectal cancer Neg Hx     Stomach cancer Neg Hx     Ulcerative colitis Neg Hx     Jacob's disease Neg Hx     Ovarian cancer Neg Hx     Arrhythmia Neg Hx     Cardiomyopathy Neg Hx     Congenital heart disease Neg Hx     Early death Neg Hx     Heart attacks under age 50 Neg  Hx     Pacemaker/defibrilator Neg Hx      Social History     Tobacco Use    Smoking status: Never Smoker    Smokeless tobacco: Never Used   Substance Use Topics    Alcohol use: Yes     Comment: 1 glass wine on occ    Drug use: No     Review of Systems   Constitutional: Negative for chills and fever.   HENT: Negative for congestion and sore throat.    Eyes: Negative for visual disturbance.   Respiratory: Negative for cough and shortness of breath.    Cardiovascular: Negative for chest pain.   Gastrointestinal: Positive for abdominal pain (contractions). Negative for constipation, diarrhea, nausea and vomiting.   Genitourinary: Negative for dysuria, vaginal bleeding and vaginal discharge.   Musculoskeletal: Negative for back pain.   Skin: Negative for rash.   Neurological: Negative for weakness and headaches.   Hematological: Does not bruise/bleed easily.       Physical Exam     Initial Vitals   BP Pulse Resp Temp SpO2   07/16/22 1114 07/16/22 1106 07/16/22 1129 07/16/22 1129 07/16/22 1119   126/80 110 20 98.1 °F (36.7 °C) 98 %      MAP       --                Physical Exam    Constitutional: She appears well-developed and well-nourished. No distress.   HENT:   Head: Normocephalic and atraumatic.   Eyes: EOM are normal.   Neck:   Normal range of motion.  Cardiovascular: Normal rate.   Pulmonary/Chest: No respiratory distress.   Abdominal: There is no abdominal tenderness. There is no rebound and no guarding.   Musculoskeletal:         General: No edema.      Cervical back: Normal range of motion.     Neurological: She is alert and oriented to person, place, and time.   Skin: Skin is warm and dry.   Psychiatric: She has a normal mood and affect. Thought content normal.     OB LABOR EXAM:   Pre-Term Labor: No.   Membranes ruptured: No.   Method: Sterile vaginal exam per MD.   Vaginal Bleeding: none present.     Dilatation: 5.   Station: -3.   Effacement: 70%.   Amniotic Fluid Color: no fluid.   Amniotic Fluid  Amount: none noted.   Comments: SVE: 5/70/-3 > 1.5 hour recheck unchanged        ED Course   Obtain Fetal nonstress test (NST)    Date/Time: 7/16/2022 3:25 PM  Performed by: Jesika Tilley MD  Authorized by: Jesika Tilley MD     Nonstress Test:     Variability:  6-25 BPM    Decelerations:  None    Accelerations:  15 bpm    Baseline:  145    Uterine Irritability: No      Contractions:  Irregular  Biophysical Profile:     MVP (Maximal Vertical Pocket):  1.9    Nonstress Test Interpretation: reactive      Overall Impression:  Reassuring  Post-procedure:     Patient tolerance:  Patient tolerated the procedure well with no immediate complications      Labs Reviewed - No data to display       Imaging Results    None          Medications   lactated ringers bolus 1,000 mL (has no administration in time range)   lactated ringers bolus 500 mL (has no administration in time range)   lactated ringers infusion ( Intravenous New Bag 7/16/22 1408)   0.9%  NaCl infusion (has no administration in time range)   oxytocin 30 units in 500 mL lactated ringers infusion (non-titrating) (0 julian-units/min Intravenous Hold 7/16/22 1345)   oxytocin 30 units in 500 mL lactated ringers infusion (non-titrating) (0 julian-units/min Intravenous Hold 7/16/22 1415)   tranexamic acid (CYKLOKAPRON) 1,000 mg in sodium chloride 0.9 % 100 mL (ready to mix system) (has no administration in time range)   cefazolin (ANCEF) 2 gram in dextrose 5% 50 mL IVPB (premix) (has no administration in time range)   azithromycin 500 mg in dextrose 5 % 250 mL IVPB (ready to mix system) (has no administration in time range)   ondansetron disintegrating tablet 8 mg (has no administration in time range)   prochlorperazine injection Soln 5 mg (has no administration in time range)   calcium carbonate 200 mg calcium (500 mg) chewable tablet 500 mg (has no administration in time range)   simethicone chewable tablet 80 mg (has no administration in time range)   LIDOcaine HCL 10  mg/ml (1%) injection 10 mL (has no administration in time range)   penicillin G potassium 3 Million Units in dextrose 5 % 100 mL IVPB (has no administration in time range)   oxytocin 30 units in 500 mL lactated ringers infusion (titrating) (4 julian-units/min Intravenous New Bag 7/16/22 1414)   penicillin G potassium 5 Million Units in dextrose 5 % 100 mL IVPB (ready to mix system) (5 Million Units Intravenous New Bag 7/16/22 1410)     Medical Decision Making:   ED Management:  - Afebrile, VSS  - NST RR   - TOCO: not cierra   - SVE: 5/70/-3 > 1.5 hour recheck unchanged   - MVP 1.9cm. Unable to measure true PRISCILLA as there was cord/fetal body parts in all other quadrants  - Patient reports she has felt minimal movement since arrival to [facility]  - Due to decreased fetal movement and oligohydramnios patient will be admitted to L&D for IOL  - Consents in chart   - Vertex on BSUS   - Patient voiced understanding and agreement with plan            Attending Attestation:   Physician Attestation Statement for Resident:  As the supervising MD   Physician Attestation Statement: I have personally seen and examined this patient.   I agree with the above history. -:   As the supervising MD I agree with the above PE.    As the supervising MD I agree with the above treatment, course, plan, and disposition.   -:   NST  I independently reviewed the fetal non-stress test with the following interpretation:  145 BPM baseline  Variability: moderate  Accelerations: present  Decelerations: absent  Contractions: occasional  Category 1    Clinical Interpretation:reactive    Patient evaluated and found to be stable, agree with resident's assessment and plan.    Dhaval amos was cepahlice presentation with MVP of 1.9cm and no fluid to measure in other 3 quadrants. Plan for admission for term, oligohydramnios induction.    I was personally present during the critical portions of the procedure(s) performed by the resident and was immediately  available in the ED to provide services and assistance as needed during the entire procedure.                         Clinical Impression:   Final diagnoses:  [Z34.90] Encounter for induction of labor  [O47.9] Irregular uterine contractions  [O41.03X1] Oligohydramnios in third trimester, fetus 1 of multiple gestation (Primary)          ED Disposition Condition    Send to L&D               Jesika Tilley MD  OBGYN, PGY-2       Jesika Tilley MD  Resident  07/16/22 1529       Gely Reyez MD  07/16/22 5001

## 2022-07-16 NOTE — PROGRESS NOTES
"LABOR NOTE    S:  Complaints: No.  Epidural working:  not applicable  MD to bedside for cervical exam    O: /88   Pulse 91   Temp 96.6 °F (35.9 °C) (Temporal)   Resp 18   Ht 5' 3" (1.6 m)   Wt 127.5 kg (281 lb)   LMP 10/01/2021   SpO2 96%   Breastfeeding No   BMI 49.78 kg/m²       FHT: 150, modBTBV, +accels, -decels Cat 1 (reassuring)  CTX: tracing irregularly   SVE: , AROM clear       ASSESSMENT:   32 y.o.  at 37w6d, IOL    FHT reassuring    Active Hospital Problems    Diagnosis  POA    *Encounter for induction of labor [Z34.90]  Not Applicable    Chronic migraine w/o aura w/o status migrainosus, not intractable [G43.709]  Yes     Onset 12 years / Failed preventive therapy: Only antidepressant   - Complex presentation of RSW / sensory deficits   - Active pregnancy plans     Plans:   Abortive regimen: avoid triptans / continue ibuprofen, tylenol   Preventive: Add on MgO2 / B2   Discussed on alternative options / its pregnancy risk  Headache diary / sleep hygiene / life style modifications         Intracranial aneurysm [I67.1]  Yes    Depression [F32.A]  Yes    Class 3 severe obesity in adult [E66.01]  Yes      Resolved Hospital Problems   No resolved problems to display.     TIMELINE:  1030: 5/70/-3  1230: 5/70/-3  1715: , AROM clear       PLAN:    Continue Close Maternal/Fetal Monitoring  Pitocin Augmentation per protocol  Recheck 2 hours or PRN    Jesika Tilley MD  OBGYN, PGY-2      "

## 2022-07-16 NOTE — ANESTHESIA PREPROCEDURE EVALUATION
Sonam Hidalgo is a 32 y.o. female   with IUP at 37w6d weeks gestation who presented to the [facility] for contractions and decreased fetal movement.      This IUP is complicated by intracranial aneurysm, MO (BMI 49), h/o PreE x2, GBS positive, oligohydramnios, depression.    OB History    Para Term  AB Living   5 2 2 0 1 2   SAB IAB Ectopic Multiple Live Births   0 1 0 0 2      # Outcome Date GA Lbr Angelito/2nd Weight Sex Delivery Anes PTL Lv   5 Current            4 Term 19 37w2d / 00:10 3.033 kg (6 lb 11 oz) F Vag-Spont None N ZENA   3 Term 11/30/15 38w3d  2.81 kg (6 lb 3.1 oz) M Vag-Spont EPI N ZENA      Birth Comments: NICU x 4 days.    Needed HFNC and CPAP but no intubation.  Abx for 48 hour rule out, blood cx negative.   Hepatitis B given on 12/3/15   2             1 IAB                Wt Readings from Last 1 Encounters:   22 1256 127.5 kg (281 lb)       BP Readings from Last 3 Encounters:   22 131/85   22 132/84   22 128/82       Patient Active Problem List   Diagnosis     (spontaneous vaginal delivery)    Gestational hypertension    Joint pain    Shoulder pain, left    Fetal echo with top normal size of right heart, recommend  echo prior to nursery discharge    Depression    Class 3 severe obesity in adult    Focal neurological deficit    Pregnancy at early stage    Chronic migraine w/o aura w/o status migrainosus, not intractable    Intracranial aneurysm    Complex migraine / 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, not intractable    Cyst of ovary    S/P laparoscopic ovarian cystectomy, 19    Current mild episode of major depressive disorder without prior episode    Decreased strength of trunk and back    Lumbar spine instability    Encounter for induction of labor       Past Surgical History:   Procedure Laterality Date    CHOLECYSTECTOMY      LAPAROSCOPIC SURGICAL REMOVAL OF CYST OF OVARY Left  11/20/2019    Procedure: EXCISION, CYST, OVARY, LAPAROSCOPIC;  Surgeon: Merary Benton MD;  Location: [facility] OR;  Service: OB/GYN;  Laterality: Left;    median arcuate ligament          Social History     Socioeconomic History    Marital status: Single   Tobacco Use    Smoking status: Never Smoker    Smokeless tobacco: Never Used   Substance and Sexual Activity    Alcohol use: Yes     Comment: 1 glass wine on occ    Drug use: No    Sexual activity: Yes     Partners: Male     Birth control/protection: None   Social History Narrative    2 kids at home (5 Sheldon and Buster 2 yrs. Aorta issue and Partial anomalous PV return, asymptomatic), in apt attached to her mom's hse. Fiance deployed arm. Not much forma exercise. Works for [employer].         Chemistry        Component Value Date/Time     06/30/2022 1218    K 3.9 06/30/2022 1218     06/30/2022 1218    CO2 19 (L) 06/30/2022 1218    BUN 9 06/30/2022 1218    CREATININE 0.7 06/30/2022 1218     06/30/2022 1218        Component Value Date/Time    CALCIUM 8.9 06/30/2022 1218    ALKPHOS 106 06/30/2022 1218    AST 11 06/30/2022 1218    ALT 11 06/30/2022 1218    BILITOT 0.3 06/30/2022 1218    ESTGFRAFRICA >60 06/30/2022 1218    EGFRNONAA >60 06/30/2022 1218            Lab Results   Component Value Date    WBC 8.25 07/16/2022    HGB 12.5 07/16/2022    HCT 38.0 07/16/2022    MCV 85 07/16/2022     07/16/2022       No results for input(s): PT, INR, PROTIME, APTT in the last 72 hours.                Pre-op Assessment    I have reviewed the Patient Summary Reports.     I have reviewed the Nursing Notes.    I have reviewed the Medications.     Review of Systems  Anesthesia Hx:  No problems with previous Anesthesia Denies Hx of Anesthetic complications  History of prior surgery of interest to airway management or planning: Denies Family Hx of Anesthesia complications.   Denies Personal Hx of Anesthesia complications.   Social:  Non-Smoker     Hematology/Oncology:     Oncology Normal     Cardiovascular:   Denies Hypertension.   Denies Angina. ECG has been reviewed.    Pulmonary:   Denies Shortness of breath.  Denies Recent URI.    Renal/:  Renal/ Normal     Hepatic/GI:   Denies GERD. Denies Liver Disease.    Neurological:   Denies CVA. Headaches Denies Seizures.    Endocrine:  Endocrine Normal Denies Diabetes.    Psych:   Psychiatric History          Physical Exam  General: Well nourished, Cooperative and Alert    Airway:  Mallampati: II   Mouth Opening: Normal  TM Distance: Normal  Tongue: Normal  Neck ROM: Normal ROM    Dental:  Intact        Anesthesia Plan  Type of Anesthesia, risks & benefits discussed:    Anesthesia Type: Gen ETT, Epidural, Spinal, CSE  Intra-op Monitoring Plan: Standard ASA Monitors  Post Op Pain Control Plan: multimodal analgesia  Induction:  IV  Airway Plan: Direct, Post-Induction  Informed Consent: Informed consent signed with the Patient and all parties understand the risks and agree with anesthesia plan.  All questions answered.   ASA Score: 3  Day of Surgery Review of History & Physical: H&P Update referred to the surgeon/provider.    Ready For Surgery From Anesthesia Perspective.     .

## 2022-07-16 NOTE — H&P
HISTORY AND PHYSICAL                                                OBSTETRICS          Subjective:       Sonam Hidalgo is a 32 y.o.  female with IUP at 37w6d weeks gestation who presented to the [facility] for contractions and decreased fetal movement. SVE remained unchanged at 5/70/-3, however, patient was found to have oligohydramnios so decision was made to admit to L&D for induction of labor.    Patient reports contractions, denies vaginal bleeding, denies LOF.   Fetal Movement: decreased.    This IUP is complicated by intracranial aneurysm, MO (BMI 49), h/o PreE x2, GBS positive, oligohydramnios, depression.    Review of Systems   Constitutional: Negative for chills and fever.   Eyes: Negative for visual disturbance.   Respiratory: Negative for shortness of breath.    Cardiovascular: Negative for chest pain.   Gastrointestinal: Positive for abdominal pain. Negative for constipation, nausea and vomiting.   Endocrine: Negative for diabetes.   Genitourinary: Negative for dysuria, frequency, vaginal bleeding and vaginal discharge.   Musculoskeletal: Negative for back pain.   Neurological: Negative for headaches.   Hematological: Does not bruise/bleed easily.       PMHx:   Past Medical History:   Diagnosis Date    Abnormal Pap smear of vagina     Allergy     Anxiety     Hypertension     Pre-eclampsia        PSHx:   Past Surgical History:   Procedure Laterality Date    CHOLECYSTECTOMY      LAPAROSCOPIC SURGICAL REMOVAL OF CYST OF OVARY Left 2019    Procedure: EXCISION, CYST, OVARY, LAPAROSCOPIC;  Surgeon: Merary Benton MD;  Location: [facility];  Service: OB/GYN;  Laterality: Left;    median arcuate ligament          All: Review of patient's allergies indicates:  No Known Allergies    Meds:   Medications Prior to Admission   Medication Sig Dispense Refill Last Dose    aspirin 81 mg Cap Take 81 mg by mouth.       ferrous sulfate (IRON ORAL) Take by mouth.       loratadine  (CLARITIN) 10 mg tablet TAKE 1 TABLET BY MOUTH EVERY DAY 30 tablet 2     magnesium 30 mg Tab Take by mouth once.       ondansetron (ZOFRAN) 4 MG tablet Take 1 tablet (4 mg total) by mouth daily as needed for Nausea. 30 tablet 3     prenatal vit/iron fum/folic ac (PRENATAL 1+1 ORAL) Take by mouth.          SH:   Social History     Socioeconomic History    Marital status: Single   Tobacco Use    Smoking status: Never Smoker    Smokeless tobacco: Never Used   Substance and Sexual Activity    Alcohol use: Yes     Comment: 1 glass wine on occ    Drug use: No    Sexual activity: Yes     Partners: Male     Birth control/protection: None   Social History Narrative    2 kids at home (5 Sheldon and Buster 2 yrs. Aorta issue and Partial anomalous PV return, asymptomatic), in apt attached to her mom's hse. Fiance deployed arm. Not much forma exercise. Works for [employer].       FH:   Family History   Problem Relation Age of Onset    Breast cancer Mother     Alcohol abuse Father     Dementia Father         vascular and ? lewey    Hypertension Brother     Celiac disease Neg Hx     Cirrhosis Neg Hx     Colon cancer Neg Hx     Colon polyps Neg Hx     Crohn's disease Neg Hx     Cystic fibrosis Neg Hx     Esophageal cancer Neg Hx     Hemochromatosis Neg Hx     Inflammatory bowel disease Neg Hx     Irritable bowel syndrome Neg Hx     Liver cancer Neg Hx     Liver disease Neg Hx     Rectal cancer Neg Hx     Stomach cancer Neg Hx     Ulcerative colitis Neg Hx     Jacob's disease Neg Hx     Ovarian cancer Neg Hx     Arrhythmia Neg Hx     Cardiomyopathy Neg Hx     Congenital heart disease Neg Hx     Early death Neg Hx     Heart attacks under age 50 Neg Hx     Pacemaker/defibrilator Neg Hx        OBHx:   OB History    Para Term  AB Living   5 2 2 0 1 2   SAB IAB Ectopic Multiple Live Births   0 1 0 0 2      # Outcome Date GA Lbr Angelito/2nd Weight Sex Delivery Anes PTL Lv   5 Current            4  "Term 19 37w2d / 00:10 3.033 kg (6 lb 11 oz) F Vag-Spont None N ZENA      Name: DONNA,GIRL CLARA      Apgar1: 9  Apgar5: 9   3 Term 11/30/15 38w3d  2.81 kg (6 lb 3.1 oz) M Vag-Spont EPI N ZENA      Birth Comments: NICU x 4 days.    Needed HFNC and CPAP but no intubation.  Abx for 48 hour rule out, blood cx negative.   Hepatitis B given on 12/3/15      Name: Sheldon      Apgar1: 2  Apgar5: 5   2             1 IAB                Objective:       /85   Pulse 104   Temp 98.1 °F (36.7 °C) (Oral)   Resp 20   Ht 5' 3" (1.6 m)   Wt 127.5 kg (281 lb)   LMP 10/01/2021   SpO2 96%   Breastfeeding No   BMI 49.78 kg/m²     Vitals:    22 1159 22 1200 22 1214 22 1256   BP:  133/84 131/85    Pulse: 96 96 104    Resp:       Temp:       TempSrc:       SpO2: 96%  96%    Weight:    127.5 kg (281 lb)   Height:    5' 3" (1.6 m)       General:   alert, appears stated age and cooperative, no apparent distress   HENT:  normocephalic, atraumatic   Eyes:  extraocular movements and conjunctivae normal   Neck:  supple, range of motion normal, no thyromegaly   Lungs:   no respiratory distress   Heart:   regular rate   Abdomen:  soft, non-tender, non-distended but gravid, no rebound or guarding    Extremities negative edema, negative erythema   FHT: 145, moderate BTBV, +accels, -decels;  Cat 1 (reassuring)                 TOCO: Q 2-4 minutes   Presentations: cephalic by ultrasound   Cervix:     Dilation: 5cm    Effacement: 70%    Station:  -3    Consistency: soft    Position: anterior   Sterile Speculum Exam: n/a    EFW by Leopold's: 7#    Recent Growth Scan: 36w4d EFW 2985g/44%ile, AC 82%ile    Lab Review  Blood Type O POS  GBBS: positive  Rubella: Immune  RPR: NR  HIV: negative  HepB: negative       Assessment:       37w6d weeks gestation with oligohydramnios.     Active Hospital Problems    Diagnosis  POA    *Encounter for induction of labor [Z34.90]  Not Applicable    Chronic migraine w/o " aura w/o status migrainosus, not intractable [G43.709]  Yes     Onset 12 years / Failed preventive therapy: Only antidepressant   - Complex presentation of RSW / sensory deficits   - Active pregnancy plans     Plans:   Abortive regimen: avoid triptans / continue ibuprofen, tylenol   Preventive: Add on MgO2 / B2   Discussed on alternative options / its pregnancy risk  Headache diary / sleep hygiene / life style modifications         Intracranial aneurysm [I67.1]  Yes    Depression [F32.A]  Yes    Class 3 severe obesity in adult [E66.01]  Yes      Resolved Hospital Problems   No resolved problems to display.          Plan:      1. Induction of labor   - Risks, benefits, alternatives and possible complications have been discussed in detail with the patient.   - Consents signed and to chart  - Admit to Labor and Delivery unit   - Epidural per Anesthesia  - Draw CBC, T&  - Notify Staff  - Start pitocin   - Recheck in 4 hrs or PRN  - Growth scan: 36w4d EFW 2985g/44%ile, AC 82%ile    2. Oligohydramnios  - MVP 1.9cm     3. GBS positive   - PCN in labor     4. Morbid Obesity  - BMI 49, prepregnancy BMI >40  - Will need lovenox 40mg BID postpartum     5. Intracranial Aneurysm  - Stable on imaging   - Ok to labor/push per patient      6. Depression  - No medications   - Will need 2 week postpartum mood check     Post-Partum Hemorrhage risk - medium    Jesika Tilley MD  OBGYN, PGY-2

## 2022-07-16 NOTE — DISCHARGE INSTRUCTIONS
Call clinic [phone] or L & D after hours at [phone] for vaginal bleeding, leakage of fluids, regular contractions every 5 mins for 2 hours, decreased fetal movements ( 10 kicks in 2 hours), headache not relieved by Tylenol, blurry vision, or temp of 100.4 or greater, blood pressure greater than 160 systolic (top number) or greater than 110 diastolic (bottom number). Begin doing fetal kick counts, at least 10 movements in 2 hours starting at 28 weeks gestation.  Keep next clinic appointment   
99

## 2022-07-16 NOTE — ANESTHESIA PROCEDURE NOTES
CSE    Patient location during procedure: OB  Start time: 7/16/2022 6:15 PM  Timeout: 7/16/2022 6:15 PM  End time: 7/16/2022 6:25 PM    Reason for block: labor analgesia requested by patient and obstetrician    Staffing  Authorizing Provider: Steve Huang MD  Performing Provider: David Alcantar MD    Preanesthetic Checklist  Completed: patient identified, IV checked, site marked, risks and benefits discussed, surgical consent, monitors and equipment checked, pre-op evaluation and timeout performed  CSE  Patient position: sitting  Prep: ChloraPrep  Patient monitoring: heart rate, continuous pulse ox and frequent blood pressure checks  Approach: midline  Spinal Needle  Needle type: Kathy   Needle gauge: 25 G  Needle length: 5 in  Epidural Needle  Injection technique: ARIANA saline  Needle type: Tuohy   Needle gauge: 17 G  Needle insertion depth: 7 cm  Location: L3-4  Needle localization: anatomical landmarks   Catheter  Catheter type: springwound  Catheter at skin depth: 12 cm  Test dose: lidocaine 1.5% with Epi 1-to-200,000  Test dose: 3 mL  Additional Documentation: incremental injection

## 2022-07-17 LAB
BASOPHILS # BLD AUTO: 0.02 K/UL (ref 0–0.2)
BASOPHILS NFR BLD: 0.2 % (ref 0–1.9)
DIFFERENTIAL METHOD: ABNORMAL
EOSINOPHIL # BLD AUTO: 0 K/UL (ref 0–0.5)
EOSINOPHIL NFR BLD: 0.2 % (ref 0–8)
ERYTHROCYTE [DISTWIDTH] IN BLOOD BY AUTOMATED COUNT: 15.6 % (ref 11.5–14.5)
HCT VFR BLD AUTO: 34.8 % (ref 37–48.5)
HGB BLD-MCNC: 11.3 G/DL (ref 12–16)
IMM GRANULOCYTES # BLD AUTO: 0.1 K/UL (ref 0–0.04)
IMM GRANULOCYTES NFR BLD AUTO: 1.2 % (ref 0–0.5)
LYMPHOCYTES # BLD AUTO: 1.3 K/UL (ref 1–4.8)
LYMPHOCYTES NFR BLD: 15.2 % (ref 18–48)
MCH RBC QN AUTO: 27.6 PG (ref 27–31)
MCHC RBC AUTO-ENTMCNC: 32.5 G/DL (ref 32–36)
MCV RBC AUTO: 85 FL (ref 82–98)
MONOCYTES # BLD AUTO: 0.5 K/UL (ref 0.3–1)
MONOCYTES NFR BLD: 6.5 % (ref 4–15)
NEUTROPHILS # BLD AUTO: 6.4 K/UL (ref 1.8–7.7)
NEUTROPHILS NFR BLD: 76.7 % (ref 38–73)
NRBC BLD-RTO: 0 /100 WBC
PLATELET # BLD AUTO: 229 K/UL (ref 150–450)
PMV BLD AUTO: 10.5 FL (ref 9.2–12.9)
RBC # BLD AUTO: 4.1 M/UL (ref 4–5.4)
WBC # BLD AUTO: 8.31 K/UL (ref 3.9–12.7)

## 2022-07-17 PROCEDURE — 72100002 HC LABOR CARE, 1ST 8 HOURS

## 2022-07-17 PROCEDURE — 25000003 PHARM REV CODE 250

## 2022-07-17 PROCEDURE — 63600175 PHARM REV CODE 636 W HCPCS

## 2022-07-17 PROCEDURE — 36415 COLL VENOUS BLD VENIPUNCTURE: CPT

## 2022-07-17 PROCEDURE — 11000001 HC ACUTE MED/SURG PRIVATE ROOM

## 2022-07-17 PROCEDURE — 85025 COMPLETE CBC W/AUTO DIFF WBC: CPT

## 2022-07-17 PROCEDURE — 72200005 HC VAGINAL DELIVERY LEVEL II

## 2022-07-17 PROCEDURE — 25000003 PHARM REV CODE 250: Performed by: STUDENT IN AN ORGANIZED HEALTH CARE EDUCATION/TRAINING PROGRAM

## 2022-07-17 RX ORDER — NIFEDIPINE 30 MG/1
30 TABLET, EXTENDED RELEASE ORAL DAILY
Status: DISCONTINUED | OUTPATIENT
Start: 2022-07-17 | End: 2022-07-18 | Stop reason: HOSPADM

## 2022-07-17 RX ADMIN — ACETAMINOPHEN 650 MG: 325 TABLET ORAL at 05:07

## 2022-07-17 RX ADMIN — NIFEDIPINE 30 MG: 30 TABLET, FILM COATED, EXTENDED RELEASE ORAL at 04:07

## 2022-07-17 RX ADMIN — ENOXAPARIN SODIUM 40 MG: 100 INJECTION SUBCUTANEOUS at 09:07

## 2022-07-17 RX ADMIN — PRENATAL VIT W/ FE FUMARATE-FA TAB 27-0.8 MG 1 TABLET: 27-0.8 TAB at 09:07

## 2022-07-17 RX ADMIN — IBUPROFEN 600 MG: 600 TABLET ORAL at 12:07

## 2022-07-17 RX ADMIN — IBUPROFEN 600 MG: 600 TABLET ORAL at 05:07

## 2022-07-17 RX ADMIN — DOCUSATE SODIUM 200 MG: 100 CAPSULE, LIQUID FILLED ORAL at 09:07

## 2022-07-17 NOTE — PLAN OF CARE

## 2022-07-17 NOTE — PROGRESS NOTES
POSTPARTUM PROGRESS NOTE    Subjective:     PPD/POD#: 1   Procedure:    EGA: 37w6d   N/V: No   F/C: No   Abd Pain: Mild, well-controlled with oral pain medication   Lochia: Mild   Voiding: Yes   Ambulating: Yes   Bowel fnc: Yes   Breastfeeding: Yes   Contraception: Per primary OB   Circumcision: Consented.  Needs to be examined by OB attending.     Objective:      Temp:  [96.6 °F (35.9 °C)-98.4 °F (36.9 °C)] 98.4 °F (36.9 °C)  Pulse:  [] 106  Resp:  [18-20] 20  SpO2:  [96 %-99 %] 96 %  BP: (122-170)/() 149/86    Lung: Normal respiratory effort   Abdomen: Soft, appropriately tender   Uterus: Firm, no fundal tenderness   Incision: N/A   : Deferred   Extremities: Bilateral trace edema     Lab Review    Recent Labs   Lab 22  1754   *   K 4.2      CO2 18*   BUN 8   CREATININE 0.7   GLU 82   PROT 6.7   BILITOT 0.8   ALKPHOS 131   ALT 12   AST 15       Recent Labs   Lab 22  1333   WBC 8.25   HGB 12.5   HCT 38.0   MCV 85            I/O    Intake/Output Summary (Last 24 hours) at 2022 0305  Last data filed at 2022 0016  Gross per 24 hour   Intake 2622.74 ml   Output 700 ml   Net 1922.74 ml        Assessment and Plan:   Postpartum care:  - Patient doing well.  - Continue routine management and advances.    gHTN  - BP as above  - asymptomatic  - preE labs as above  - UOP: adequate   - Mag: not indicated  - Hypertensive agent: not indicated     Obesity  - PrePreg BMI 47  - Encourage ambulation  - Lovenox: 40 mg BID    Mood Disorder  - Mood stable  - Medications: none  - Will need 1-2 week postpartum mood check    Intracranial Aneurysm   - Stable on imaging   - Follows neurology/internal medicine       Jesika Tilley MD  OBGYN, PGY-2

## 2022-07-17 NOTE — L&D DELIVERY NOTE
[facility] - Labor & Delivery  Vaginal Delivery   Operative Note    SUMMARY     Normal spontaneous vaginal delivery of live infant, was placed on mothers abdomen for skin to skin and bulb suctioning performed.  Infant delivered position VANESSA over intact perineum.  Nuchal cord x1: Yes, cord reduced at perineum.    Spontaneous delivery of placenta and IV pitocin given noting good uterine tone.    1st degree laceration noted and repaired in a running fashion with 2-0 vicryl.    Patient tolerated delivery well. Sponge needle and lap counted correctly x2.    Jesika Tilley MD  OBGYN, PGY-2      Indications:  (spontaneous vaginal delivery)  Pregnancy complicated by:   Patient Active Problem List   Diagnosis     (spontaneous vaginal delivery)    Gestational hypertension    Joint pain    Shoulder pain, left    Fetal echo with top normal size of right heart, recommend  echo prior to nursery discharge    Depression    Class 3 severe obesity in adult    Focal neurological deficit    Pregnancy at early stage    Chronic migraine w/o aura w/o status migrainosus, not intractable    Intracranial aneurysm    Complex migraine / 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, not intractable    Cyst of ovary    S/P laparoscopic ovarian cystectomy, 19    Current mild episode of major depressive disorder without prior episode    Decreased strength of trunk and back    Lumbar spine instability    Encounter for induction of labor    Gestational hypertension, third trimester     Admitting GA: 37w6d    Delivery Information for Nacho Hidalgo    Birth information:  YOB: 2021   Time of birth: 9:06 PM   Sex: male   Head Delivery Date/Time: 2022  9:06 PM   Delivery type: Vaginal, Spontaneous   Gestational Age: 37w6d    Delivery Providers    Delivering clinician: Arelis Patterson MD   Provider Role    MD Jesika Greenwood MD Lydia C Naquin, RN     Sophia  "VIANCA Merrillzully Tomer, ST             Measurements    Weight: 3629 g  Weight (lbs): 8 lb  Length: 50.8 cm  Length (in): 20"  Head circumference: 13.7 cm  Chest circumference: 13.5 cm         Apgars    Living status: Living  Apgars:  1 min.:  5 min.:  10 min.:  15 min.:  20 min.:    Skin color:  0  1       Heart rate:  2  2       Reflex irritability:  2  2       Muscle tone:  2  2       Respiratory effort:  2  2       Total:  8  9       Apgars assigned by: FREDDIE HUNG RN         Operative Delivery    Forceps attempted?: No  Vacuum extractor attempted?: No         Shoulder Dystocia    Shoulder dystocia present?: No           Presentation    Presentation: Compound  Position: Left Occiput Anterior           Interventions/Resuscitation    Method: Bulb Suctioning, Tactile Stimulation       Cord    Vessels: 3 vessels  Complications: Nuchal  Nuchal Intervention: reduced  Nuchal Cord Description: loose nuchal cord  Number of Loops: 1  Delayed Cord Clamping?: Yes  Cord Clamped Date/Time: 2022  9:07 PM  Cord Blood Disposition: Sent with Baby  Gases Sent?: No  Stem Cell Collection (by MD): No       Placenta    Placenta delivery date/time: 2022  Placenta removal: Spontaneous  Placenta appearance: Intact  Placenta disposition: discarded           Labor Events:       labor: No     Labor Onset Date/Time:         Dilation Complete Date/Time: 2022 20:35     Start Pushing Date/Time: 2022 20:55       Start Pushing Date/Time: 2022 20:55     Rupture Date/Time: 22  1724         Rupture type:          Fluid Amount:       Fluid Color: Clear      Fluid Odor:       Membrane Status: ARM (Artificial Rupture)               steroids: None     Antibiotics given for GBS: Yes     Induction: oxytocin     Indications for induction:        Augmentation:       Indications for augmentation:       Labor complications: None     Additional complications:          Cervical ripening:            "          Delivery:      Episiotomy: None     Indication for Episiotomy:       Perineal Lacerations: 1st Repaired:  Yes   Periurethral Laceration:   Repaired:     Labial Laceration:   Repaired:     Sulcus Laceration:   Repaired:     Vaginal Laceration:   Repaired:     Cervical Laceration:   Repaired:     Repair suture:       Repair # of packets: 1     Last Value - EBL - Nursing (mL):       Sum - EBL - Nursing (mL): 0     Last Value - EBL - Anesthesia (mL):      Calculated QBL (mL): 100      Vaginal Sweep Performed: No     Surgicount Correct: Yes       Other providers:       Anesthesia    Method: Epidural          Details (if applicable):  Trial of Labor      Categorization:      Priority:     Indications for :     Incision Type:       Additional  information:  Forceps:    Vacuum:    Breech:    Observed anomalies    Other (Comments):

## 2022-07-17 NOTE — PROGRESS NOTES
"LABOR NOTE    S:  Complaints: No.  Epidural working:  not applicable  MD to bedside for cervical exam    O: /88   Pulse 91   Temp 96.6 °F (35.9 °C) (Temporal)   Resp 18   Ht 5' 3" (1.6 m)   Wt 127.5 kg (281 lb)   LMP 10/01/2021   SpO2 96%   Breastfeeding No   BMI 49.78 kg/m²       FHT: 150, modBTBV, +accels, + early decels Cat 1 (reassuring)  CTX: tracing irregularly   SVE:       ASSESSMENT:   32 y.o.  at 37w6d, IOL    FHT reassuring    Active Hospital Problems    Diagnosis  POA    *Encounter for induction of labor [Z34.90]  Not Applicable    Chronic migraine w/o aura w/o status migrainosus, not intractable [G43.709]  Yes     Onset 12 years / Failed preventive therapy: Only antidepressant   - Complex presentation of RSW / sensory deficits   - Active pregnancy plans     Plans:   Abortive regimen: avoid triptans / continue ibuprofen, tylenol   Preventive: Add on MgO2 / B2   Discussed on alternative options / its pregnancy risk  Headache diary / sleep hygiene / life style modifications         Intracranial aneurysm [I67.1]  Yes    Depression [F32.A]  Yes    Class 3 severe obesity in adult [E66.01]  Yes      Resolved Hospital Problems   No resolved problems to display.     TIMELINE:  1030: 5/70/-3  1230: 5/70/-3  1715: , AROM clear   1915:       PLAN:    Continue Close Maternal/Fetal Monitoring  Pitocin Augmentation per protocol  Recheck 2 hours or PRN    Meliza Sarmiento M.D.   OB/GYN  PGY-4        "

## 2022-07-17 NOTE — ANESTHESIA POSTPROCEDURE EVALUATION
Anesthesia Post Evaluation    Patient: Sonam Hidalgo    Procedure(s) Performed: * No procedures listed *    Final Anesthesia Type: CSE      Patient location during evaluation: floor  Patient participation: Yes- Able to Participate  Level of consciousness: awake and alert  Post-procedure vital signs: reviewed and stable  Pain management: adequate  Airway patency: patent  JUAQUIN mitigation strategies: Use of major conduction anesthesia (spinal/epidural) or peripheral nerve block and Multimodal analgesia  PONV status at discharge: No PONV  Anesthetic complications: no      Cardiovascular status: hemodynamically stable and blood pressure returned to baseline  Respiratory status: unassisted, spontaneous ventilation and room air  Hydration status: euvolemic  Follow-up not needed.          Vitals Value Taken Time   /89 07/17/22 0812   Temp 36.4 °C (97.6 °F) 07/17/22 0812   Pulse 97 07/17/22 0812   Resp 16 07/17/22 0812   SpO2 97 % 07/17/22 0812         No case tracking events are documented in the log.      Pain/Vj Score: Pain Rating Prior to Med Admin: 4 (7/17/2022  5:57 AM)

## 2022-07-17 NOTE — CARE UPDATE
Resident to bedside for cervical exam. SVE 10/100/0. Cat 2 (variable decelerations).     Staff notified. Will set up for 2nd stage of labor.    Jesika Tilley MD  OBGYN, PGY-2

## 2022-07-17 NOTE — LACTATION NOTE
07/17/22 1346   Maternal Assessment   Breast Shape Bilateral:;pendulous   Breast Density soft   Areola elastic   Nipples short;everted   Left Nipple Symptoms redness;tender   Right Nipple Symptoms cracked;redness   Maternal Infant Feeding   Maternal Emotional State relaxed   Infant Positioning clutch/football   Latch Assistance yes  (no latch achieved)    provided education on behaviors of late pre-term babies and encouraged Pt to feed on cue or at least every three hours. Multiple attempts to stimulate baby to quiet alert state. LC assisted with hand expression and spoon feeding. Lactation Basics education completed. LC reviewed Breastfeeding Guide and encouraged tracking feeds and output.  encouraged Pt to place fresh expressed breastmilk on nipples. Encouraged use of STS, frequent feeds on cue or at least every three hours, and avoiding artificial nipples. Pt verbalized understanding and questions answered. Pt aware to call LC for assistance with feeding.

## 2022-07-18 VITALS
BODY MASS INDEX: 49.79 KG/M2 | HEIGHT: 63 IN | HEART RATE: 107 BPM | RESPIRATION RATE: 18 BRPM | DIASTOLIC BLOOD PRESSURE: 67 MMHG | SYSTOLIC BLOOD PRESSURE: 119 MMHG | OXYGEN SATURATION: 96 % | WEIGHT: 281 LBS | TEMPERATURE: 98 F

## 2022-07-18 PROCEDURE — 25000003 PHARM REV CODE 250

## 2022-07-18 PROCEDURE — 25000003 PHARM REV CODE 250: Performed by: STUDENT IN AN ORGANIZED HEALTH CARE EDUCATION/TRAINING PROGRAM

## 2022-07-18 PROCEDURE — 63600175 PHARM REV CODE 636 W HCPCS

## 2022-07-18 RX ORDER — IBUPROFEN 600 MG/1
600 TABLET ORAL EVERY 6 HOURS PRN
Qty: 60 TABLET | Refills: 1 | Status: SHIPPED | OUTPATIENT
Start: 2022-07-18 | End: 2023-04-20

## 2022-07-18 RX ORDER — DOCUSATE SODIUM 100 MG/1
200 CAPSULE, LIQUID FILLED ORAL 2 TIMES DAILY PRN
Qty: 30 CAPSULE | Refills: 0 | Status: SHIPPED | OUTPATIENT
Start: 2022-07-18 | End: 2023-04-20

## 2022-07-18 RX ORDER — NIFEDIPINE 30 MG/1
30 TABLET, EXTENDED RELEASE ORAL DAILY
Qty: 30 TABLET | Refills: 11 | Status: SHIPPED | OUTPATIENT
Start: 2022-07-19 | End: 2023-04-20

## 2022-07-18 RX ADMIN — ACETAMINOPHEN 650 MG: 325 TABLET ORAL at 12:07

## 2022-07-18 RX ADMIN — DOCUSATE SODIUM 200 MG: 100 CAPSULE, LIQUID FILLED ORAL at 08:07

## 2022-07-18 RX ADMIN — NIFEDIPINE 30 MG: 30 TABLET, FILM COATED, EXTENDED RELEASE ORAL at 08:07

## 2022-07-18 RX ADMIN — PRENATAL VIT W/ FE FUMARATE-FA TAB 27-0.8 MG 1 TABLET: 27-0.8 TAB at 08:07

## 2022-07-18 RX ADMIN — ACETAMINOPHEN 650 MG: 325 TABLET ORAL at 04:07

## 2022-07-18 RX ADMIN — ENOXAPARIN SODIUM 40 MG: 100 INJECTION SUBCUTANEOUS at 10:07

## 2022-07-18 RX ADMIN — IBUPROFEN 600 MG: 600 TABLET ORAL at 12:07

## 2022-07-18 RX ADMIN — IBUPROFEN 600 MG: 600 TABLET ORAL at 06:07

## 2022-07-18 NOTE — PROGRESS NOTES
Depression education given with handout, pt states understanding and will follow up with OB in clinic for a mood check in 2 weeks

## 2022-07-18 NOTE — PROGRESS NOTES
POSTPARTUM PROGRESS NOTE    Subjective:     PPD#: 2   Procedure:    EGA: 37w6d   N/V: No   F/C: No   Abd Pain: Mild, well-controlled with oral pain medication   Lochia: Mild   Voiding: Yes   Ambulating: Yes   Bowel fnc: Yes   Breastfeeding: Yes   Contraception: Per primary OB   Circumcision: Done     Objective:      Temp:  [97.5 °F (36.4 °C)-98.1 °F (36.7 °C)] 97.7 °F (36.5 °C)  Pulse:  [80-98] 93  Resp:  [16-18] 16  SpO2:  [95 %-97 %] 96 %  BP: (126-142)/(67-89) 135/67    Lung: Normal respiratory effort   Abdomen: Soft, appropriately tender   Uterus: Firm, no fundal tenderness   Incision: N/A   : Deferred   Extremities: Bilateral trace edema     Lab Review    Recent Labs   Lab 22  1754   *   K 4.2      CO2 18*   BUN 8   CREATININE 0.7   GLU 82   PROT 6.7   BILITOT 0.8   ALKPHOS 131   ALT 12   AST 15       Recent Labs   Lab 22  1333 22  0529   WBC 8.25 8.31   HGB 12.5 11.3*   HCT 38.0 34.8*   MCV 85 85    229         I/O    Intake/Output Summary (Last 24 hours) at 2022 0610  Last data filed at 2022 0800  Gross per 24 hour   Intake --   Output 700 ml   Net -700 ml        Assessment and Plan:   Postpartum care:  - Patient doing well.  - Continue routine management and advances.    gHTN  - BP as above  - asymptomatic  - preE labs as above  - UOP: adequate   - Mag: not indicated  - Hypertensive agent: Procardia 30 XL initiated on PPD#1, recommend BP monitoring at home    Obesity  - PrePreg BMI 47  - Encourage ambulation  - Lovenox: 40 mg BID    Mood Disorder  - Mood stable  - Medications: none  - Will need 1-2 week postpartum mood check    Intracranial Aneurysm   - Stable on imaging   - Follows neurology/internal medicine       Rohith Quezada MD  PGY-1 OBGYN

## 2022-07-18 NOTE — PLAN OF CARE
Pt discharged home in no apparent distress. Discharge instructions reviewed with pt at this time. Pt v/u of instructions and the need to follow up with OB in 1 week for a BP check and 6 weeks for a PP check. Pt wheeled off of unit via transport at this time with infant in arms to the 2nd floor of the [facility].

## 2022-07-18 NOTE — PLAN OF CARE
Patient safety maintained, side rails up, bed low and locked position. Pain well controlled. Fundus midline, firm, with moderate lochia, bleeding continues to improve. VSS. Significant other at bedside; parents responding to infant cues and bonding appropriately. Will continue to monitor and intervene as necessary.     Problem:  Fall Injury Risk  Goal: Absence of Fall, Infant Drop and Related Injury  Outcome: Ongoing, Progressing     Problem: Adult Inpatient Plan of Care  Goal: Plan of Care Review  Outcome: Ongoing, Progressing  Goal: Patient-Specific Goal (Individualized)  Outcome: Ongoing, Progressing  Goal: Absence of Hospital-Acquired Illness or Injury  Outcome: Ongoing, Progressing  Goal: Optimal Comfort and Wellbeing  Outcome: Ongoing, Progressing  Goal: Readiness for Transition of Care  Outcome: Ongoing, Progressing     Problem: Bariatric Environmental Safety  Goal: Safety Maintained with Care  Outcome: Ongoing, Progressing     Problem: Infection  Goal: Absence of Infection Signs and Symptoms  Outcome: Ongoing, Progressing     Problem: Bleeding (Labor)  Goal: Hemostasis  Outcome: Ongoing, Progressing     Problem: Change in Fetal Wellbeing (Labor)  Goal: Stable Fetal Wellbeing  Outcome: Ongoing, Progressing     Problem: Delayed Labor Progression (Labor)  Goal: Effective Progression to Delivery  Outcome: Ongoing, Progressing     Problem: Infection (Labor)  Goal: Absence of Infection Signs and Symptoms  Outcome: Ongoing, Progressing     Problem: Labor Pain (Labor)  Goal: Acceptable Pain Control  Outcome: Ongoing, Progressing     Problem: Uterine Tachysystole (Labor)  Goal: Normal Uterine Contraction Pattern  Outcome: Ongoing, Progressing     Problem: Breastfeeding  Goal: Effective Breastfeeding  Outcome: Ongoing, Progressing     Problem: Adjustment to Role Transition (Postpartum Vaginal Delivery)  Goal: Successful Maternal Role Transition  Outcome: Ongoing, Progressing     Problem: Bleeding (Postpartum  Vaginal Delivery)  Goal: Hemostasis  Outcome: Ongoing, Progressing     Problem: Infection (Postpartum Vaginal Delivery)  Goal: Absence of Infection Signs/Symptoms  Outcome: Ongoing, Progressing     Problem: Pain (Postpartum Vaginal Delivery)  Goal: Acceptable Pain Control  Outcome: Ongoing, Progressing     Problem: Urinary Retention (Postpartum Vaginal Delivery)  Goal: Effective Urinary Elimination  Outcome: Ongoing, Progressing

## 2022-07-18 NOTE — ED NOTES
PPD#2 s/p     H/H Pre:   (QBL 100mL)  Post: p  Dx: intracranial aneurysm, gHTN, 1 deg lac, MO (49), h/o pre-e    BC: [facility]  Meds: procardia 30 XL (), ibuprofen, norco, yes PP Lovenox BID  //256  AST/ALT 15/12 Cr 0.7  PCR 0.18  BP: (122-156)/(67-94) 137/69  MALE [x] circ done

## 2022-07-18 NOTE — LACTATION NOTE
07/18/22 0925   Maternal Infant Feeding   Latch Assistance no  (encouraged to call)   Equipment Type   Breast Pump Type double electric, personal  (Medela PISA from other children)   Lactation Referrals   Lactation Referrals support group;outpatient lactation program       Discharge lactation education reviewed with breastfeeding guide. Mother states baby is improving with latch, she has to manually flip baby's lip out to achieve deeper latch. She has medela PISA from last children, she says her insurance policy will not cover a new pump.   Pt has lactation warmline for support, she has LC number to call today for latch assessment PRN.

## 2022-07-18 NOTE — DISCHARGE SUMMARY
Delivery Discharge Summary  Obstetrics      Primary OB Clinician: Merary Benton MD      Admission date: 2022  Discharge date: 2022    Disposition: To home, self care    Discharge Diagnosis List:      Patient Active Problem List   Diagnosis     (spontaneous vaginal delivery)    Gestational hypertension    Joint pain    Shoulder pain, left    Fetal echo with top normal size of right heart, recommend  echo prior to nursery discharge    Depression    Class 3 severe obesity in adult    Focal neurological deficit    Pregnancy at early stage    Chronic migraine w/o aura w/o status migrainosus, not intractable    Intracranial aneurysm    Complex migraine / 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, not intractable    Cyst of ovary    S/P laparoscopic ovarian cystectomy, 19    Current mild episode of major depressive disorder without prior episode    Decreased strength of trunk and back    Lumbar spine instability    Encounter for induction of labor    Gestational hypertension, third trimester       Procedure:     Hospital Course:  Sonam Hidalgo is a 32 y.o. now , PPD #2 who was admitted on 2022 at 37w6d for induction of labor in the setting of contractions and decreased fetal movement. Patient was subsequently admitted to labor and delivery unit with signed consents.     Labor course was uncomplicated and resulted in  complicated by a 1st degree laceration which was repaired appropriately. Please see delivery note for further details.     Her postpartum course was complicated by gestational HTN and started on Procardia XL 30 mg. On discharge day, patient's pain is controlled with oral pain medications. Pt is tolerating ambulation without SOB or CP, and regular diet without N/V. Reports lochia is mild. Denies any HA, vision changes, F/C, LE swelling. Denies any breast pain/soreness.    Pt in stable condition and ready for discharge. She  has been instructed to start and/or continue medications and follow up with her obstetrics provider as listed below.    Pertinent studies:  CBC  Recent Labs   Lab 07/16/22  1333 07/17/22  0529   WBC 8.25 8.31   HGB 12.5 11.3*   HCT 38.0 34.8*   MCV 85 85    229          Immunization History   Administered Date(s) Administered    COVID-19, MRNA, LN-S, PF (MODERNA FULL 0.5 ML DOSE) 03/20/2021, 04/19/2021    DTP 1991, 1991, 09/15/1992, 09/05/1994    DTaP 08/30/1995    HIB 1991, 1991, 1991, 06/11/1992    HPV Quadrivalent 10/30/2007, 01/07/2008, 04/02/2008    Hepatitis B, Pediatric/Adolescent 1991, 10/02/1995, 01/25/2002    Influenza - Quadrivalent - PF *Preferred* (6 months and older) 10/14/2015, 03/07/2019, 11/20/2019    Influenza Split 10/30/2007, 01/12/2010    MMR 06/11/1992, 08/30/1995    Meningococcal Conjugate (MCV4P) 10/30/2007    OPV 1991, 1991, 1991, 09/15/1992, 08/30/1995    PPD Test 01/12/2010, 01/14/2010, 12/06/2010, 12/08/2010    Td (ADULT) 07/24/2004    Tdap 01/12/2010, 10/05/2015, 04/04/2019, 05/06/2022    Varicella 10/02/1995        Delivery:    Episiotomy: None   Lacerations: 1st   Repair suture:     Repair # of packets: 1   Blood loss (ml):       Birth information:  YOB: 2021   Time of birth: 9:06 PM   Sex: male   Delivery type: Vaginal, Spontaneous   Gestational Age: 37w6d    Delivery Clinician:      Other providers:       Additional  information:  Forceps:    Vacuum:    Breech:    Observed anomalies      Living?:           APGARS  One minute Five minutes Ten minutes   Skin color:         Heart rate:         Grimace:         Muscle tone:         Breathing:         Totals: 8  9        Placenta: Delivered:       appearance      Patient Instructions:   Current Discharge Medication List      START taking these medications    Details   docusate sodium (COLACE) 100 MG capsule Take 2 capsules (200 mg total) by mouth 2  (two) times daily as needed for Constipation.  Qty: 30 capsule, Refills: 0      ibuprofen (ADVIL,MOTRIN) 600 MG tablet Take 1 tablet (600 mg total) by mouth every 6 (six) hours as needed for Pain.  Qty: 60 tablet, Refills: 1      NIFEdipine (PROCARDIA-XL) 30 MG (OSM) 24 hr tablet Take 1 tablet (30 mg total) by mouth once daily.  Qty: 30 tablet, Refills: 11    Comments: .         CONTINUE these medications which have NOT CHANGED    Details   aspirin 81 mg Cap Take 81 mg by mouth.      ferrous sulfate (IRON ORAL) Take by mouth.      loratadine (CLARITIN) 10 mg tablet TAKE 1 TABLET BY MOUTH EVERY DAY  Qty: 30 tablet, Refills: 2      magnesium 30 mg Tab Take by mouth once.      ondansetron (ZOFRAN) 4 MG tablet Take 1 tablet (4 mg total) by mouth daily as needed for Nausea.  Qty: 30 tablet, Refills: 3    Associated Diagnoses: Nausea and vomiting during pregnancy      prenatal vit/iron fum/folic ac (PRENATAL 1+1 ORAL) Take by mouth.             Discharge Procedure Orders   Lifting restrictions   Order Comments: No lifting more than infant for 6 weeks.     Other restrictions (specify):     No driving until:   Order Comments: No driving until no longer taking narcotic medications.     Pelvic Rest   Order Comments: Nothing in the vagina including intercourse for 6 weeks.     Notify your health care provider if you experience any of the following:  temperature >100.4     Notify your health care provider if you experience any of the following:  persistent nausea and vomiting or diarrhea     Notify your health care provider if you experience any of the following:  severe uncontrolled pain     Notify your health care provider if you experience any of the following:  redness, tenderness, or signs of infection (pain, swelling, redness, odor or green/yellow discharge around incision site)     Notify your health care provider if you experience any of the following:  severe persistent headache     Notify your health care provider if  you experience any of the following:  persistent dizziness, light-headedness, or visual disturbances     Activity as tolerated        Follow-up Information     Merary Benton MD Follow up today.    Specialties: Obstetrics, Obstetrics and Gynecology  Why: Follow up in 1 week for BP check and 6 weeks for routine post partum visit.  Contact information:  [address] [id number] [phone]                          Wellington Marc MD PGY-1  Obstetrics and Gynecology

## 2022-07-18 NOTE — NURSING
1845: Pt requests to pump. Pump provided & pt taught how to use & clean it. Pt verbalizes understanding & demonstrates. Methods of feeding expressed colostrum gone over & pt requested to spoon feed as that is how they fed hand expressed breastmilk to infant earlier & she feels comfortable & confident with that method. Spoons provided per pt request.

## 2022-07-21 ENCOUNTER — PATIENT MESSAGE (OUTPATIENT)
Dept: OBSTETRICS AND GYNECOLOGY | Facility: CLINIC | Age: 32
End: 2022-07-21
Payer: COMMERCIAL

## 2022-07-22 NOTE — TELEPHONE ENCOUNTER
Patient has been scheduled to be seen in office. Does not have thoughts or harming self or anyone else at this time. Will have another adult with her at all times this weekend. Patient verbalized understanding and will seek care at closest ER if she develops thoughts of harming herself or others before being seen.

## 2022-07-25 ENCOUNTER — POSTPARTUM VISIT (OUTPATIENT)
Dept: OBSTETRICS AND GYNECOLOGY | Facility: CLINIC | Age: 32
End: 2022-07-25
Payer: COMMERCIAL

## 2022-07-25 VITALS
BODY MASS INDEX: 46.25 KG/M2 | HEIGHT: 63 IN | SYSTOLIC BLOOD PRESSURE: 126 MMHG | WEIGHT: 261 LBS | DIASTOLIC BLOOD PRESSURE: 86 MMHG

## 2022-07-25 DIAGNOSIS — F41.8 POSTPARTUM ANXIETY: Primary | ICD-10-CM

## 2022-07-25 DIAGNOSIS — Z30.9 ENCOUNTER FOR CONTRACEPTIVE MANAGEMENT, UNSPECIFIED TYPE: ICD-10-CM

## 2022-07-25 PROCEDURE — 99499 UNLISTED E&M SERVICE: CPT | Mod: S$GLB,,, | Performed by: OBSTETRICS & GYNECOLOGY

## 2022-07-25 PROCEDURE — 99999 PR PBB SHADOW E&M-EST. PATIENT-LVL III: CPT | Mod: PBBFAC,,, | Performed by: OBSTETRICS & GYNECOLOGY

## 2022-07-25 PROCEDURE — 99499 NO LOS: ICD-10-PCS | Mod: S$GLB,,, | Performed by: OBSTETRICS & GYNECOLOGY

## 2022-07-25 PROCEDURE — 99999 PR PBB SHADOW E&M-EST. PATIENT-LVL III: ICD-10-PCS | Mod: PBBFAC,,, | Performed by: OBSTETRICS & GYNECOLOGY

## 2022-07-25 RX ORDER — PROGESTERONE 200 MG/1
200 CAPSULE ORAL NIGHTLY
Qty: 30 CAPSULE | Refills: 11 | Status: SHIPPED | OUTPATIENT
Start: 2022-07-25 | End: 2023-04-20

## 2022-07-25 RX ORDER — SERTRALINE HYDROCHLORIDE 50 MG/1
50 TABLET, FILM COATED ORAL DAILY
Qty: 30 TABLET | Refills: 6 | Status: SHIPPED | OUTPATIENT
Start: 2022-07-25 | End: 2023-01-25

## 2022-07-25 NOTE — PROGRESS NOTES
"CC: Post-partum follow-up    Sonam Hidalgo is a 32 y.o. female  who presents for post-partum visit.  She is S/P a .  She and the baby are doing well.  No pain.  No fever.   No bowel / bladder complaints.  Depression and anxiety. Drying often.  Overwhelmed.  She does not get much help from her .  No suicidal or homicidal ideations      Gender: male- KERRIE  Breast Feeding: YES  Depression: YES  Contraception: considering    Pregnancy was complicated by:  History of PRE E    /86   Ht 5' 3" (1.6 m)   Wt 118.4 kg (261 lb)   LMP 10/01/2021   Breastfeeding Yes   BMI 46.23 kg/m²     ROS:  GENERAL: No fever, chills, fatigability.  VULVAR: No pain, no lesions and no itching.  VAGINAL: No relaxation, no itching, no discharge, no abnormal bleeding and no lesions.  ABDOMEN: No abdominal pain. Denies nausea. Denies vomiting. No diarrhea. No constipation  BREAST: Denies pain. No lumps.  URINARY: No incontinence, no nocturia, no frequency and no dysuria.  CARDIOVASCULAR: No chest pain. No shortness of breath. No leg cramps.  NEUROLOGICAL: No headaches. No vision changes.    PHYSICAL EXAM:  Exam chaperoned by nurse  ABDOMEN:  Soft, non-tender, non-distended  VULVA:  Normal, no lesions  CERVIX:  Without lesions, polyps or tenderness.  UTERUS:  Normal size, shape, consistency, no mass or tenderness.  ADNEXA:  Normal in size without mass or tenderness    IMP:  Doing well S/P , PP anxiety/ depression  Instructions / precautions reviewed      Rx zoloft    PLAN:  May resume normal activities  Return: PRN            "

## 2022-08-11 ENCOUNTER — POSTPARTUM VISIT (OUTPATIENT)
Dept: OBSTETRICS AND GYNECOLOGY | Facility: CLINIC | Age: 32
End: 2022-08-11
Payer: COMMERCIAL

## 2022-08-11 VITALS
SYSTOLIC BLOOD PRESSURE: 112 MMHG | BODY MASS INDEX: 45.36 KG/M2 | WEIGHT: 256 LBS | HEIGHT: 63 IN | DIASTOLIC BLOOD PRESSURE: 84 MMHG

## 2022-08-11 DIAGNOSIS — Z30.9 ENCOUNTER FOR CONTRACEPTIVE MANAGEMENT, UNSPECIFIED TYPE: Primary | ICD-10-CM

## 2022-08-11 PROCEDURE — 99024 POSTOP FOLLOW-UP VISIT: CPT | Mod: S$GLB,,, | Performed by: OBSTETRICS & GYNECOLOGY

## 2022-08-11 PROCEDURE — 99999 PR PBB SHADOW E&M-EST. PATIENT-LVL III: ICD-10-PCS | Mod: PBBFAC,,, | Performed by: OBSTETRICS & GYNECOLOGY

## 2022-08-11 PROCEDURE — 99999 PR PBB SHADOW E&M-EST. PATIENT-LVL III: CPT | Mod: PBBFAC,,, | Performed by: OBSTETRICS & GYNECOLOGY

## 2022-08-11 PROCEDURE — 99024 PR POST-OP FOLLOW-UP VISIT: ICD-10-PCS | Mod: S$GLB,,, | Performed by: OBSTETRICS & GYNECOLOGY

## 2022-08-11 RX ORDER — LACTIC ACID, L-, CITRIC ACID MONOHYDRATE, AND POTASSIUM BITARTRATE 90; 50; 20 MG/5G; MG/5G; MG/5G
1 GEL VAGINAL
Qty: 60 G | Refills: 12 | Status: SHIPPED | OUTPATIENT
Start: 2022-08-11 | End: 2022-12-29

## 2022-08-11 NOTE — PROGRESS NOTES
"CC: Post-partum follow-up     Sonam Hiadlgo is a 32 y.o. female  who presents for post-partum visit.  She is S/P a .  She and the baby are doing well.  No pain.  No fever.   No bowel / bladder complaints.  Depression and anxiety. Drying often.  Overwhelmed.  She does not get much help from her .  No suicidal or homicidal ideations        Gender: male- KERRIE  Breast Feeding: YES  Depression: YES  Contraception: considering     Pregnancy was complicated by:  History of PRE E        /84   Ht 5' 3" (1.6 m)   Wt 116.1 kg (256 lb)   LMP 10/01/2021   Breastfeeding Yes   BMI 45.35 kg/m²     ROS:  GENERAL: No fever, chills, fatigability.  VULVAR: No pain, no lesions and no itching.  VAGINAL: No relaxation, no itching, no discharge, no abnormal bleeding and no lesions.  ABDOMEN: No abdominal pain. Denies nausea. Denies vomiting. No diarrhea. No constipation  BREAST: Denies pain. No lumps.  URINARY: No incontinence, no nocturia, no frequency and no dysuria.  CARDIOVASCULAR: No chest pain. No shortness of breath. No leg cramps.  NEUROLOGICAL: No headaches. No vision changes.    IMP:  Doing well S/P , will monitor  GHTN- not taking procardia at this time,   PPD- stable on prometrium and zoloft  Instructions / precautions reviewed  Contraceptive counseling    Rx phexxi    PLAN:  Follow up 6 wk PP check up  When off prometrium may consider micrnor            "

## 2022-08-30 NOTE — ED NOTES
LOC: The patient is awake, alert, and oriented to place, time, situation. Affect is appropriate.  Speech is appropriate and clear.     APPEARANCE: Patient resting uncomfortably, lower left abdominal pain,  in no acute distress.  Patient is clean and well groomed.    SKIN: The skin is warm and dry; color consistent with ethnicity.  Patient has normal skin turgor and moist mucus membranes.  Skin intact; no breakdown or bruising noted.     MUSCULOSKELETAL: Patient moving upper and lower extremities without difficulty.  Denies weakness.     RESPIRATORY: Airway is open and patent. Respirations spontaneous, even, easy, and non-labored.  Patient has a normal effort and rate.  No accessory muscle use noted. Denies cough.     CARDIAC:   No peripheral edema noted. No complaints of chest pain.      ABDOMEN: Soft and  tender to palpation.  No distention noted.     NEUROLOGIC: Eyes open spontaneously.  Behavior appropriate to situation.  Follows commands; facial expression symmetrical.  Purposeful motor response noted; normal sensation in all extremities.         Admission Reconciliation is Completed  Discharge Reconciliation is Completed

## 2022-09-01 ENCOUNTER — POSTPARTUM VISIT (OUTPATIENT)
Dept: OBSTETRICS AND GYNECOLOGY | Facility: CLINIC | Age: 32
End: 2022-09-01
Payer: COMMERCIAL

## 2022-09-01 VITALS
DIASTOLIC BLOOD PRESSURE: 82 MMHG | SYSTOLIC BLOOD PRESSURE: 118 MMHG | BODY MASS INDEX: 45.78 KG/M2 | HEIGHT: 63 IN | WEIGHT: 258.38 LBS

## 2022-09-01 DIAGNOSIS — Z30.9 ENCOUNTER FOR CONTRACEPTIVE MANAGEMENT, UNSPECIFIED TYPE: Primary | ICD-10-CM

## 2022-09-01 PROCEDURE — 99999 PR PBB SHADOW E&M-EST. PATIENT-LVL III: CPT | Mod: PBBFAC,,, | Performed by: OBSTETRICS & GYNECOLOGY

## 2022-09-01 PROCEDURE — 0503F PR POSTPARTUM CARE VISIT: ICD-10-PCS | Mod: CPTII,S$GLB,, | Performed by: OBSTETRICS & GYNECOLOGY

## 2022-09-01 PROCEDURE — 0503F POSTPARTUM CARE VISIT: CPT | Mod: CPTII,S$GLB,, | Performed by: OBSTETRICS & GYNECOLOGY

## 2022-09-01 PROCEDURE — 99999 PR PBB SHADOW E&M-EST. PATIENT-LVL III: ICD-10-PCS | Mod: PBBFAC,,, | Performed by: OBSTETRICS & GYNECOLOGY

## 2022-09-01 RX ORDER — ACETAMINOPHEN AND CODEINE PHOSPHATE 120; 12 MG/5ML; MG/5ML
1 SOLUTION ORAL DAILY
Qty: 28 TABLET | Refills: 11 | Status: SHIPPED | OUTPATIENT
Start: 2022-09-01 | End: 2023-04-20

## 2022-09-01 RX ORDER — LACTIC ACID, L-, CITRIC ACID MONOHYDRATE, AND POTASSIUM BITARTRATE 90; 50; 20 MG/5G; MG/5G; MG/5G
1 GEL VAGINAL
Qty: 60 G | Refills: 12 | Status: SHIPPED | OUTPATIENT
Start: 2022-09-01 | End: 2023-04-20

## 2022-09-01 NOTE — PROGRESS NOTES
"  CC: Post-partum follow-up    Sonam Hidalgo is a 32 y.o. female  who presents for post-partum visit.  She is S/P a  2022.  She and the baby are doing well.  No pain.  No fever.   No bowel / bladder complaints.    Gender: male- KERRIE  Breast Feeding: YES  Depression: YES  Contraception: phexxi, micronor after stopping progesterone    Pregnancy was complicated by:  OLIGO    /82   Ht 5' 3" (1.6 m)   Wt 117.2 kg (258 lb 6.1 oz)   LMP 10/01/2021   Breastfeeding No   BMI 45.77 kg/m²     ROS:  GENERAL: No fever, chills, fatigability.  VULVAR: No pain, no lesions and no itching.  VAGINAL: No relaxation, no itching, no discharge, no abnormal bleeding and no lesions.  ABDOMEN: No abdominal pain. Denies nausea. Denies vomiting. No diarrhea. No constipation  BREAST: Denies pain. No lumps.  URINARY: No incontinence, no nocturia, no frequency and no dysuria.  CARDIOVASCULAR: No chest pain. No shortness of breath. No leg cramps.  NEUROLOGICAL: No headaches. No vision changes.    PHYSICAL EXAM:  Exam chaperoned by nurse  ABDOMEN:  Soft, non-tender, non-distended  VULVA:  Normal, no lesions  CERVIX:  Without lesions, polyps or tenderness.  UTERUS:  Normal size, shape, consistency, no mass or tenderness.  ADNEXA:  Normal in size without mass or tenderness    IMP:  Doing well S/P   Instructions / precautions reviewed  Contraceptive counseling    Rx     PLAN:  May resume normal activities  Return: PRN  "

## 2022-10-17 PROBLEM — O13.3 GESTATIONAL HYPERTENSION, THIRD TRIMESTER: Status: RESOLVED | Noted: 2022-07-16 | Resolved: 2022-10-17

## 2022-11-02 ENCOUNTER — PATIENT MESSAGE (OUTPATIENT)
Dept: INTERNAL MEDICINE | Facility: CLINIC | Age: 32
End: 2022-11-02
Payer: COMMERCIAL

## 2022-12-29 ENCOUNTER — TELEPHONE (OUTPATIENT)
Dept: INTERNAL MEDICINE | Facility: CLINIC | Age: 32
End: 2022-12-29
Payer: COMMERCIAL

## 2022-12-29 ENCOUNTER — OFFICE VISIT (OUTPATIENT)
Dept: INTERNAL MEDICINE | Facility: CLINIC | Age: 32
End: 2022-12-29
Payer: COMMERCIAL

## 2022-12-29 ENCOUNTER — LAB VISIT (OUTPATIENT)
Dept: LAB | Facility: HOSPITAL | Age: 32
End: 2022-12-29
Attending: INTERNAL MEDICINE
Payer: COMMERCIAL

## 2022-12-29 VITALS
WEIGHT: 264.56 LBS | BODY MASS INDEX: 46.88 KG/M2 | OXYGEN SATURATION: 97 % | RESPIRATION RATE: 18 BRPM | HEART RATE: 63 BPM | HEIGHT: 63 IN | SYSTOLIC BLOOD PRESSURE: 120 MMHG | DIASTOLIC BLOOD PRESSURE: 88 MMHG

## 2022-12-29 DIAGNOSIS — L65.0 TELOGEN EFFLUVIUM: ICD-10-CM

## 2022-12-29 DIAGNOSIS — J01.00 SUBACUTE MAXILLARY SINUSITIS: ICD-10-CM

## 2022-12-29 DIAGNOSIS — L65.0 TELOGEN EFFLUVIUM: Primary | ICD-10-CM

## 2022-12-29 LAB
BASOPHILS # BLD AUTO: 0.07 K/UL (ref 0–0.2)
BASOPHILS NFR BLD: 0.9 % (ref 0–1.9)
DIFFERENTIAL METHOD: ABNORMAL
EOSINOPHIL # BLD AUTO: 0.2 K/UL (ref 0–0.5)
EOSINOPHIL NFR BLD: 2.5 % (ref 0–8)
ERYTHROCYTE [DISTWIDTH] IN BLOOD BY AUTOMATED COUNT: 13.5 % (ref 11.5–14.5)
HCT VFR BLD AUTO: 41.9 % (ref 37–48.5)
HGB BLD-MCNC: 13.1 G/DL (ref 12–16)
IMM GRANULOCYTES # BLD AUTO: 0.04 K/UL (ref 0–0.04)
IMM GRANULOCYTES NFR BLD AUTO: 0.5 % (ref 0–0.5)
IRON SERPL-MCNC: 46 UG/DL (ref 30–160)
LYMPHOCYTES # BLD AUTO: 2.2 K/UL (ref 1–4.8)
LYMPHOCYTES NFR BLD: 29.7 % (ref 18–48)
MCH RBC QN AUTO: 27.1 PG (ref 27–31)
MCHC RBC AUTO-ENTMCNC: 31.3 G/DL (ref 32–36)
MCV RBC AUTO: 87 FL (ref 82–98)
MONOCYTES # BLD AUTO: 0.5 K/UL (ref 0.3–1)
MONOCYTES NFR BLD: 6.6 % (ref 4–15)
NEUTROPHILS # BLD AUTO: 4.5 K/UL (ref 1.8–7.7)
NEUTROPHILS NFR BLD: 59.8 % (ref 38–73)
NRBC BLD-RTO: 0 /100 WBC
PLATELET # BLD AUTO: 390 K/UL (ref 150–450)
PMV BLD AUTO: 9.5 FL (ref 9.2–12.9)
RBC # BLD AUTO: 4.83 M/UL (ref 4–5.4)
SATURATED IRON: 11 % (ref 20–50)
TOTAL IRON BINDING CAPACITY: 422 UG/DL (ref 250–450)
TRANSFERRIN SERPL-MCNC: 285 MG/DL (ref 200–375)
WBC # BLD AUTO: 7.53 K/UL (ref 3.9–12.7)

## 2022-12-29 PROCEDURE — 1160F PR REVIEW ALL MEDS BY PRESCRIBER/CLIN PHARMACIST DOCUMENTED: ICD-10-PCS | Mod: CPTII,S$GLB,, | Performed by: INTERNAL MEDICINE

## 2022-12-29 PROCEDURE — 3008F BODY MASS INDEX DOCD: CPT | Mod: CPTII,S$GLB,, | Performed by: INTERNAL MEDICINE

## 2022-12-29 PROCEDURE — 90686 FLU VACCINE (QUAD) GREATER THAN OR EQUAL TO 3YO PRESERVATIVE FREE IM: ICD-10-PCS | Mod: S$GLB,,, | Performed by: INTERNAL MEDICINE

## 2022-12-29 PROCEDURE — 36415 COLL VENOUS BLD VENIPUNCTURE: CPT | Performed by: INTERNAL MEDICINE

## 2022-12-29 PROCEDURE — 3074F PR MOST RECENT SYSTOLIC BLOOD PRESSURE < 130 MM HG: ICD-10-PCS | Mod: CPTII,S$GLB,, | Performed by: INTERNAL MEDICINE

## 2022-12-29 PROCEDURE — 3079F PR MOST RECENT DIASTOLIC BLOOD PRESSURE 80-89 MM HG: ICD-10-PCS | Mod: CPTII,S$GLB,, | Performed by: INTERNAL MEDICINE

## 2022-12-29 PROCEDURE — 3008F PR BODY MASS INDEX (BMI) DOCUMENTED: ICD-10-PCS | Mod: CPTII,S$GLB,, | Performed by: INTERNAL MEDICINE

## 2022-12-29 PROCEDURE — 1159F MED LIST DOCD IN RCRD: CPT | Mod: CPTII,S$GLB,, | Performed by: INTERNAL MEDICINE

## 2022-12-29 PROCEDURE — 90686 IIV4 VACC NO PRSV 0.5 ML IM: CPT | Mod: S$GLB,,, | Performed by: INTERNAL MEDICINE

## 2022-12-29 PROCEDURE — 84466 ASSAY OF TRANSFERRIN: CPT | Performed by: INTERNAL MEDICINE

## 2022-12-29 PROCEDURE — 3074F SYST BP LT 130 MM HG: CPT | Mod: CPTII,S$GLB,, | Performed by: INTERNAL MEDICINE

## 2022-12-29 PROCEDURE — 3079F DIAST BP 80-89 MM HG: CPT | Mod: CPTII,S$GLB,, | Performed by: INTERNAL MEDICINE

## 2022-12-29 PROCEDURE — 90471 IMMUNIZATION ADMIN: CPT | Mod: S$GLB,,, | Performed by: INTERNAL MEDICINE

## 2022-12-29 PROCEDURE — 85025 COMPLETE CBC W/AUTO DIFF WBC: CPT | Performed by: INTERNAL MEDICINE

## 2022-12-29 PROCEDURE — 1159F PR MEDICATION LIST DOCUMENTED IN MEDICAL RECORD: ICD-10-PCS | Mod: CPTII,S$GLB,, | Performed by: INTERNAL MEDICINE

## 2022-12-29 PROCEDURE — 90471 FLU VACCINE (QUAD) GREATER THAN OR EQUAL TO 3YO PRESERVATIVE FREE IM: ICD-10-PCS | Mod: S$GLB,,, | Performed by: INTERNAL MEDICINE

## 2022-12-29 PROCEDURE — 99999 PR PBB SHADOW E&M-EST. PATIENT-LVL V: CPT | Mod: PBBFAC,,, | Performed by: INTERNAL MEDICINE

## 2022-12-29 PROCEDURE — 99999 PR PBB SHADOW E&M-EST. PATIENT-LVL V: ICD-10-PCS | Mod: PBBFAC,,, | Performed by: INTERNAL MEDICINE

## 2022-12-29 PROCEDURE — 99214 PR OFFICE/OUTPT VISIT, EST, LEVL IV, 30-39 MIN: ICD-10-PCS | Mod: 25,S$GLB,, | Performed by: INTERNAL MEDICINE

## 2022-12-29 PROCEDURE — 1160F RVW MEDS BY RX/DR IN RCRD: CPT | Mod: CPTII,S$GLB,, | Performed by: INTERNAL MEDICINE

## 2022-12-29 PROCEDURE — 99214 OFFICE O/P EST MOD 30 MIN: CPT | Mod: 25,S$GLB,, | Performed by: INTERNAL MEDICINE

## 2022-12-29 RX ORDER — AMOXICILLIN 500 MG/1
1000 TABLET, FILM COATED ORAL EVERY 12 HOURS
Qty: 28 TABLET | Refills: 0 | Status: SHIPPED | OUTPATIENT
Start: 2022-12-29 | End: 2023-01-05

## 2022-12-29 NOTE — PROGRESS NOTES
Subjective:       Patient ID: Sonam Hidalgo is a 32 y.o. female.    Chief Complaint: Fatigue    Patient is here for followup for chronic conditions.    Has had some hair loss, by the clumps. Had a prolonged first period after recent delivery, did not have much peripartum bleeding,    Has had a cough also, cough for 1-2 months, daily has some purulent sputum. Has sinus congestion as well, which is also new for her (not a chronic issue). Some sinus pains. No SOB/victoria.    HTN developed after recent delivery and has been on nifedipine, not checking her pressures.    Review of Systems   Constitutional:  Positive for activity change. Negative for unexpected weight change.   HENT:  Positive for rhinorrhea. Negative for hearing loss and trouble swallowing.    Eyes:  Positive for visual disturbance. Negative for discharge.   Respiratory:  Negative for chest tightness and wheezing.    Cardiovascular:  Negative for chest pain and palpitations.   Gastrointestinal:  Negative for blood in stool, constipation, diarrhea and vomiting.   Endocrine: Negative for polydipsia and polyuria.   Genitourinary:  Negative for difficulty urinating, dysuria, hematuria and menstrual problem.   Musculoskeletal:  Negative for arthralgias, joint swelling and neck pain.   Skin:         1-2 months of hair loss   Neurological:  Positive for headaches. Negative for weakness.   Psychiatric/Behavioral:  Positive for dysphoric mood. Negative for confusion.          Objective:      Physical Exam  Constitutional:       General: She is not in acute distress.     Appearance: Normal appearance. She is well-developed. She is not ill-appearing, toxic-appearing or diaphoretic.   HENT:      Head: Normocephalic and atraumatic.      Nose:      Comments: No sinus tenderness to deep palpation, some excess mucous seen L nostril.     Mouth/Throat:      Pharynx: Oropharynx is clear. No oropharyngeal exudate.   Eyes:      General: No scleral icterus.      Pupils: Pupils are equal, round, and reactive to light.   Neck:      Thyroid: No thyromegaly.   Cardiovascular:      Rate and Rhythm: Normal rate and regular rhythm.   Pulmonary:      Effort: Pulmonary effort is normal. No respiratory distress.      Breath sounds: Normal breath sounds. No wheezing or rales.   Musculoskeletal:      Cervical back: Normal range of motion and neck supple.   Lymphadenopathy:      Cervical: No cervical adenopathy.   Skin:     Comments: Neg hair pull test. No focal areas of alopecia. Mild dandruff seen.   Neurological:      Mental Status: She is alert.   Psychiatric:         Behavior: Behavior normal.       Assessment:       1. Telogen effluvium    2. Subacute maxillary sinusitis        Plan:       Sonam was seen today for fatigue.    Diagnoses and all orders for this visit:    Telogen effluvium  -     CBC Auto Differential; Future  -     Iron and TIBC; Future  Discussed rogaine OTC  Explained that if not better in 1-2 mos, pt should rtc/call PCP      Subacute maxillary sinusitis  -     amoxicillin (AMOXIL) 500 MG Tab; Take 2 tablets (1,000 mg total) by mouth every 12 (twelve) hours. for 7 days  She is aware that it is OK for breastfeeding but can cross to breast milk  Patient Instructions   You can try a nettipot:  https://www.youNewCondosOnline.com/watch?v=kX8HyjZsq_o    Explained that if not better in 1-2 weeks, pt should rtc/call PCP    HTN -- continue current, see back 3 months for pressure and wellness and above    Health Maintenance         Date Due Completion Date    COVID-19 Vaccine (3 - Booster for Moderna series) 06/14/2021 4/19/2021    Cervical Cancer Screening 01/11/2025 1/11/2022    TETANUS VACCINE 05/06/2032 5/6/2022            Follow up in about 3 months (around 3/29/2023).    Future Appointments   Date Time Provider Department Center   12/30/2022 10:00 AM Patricia Brothers MD [facility]   3/30/2023  8:00 AM Fili Johsua MD [facility]

## 2022-12-29 NOTE — PROGRESS NOTES
Administered FLU VAC IM LT  deltoid. Two patient identifier (name & ) confirmed. Patient advised to wait 15 mins tolerated well.- Nika KO MA

## 2022-12-30 ENCOUNTER — OFFICE VISIT (OUTPATIENT)
Dept: NEUROLOGY | Facility: CLINIC | Age: 32
End: 2022-12-30
Payer: COMMERCIAL

## 2022-12-30 DIAGNOSIS — I67.1 INTRACRANIAL ANEURYSM: Primary | ICD-10-CM

## 2022-12-30 DIAGNOSIS — R51.9 WORSENING HEADACHES: ICD-10-CM

## 2022-12-30 DIAGNOSIS — I67.1 CEREBRAL ANEURYSM, NONRUPTURED: ICD-10-CM

## 2022-12-30 DIAGNOSIS — G43.709 CHRONIC MIGRAINE W/O AURA W/O STATUS MIGRAINOSUS, NOT INTRACTABLE: ICD-10-CM

## 2022-12-30 PROCEDURE — 99215 OFFICE O/P EST HI 40 MIN: CPT | Mod: 95,,, | Performed by: STUDENT IN AN ORGANIZED HEALTH CARE EDUCATION/TRAINING PROGRAM

## 2022-12-30 PROCEDURE — 99215 PR OFFICE/OUTPT VISIT, EST, LEVL V, 40-54 MIN: ICD-10-PCS | Mod: 95,,, | Performed by: STUDENT IN AN ORGANIZED HEALTH CARE EDUCATION/TRAINING PROGRAM

## 2022-12-30 NOTE — PROGRESS NOTES
"        Patient ID: [id number]  The patient location is: [place], [place]  The chief complaint leading to consultation is: headaches    Visit type: audiovisual    Face to Face time with patient:  40  40 minutes of total time spent on the encounter, which includes face to face time and non-face to face time preparing to see the patient (eg, review of tests), Obtaining and/or reviewing separately obtained history, Documenting clinical information in the electronic or other health record, Independently interpreting results (not separately reported) and communicating results to the patient/family/caregiver, or Care coordination (not separately reported).     Each patient to whom he or she provides medical services by telemedicine is:  (1) informed of the relationship between the physician and patient and the respective role of any other health care provider with respect to management of the patient; and (2) notified that he or she may decline to receive medical services by telemedicine and may withdraw from such care at any time.    Notes:      Subjective:     NIRAV Hidalgo is a 32 y.o.  female with HTN (after the last pregnancy), obesity, depression, and anxiety. she  is presenting today for evaluation of worsening headaches. She was previously following with Dr. Reece, last seen in March 2020, she is a new patient to me.    She reports worsening headaches x3 weeks. Used to get headaches x2/week, right-sided deep, occasional retr-orbital, goes from aching to sharp pain, 8/q0 intensity, lasts a whole day, has affected daily activities, tylenol and Advil used to help but not any more, occurring almost daily. Local pressure helps while doing it but otherwise not helping much. She reports seeing squiggly lines, "worms", more in the right side of visual fields, no N/V, photosensitivity, phonophobia, or sensitivity to odors. Lying down makes the headache worse. She has to be propped up when sleeping. " Occasionally wakes her up in the middle of night and wakes up with them in the morning. Blurred vision comes and goes, feels wobbly and unsteady with the headaches.     She states that she has always had headaches even as a kid, in 2019 she was worked up and found to have an aneurysm. Tylenol, coke, laying down in a dark room were the usual things that used to help.     Review of Systems   Eyes:  Positive for visual disturbance. Negative for photophobia.   Gastrointestinal:  Negative for nausea and vomiting.   Musculoskeletal:  Negative for joint deformity.   Neurological:  Positive for headaches. Negative for speech difficulty, weakness and numbness.   Psychiatric/Behavioral:  Negative for confusion.    All other systems reviewed and are negative.    Past Medical History:  Past Medical History:   Diagnosis Date    Abnormal Pap smear of vagina     Allergy     Anxiety     Hypertension     Pre-eclampsia      Allergies:  Review of patient's allergies indicates:  No Known Allergies    Pertinent Family History:  -    Pertinent Social History:  Former smoker (socially in college). rare alcohol (still breast feeding), no marijuana, no illicit substance use. Lives with mother and 3 kids (7 and 3 y/o and 6 m/o), and a dog. 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     Medications:  Current Outpatient Medications   Medication Instructions    amoxicillin (AMOXIL) 1,000 mg, Oral, Every 12 hours    aspirin 81 mg, Oral    docusate sodium (COLACE) 200 mg, Oral, 2 times daily PRN    ferrous sulfate (IRON ORAL) Oral    ibuprofen (ADVIL,MOTRIN) 600 mg, Oral, Every 6 hours PRN    lactic acid-citric-potassium (PHEXXI) 1.8-1-0.4 % Gel 1 application, Vaginal, As needed (PRN)    loratadine (CLARITIN) 10 mg tablet TAKE 1 TABLET BY MOUTH EVERY DAY    magnesium 30 mg Tab Oral, Once    NIFEdipine (PROCARDIA-XL) 30 mg, Oral, Daily    norethindrone (ORTHO MICRONOR) 0.35 mg, Oral, Daily    ondansetron (ZOFRAN) 4 MG tablet TAKE 1  TABLET BY MOUTH DAILY AS NEEDED FOR NAUSEA    prenatal vit/iron fum/folic ac (PRENATAL 1+1 ORAL) Oral    progesterone (PROMETRIUM) 200 mg, Oral, Nightly    sertraline (ZOLOFT) 50 mg, Oral, Daily        Objective:     * exam is limited due to the virtual nature of this visit    Well-appearing, well-nourished, NAD, cooperative  Awake, alert and oriented x3  Speech spontaneous and fluent, intact comprehension, naming, repetition.   Adequate fund of knowledge, vocabulary.  EOM intact. No ophthalmoplegia.   Facial expression is full and symmetric.   Hearing is intact.     Pertinent lab results    07/2022  CBC/Diff nl except HGB 11.3 HCT 34.8  CMP nl except albumin 2.7  RPR -  HIV 1/2 -    Pertinent imaging results    09/30/2019  MRI Brain wo contrast: Unremarkable    MRA brain and neck wo contrast:  Unremarkable MRA of neck. 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 This measures approximately 4-5 mm in length.    MRV Brain wo contrast: Unremarkable    Other pertinent studies  None    Assessment:   Sonam Hidalgo is a 32 y.o.  female with HTN, obesity, anxiety, depression, migraine headaches, and history of cerebral aneurysm who presents via video to follow up on worsening headaches. Migraine headaches now occur almost daily, the positional nature of the headaches (worse when lying down and waking her up in the middle of the night) are worrisome for increased ICP. Before proceeding with any preventive and/or abortive treatment, I would like to assess any interval change in the size of aneurysm first and rule out any other structural intracranial pathologies. She has an upcoming appointment with ophthalmology and I advised her to be examined for papilledema as well. We will reconvene with these results and decide on the choice of medications.     1. Intracranial aneurysm    2. Chronic migraine w/o aura w/o status  migrainosus, not intractable    3. Worsening headaches    4. Cerebral aneurysm, nonruptured      Plan:     Diagnostics  Imaging: MRI Brain w/wo + MRA Brain wo contrast    We discussed lifestyle modifications including healthy regular eating, avoiding dehydration, avoiding more than 1 caffeinated product a day, establishing routine sleep patterns at least 8 hours of sleep and avoiding oversleeping and working on relaxation and stressors.     Follow up: RTC in 6 weeks with results    Plan was discussed in detail with the patient, who is in agreement.        Patricia Brothers MD  [facility]  12/30/2022

## 2023-01-02 NOTE — PATIENT INSTRUCTIONS
"Avoid dietary triggers:   Processed meats/cold cuts/sausage and pepperoni  Peanuts  Aged cheese  Red wine (tyramine), white wine (nitrates)  Yeast breads (biscuits, sourdough)  Avocado, tomato, figs, ripe bananas, raisins  Chocolate  MSG  Artificial sweeteners    Excessive caffeine, and caffeine withdrawal. Limit to 1 caffeinated beverage per day  Some people find that cutting out sugar or wheat products (gluten) markedly decreases their migraines.     More information about tracking your caffeine intake using a smart phone davy can be found at [employer]. http://www.DeepDyve/Sponto-The Invisible Armor-coffee-quantifies-your-caffeine-addiction-14255608/?fb_action_ids=44387188581399477&fb_action_types=og.likes    Avoid bright/flashing/fluorescent light, prolonged bright computer monitors, perfumes and bleach/cleaning fumes. Wear salvador colored sunglasses which blocks out the blue-spectrum wavelengths    Exercise regularly  Keep regular sleep hours  Alleviate stress as much as possible. Consider yoga, talk therapy, meditation or spiritual guidance to lower stress levels.    Taking "pain killers" (analgesics) more than 2 days per week can predispose you to more frequent chronic headaches. This is called Medication Overuse Headache syndrome. This seems to occur more with the opioids ("narcotics") and butalbital (Fioricet, Esgic) but also applies to the triptans (like Imitrex).     Biofeedback Apps:  ALIVE  HEADSPACE  [id number]  RELAX&REST    Helpful websites:   CureLauncher    Informational websites:   [email or web address]   [email or web address]       "

## 2023-01-05 ENCOUNTER — PATIENT MESSAGE (OUTPATIENT)
Dept: NEUROLOGY | Facility: CLINIC | Age: 33
End: 2023-01-05
Payer: COMMERCIAL

## 2023-01-05 DIAGNOSIS — R51.9 WORSENING HEADACHES: Primary | ICD-10-CM

## 2023-02-27 ENCOUNTER — TELEPHONE (OUTPATIENT)
Dept: NEUROLOGY | Facility: CLINIC | Age: 33
End: 2023-02-27
Payer: COMMERCIAL

## 2023-02-27 NOTE — TELEPHONE ENCOUNTER
----- Message from Lo Magana sent at 2/23/2023  3:41 PM CST -----  Regarding: Return Call  .Type:  Patient Returning Call    Who Called: Self     Who Left Message for Patient: not sure     Does the patient know what this is regarding?: Insurance    Would the patient rather a call back or a response via [facility]? call    Best Call Back Number: .[phone]

## 2023-03-14 ENCOUNTER — HOSPITAL ENCOUNTER (OUTPATIENT)
Dept: RADIOLOGY | Facility: HOSPITAL | Age: 33
Discharge: HOME | End: 2023-03-14
Attending: STUDENT IN AN ORGANIZED HEALTH CARE EDUCATION/TRAINING PROGRAM
Payer: COMMERCIAL

## 2023-03-14 DIAGNOSIS — R51.9 WORSENING HEADACHES: ICD-10-CM

## 2023-03-14 DIAGNOSIS — I67.1 CEREBRAL ANEURYSM, NONRUPTURED: ICD-10-CM

## 2023-03-14 DIAGNOSIS — I67.1 INTRACRANIAL ANEURYSM: ICD-10-CM

## 2023-03-14 DIAGNOSIS — G43.709 CHRONIC MIGRAINE W/O AURA W/O STATUS MIGRAINOSUS, NOT INTRACTABLE: ICD-10-CM

## 2023-03-14 PROCEDURE — 25500020 PHARM REV CODE 255: Performed by: STUDENT IN AN ORGANIZED HEALTH CARE EDUCATION/TRAINING PROGRAM

## 2023-03-14 PROCEDURE — 70553 MRI BRAIN STEM W/O & W/DYE: CPT | Mod: 26,,, | Performed by: RADIOLOGY

## 2023-03-14 PROCEDURE — A9585 GADOBUTROL INJECTION: HCPCS | Performed by: STUDENT IN AN ORGANIZED HEALTH CARE EDUCATION/TRAINING PROGRAM

## 2023-03-14 PROCEDURE — 70544 MR ANGIOGRAPHY HEAD W/O DYE: CPT | Mod: TC,59

## 2023-03-14 PROCEDURE — 70544 MRA BRAIN WITHOUT CONTRAST: ICD-10-PCS | Mod: 26,59,, | Performed by: RADIOLOGY

## 2023-03-14 PROCEDURE — 70544 MR ANGIOGRAPHY HEAD W/O DYE: CPT | Mod: 26,59,, | Performed by: RADIOLOGY

## 2023-03-14 PROCEDURE — 70553 MRI BRAIN STEM W/O & W/DYE: CPT | Mod: TC

## 2023-03-14 PROCEDURE — 70553 MRI BRAIN W WO CONTRAST: ICD-10-PCS | Mod: 26,,, | Performed by: RADIOLOGY

## 2023-03-14 RX ORDER — GADOBUTROL 604.72 MG/ML
10 INJECTION INTRAVENOUS
Status: COMPLETED | OUTPATIENT
Start: 2023-03-14 | End: 2023-03-14

## 2023-03-14 RX ADMIN — GADOBUTROL 10 ML: 604.72 INJECTION INTRAVENOUS at 07:03

## 2023-04-18 ENCOUNTER — PATIENT MESSAGE (OUTPATIENT)
Dept: INTERNAL MEDICINE | Facility: CLINIC | Age: 33
End: 2023-04-18
Payer: COMMERCIAL

## 2023-04-19 ENCOUNTER — E-VISIT (OUTPATIENT)
Dept: INTERNAL MEDICINE | Facility: CLINIC | Age: 33
End: 2023-04-19
Payer: COMMERCIAL

## 2023-04-19 DIAGNOSIS — R19.7 DIARRHEA, UNSPECIFIED TYPE: Primary | ICD-10-CM

## 2023-04-19 PROCEDURE — 99421 OL DIG E/M SVC 5-10 MIN: CPT | Mod: 95,,, | Performed by: INTERNAL MEDICINE

## 2023-04-19 PROCEDURE — 99421 PR E&M, ONLINE DIGIT, EST, < 7 DAYS, 5-10 MINS: ICD-10-PCS | Mod: 95,,, | Performed by: INTERNAL MEDICINE

## 2023-04-20 ENCOUNTER — PATIENT MESSAGE (OUTPATIENT)
Dept: INTERNAL MEDICINE | Facility: CLINIC | Age: 33
End: 2023-04-20
Payer: COMMERCIAL

## 2023-04-20 ENCOUNTER — TELEPHONE (OUTPATIENT)
Dept: INTERNAL MEDICINE | Facility: CLINIC | Age: 33
End: 2023-04-20
Payer: COMMERCIAL

## 2023-04-20 NOTE — TELEPHONE ENCOUNTER
"Hi, please call her -- I emailed her with this message from an E visit but she has not yet seen the msg:    "Hi,  Thank you for completing the E visit questions.  It sounds like you have a viral infection related to both the cough/congestion and diarrhea. Have you been around anyone sick? Have you tested for covid?  You can take over the counter anti-diarrheal medicine like imodium every 4-6 hours as needed.  Try your best to stay hydrated.  As far as the sinus congestion and cough I would continue the current over the counters. You can also take a night time syrup if coughing is keeping you up at night.  I hope you are feeling better soon.  Let me know if you have any more questions.  Fili Joshua"    Let me know if patient has any questions.  Thank you, Fili Joshua    "

## 2023-04-20 NOTE — PROGRESS NOTES
Patient ID: Sonam Hidalgo is a 33 y.o. female.    Chief Complaint: Diarrhea    The patient initiated a request through [employer] on 4/19/2023 for evaluation and management with a chief complaint of Diarrhea     I evaluated the questionnaire submission on 04/20/2023  .    [facility]s Peq Evisit Diarrhea    4/19/2023  4:06 PM CDT - Filed by Patient   Do you agree to participate in an E-Visit? Yes   If you have any of the following symptoms, please present to your local ER or call 911:  I acknowledge   What is the main issue that you would like for your doctor to address today? Day 5 of diarrhea, stomach cramping, headaches   Are you able to take your vital signs? Yes   Systolic Blood Pressure: 123   Diastolic Blood Pressure: 81   Weight: 270   Height: 63   Pulse: 97   Temp: 97.5   Resp:    Pulse Ox:    Are you currently pregnant, could you be pregnant, or are you breast feeding? None of the above   Do you have diarrhea? Yes   How many stools have you passed in the last 24 hours? More than eight   Is there blood in your stool, or is your stool dark red or black? None of the above   Does your stool contain pus or mucus? No   Have you taken a laxative or a medicine to help you move your bowels lately? No   Are you vomiting? No, I am not vomiting   Do you have belly pain? I have pain in the lower part of my belly   Are you feeling dizzy or like you might pass out? No   When did your symptoms begin? 4/14/2023   Do you have a fever? No, I do not have a fever   Are you having trouble walking or lifting yourself due to weakness from this illness?  No   Do any of the following apply to you? My urine is normal   Did your condition begin after a specific meal that may have caused the illness? Not clearly related to a meal.    Have you taken antibiotics recently? I have not been on any antibiotics   Have you been hospitalized in the past 2 months? No, I have not been hospitalized recently.   Do you work in a  center  or healthcare environment? No   Does anyone you know have similar symptoms? No   Have you had a meal consisting of raw meat or fish in the week prior to your illness? No   Have you recently travelled to a place where you may have caught an illness? No   Have you tried any medication or other treatment for your symptoms? No   Provide any information you feel is important to your history not asked above headaches;  green/yellow mucus from nose and coughing up;  wheezing sounds when breathing and rumbling feelings when taking deep breaths.   Please attach any relevant images or files          Recent Labs Obtained:  No visits with results within 7 Day(s) from this visit.   Latest known visit with results is:   Lab Visit on 12/29/2022   Component Date Value Ref Range Status    WBC 12/29/2022 7.53  3.90 - 12.70 K/uL Final    RBC 12/29/2022 4.83  4.00 - 5.40 M/uL Final    Hemoglobin 12/29/2022 13.1  12.0 - 16.0 g/dL Final    Hematocrit 12/29/2022 41.9  37.0 - 48.5 % Final    MCV 12/29/2022 87  82 - 98 fL Final    MCH 12/29/2022 27.1  27.0 - 31.0 pg Final    MCHC 12/29/2022 31.3 (L)  32.0 - 36.0 g/dL Final    RDW 12/29/2022 13.5  11.5 - 14.5 % Final    Platelets 12/29/2022 390  150 - 450 K/uL Final    MPV 12/29/2022 9.5  9.2 - 12.9 fL Final    Immature Granulocytes 12/29/2022 0.5  0.0 - 0.5 % Final    Gran # (ANC) 12/29/2022 4.5  1.8 - 7.7 K/uL Final    Immature Grans (Abs) 12/29/2022 0.04  0.00 - 0.04 K/uL Final    Comment: Mild elevation in immature granulocytes is non specific and   can be seen in a variety of conditions including stress response,   acute inflammation, trauma and pregnancy. Correlation with other   laboratory and clinical findings is essential.      Lymph # 12/29/2022 2.2  1.0 - 4.8 K/uL Final    Mono # 12/29/2022 0.5  0.3 - 1.0 K/uL Final    Eos # 12/29/2022 0.2  0.0 - 0.5 K/uL Final    Baso # 12/29/2022 0.07  0.00 - 0.20 K/uL Final    nRBC 12/29/2022 0  0 /100 WBC Final    Gran % 12/29/2022 59.8  38.0  - 73.0 % Final    Lymph % 12/29/2022 29.7  18.0 - 48.0 % Final    Mono % 12/29/2022 6.6  4.0 - 15.0 % Final    Eosinophil % 12/29/2022 2.5  0.0 - 8.0 % Final    Basophil % 12/29/2022 0.9  0.0 - 1.9 % Final    Differential Method 12/29/2022 Automated   Final    Iron 12/29/2022 46  30 - 160 ug/dL Final    Transferrin 12/29/2022 285  200 - 375 mg/dL Final    TIBC 12/29/2022 422  250 - 450 ug/dL Final    Saturated Iron 12/29/2022 11 (L)  20 - 50 % Final       Encounter Diagnosis   Name Primary?    Diarrhea, unspecified type Yes    Patient emailed and called will await CB    No orders of the defined types were placed in this encounter.           No follow-ups on file.      E-Visit Time Tracking:    Day 1 Time (in minutes): 7     Total Time (in minutes): 7

## 2023-04-20 NOTE — TELEPHONE ENCOUNTER
Called and poke with pt, states her and all her kids have been coughing and coughing up phlegm. No one has tested for Covid and she doesn't believe she has come in contact with any one who is sick. States she will call back if symptoms get any worse

## 2023-05-10 ENCOUNTER — PATIENT MESSAGE (OUTPATIENT)
Dept: INTERNAL MEDICINE | Facility: CLINIC | Age: 33
End: 2023-05-10
Payer: COMMERCIAL

## 2023-07-24 NOTE — TELEPHONE ENCOUNTER
Discuss provider recommendations- Anemia and fatigue in pregnancy can be overwhelming.  You can increase the water in your diet, try to rest and relax when it is possible.  Compression stockings, maternity belt and support can also help.    If cramping increases I will need to see you in the office.  Patient verbalized understanding   Topical Metronidazole Pregnancy And Lactation Text: This medication is Pregnancy Category B and considered safe during pregnancy.  It is also considered safe to use while breastfeeding.

## 2023-07-26 DIAGNOSIS — O13.9 GESTATIONAL HYPERTENSION: ICD-10-CM

## 2023-09-06 ENCOUNTER — E-VISIT (OUTPATIENT)
Dept: INTERNAL MEDICINE | Facility: CLINIC | Age: 33
End: 2023-09-06
Payer: COMMERCIAL

## 2023-09-06 DIAGNOSIS — H10.023 PINK EYE DISEASE OF BOTH EYES: Primary | ICD-10-CM

## 2023-09-06 PROCEDURE — 99421 PR E&M, ONLINE DIGIT, EST, < 7 DAYS, 5-10 MINS: ICD-10-PCS | Mod: ,,, | Performed by: INTERNAL MEDICINE

## 2023-09-06 PROCEDURE — 99421 OL DIG E/M SVC 5-10 MIN: CPT | Mod: ,,, | Performed by: INTERNAL MEDICINE

## 2023-09-08 ENCOUNTER — PATIENT MESSAGE (OUTPATIENT)
Dept: INTERNAL MEDICINE | Facility: CLINIC | Age: 33
End: 2023-09-08
Payer: COMMERCIAL

## 2023-09-08 NOTE — PROGRESS NOTES
Patient ID: Sonam Hidalgo is a 33 y.o. female.    Chief Complaint: Conjunctivitis (Entered automatically based on patient selection in Patient Portal.)    The patient initiated a request through [employer] on 9/6/2023 for evaluation and management with a chief complaint of Conjunctivitis (Entered automatically based on patient selection in Patient Portal.)     I evaluated the questionnaire submission on 09/08/2023  .    Ohs Peq Evisit Red Eye    9/6/2023  5:51 PM CDT - Filed by Patient   Do you agree to participate in an E-Visit? Yes   If you have any of the following symptoms, please present to your local ER or call 911:  I acknowledge   What is the main issue that you would like for your doctor to address today? Pink Eye;  sore/swollen feeling throat;  sinus cogestion;  headache   Are you able to take your vital signs? No   Are you currently pregnant, could you be pregnant, or are you breast feeding? None of the above   Which of the following have you been experiencing? One eye is red;  Eye itching;  Eye drainage or crusting   Have you had any of the following? Cough    How long have you been having these symptoms? For a few days   Do you have a fever? No, I do not have a fever   Are your symptoms associated with swimming? No, I have not been swimming recently   Have your eyes been exposed to any chemicals, creams, or drops that may be causing irritation? No   Have you suffered any recent injury to your eyes? No   Have you been exposed to anyone with similar symptoms? Yes   Did or do you wear contact lenses? No   Have you had any of the following? None of the above   Have you had any of the following in the past? I have not had any past problems with my eyes.   What medications are you currently using for these symptoms? Prescription eye drops   Please enter the medications you have been using: Neomycin and Polymyxin v sulfates and dexomethasone oph oint   Provide any information you feel is important  to your history not asked above I have been taking ibuprofen and chlrotabs for sinus issues which helps a little but not all day. All 3 kids have had pink eye over the past week and I started using the prescibed ointment today as eye became red yesterday afternoon   At least one photo is required for treatment to be provided. You can upload a maximum of three photos of the affected area.           Recent Labs Obtained:  No visits with results within 7 Day(s) from this visit.   Latest known visit with results is:   Lab Visit on 12/29/2022   Component Date Value Ref Range Status    WBC 12/29/2022 7.53  3.90 - 12.70 K/uL Final    RBC 12/29/2022 4.83  4.00 - 5.40 M/uL Final    Hemoglobin 12/29/2022 13.1  12.0 - 16.0 g/dL Final    Hematocrit 12/29/2022 41.9  37.0 - 48.5 % Final    MCV 12/29/2022 87  82 - 98 fL Final    MCH 12/29/2022 27.1  27.0 - 31.0 pg Final    MCHC 12/29/2022 31.3 (L)  32.0 - 36.0 g/dL Final    RDW 12/29/2022 13.5  11.5 - 14.5 % Final    Platelets 12/29/2022 390  150 - 450 K/uL Final    MPV 12/29/2022 9.5  9.2 - 12.9 fL Final    Immature Granulocytes 12/29/2022 0.5  0.0 - 0.5 % Final    Gran # (ANC) 12/29/2022 4.5  1.8 - 7.7 K/uL Final    Immature Grans (Abs) 12/29/2022 0.04  0.00 - 0.04 K/uL Final    Comment: Mild elevation in immature granulocytes is non specific and   can be seen in a variety of conditions including stress response,   acute inflammation, trauma and pregnancy. Correlation with other   laboratory and clinical findings is essential.      Lymph # 12/29/2022 2.2  1.0 - 4.8 K/uL Final    Mono # 12/29/2022 0.5  0.3 - 1.0 K/uL Final    Eos # 12/29/2022 0.2  0.0 - 0.5 K/uL Final    Baso # 12/29/2022 0.07  0.00 - 0.20 K/uL Final    nRBC 12/29/2022 0  0 /100 WBC Final    Gran % 12/29/2022 59.8  38.0 - 73.0 % Final    Lymph % 12/29/2022 29.7  18.0 - 48.0 % Final    Mono % 12/29/2022 6.6  4.0 - 15.0 % Final    Eosinophil % 12/29/2022 2.5  0.0 - 8.0 % Final    Basophil % 12/29/2022 0.9  0.0 -  1.9 % Final    Differential Method 12/29/2022 Automated   Final    Iron 12/29/2022 46  30 - 160 ug/dL Final    Transferrin 12/29/2022 285  200 - 375 mg/dL Final    TIBC 12/29/2022 422  250 - 450 ug/dL Final    Saturated Iron 12/29/2022 11 (L)  20 - 50 % Final       Encounter Diagnosis   Name Primary?    Pink eye disease of both eyes Yes        No orders of the defined types were placed in this encounter.           No follow-ups on file.      E-Visit Time Tracking:

## 2024-01-02 ENCOUNTER — PATIENT MESSAGE (OUTPATIENT)
Dept: OBSTETRICS AND GYNECOLOGY | Facility: CLINIC | Age: 34
End: 2024-01-02
Payer: COMMERCIAL

## 2024-01-05 ENCOUNTER — CLINICAL SUPPORT (OUTPATIENT)
Dept: OBSTETRICS AND GYNECOLOGY | Facility: CLINIC | Age: 34
End: 2024-01-05
Payer: COMMERCIAL

## 2024-01-05 DIAGNOSIS — N91.2 AMENORRHEA: Primary | ICD-10-CM

## 2024-01-05 PROCEDURE — 99999 PR PBB SHADOW E&M-EST. PATIENT-LVL I: CPT | Mod: PBBFAC,,,

## 2024-01-05 NOTE — PROGRESS NOTES
Spoke with patient for a total of 30 minutes during virtual visit.  Updated chart to reflect up to date patient demographics.  Allergies, medications, pharmacy, medical/family history and OB history updated.  Patient was guided through expectations of care during pregnancy.  Pregnancy confirmation, dating u/s & first routine OB appts scheduled.  Education provided & questions answered. Encouraged to send message or call office with any questions/concerns. Verbalized understanding.     Discussed with pt:    Encouraged to begin taking PNV asap  Has some nausea-no vomiting  Has some mild cramping-no spotting  Pt reports decreased amniotic fluid with last baby

## 2024-01-06 ENCOUNTER — PATIENT MESSAGE (OUTPATIENT)
Dept: OBSTETRICS AND GYNECOLOGY | Facility: CLINIC | Age: 34
End: 2024-01-06
Payer: COMMERCIAL

## 2024-01-17 ENCOUNTER — PATIENT MESSAGE (OUTPATIENT)
Dept: OBSTETRICS AND GYNECOLOGY | Facility: CLINIC | Age: 34
End: 2024-01-17
Payer: COMMERCIAL

## 2024-01-26 ENCOUNTER — OFFICE VISIT (OUTPATIENT)
Dept: OBSTETRICS AND GYNECOLOGY | Facility: CLINIC | Age: 34
End: 2024-01-26
Payer: COMMERCIAL

## 2024-01-26 ENCOUNTER — HOSPITAL ENCOUNTER (OUTPATIENT)
Dept: PERINATAL CARE | Facility: OTHER | Age: 34
Discharge: HOME | End: 2024-01-26
Payer: COMMERCIAL

## 2024-01-26 ENCOUNTER — TELEPHONE (OUTPATIENT)
Dept: PSYCHIATRY | Facility: CLINIC | Age: 34
End: 2024-01-26
Payer: COMMERCIAL

## 2024-01-26 VITALS
WEIGHT: 276.69 LBS | HEIGHT: 63 IN | SYSTOLIC BLOOD PRESSURE: 118 MMHG | BODY MASS INDEX: 49.02 KG/M2 | DIASTOLIC BLOOD PRESSURE: 82 MMHG

## 2024-01-26 DIAGNOSIS — Z32.01 POSITIVE PREGNANCY TEST: ICD-10-CM

## 2024-01-26 DIAGNOSIS — F32.A DEPRESSION, UNSPECIFIED DEPRESSION TYPE: ICD-10-CM

## 2024-01-26 DIAGNOSIS — N91.2 AMENORRHEA: Primary | ICD-10-CM

## 2024-01-26 DIAGNOSIS — N91.2 AMENORRHEA: ICD-10-CM

## 2024-01-26 PROCEDURE — 87491 CHLMYD TRACH DNA AMP PROBE: CPT

## 2024-01-26 PROCEDURE — 99214 OFFICE O/P EST MOD 30 MIN: CPT | Mod: S$GLB,,,

## 2024-01-26 PROCEDURE — 99999 PR PBB SHADOW E&M-EST. PATIENT-LVL IV: CPT | Mod: PBBFAC,,,

## 2024-01-26 PROCEDURE — 87086 URINE CULTURE/COLONY COUNT: CPT

## 2024-01-26 PROCEDURE — 1159F MED LIST DOCD IN RCRD: CPT | Mod: CPTII,S$GLB,,

## 2024-01-26 PROCEDURE — 3074F SYST BP LT 130 MM HG: CPT | Mod: CPTII,S$GLB,,

## 2024-01-26 PROCEDURE — 76801 OB US < 14 WKS SINGLE FETUS: CPT

## 2024-01-26 PROCEDURE — 76801 OB US < 14 WKS SINGLE FETUS: CPT | Mod: 26,,, | Performed by: OBSTETRICS & GYNECOLOGY

## 2024-01-26 PROCEDURE — 1160F RVW MEDS BY RX/DR IN RCRD: CPT | Mod: CPTII,S$GLB,,

## 2024-01-26 PROCEDURE — 3079F DIAST BP 80-89 MM HG: CPT | Mod: CPTII,S$GLB,,

## 2024-01-26 PROCEDURE — 3008F BODY MASS INDEX DOCD: CPT | Mod: CPTII,S$GLB,,

## 2024-01-26 RX ORDER — SERTRALINE HYDROCHLORIDE 25 MG/1
25 TABLET, FILM COATED ORAL DAILY
Qty: 30 TABLET | Refills: 5 | Status: SHIPPED | OUTPATIENT
Start: 2024-01-26 | End: 2024-05-10

## 2024-01-26 NOTE — PATIENT INSTRUCTIONS
1) Eat small frequent meals through the day versus three large meals  2) Try ginger ale or sprite to help settle the stomach   3) Eat crackers or dry toast before getting out of bed in the morning   4) Stay hydrated by drinking plenty of water-do not immediately eat or drink something after vomiting. Give your stomach a rest for 20-30 minutes. Slowly reintroduce ice chips, small sips water, crackers, etc.    5) Try OTC Unisom (use the tablets that have doxylamine not diphenhydramine in them, can use generic brands like Equate) and vitamin B6  (25 mg, can find on [employer]) together at night before bed. This can help with the nausea in the morning and is safe to use during pregnancy. You can also take the vitamin B6 alone, every 8 hours to help with the nausea.     If you are unable to keep anything down and constantly vomiting for more that 24 hours, let the office know so that dehydration can be avoided. We would recommend being seen in the emergency department if this is the case.      Call [phone] to see about coverage and any out of pocket costs regarding the [id number] (MT21) testing. This is done any day after 11 weeks and is blood work only. It checks for any chromosomal abnormalities like Down Syndrome. You can also find out the sex of the baby if you choose to know. Once you find out coverage and decide to proceed, send either myself or your doctor a message and we can see what date you can do it. It is done at our second floor lab at [facility], [place].      The sequential screen is a test done around 12-14 weeks that consists of an ultrasound that measures the nuchal fold (neck thickness) of baby and blood work on the same day. You get a second set of labs done a few weeks later after 15 weeks. Based on all three of these results, they are able to tell you if you are considered to be low risk or high risk regarding neural tube defects and chromosomal abnormalities like Down Syndrome. If you are at all interested,  I usually place the order today so we do not miss the window. Someone will give you a phone call in a week or two to schedule this. If you do not want it, just tell them no thank you. This test is typically covered by your insurance and is performed by the Maternal Fetal Medicine (MFM) department here at [facility], [place]. It will not tell you the sex of the baby.     Call clinic [phone] or L & D after hours at [phone]

## 2024-01-26 NOTE — TELEPHONE ENCOUNTER
----- Message from Mar Howard NP sent at 2024 12:06 PM CST -----  Good afternoon,    Can you please assist patient in scheduling appointment for therapy. I placed the referral for  behavioral health.     Thanks  Mar

## 2024-01-26 NOTE — PROGRESS NOTES
CC: Positive Pregnancy Test    HISTORY OF PRESENT ILLNESS:    Sonam Hidalgo is a 33 y.o. female, ,  Presents today for a routine exam complaining of amenorrhea and positive home urine pregnancy test.  Patient's last menstrual period was 2023 (exact date).  Pt reports menses were normal occuring once monthly prior to this. She is not currently on any contraception. Reports nausea. Reports breast tenderness. Denies pelvic pain. Denies vaginal bleeding. This is her 6th pregnancy. Hx of  x3. SAB x2. Reports pre.e with first pregnancy. Oligo with 3rd pregnancy. Does report having a hard time with depression postpartum from last pregnancy. Would like to stay on top of it with this pregnancy. Currently on taking PNV.     LMP: 23   EGA: 11w4d (per LMP)  EDC: 24 (per LMP)    Indication   ========   Indication: Estimation of Gestational Age     History   ======   Previous Outcomes    6   Para 3   Risk Factors   History risk factors: Obesity BMI >35     Method   ======   Transabdominal ultrasound examination. View: Good view     Pregnancy   =========   Wan pregnancy. Number of fetuses: 1     Dating   ======   LMP on: 2023   GA by LMP 11 w + 4 d   GRIFFIN by LMP: 2024   Ultrasound examination on: 2024   GA by U/S based upon: CRL   GA by U/S 12 w + 0 d   GRIFFIN by U/S: 2024   Assigned: based on ultrasound (CRL), selected on 2024   Assigned GA 12 w + 0 d   Assigned GRIFFIN: 2024     General Evaluation   ==============   Cardiac activity present   Amniotic fluid: normal amount     Fetal Biometry   ============   Standard    bpm   CRL 52.7 mm 12w 0d [place]     Fetal Anatomy   ===========   Cranium: appears normal, midline falx visualized.   The following structures were visualized:   Arms. Legs.     Maternal Structures   ===============   Uterus / Cervix   Uterus: Normal   Cervix: Visualized   Ovaries / Tubes / Adnexa   Rt ovary: Normal   Rt ovary D1 41  "mm   Rt ovary D2 48 mm   Rt ovary D3 30 mm   Rt ovary Vol 31.3 cmÂ³   Rt ovarian corpus luteum: visualized   Rt ovarian corpus luteum D1 34.0 mm   Rt ovarian corpus luteum D2 30.0 mm   Rt ovarian corpus luteum D3 30.0 mm   Lt ovary: Suboptimal   Lt ovary D1 22 mm   Lt ovary D2 18 mm   Lt ovary D3 15 mm   Lt ovary Vol 3.2 cmÂ³     Impression   =========   A ch living IUP is identified. GRIFFIN is assigned based on the CRL from today?s study. If other clinical data, such as IVF conception dating or prior ultrasound   assignment of GRIFFIN differs from the GRIFFIN assigned today, please contact the [facility] department so that this report can be revised to reflect the correct GRIFFIN.   The maternal adnexae are normal in appearance.     ROS:  GENERAL: No weight changes. No swelling. No fatigue. No fever.  CARDIOVASCULAR: No chest pain. No shortness of breath. No leg cramps.   NEUROLOGICAL: No headaches. No vision changes.  BREASTS: No pain. No lumps. No discharge.  ABDOMEN: No pain. No diarrhea. No constipation.  REPRODUCTIVE: No abnormal bleeding.   VULVA: No pain. No lesions. No itching.  VAGINA: No relaxation. No itching. No odor. No discharge. No lesions.  URINARY: No incontinence. No nocturia. No frequency. No dysuria.    MEDICATIONS AND ALLERGIES:  Reviewed    COMPREHENSIVE GYN HISTORY:  PAP History: Denies abnormal Paps.  Infection History: Denies STDs. Denies PID.  Benign History: Denies uterine fibroids. Denies ovarian cysts. Denies endometriosis. Denies other conditions.  Cancer History: Denies cervical cancer. Denies uterine cancer or hyperplasia. Denies ovarian cancer. Denies vulvar cancer or pre-cancer. Denies vaginal cancer or pre-cancer. Denies breast cancer. Denies colon cancer.  Sexual Activity History: Reports currently being sexually active  Menstrual History: None.  Contraception: None    /82   Ht 5' 3" (1.6 m)   Wt 125.5 kg (276 lb 10.8 oz)   LMP 11/06/2023 (Exact Date)   BMI 49.01 kg/m² "     PE:  AFFECT: Calm, alert and oriented X 3. Interactive during exam  GENERAL: Appears well-nourished, well-developed, in no acute distress.  HEAD: Normocephalic, atraumatic  SKIN: Normal for race, warm, & dry. No lesions or rashes.  LUNGS: Easy and unlabored  HEART: Regular rate   EXTREMITIES: No cyanosis, clubbing or edema. No calf tenderness.    PROCEDURES:  UPT Positive  Genprobe  Pap up to date     ASSESSMENT/PLAN:  Amenorrhea  Positive urine pregnancy test (GRIFFIN: 24, EGA: 11w4d  based on LMP)    -  Routine prenatal care    Nausea and vomiting in pregnancy    -  Education regarding lifestyle and dietary modifications    -  Advised use of B6/Unisom. Pt will notify us if no relief/worsening symptoms    1st TRIMESTER COUNSELING: Discussed all, booklet provided:  Common complaints of pregnancy  HIV and other routine prenatal tests including  genetic screening  Risk factors identified by prenatal history  Oriented to practice - discussed anticipated course of prenatal care & indications for Ultrasound  Childbirth classes/Hospital facilities   Nutrition and weight gain counseling  Toxoplasmosis precautions (Cats/Raw Meat)  Sexual activity and exercise  Environmental/Work hazards  Travel  Tobacco (Ask, Advise, Assess, Assist, and Arrange), as well as alcohol and drug use  Use of any medications (Including supplements, Vitamins, Herbs, or OTC Drugs)  Domestic violence  Seat belt use    TERATOLOGY COUNSELING:   Discussed indications and options for aneuploidy screening - pamphlets given    -  Pt declines testing    PLAN:  - Dating ultrasound today   - Urine culture sent  - GC collected  - 1T labs ordered   - Pap up to date    - Discussed taking baby ASA   - Zoloft sent  - Referral to  behavioral health for therapy     FOLLOW-UP in 4 weeks with Dr. Chetan Howard, NP    OB/GYN

## 2024-01-28 LAB
BACTERIA UR CULT: NORMAL
BACTERIA UR CULT: NORMAL

## 2024-01-30 LAB
C TRACH DNA SPEC QL NAA+PROBE: NOT DETECTED
N GONORRHOEA DNA SPEC QL NAA+PROBE: NOT DETECTED

## 2024-02-28 ENCOUNTER — PATIENT MESSAGE (OUTPATIENT)
Dept: ADMINISTRATIVE | Facility: OTHER | Age: 34
End: 2024-02-28
Payer: COMMERCIAL

## 2024-02-28 ENCOUNTER — INITIAL PRENATAL (OUTPATIENT)
Dept: OBSTETRICS AND GYNECOLOGY | Facility: CLINIC | Age: 34
End: 2024-02-28
Payer: COMMERCIAL

## 2024-02-28 VITALS — DIASTOLIC BLOOD PRESSURE: 80 MMHG | BODY MASS INDEX: 49.6 KG/M2 | WEIGHT: 280 LBS | SYSTOLIC BLOOD PRESSURE: 126 MMHG

## 2024-02-28 DIAGNOSIS — Z3A.16 16 WEEKS GESTATION OF PREGNANCY: Primary | ICD-10-CM

## 2024-02-28 PROCEDURE — 0502F SUBSEQUENT PRENATAL CARE: CPT | Mod: CPTII,S$GLB,, | Performed by: OBSTETRICS & GYNECOLOGY

## 2024-02-28 PROCEDURE — 99999 PR PBB SHADOW E&M-EST. PATIENT-LVL III: CPT | Mod: PBBFAC,,, | Performed by: OBSTETRICS & GYNECOLOGY

## 2024-02-28 RX ORDER — ASPIRIN 81 MG/1
81 TABLET ORAL DAILY
Refills: 0 | COMMUNITY
Start: 2024-02-28 | End: 2024-12-04

## 2024-02-28 NOTE — PROGRESS NOTES
Pregnancy dating, labs, ultrasound reports, prenatal testing, and problem list; prior records and results; and available outside records were reviewed and updated in EMR.  Pertinent findings were noted below.    Reason for Visit   Initial Prenatal Visit    HPI   33 y.o., at 16w5d by Estimated Date of Delivery: 24    She declines genetic testing  She has some RUQ pain.  S/P MARIA.  Pain can come and go.    Contractions: No   Bleeding: No   Loss of fluid: No   Fetal movement: No   Nausea: No   Vomiting: No   Headache: No     Exam   /80   Wt 127 kg (279 lb 15.8 oz)   LMP 2023 (Exact Date)   BMI 49.60 kg/m²     GENERAL: No acute distress  ABD: Gravid    Assessment and Plan   16 weeks gestation of pregnancy  -     US MFM Procedure (Viewpoint)-Future; Future  -     aspirin (ECOTRIN) 81 MG EC tablet; Take 1 tablet (81 mg total) by mouth once daily.; Refill: 0  -     Connected MOM Enrollment  -     Assign Connected MOM Program Consent Questionnaire      IF the RUQ pain worsens we can get US  HTN- no meds  Depression on zoloft and has follow up with provider  MFM scan 19 wks     labor and Pain and bleeding precautions given  Follow-up: 4 weeks

## 2024-03-01 ENCOUNTER — PATIENT MESSAGE (OUTPATIENT)
Dept: PSYCHIATRY | Facility: CLINIC | Age: 34
End: 2024-03-01

## 2024-03-01 ENCOUNTER — OFFICE VISIT (OUTPATIENT)
Dept: PSYCHIATRY | Facility: CLINIC | Age: 34
End: 2024-03-01
Payer: COMMERCIAL

## 2024-03-01 ENCOUNTER — PATIENT MESSAGE (OUTPATIENT)
Dept: OTHER | Facility: OTHER | Age: 34
End: 2024-03-01
Payer: COMMERCIAL

## 2024-03-01 DIAGNOSIS — Z87.59 HISTORY OF POSTPARTUM DEPRESSION: ICD-10-CM

## 2024-03-01 DIAGNOSIS — Z86.59 HISTORY OF POSTPARTUM DEPRESSION: ICD-10-CM

## 2024-03-01 DIAGNOSIS — F32.A DEPRESSION, UNSPECIFIED DEPRESSION TYPE: Primary | ICD-10-CM

## 2024-03-01 PROCEDURE — 90791 PSYCH DIAGNOSTIC EVALUATION: CPT | Mod: 95,,, | Performed by: SOCIAL WORKER

## 2024-03-01 PROCEDURE — 3044F HG A1C LEVEL LT 7.0%: CPT | Mod: CPTII,95,, | Performed by: SOCIAL WORKER

## 2024-03-01 PROCEDURE — 90785 PSYTX COMPLEX INTERACTIVE: CPT | Mod: 95,,, | Performed by: SOCIAL WORKER

## 2024-03-01 NOTE — PROGRESS NOTES
The patient location is: [place]  The chief complaint leading to consultation is: depression, anxiety    Visit type: audiovisual    Face to Face time with patient: 45 mins  60 minutes of total time spent on the encounter, which includes face to face time and non-face to face time preparing to see the patient (eg, review of tests), Obtaining and/or reviewing separately obtained history, Documenting clinical information in the electronic or other health record, Independently interpreting results (not separately reported) and communicating results to the patient/family/caregiver, or Care coordination (not separately reported).     Each patient to whom he or she provides medical services by telemedicine is:  (1) informed of the relationship between the physician and patient and the respective role of any other health care provider with respect to management of the patient; and (2) notified that he or she may decline to receive medical services by telemedicine and may withdraw from such care at any time.    Notes:   [facility] Psychiatry Initial Visit (PhD/LCSW)  Diagnostic Interview - CPT 67184    Patient Name: Sonam Hidalgo  Date: 03/01/2024  Site: [facility]   Referral Source: OB/GYNChetan    Chief complaint/reason for encounter: Psychological Evaluation to assess suitability for admission to the [facility]  Clinical Status of Patient: Outpatient  Met With: Patient  CPT Code: 81619    Before this evaluation was initiated, the purpose and process of the assessment and the limits of confidentiality were discussed with the patient who expressed understanding of these issues and verbally consented to proceed with evaluation.     History of present illness: Sonam Hidalgo is a 33 y.o. female who is pursuing psychotherapy to improve postpartum depression and anxiety. State that after last pregnancy her mindset was not great and wasn't allowed to be alone with her children. Last  "postpartum period started July 2022. States that she was depressed and having a lot of sad. States that this was her third child and with previous two didn't suffer with any postpartum depression or anxiety. States that she was able to breastfeed him and thought it was strange that she didn't feel that zurita with him. Felt this was for a few months. Reports that during that time spoke with OB and started her on Zoloft, magnesium, and possible hormone pill (not sure which). That was the catalyst that helped mood. Feels like over the last year and half depression/anxiety has been better. Reports that she found out that she was pregnant around [place]. Was not trying to get pregnant. States that she thought about exploring options but due to location that was not possible. Reports that mood the last few months has been irritable and short. Endorses feels anxious about how the pregnancy has started. States that she worries because she didn't want pregnancy that child is going to feel unloved and unwanted. States that she does have other stressors but they don't put a lot of pressure on her mental health at this moment. Has had problems with depression and anxiety in the past.     Reports that sleep is "crap". Takes a long time to fall asleep. Waking up in the middle of the night to use the restroom and then is unable to return to sleep. Only able to return to sleep for an hour before she has to get up. Take medication before bed hoping that it will help with getting her to sleep. Not taking any OTC for sleep. No napping during the day. Reports that appetite varies. States that she has a hard time with deciding what foods could possibly make her sick.     Psychiatric Symptoms:   Mood: depressed mood, diminished interest, fatigue, and worthlessness/guilt  Anxiety: decreased memory, excessive anxiety/worry, restlessness/keyed up, and irritability  Substance abuse: denied  Cognitive functioning: denied  Health behaviors: " "noncontributory    Psychiatric history: Has seen a therapist a couple years ago. In  saw someone after father passed away. At that time was having a hard time going to sleep and having nightmares. Possibly saw a psychiatrist in high school but is not sure. Never hospitalized for psychiatric reasons.     Medical history: gestational hypertension, family history of diabetes and believes it is just a matter of time that she is diagnosed.     Pain: noncontributory    Report of Coping Skills: Reports that she will put on a "comfort" show when she is feeling down. States that she also has a couple of coloring books.  Reports that healthy coping skills happen every so often. Watching TV happens almost daily. States another unhealthy coping skill is that she will snack or mindlessly eat. Obstacles for coping skills is motivation and time management.     Support System: reports that support system is mother, they live together. Relationship with FOB is cordial. The FOB is the same for all her children. States that he lives in [place] with his boyfriend. States that they were suppose to get  but it never happened. Not sure the terms of their relationship so doesn't know what FOB has told or not told him.     Brief Social history (marriage, employment, etc.): Born and raised in [facility] [place] [facility]. Raised by both parents. Father is  (). One older brother. Graduated high school. Bachelors degree. 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 Never . Not currently in a relationship. 3 children (8,4, 1.5) and currently pregnant with fourth. Lives with mother and three children. Mother does not work, she watches the baby and other two go to school. Never arrested. No .     Risk Factors:   Alcohol:  none currently, in the past would have a drink or two a couple times a week   Drugs: none   Tobacco: previous vapping but stopped once she got pregnant    Caffeine: daily, 1 coffee and occasional a coke   Access to Guns: " no   Trauma: watched father pass away from dementia    Current medications and drug reactions (include OTC, herbal): see medication list. Currently is only taking Zoloft.     Strengths and liabilities: Strength: Patient accepts guidance/feedback, Strength: Patient is expressive/articulate., Strength: Patient is intelligent., Strength: Patient is motivated for change., Liability: Patient lacks coping skills.    Current Evaluation:     Mental Status Exam:  General Appearance:  unremarkable, age appropriate   Speech: normal tone, normal rate, normal pitch, normal volume      Level of Cooperation: cooperative      Thought Processes: normal and logical   Mood: steady      Thought Content: normal, no suicidality, no homicidality, delusions, or paranoia   Affect: congruent and appropriate   Orientation: Oriented x3   Memory: recent >  intact, remote >  intact   Attention Span & Concentration: intact   Fund of General Knowledge: intact and appropriate to age and level of education   Abstract Reasoning: Did not assess   Judgment & Insight: fair     Language  intact     Diagnostic Impression - Plan:     Encounter Diagnoses   Name Primary?    Depression, unspecified depression type Yes    History of postpartum depression      Plan: Sonam Hidalgo will [facility] the [facility]. Patient understood the [facility] guidelines and signed the [facility] Informed Consent and [facility] Partnership Agreement. Patient was provided with information about [facility] treatments and will proceed with scheduled sessions.     Cassandra Rockweiler, LCSW

## 2024-03-22 ENCOUNTER — PATIENT MESSAGE (OUTPATIENT)
Dept: OTHER | Facility: OTHER | Age: 34
End: 2024-03-22
Payer: COMMERCIAL

## 2024-03-25 ENCOUNTER — PROCEDURE VISIT (OUTPATIENT)
Dept: MATERNAL FETAL MEDICINE | Facility: CLINIC | Age: 34
End: 2024-03-25
Payer: COMMERCIAL

## 2024-03-25 DIAGNOSIS — Z3A.16 16 WEEKS GESTATION OF PREGNANCY: ICD-10-CM

## 2024-03-25 PROCEDURE — 76811 OB US DETAILED SNGL FETUS: CPT | Mod: S$GLB,,, | Performed by: OBSTETRICS & GYNECOLOGY

## 2024-04-08 ENCOUNTER — PATIENT MESSAGE (OUTPATIENT)
Dept: PSYCHIATRY | Facility: CLINIC | Age: 34
End: 2024-04-08
Payer: COMMERCIAL

## 2024-04-08 ENCOUNTER — PATIENT MESSAGE (OUTPATIENT)
Dept: PSYCHIATRY | Facility: CLINIC | Age: 34
End: 2024-04-08

## 2024-04-08 ENCOUNTER — OFFICE VISIT (OUTPATIENT)
Dept: PSYCHIATRY | Facility: CLINIC | Age: 34
End: 2024-04-08
Payer: COMMERCIAL

## 2024-04-08 DIAGNOSIS — F32.A DEPRESSION, UNSPECIFIED DEPRESSION TYPE: Primary | ICD-10-CM

## 2024-04-08 DIAGNOSIS — Z86.59 HISTORY OF POSTPARTUM DEPRESSION: ICD-10-CM

## 2024-04-08 DIAGNOSIS — Z87.59 HISTORY OF POSTPARTUM DEPRESSION: ICD-10-CM

## 2024-04-08 PROCEDURE — 90837 PSYTX W PT 60 MINUTES: CPT | Mod: 95,,, | Performed by: SOCIAL WORKER

## 2024-04-08 PROCEDURE — 90785 PSYTX COMPLEX INTERACTIVE: CPT | Mod: 95,,, | Performed by: SOCIAL WORKER

## 2024-04-08 PROCEDURE — 3044F HG A1C LEVEL LT 7.0%: CPT | Mod: CPTII,95,, | Performed by: SOCIAL WORKER

## 2024-04-08 NOTE — PROGRESS NOTES
The patient location is: [place]  The chief complaint leading to consultation is: depression, anxiety    Visit type: audiovisual    Face to Face time with patient: 55 mins  65 minutes of total time spent on the encounter, which includes face to face time and non-face to face time preparing to see the patient (eg, review of tests), Obtaining and/or reviewing separately obtained history, Documenting clinical information in the electronic or other health record, Independently interpreting results (not separately reported) and communicating results to the patient/family/caregiver, or Care coordination (not separately reported).     Each patient to whom he or she provides medical services by telemedicine is:  (1) informed of the relationship between the physician and patient and the respective role of any other health care provider with respect to management of the patient; and (2) notified that he or she may decline to receive medical services by telemedicine and may withdraw from such care at any time.    Notes:     Individual Psychotherapy (PhD/LCSW)    2024     Site:  Telemed         Therapeutic Intervention: Met with patient.  Outpatient - Insight oriented psychotherapy 60 min - CPT code 90881 and Outpatient - Supportive psychotherapy 60 min - CPT Code 59225    Chief complaint/reason for encounter: depression and anxiety     Interval history and content of current session: Patient returns for follow up psychotherapy. Patient is present for STe[facility] session #2.     EPDS/PHQ9:    2024   [place]  Depression Scale    I have been able to laugh and see the funny side of things. 1    I have looked forward with enjoyment to things. 1    I have blamed myself unnecessarily when things went wrong. 3    I have been anxious or worried for no good reason. 3    I have felt scared or panicky for no good reason. 3    Things have been getting on top of me. 1    I have been so unhappy that I have had difficulty  "sleeping. 0    I have felt sad or miserable. 1    I have been so unhappy that I have been crying. 1    The thought of harming myself has occurred to me. 1 (15)     Reports mood as "short fused and edgy"    Update: states that things have been busy. Has been thinking about school situations for next year as well as summer camp. States that she feel like this have gotten overwhelming. Has been having issues at school and that has started to make her wonder if she needs to change schools. Worries about getting children tested into advance school but then worries about them adjusting to new schools.     Goals:   Values: discussed values and how to create her own. Discussed that most of the time we don't think about what we want from our life. This leads us to making decisions that are not based in value. Discussed how knowing our values will allow us to make decisions that are meaningful and helpful in our life.     Action Plan:  Values: work on value worksheet sent via [employer]    Treatment plan:  Target symptoms: depression, anxiety   Why chosen therapy is appropriate versus another modality: relevant to diagnosis, evidence based practice  Outcome monitoring methods: self-report, observation  Therapeutic intervention type: insight oriented psychotherapy, supportive psychotherapy    Risk parameters:  Patient reports no suicidal ideation  Patient reports no homicidal ideation  Patient reports no self-injurious behavior  Patient reports no violent behavior    Verbal deficits: None    Patient's response to intervention:  The patient's response to intervention is accepting.    Progress toward goals and other mental status changes:  The patient's progress toward goals is good.    Diagnosis:   Encounter Diagnoses   Name Primary?    Depression, unspecified depression type Yes    History of postpartum depression        Plan:  individual psychotherapy    Return to clinic: 1 week    Length of Service (minutes): 60    "

## 2024-04-19 ENCOUNTER — PATIENT MESSAGE (OUTPATIENT)
Dept: OTHER | Facility: OTHER | Age: 34
End: 2024-04-19
Payer: COMMERCIAL

## 2024-04-22 ENCOUNTER — OFFICE VISIT (OUTPATIENT)
Dept: PSYCHIATRY | Facility: CLINIC | Age: 34
End: 2024-04-22
Payer: COMMERCIAL

## 2024-04-22 DIAGNOSIS — Z86.59 HISTORY OF POSTPARTUM DEPRESSION: ICD-10-CM

## 2024-04-22 DIAGNOSIS — Z87.59 HISTORY OF POSTPARTUM DEPRESSION: ICD-10-CM

## 2024-04-22 DIAGNOSIS — F32.A DEPRESSION, UNSPECIFIED DEPRESSION TYPE: Primary | ICD-10-CM

## 2024-04-22 PROCEDURE — 3044F HG A1C LEVEL LT 7.0%: CPT | Mod: CPTII,95,, | Performed by: SOCIAL WORKER

## 2024-04-22 PROCEDURE — 90785 PSYTX COMPLEX INTERACTIVE: CPT | Mod: 95,,, | Performed by: SOCIAL WORKER

## 2024-04-22 PROCEDURE — 90837 PSYTX W PT 60 MINUTES: CPT | Mod: 95,,, | Performed by: SOCIAL WORKER

## 2024-04-22 NOTE — Clinical Note
Dr. Benton and Mar,  Please see my addendum from our  Behavioral Health meeting too.  Let me know your thoughts - looks like she was on 50mg daily for years per chart.  Thanks! Stefany Mukherjee

## 2024-04-22 NOTE — PROGRESS NOTES
The patient location is: [place]  The chief complaint leading to consultation is: depression, anxiety    Visit type: audiovisual    Face to Face time with patient: 55 mins  65 minutes of total time spent on the encounter, which includes face to face time and non-face to face time preparing to see the patient (eg, review of tests), Obtaining and/or reviewing separately obtained history, Documenting clinical information in the electronic or other health record, Independently interpreting results (not separately reported) and communicating results to the patient/family/caregiver, or Care coordination (not separately reported).     Each patient to whom he or she provides medical services by telemedicine is:  (1) informed of the relationship between the physician and patient and the respective role of any other health care provider with respect to management of the patient; and (2) notified that he or she may decline to receive medical services by telemedicine and may withdraw from such care at any time.    Notes:     Individual Psychotherapy (PhD/LCSW)    2024     Site:  Telemed         Therapeutic Intervention: Met with patient.  Outpatient - Insight oriented psychotherapy 60 min - CPT code 37066 and Outpatient - Supportive psychotherapy 60 min - CPT Code 64528    Chief complaint/reason for encounter: depression and anxiety     Interval history and content of current session: Patient returns for follow up psychotherapy. Patient is present for STe[facility] session #3.     EPDS/PHQ9:    2024   [place]  Depression Scale    I have been able to laugh and see the funny side of things. 2    I have looked forward with enjoyment to things. 2    I have blamed myself unnecessarily when things went wrong. 2    I have been anxious or worried for no good reason. 2    I have felt scared or panicky for no good reason. 2    Things have been getting on top of me. 2    I have been so unhappy that I have had difficulty  "sleeping. 1    I have felt sad or miserable. 1    I have been so unhappy that I have been crying. 1    The thought of harming myself has occurred to me. 1 (16)       Reports mood as "overwhelmed"    Update: states that things have been coming up and struggling to prioritize. States that an apartment that is connected to the house is infested in fleas. Attempted to get rid of them but it didn't work and that was overwhelming. States that they are having a party for son's first confirmation but is overwhelmed by the state of her home. States that her home is clean but is "lived" in. Knows that people are coming to celebrate son but worries about being judged. Feels like she is letting everything pile up and become overwhelming. Discussed possibility of increasing medication to help with depressive symptoms as previously discussed with Dr. Mukherjee. Patient is interested in increasing medication. Will review with Dr. Mukherjee and collaborate with Dr. Benton.     Pregnancy Status: 24 weeks  Medication Desire: currently on medication, interested in increase    Goals:   Values: discussed value worksheet that was sent. [place] like she put "a goal" rather than a value. Identified parenting, family, friendship, education, and spirituality as top 5 values. Discussed in parenting her value of being more present and being more calm. Discussed different ways to identify values to put in different domains. Discussed her successfulness in the domains of values.   Cognitive distortions: discussed cognitive distortions and how negative thoughts are created. Discussed how cognitive distortions pop up in our lives and can create issues. Discussed common distortions that people with depression/anxiety can experience.     Action Plan:  Values: continue to identify values for herself.   Cognitive Distortions: look at worksheet and identify cognitive distortions that she sees in her thought process. Worksheet sent via [employer] " Portal.    Treatment plan:  Target symptoms: depression, anxiety   Why chosen therapy is appropriate versus another modality: relevant to diagnosis, evidence based practice  Outcome monitoring methods: self-report, observation  Therapeutic intervention type: insight oriented psychotherapy, supportive psychotherapy    Risk parameters:  Patient reports no suicidal ideation  Patient reports no homicidal ideation  Patient reports no self-injurious behavior  Patient reports no violent behavior    Verbal deficits: None    Patient's response to intervention:  The patient's response to intervention is accepting.    Progress toward goals and other mental status changes:  The patient's progress toward goals is good.    Diagnosis:   Encounter Diagnoses   Name Primary?    Depression, unspecified depression type Yes    History of postpartum depression      Plan:  individual psychotherapy    Return to clinic: 1 week    Length of Service (minutes): 60

## 2024-04-23 ENCOUNTER — PATIENT MESSAGE (OUTPATIENT)
Dept: PSYCHIATRY | Facility: CLINIC | Age: 34
End: 2024-04-23
Payer: COMMERCIAL

## 2024-04-23 NOTE — PROGRESS NOTES
Patient discussed during [facility] meeting today, 04/23/2024.  Patient with poorly controlled depression and anxiety.  She is currently taking Zoloft 25mg daily.  I would recommend increasing dose to 50mg daily, which she was on in the past.    Time: 10 minutes    The above treatment considerations and suggestions are based on consultations with the patients Behavioral Health Integration therapist and a review of information available in the patient's chart.  I have not personally examined the patient.  All recommendations should be implemented with consideration of the patients relevant prior history and current clinical status.  It remains the responsibility of the patient's Primary Care Physician to prescribe medications and to educate the patient on risks versus benefits, alternative treatments, side effect profile and the inherent unpredictability of individual responses to medications.  Please feel free to call me with any questions about the care of this patient.

## 2024-04-29 ENCOUNTER — OFFICE VISIT (OUTPATIENT)
Dept: PSYCHIATRY | Facility: CLINIC | Age: 34
End: 2024-04-29
Payer: COMMERCIAL

## 2024-04-29 DIAGNOSIS — Z86.59 HISTORY OF POSTPARTUM DEPRESSION: ICD-10-CM

## 2024-04-29 DIAGNOSIS — Z87.59 HISTORY OF POSTPARTUM DEPRESSION: ICD-10-CM

## 2024-04-29 DIAGNOSIS — F32.A DEPRESSION, UNSPECIFIED DEPRESSION TYPE: Primary | ICD-10-CM

## 2024-04-29 PROCEDURE — 90837 PSYTX W PT 60 MINUTES: CPT | Mod: 95,,, | Performed by: SOCIAL WORKER

## 2024-04-29 PROCEDURE — 90785 PSYTX COMPLEX INTERACTIVE: CPT | Mod: 95,,, | Performed by: SOCIAL WORKER

## 2024-04-29 PROCEDURE — 3044F HG A1C LEVEL LT 7.0%: CPT | Mod: CPTII,95,, | Performed by: SOCIAL WORKER

## 2024-04-29 NOTE — PROGRESS NOTES
The patient location is: [place]  The chief complaint leading to consultation is: depression, anxiety    Visit type: audiovisual    Face to Face time with patient: 55 mins  65 minutes of total time spent on the encounter, which includes face to face time and non-face to face time preparing to see the patient (eg, review of tests), Obtaining and/or reviewing separately obtained history, Documenting clinical information in the electronic or other health record, Independently interpreting results (not separately reported) and communicating results to the patient/family/caregiver, or Care coordination (not separately reported).     Each patient to whom he or she provides medical services by telemedicine is:  (1) informed of the relationship between the physician and patient and the respective role of any other health care provider with respect to management of the patient; and (2) notified that he or she may decline to receive medical services by telemedicine and may withdraw from such care at any time.    Notes:     Individual Psychotherapy (PhD/LCSW)    2024     Site:  Telemed         Therapeutic Intervention: Met with patient.  Outpatient - Insight oriented psychotherapy 60 min - CPT code 90397 and Outpatient - Supportive psychotherapy 60 min - CPT Code 37052    Chief complaint/reason for encounter: depression and anxiety     Interval history and content of current session: Patient returns for follow up psychotherapy. Patient is present for STeP session #4.     EPDS/PHQ9:    2024   [place]  Depression Scale    I have been able to laugh and see the funny side of things. 1    I have looked forward with enjoyment to things. 1    I have blamed myself unnecessarily when things went wrong. 2    I have been anxious or worried for no good reason. 1    I have felt scared or panicky for no good reason. 1    Things have been getting on top of me. 2    I have been so unhappy that I have had difficulty  "sleeping. 2    I have felt sad or miserable. 1    I have been so unhappy that I have been crying. 1    The thought of harming myself has occurred to me. 0 (12)     Reports mood as "stressed"    Update: states that she has party over the weekend for son's first communion. Also went to [facility] to celebrates best friend's birthday.     Pregnancy Status: 25 weeks  Medication Desire: currently on medication, interested in increase    Goals:   Values: discussed in previous session.  Cognitive distortions: wrote in thought log about party. States that she was feelings very overwhelmed by getting house ready for communion. A lot of her thoughts were around being judged by people coming to party. Feels like catastrophising is main cognitive distortion.   Challenging Cognitive Distortions: discussed that once you know your cognitive distortions you are able to make changes to thought process. Discussed how thoughts are habitual and that it will take work to change them.     Action Plan:  Values: continue to identify values for herself.   Cognitive Distortions: continue to identify cognitive distortions  Challenge cognitive distortions: review challenging cognitive distortion worksheet and use thought log to track thoughts.     Treatment plan:  Target symptoms: depression, anxiety   Why chosen therapy is appropriate versus another modality: relevant to diagnosis, evidence based practice  Outcome monitoring methods: self-report, observation  Therapeutic intervention type: insight oriented psychotherapy, supportive psychotherapy    Risk parameters:  Patient reports no suicidal ideation  Patient reports no homicidal ideation  Patient reports no self-injurious behavior  Patient reports no violent behavior    Verbal deficits: None    Patient's response to intervention:  The patient's response to intervention is accepting.    Progress toward goals and other mental status changes:  The patient's progress toward goals is " good.    Diagnosis:   Encounter Diagnoses   Name Primary?    Depression, unspecified depression type Yes    History of postpartum depression        Plan:  individual psychotherapy    Return to clinic: 1 week    Length of Service (minutes): 60

## 2024-04-30 ENCOUNTER — PATIENT MESSAGE (OUTPATIENT)
Dept: PSYCHIATRY | Facility: CLINIC | Age: 34
End: 2024-04-30
Payer: COMMERCIAL

## 2024-04-30 DIAGNOSIS — Z36.2 ENCOUNTER FOR FOLLOW-UP ULTRASOUND OF FETAL ANATOMY: Primary | ICD-10-CM

## 2024-05-02 ENCOUNTER — PROCEDURE VISIT (OUTPATIENT)
Dept: MATERNAL FETAL MEDICINE | Facility: CLINIC | Age: 34
End: 2024-05-02
Payer: COMMERCIAL

## 2024-05-02 DIAGNOSIS — Z36.2 ENCOUNTER FOR FOLLOW-UP ULTRASOUND OF FETAL ANATOMY: ICD-10-CM

## 2024-05-02 DIAGNOSIS — Z36.2 ENCOUNTER FOR FOLLOW-UP ULTRASOUND OF FETAL ANATOMY: Primary | ICD-10-CM

## 2024-05-02 PROCEDURE — 76816 OB US FOLLOW-UP PER FETUS: CPT | Mod: S$GLB,,, | Performed by: OBSTETRICS & GYNECOLOGY

## 2024-05-03 ENCOUNTER — ROUTINE PRENATAL (OUTPATIENT)
Dept: OBSTETRICS AND GYNECOLOGY | Facility: CLINIC | Age: 34
End: 2024-05-03
Payer: COMMERCIAL

## 2024-05-03 ENCOUNTER — PATIENT MESSAGE (OUTPATIENT)
Dept: OTHER | Facility: OTHER | Age: 34
End: 2024-05-03
Payer: COMMERCIAL

## 2024-05-03 VITALS — WEIGHT: 276 LBS | SYSTOLIC BLOOD PRESSURE: 122 MMHG | BODY MASS INDEX: 48.89 KG/M2 | DIASTOLIC BLOOD PRESSURE: 60 MMHG

## 2024-05-03 DIAGNOSIS — O26.86 PUPP (PRURITIC URTICARIAL PAPULES AND PLAQUES OF PREGNANCY): ICD-10-CM

## 2024-05-03 DIAGNOSIS — Z34.80 SUPERVISION OF OTHER NORMAL PREGNANCY, ANTEPARTUM: Primary | ICD-10-CM

## 2024-05-03 PROCEDURE — 99999 PR PBB SHADOW E&M-EST. PATIENT-LVL II: CPT | Mod: PBBFAC,,, | Performed by: NURSE PRACTITIONER

## 2024-05-03 PROCEDURE — 0502F SUBSEQUENT PRENATAL CARE: CPT | Mod: CPTII,S$GLB,, | Performed by: NURSE PRACTITIONER

## 2024-05-03 RX ORDER — BETAMETHASONE DIPROPIONATE 0.5 MG/ML
LOTION, AUGMENTED TOPICAL 2 TIMES DAILY
Qty: 60 ML | Refills: 1 | Status: SHIPPED | OUTPATIENT
Start: 2024-05-03 | End: 2024-05-10

## 2024-05-03 NOTE — PROGRESS NOTES
Here for routine OB appt at 26w0d, with complaints of itching and rash to her belly and chest.  Discussed  PUPPS- Steroid lotion (Betamethasone dipropionate) sent, can try antihistamines (such as Benadryl or Zyrtec) for comfort.   Reports + FM. Denies VB and cramping.  Pain, bleeding, and PTL precautions given.  OB glucose and CBC with next appt.   Peds- Dr. Salcedo. Plans to breastfeed- has pump.   F/U scheduled 2 weeks

## 2024-05-06 ENCOUNTER — OFFICE VISIT (OUTPATIENT)
Dept: PSYCHIATRY | Facility: CLINIC | Age: 34
End: 2024-05-06
Payer: COMMERCIAL

## 2024-05-06 DIAGNOSIS — Z87.59 HISTORY OF POSTPARTUM DEPRESSION: ICD-10-CM

## 2024-05-06 DIAGNOSIS — F32.A DEPRESSION, UNSPECIFIED DEPRESSION TYPE: Primary | ICD-10-CM

## 2024-05-06 DIAGNOSIS — Z86.59 HISTORY OF POSTPARTUM DEPRESSION: ICD-10-CM

## 2024-05-06 PROCEDURE — 90837 PSYTX W PT 60 MINUTES: CPT | Mod: 95,,, | Performed by: SOCIAL WORKER

## 2024-05-06 PROCEDURE — 3044F HG A1C LEVEL LT 7.0%: CPT | Mod: CPTII,95,, | Performed by: SOCIAL WORKER

## 2024-05-06 NOTE — PROGRESS NOTES
The patient location is: [place]  The chief complaint leading to consultation is: depression, anxiety    Visit type: audiovisual    Face to Face time with patient: 55 mins  65 minutes of total time spent on the encounter, which includes face to face time and non-face to face time preparing to see the patient (eg, review of tests), Obtaining and/or reviewing separately obtained history, Documenting clinical information in the electronic or other health record, Independently interpreting results (not separately reported) and communicating results to the patient/family/caregiver, or Care coordination (not separately reported).     Each patient to whom he or she provides medical services by telemedicine is:  (1) informed of the relationship between the physician and patient and the respective role of any other health care provider with respect to management of the patient; and (2) notified that he or she may decline to receive medical services by telemedicine and may withdraw from such care at any time.    Notes:     Individual Psychotherapy (PhD/LCSW)    2024     Site:  Telemed         Therapeutic Intervention: Met with patient.  Outpatient - Insight oriented psychotherapy 60 min - CPT code 03294 and Outpatient - Supportive psychotherapy 60 min - CPT Code 58253    Chief complaint/reason for encounter: depression and anxiety     Interval history and content of current session: Patient returns for follow up psychotherapy. Patient is present for STeP session #4.     EPDS/PHQ9:    2024   [place]  Depression Scale    I have been able to laugh and see the funny side of things. 1    I have looked forward with enjoyment to things. 2    I have blamed myself unnecessarily when things went wrong. 2    I have been anxious or worried for no good reason. 2    I have felt scared or panicky for no good reason. 2    Things have been getting on top of me. 2    I have been so unhappy that I have had difficulty  "sleeping. 1    I have felt sad or miserable. 1    I have been so unhappy that I have been crying. 0    The thought of harming myself has occurred to me. 0 (13)       Reports mood as "mild stress"    Update: states that she has been more in the office than normal which has been stressful. Discussed situation with FOB's mother. She still doesn't know that patient is pregnant. Left it up to FOB but he hasn't done it yet.     Pregnancy Status: 26 weeks  Medication Desire: currently on medication, interested in increase    Goals:   Values: discussed in previous sessions.  Cognitive distortions: discussed at previous sessions.  Challenging Cognitive Distortions: discussed her use of journal to track thoughts. Discussed using situation with mother in law with thought process. Discussed the importance of reframing and how it will take time to put this into practice.     Action Plan:  Values: continue to identify values for herself.   Cognitive Distortions: continue to identify cognitive distortions  Challenge cognitive distortions: continue to use journal to track thoughts and reframing.     Treatment plan:  Target symptoms: depression, anxiety   Why chosen therapy is appropriate versus another modality: relevant to diagnosis, evidence based practice  Outcome monitoring methods: self-report, observation  Therapeutic intervention type: insight oriented psychotherapy, supportive psychotherapy    Risk parameters:  Patient reports no suicidal ideation  Patient reports no homicidal ideation  Patient reports no self-injurious behavior  Patient reports no violent behavior    Verbal deficits: None    Patient's response to intervention:  The patient's response to intervention is accepting.    Progress toward goals and other mental status changes:  The patient's progress toward goals is good.    Diagnosis:   Encounter Diagnoses   Name Primary?    Depression, unspecified depression type Yes    History of postpartum depression  "       Plan:  individual psychotherapy    Return to clinic: 1 week    Length of Service (minutes): 60

## 2024-05-10 DIAGNOSIS — F32.A DEPRESSION, UNSPECIFIED DEPRESSION TYPE: ICD-10-CM

## 2024-05-10 RX ORDER — SERTRALINE HYDROCHLORIDE 50 MG/1
50 TABLET, FILM COATED ORAL DAILY
Qty: 30 TABLET | Refills: 2 | Status: SHIPPED | OUTPATIENT
Start: 2024-05-10 | End: 2024-08-08

## 2024-05-13 ENCOUNTER — OFFICE VISIT (OUTPATIENT)
Dept: PSYCHIATRY | Facility: CLINIC | Age: 34
End: 2024-05-13
Payer: COMMERCIAL

## 2024-05-13 DIAGNOSIS — F32.A DEPRESSION, UNSPECIFIED DEPRESSION TYPE: Primary | ICD-10-CM

## 2024-05-13 DIAGNOSIS — Z86.59 HISTORY OF POSTPARTUM DEPRESSION: ICD-10-CM

## 2024-05-13 DIAGNOSIS — Z87.59 HISTORY OF POSTPARTUM DEPRESSION: ICD-10-CM

## 2024-05-13 PROCEDURE — 90785 PSYTX COMPLEX INTERACTIVE: CPT | Mod: 95,,, | Performed by: SOCIAL WORKER

## 2024-05-13 PROCEDURE — 90837 PSYTX W PT 60 MINUTES: CPT | Mod: 95,,, | Performed by: SOCIAL WORKER

## 2024-05-13 PROCEDURE — 3044F HG A1C LEVEL LT 7.0%: CPT | Mod: CPTII,95,, | Performed by: SOCIAL WORKER

## 2024-05-13 NOTE — PROGRESS NOTES
The patient location is: [place]  The chief complaint leading to consultation is: depression, anxiety    Visit type: audiovisual    Face to Face time with patient: 55 mins  65 minutes of total time spent on the encounter, which includes face to face time and non-face to face time preparing to see the patient (eg, review of tests), Obtaining and/or reviewing separately obtained history, Documenting clinical information in the electronic or other health record, Independently interpreting results (not separately reported) and communicating results to the patient/family/caregiver, or Care coordination (not separately reported).     Each patient to whom he or she provides medical services by telemedicine is:  (1) informed of the relationship between the physician and patient and the respective role of any other health care provider with respect to management of the patient; and (2) notified that he or she may decline to receive medical services by telemedicine and may withdraw from such care at any time.    Notes:     Individual Psychotherapy (PhD/LCSW)    2024     Site:  Telemed         Therapeutic Intervention: Met with patient.  Outpatient - Insight oriented psychotherapy 60 min - CPT code 42323 and Outpatient - Supportive psychotherapy 60 min - CPT Code 95849    Chief complaint/reason for encounter: depression and anxiety     Interval history and content of current session: Patient returns for follow up psychotherapy. Patient is present for STeP session #5.     EPDS/PHQ9:    2024   [place]  Depression Scale    I have been able to laugh and see the funny side of things. 2    I have looked forward with enjoyment to things. 1    I have blamed myself unnecessarily when things went wrong. 2    I have been anxious or worried for no good reason. 1    I have felt scared or panicky for no good reason. 2    Things have been getting on top of me. 2    I have been so unhappy that I have had difficulty  "sleeping. 2    I have felt sad or miserable. 2    I have been so unhappy that I have been crying. 1    The thought of harming myself has occurred to me. 0      Reports mood as "mild stress"    Update: had a breakdown this weekend when angel started to swarm. Knows that they aren't dangerous but had a spiral that it is a "gross" environment for her children to be in. Was uncomfortable for her to be in that situation. Started crying and felt bad that her child had to comfort her. The days before that were minimally stressful.     Pregnancy Status: 27 weeks  Medication Desire: currently on medication, picked up increased prescription and will start this week    Goals:   Values: discussed in previous sessions.  Cognitive distortions: discussed at previous sessions.  Challenging Cognitive Distortions: used the recent event with angel and son's school meetings as a way to show how she can reframe.   Behavioral Activation: discussed behavioral activation as a way of focusing on action rather than "feeling better". Discussed that a lot of time we have to do behaviors first and the feelings/thoughts will follow. Discussed setting small goals that are based in values to help her start to feel better.     Action Plan:  Values: continue to identify values for herself.   Cognitive Distortions: continue to identify cognitive distortions  Challenge cognitive distortions: continue to work on reframing thoughts  Behavioral activation: use behavioral activation sheet sent via [employer] portal    Treatment plan:  Target symptoms: depression, anxiety   Why chosen therapy is appropriate versus another modality: relevant to diagnosis, evidence based practice  Outcome monitoring methods: self-report, observation  Therapeutic intervention type: insight oriented psychotherapy, supportive psychotherapy    Risk parameters:  Patient reports no suicidal ideation  Patient reports no homicidal ideation  Patient reports no self-injurious " behavior  Patient reports no violent behavior    Verbal deficits: None    Patient's response to intervention:  The patient's response to intervention is accepting.    Progress toward goals and other mental status changes:  The patient's progress toward goals is good.    Diagnosis:   Encounter Diagnoses   Name Primary?    Depression, unspecified depression type Yes    History of postpartum depression      Plan:  individual psychotherapy    Return to clinic: 1 week    Length of Service (minutes): 60

## 2024-05-14 ENCOUNTER — PATIENT MESSAGE (OUTPATIENT)
Dept: PSYCHIATRY | Facility: CLINIC | Age: 34
End: 2024-05-14
Payer: COMMERCIAL

## 2024-05-17 ENCOUNTER — PATIENT MESSAGE (OUTPATIENT)
Dept: OTHER | Facility: OTHER | Age: 34
End: 2024-05-17
Payer: COMMERCIAL

## 2024-05-17 ENCOUNTER — LAB VISIT (OUTPATIENT)
Dept: LAB | Facility: HOSPITAL | Age: 34
End: 2024-05-17
Attending: OBSTETRICS & GYNECOLOGY
Payer: COMMERCIAL

## 2024-05-17 ENCOUNTER — ROUTINE PRENATAL (OUTPATIENT)
Dept: OBSTETRICS AND GYNECOLOGY | Facility: CLINIC | Age: 34
End: 2024-05-17
Payer: COMMERCIAL

## 2024-05-17 VITALS
DIASTOLIC BLOOD PRESSURE: 68 MMHG | SYSTOLIC BLOOD PRESSURE: 122 MMHG | BODY MASS INDEX: 49.52 KG/M2 | WEIGHT: 279.56 LBS

## 2024-05-17 DIAGNOSIS — O10.919 HTN IN PREGNANCY, CHRONIC: Primary | ICD-10-CM

## 2024-05-17 DIAGNOSIS — Z3A.22 22 WEEKS GESTATION OF PREGNANCY: ICD-10-CM

## 2024-05-17 LAB
BASOPHILS # BLD AUTO: 0.03 K/UL (ref 0–0.2)
BASOPHILS NFR BLD: 0.3 % (ref 0–1.9)
CREAT UR-MCNC: 47 MG/DL (ref 15–325)
DIFFERENTIAL METHOD BLD: ABNORMAL
EOSINOPHIL # BLD AUTO: 0.1 K/UL (ref 0–0.5)
EOSINOPHIL NFR BLD: 1.5 % (ref 0–8)
ERYTHROCYTE [DISTWIDTH] IN BLOOD BY AUTOMATED COUNT: 14.9 % (ref 11.5–14.5)
GLUCOSE SERPL-MCNC: 156 MG/DL (ref 70–140)
HCT VFR BLD AUTO: 34.6 % (ref 37–48.5)
HGB BLD-MCNC: 11 G/DL (ref 12–16)
IMM GRANULOCYTES # BLD AUTO: 0.07 K/UL (ref 0–0.04)
IMM GRANULOCYTES NFR BLD AUTO: 0.7 % (ref 0–0.5)
LYMPHOCYTES # BLD AUTO: 2.4 K/UL (ref 1–4.8)
LYMPHOCYTES NFR BLD: 25.5 % (ref 18–48)
MCH RBC QN AUTO: 27 PG (ref 27–31)
MCHC RBC AUTO-ENTMCNC: 31.8 G/DL (ref 32–36)
MCV RBC AUTO: 85 FL (ref 82–98)
MONOCYTES # BLD AUTO: 0.6 K/UL (ref 0.3–1)
MONOCYTES NFR BLD: 6.2 % (ref 4–15)
NEUTROPHILS # BLD AUTO: 6.2 K/UL (ref 1.8–7.7)
NEUTROPHILS NFR BLD: 65.8 % (ref 38–73)
NRBC BLD-RTO: 0 /100 WBC
PLATELET # BLD AUTO: 293 K/UL (ref 150–450)
PMV BLD AUTO: 9.9 FL (ref 9.2–12.9)
PROT UR-MCNC: <7 MG/DL (ref 0–15)
PROT/CREAT UR: NORMAL MG/G{CREAT} (ref 0–0.2)
RBC # BLD AUTO: 4.08 M/UL (ref 4–5.4)
WBC # BLD AUTO: 9.35 K/UL (ref 3.9–12.7)

## 2024-05-17 PROCEDURE — 82570 ASSAY OF URINE CREATININE: CPT | Performed by: OBSTETRICS & GYNECOLOGY

## 2024-05-17 PROCEDURE — 36415 COLL VENOUS BLD VENIPUNCTURE: CPT | Mod: PN | Performed by: OBSTETRICS & GYNECOLOGY

## 2024-05-17 PROCEDURE — 82950 GLUCOSE TEST: CPT | Performed by: OBSTETRICS & GYNECOLOGY

## 2024-05-17 PROCEDURE — 85025 COMPLETE CBC W/AUTO DIFF WBC: CPT | Performed by: OBSTETRICS & GYNECOLOGY

## 2024-05-20 ENCOUNTER — OFFICE VISIT (OUTPATIENT)
Dept: PSYCHIATRY | Facility: CLINIC | Age: 34
End: 2024-05-20
Payer: COMMERCIAL

## 2024-05-20 DIAGNOSIS — Z86.59 HISTORY OF POSTPARTUM DEPRESSION: ICD-10-CM

## 2024-05-20 DIAGNOSIS — Z87.59 HISTORY OF POSTPARTUM DEPRESSION: ICD-10-CM

## 2024-05-20 DIAGNOSIS — F32.A DEPRESSION, UNSPECIFIED DEPRESSION TYPE: Primary | ICD-10-CM

## 2024-05-20 PROCEDURE — 90785 PSYTX COMPLEX INTERACTIVE: CPT | Mod: 95,,, | Performed by: SOCIAL WORKER

## 2024-05-20 PROCEDURE — 3044F HG A1C LEVEL LT 7.0%: CPT | Mod: CPTII,95,, | Performed by: SOCIAL WORKER

## 2024-05-20 PROCEDURE — 90837 PSYTX W PT 60 MINUTES: CPT | Mod: 95,,, | Performed by: SOCIAL WORKER

## 2024-05-20 NOTE — PROGRESS NOTES
The patient location is: [place]  The chief complaint leading to consultation is: depression, anxiety    Visit type: audiovisual    Face to Face time with patient: 55 mins  65 minutes of total time spent on the encounter, which includes face to face time and non-face to face time preparing to see the patient (eg, review of tests), Obtaining and/or reviewing separately obtained history, Documenting clinical information in the electronic or other health record, Independently interpreting results (not separately reported) and communicating results to the patient/family/caregiver, or Care coordination (not separately reported).     Each patient to whom he or she provides medical services by telemedicine is:  (1) informed of the relationship between the physician and patient and the respective role of any other health care provider with respect to management of the patient; and (2) notified that he or she may decline to receive medical services by telemedicine and may withdraw from such care at any time.    Notes:     Individual Psychotherapy (PhD/LCSW)    2024     Site:  Telemed         Therapeutic Intervention: Met with patient.  Outpatient - Insight oriented psychotherapy 60 min - CPT code 19126 and Outpatient - Supportive psychotherapy 60 min - CPT Code 49200    Chief complaint/reason for encounter: depression and anxiety     Interval history and content of current session: Patient returns for follow up psychotherapy. Patient is present for STeP session #6.     EPDS/PHQ9:    2024   [place]  Depression Scale    I have been able to laugh and see the funny side of things. 1    I have looked forward with enjoyment to things. 2    I have blamed myself unnecessarily when things went wrong. 1    I have been anxious or worried for no good reason. 2    I have felt scared or panicky for no good reason. 1    Things have been getting on top of me. 1    I have been so unhappy that I have had difficulty  "sleeping. 1    I have felt sad or miserable. 1    I have been so unhappy that I have been crying. 1    The thought of harming myself has occurred to me. 0 (11)     Reports mood as "stressed"    Update: reports that she is trying to stop more and reframe. Was in the office a lot last week due to weather. Has been scheduling appointments to get some things fixed around the house. Has been on increased medication for the last week and has noticed a change in mood.     Pregnancy Status: 28 weeks  Medication Desire: currently on medication, picked up increased prescription and will start this week    Goals:   Values: discussed in previous sessions.  Cognitive distortions: discussed at previous sessions.  Challenging Cognitive Distortions: discussed at previous session  Behavioral Activation: discussed at previous session  Meditation/Mindfulness: discussed how mindfulness can change the brain to help her be more regulated. Discussed what mindfulness looks like in real life. Discussed how it can be helpful for her in being less reactive with people in her life.     Action Plan:  Values: continue to identify values for herself.   Cognitive Distortions: continue to identify cognitive distortions  Challenge cognitive distortions: continue to work on reframing thoughts  Behavioral activation: continue to use behavioral activation schedule  Meditation/Mindfulness: practice mindfulness more days a week than not, 1 minute at a time. Review meditation handout and practice mindfulness.    Treatment plan:  Target symptoms: depression, anxiety   Why chosen therapy is appropriate versus another modality: relevant to diagnosis, evidence based practice  Outcome monitoring methods: self-report, observation  Therapeutic intervention type: insight oriented psychotherapy, supportive psychotherapy    Risk parameters:  Patient reports no suicidal ideation  Patient reports no homicidal ideation  Patient reports no self-injurious " behavior  Patient reports no violent behavior    Verbal deficits: None    Patient's response to intervention:  The patient's response to intervention is accepting.    Progress toward goals and other mental status changes:  The patient's progress toward goals is good.    Diagnosis:   Encounter Diagnoses   Name Primary?    Depression, unspecified depression type Yes    History of postpartum depression        Plan:  individual psychotherapy    Return to clinic: 1 week    Length of Service (minutes): 60

## 2024-05-21 ENCOUNTER — PATIENT MESSAGE (OUTPATIENT)
Dept: PSYCHIATRY | Facility: CLINIC | Age: 34
End: 2024-05-21
Payer: COMMERCIAL

## 2024-05-27 ENCOUNTER — ROUTINE PRENATAL (OUTPATIENT)
Dept: OBSTETRICS AND GYNECOLOGY | Facility: CLINIC | Age: 34
End: 2024-05-27
Payer: COMMERCIAL

## 2024-05-27 ENCOUNTER — LAB VISIT (OUTPATIENT)
Dept: LAB | Facility: HOSPITAL | Age: 34
End: 2024-05-27
Attending: NURSE PRACTITIONER
Payer: COMMERCIAL

## 2024-05-27 ENCOUNTER — OFFICE VISIT (OUTPATIENT)
Dept: PSYCHIATRY | Facility: CLINIC | Age: 34
End: 2024-05-27
Payer: COMMERCIAL

## 2024-05-27 VITALS
SYSTOLIC BLOOD PRESSURE: 112 MMHG | BODY MASS INDEX: 48.86 KG/M2 | WEIGHT: 275.81 LBS | DIASTOLIC BLOOD PRESSURE: 74 MMHG

## 2024-05-27 DIAGNOSIS — Z34.80 SUPERVISION OF OTHER NORMAL PREGNANCY, ANTEPARTUM: Primary | ICD-10-CM

## 2024-05-27 DIAGNOSIS — O26.893 PREGNANCY HEADACHE IN THIRD TRIMESTER: ICD-10-CM

## 2024-05-27 DIAGNOSIS — R51.9 PREGNANCY HEADACHE IN THIRD TRIMESTER: ICD-10-CM

## 2024-05-27 DIAGNOSIS — Z86.59 HISTORY OF POSTPARTUM DEPRESSION: ICD-10-CM

## 2024-05-27 DIAGNOSIS — F32.A DEPRESSION, UNSPECIFIED DEPRESSION TYPE: Primary | ICD-10-CM

## 2024-05-27 DIAGNOSIS — R21 RASH: ICD-10-CM

## 2024-05-27 DIAGNOSIS — Z87.59 HISTORY OF POSTPARTUM DEPRESSION: ICD-10-CM

## 2024-05-27 DIAGNOSIS — O99.810 ABNORMAL GLUCOSE TOLERANCE AFFECTING PREGNANCY, ANTEPARTUM: ICD-10-CM

## 2024-05-27 LAB
ALBUMIN SERPL BCP-MCNC: 2.7 G/DL (ref 3.5–5.2)
ALP SERPL-CCNC: 78 U/L (ref 55–135)
ALT SERPL W/O P-5'-P-CCNC: 10 U/L (ref 10–44)
ANION GAP SERPL CALC-SCNC: 8 MMOL/L (ref 8–16)
AST SERPL-CCNC: 12 U/L (ref 10–40)
BASOPHILS # BLD AUTO: 0.03 K/UL (ref 0–0.2)
BASOPHILS NFR BLD: 0.3 % (ref 0–1.9)
BILIRUB SERPL-MCNC: 0.5 MG/DL (ref 0.1–1)
BUN SERPL-MCNC: 8 MG/DL (ref 6–20)
CALCIUM SERPL-MCNC: 9.2 MG/DL (ref 8.7–10.5)
CHLORIDE SERPL-SCNC: 105 MMOL/L (ref 95–110)
CO2 SERPL-SCNC: 24 MMOL/L (ref 23–29)
CREAT SERPL-MCNC: 0.7 MG/DL (ref 0.5–1.4)
DIFFERENTIAL METHOD BLD: ABNORMAL
EOSINOPHIL # BLD AUTO: 0.1 K/UL (ref 0–0.5)
EOSINOPHIL NFR BLD: 1 % (ref 0–8)
ERYTHROCYTE [DISTWIDTH] IN BLOOD BY AUTOMATED COUNT: 15.3 % (ref 11.5–14.5)
EST. GFR  (NO RACE VARIABLE): >60 ML/MIN/1.73 M^2
GLUCOSE SERPL-MCNC: 92 MG/DL (ref 70–110)
HCT VFR BLD AUTO: 35.3 % (ref 37–48.5)
HGB BLD-MCNC: 11.1 G/DL (ref 12–16)
IMM GRANULOCYTES # BLD AUTO: 0.06 K/UL (ref 0–0.04)
IMM GRANULOCYTES NFR BLD AUTO: 0.7 % (ref 0–0.5)
LDH SERPL L TO P-CCNC: 192 U/L (ref 110–260)
LYMPHOCYTES # BLD AUTO: 2 K/UL (ref 1–4.8)
LYMPHOCYTES NFR BLD: 21.9 % (ref 18–48)
MCH RBC QN AUTO: 26.7 PG (ref 27–31)
MCHC RBC AUTO-ENTMCNC: 31.4 G/DL (ref 32–36)
MCV RBC AUTO: 85 FL (ref 82–98)
MONOCYTES # BLD AUTO: 0.5 K/UL (ref 0.3–1)
MONOCYTES NFR BLD: 5.8 % (ref 4–15)
NEUTROPHILS # BLD AUTO: 6.3 K/UL (ref 1.8–7.7)
NEUTROPHILS NFR BLD: 70.3 % (ref 38–73)
NRBC BLD-RTO: 0 /100 WBC
PLATELET # BLD AUTO: 311 K/UL (ref 150–450)
PMV BLD AUTO: 9.7 FL (ref 9.2–12.9)
POTASSIUM SERPL-SCNC: 3.8 MMOL/L (ref 3.5–5.1)
PROT SERPL-MCNC: 6.4 G/DL (ref 6–8.4)
RBC # BLD AUTO: 4.16 M/UL (ref 4–5.4)
SODIUM SERPL-SCNC: 137 MMOL/L (ref 136–145)
WBC # BLD AUTO: 8.9 K/UL (ref 3.9–12.7)

## 2024-05-27 PROCEDURE — 90837 PSYTX W PT 60 MINUTES: CPT | Mod: 95,,, | Performed by: SOCIAL WORKER

## 2024-05-27 PROCEDURE — 3044F HG A1C LEVEL LT 7.0%: CPT | Mod: CPTII,95,, | Performed by: SOCIAL WORKER

## 2024-05-27 PROCEDURE — 80053 COMPREHEN METABOLIC PANEL: CPT | Performed by: NURSE PRACTITIONER

## 2024-05-27 PROCEDURE — 83615 LACTATE (LD) (LDH) ENZYME: CPT | Performed by: NURSE PRACTITIONER

## 2024-05-27 PROCEDURE — 99999 PR PBB SHADOW E&M-EST. PATIENT-LVL III: CPT | Mod: PBBFAC,,, | Performed by: NURSE PRACTITIONER

## 2024-05-27 PROCEDURE — 90785 PSYTX COMPLEX INTERACTIVE: CPT | Mod: 95,,, | Performed by: SOCIAL WORKER

## 2024-05-27 PROCEDURE — 85025 COMPLETE CBC W/AUTO DIFF WBC: CPT | Performed by: NURSE PRACTITIONER

## 2024-05-27 PROCEDURE — 84156 ASSAY OF PROTEIN URINE: CPT | Performed by: NURSE PRACTITIONER

## 2024-05-27 PROCEDURE — 0502F SUBSEQUENT PRENATAL CARE: CPT | Mod: CPTII,S$GLB,, | Performed by: NURSE PRACTITIONER

## 2024-05-27 PROCEDURE — 36415 COLL VENOUS BLD VENIPUNCTURE: CPT | Mod: PN | Performed by: NURSE PRACTITIONER

## 2024-05-27 RX ORDER — CLOTRIMAZOLE 1 %
CREAM (GRAM) TOPICAL 2 TIMES DAILY
Qty: 60 G | Refills: 0 | Status: SHIPPED | OUTPATIENT
Start: 2024-05-27 | End: 2024-06-05

## 2024-05-27 NOTE — PROGRESS NOTES
Here for routine OB appt at 29w3d, with complaints of daily HAs and occasional floaters. No epigastric pain.   Discussed Magnesium sulfate as migraine prophylactic agent. She also reports rash to her inner thigh / groin area. Annular rash noted - suspect ring worm- Lotrimin twice daily sent.   Reports good FM.  Denies LOF, denies VB, denies contractions.  Reviewed warning signs of Labor and Preeclampsia.  Daily FM counts reinforced.  3 hr GTT scheduled. Pre E labs and PC ratio today.   F/U scheduled 2 weeks

## 2024-05-27 NOTE — PROGRESS NOTES
The patient location is: [place]  The chief complaint leading to consultation is: depression, anxiety    Visit type: audiovisual    Face to Face time with patient: 55 mins  65 minutes of total time spent on the encounter, which includes face to face time and non-face to face time preparing to see the patient (eg, review of tests), Obtaining and/or reviewing separately obtained history, Documenting clinical information in the electronic or other health record, Independently interpreting results (not separately reported) and communicating results to the patient/family/caregiver, or Care coordination (not separately reported).     Each patient to whom he or she provides medical services by telemedicine is:  (1) informed of the relationship between the physician and patient and the respective role of any other health care provider with respect to management of the patient; and (2) notified that he or she may decline to receive medical services by telemedicine and may withdraw from such care at any time.    Notes:     Individual Psychotherapy (PhD/LCSW)    2024     Site:  Telemed         Therapeutic Intervention: Met with patient.  Outpatient - Insight oriented psychotherapy 60 min - CPT code 04438 and Outpatient - Supportive psychotherapy 60 min - CPT Code 70354    Chief complaint/reason for encounter: depression and anxiety     Interval history and content of current session: Patient returns for follow up psychotherapy. Patient is present for STeP session #7.     EPDS/PHQ9:    2024   [place]  Depression Scale    I have been able to laugh and see the funny side of things. 1    I have looked forward with enjoyment to things. 0    I have blamed myself unnecessarily when things went wrong. 2    I have been anxious or worried for no good reason. 1    I have felt scared or panicky for no good reason. 0    Things have been getting on top of me. 0    I have been so unhappy that I have had difficulty  "sleeping. 1    I have felt sad or miserable. 1    I have been so unhappy that I have been crying. 0    The thought of harming myself has occurred to me. 0      Reports mood as "content, a little stressful the last few days"    Update: reports that children are out of school. Son broke his foot last week. Not sure how he broke it and doesn't think he is been completely truthful about how he broke it. It has been stressful because son just made the cheer team and now he has to wait for his foot to heal.     Pregnancy Status: 28 weeks  Medication Desire: currently on medication, picked up increased prescription and will start this week    Goals:   Values: discussed in previous sessions.  Cognitive distortions: discussed at previous sessions.  Challenging Cognitive Distortions: discussed at previous session  Behavioral Activation: discussed at previous session  Meditation/Mindfulness: discussed that she did do breathing meditation. Did it before she went to bed and noticed that it helped her fall asleep faster.   Nutrition/Movement: discussed    Action Plan:  Values: continue to identify values for herself.   Cognitive Distortions: continue to identify cognitive distortions  Challenge cognitive distortions: continue to work on reframing thoughts  Behavioral activation: continue to use behavioral activation schedule  Meditation/Mindfulness: continue to practice mindfulness  Nutrition/Movement: sent patient information on mental health and exercise/nutrition    Treatment plan:  Target symptoms: depression, anxiety   Why chosen therapy is appropriate versus another modality: relevant to diagnosis, evidence based practice  Outcome monitoring methods: self-report, observation  Therapeutic intervention type: insight oriented psychotherapy, supportive psychotherapy    Risk parameters:  Patient reports no suicidal ideation  Patient reports no homicidal ideation  Patient reports no self-injurious behavior  Patient reports no " violent behavior    Verbal deficits: None    Patient's response to intervention:  The patient's response to intervention is accepting.    Progress toward goals and other mental status changes:  The patient's progress toward goals is good.    Diagnosis:   Encounter Diagnoses   Name Primary?    Depression, unspecified depression type Yes    History of postpartum depression      Plan:  individual psychotherapy    Return to clinic: 1 week    Length of Service (minutes): 60

## 2024-05-28 ENCOUNTER — PATIENT MESSAGE (OUTPATIENT)
Dept: OBSTETRICS AND GYNECOLOGY | Facility: CLINIC | Age: 34
End: 2024-05-28
Payer: COMMERCIAL

## 2024-05-28 ENCOUNTER — PATIENT MESSAGE (OUTPATIENT)
Dept: PSYCHIATRY | Facility: CLINIC | Age: 34
End: 2024-05-28
Payer: COMMERCIAL

## 2024-05-28 LAB
CREAT UR-MCNC: 224 MG/DL (ref 15–325)
PROT UR-MCNC: 15 MG/DL (ref 0–15)
PROT/CREAT UR: 0.07 MG/G{CREAT} (ref 0–0.2)

## 2024-05-31 ENCOUNTER — PATIENT MESSAGE (OUTPATIENT)
Dept: OTHER | Facility: OTHER | Age: 34
End: 2024-05-31
Payer: COMMERCIAL

## 2024-06-03 ENCOUNTER — PROCEDURE VISIT (OUTPATIENT)
Dept: MATERNAL FETAL MEDICINE | Facility: CLINIC | Age: 34
End: 2024-06-03
Payer: COMMERCIAL

## 2024-06-03 ENCOUNTER — NURSE TRIAGE (OUTPATIENT)
Dept: ADMINISTRATIVE | Facility: CLINIC | Age: 34
End: 2024-06-03
Payer: COMMERCIAL

## 2024-06-03 ENCOUNTER — PATIENT MESSAGE (OUTPATIENT)
Dept: OBSTETRICS AND GYNECOLOGY | Facility: CLINIC | Age: 34
End: 2024-06-03
Payer: COMMERCIAL

## 2024-06-03 ENCOUNTER — PATIENT MESSAGE (OUTPATIENT)
Dept: URGENT CARE | Facility: CLINIC | Age: 34
End: 2024-06-03
Payer: COMMERCIAL

## 2024-06-03 ENCOUNTER — OFFICE VISIT (OUTPATIENT)
Dept: PSYCHIATRY | Facility: CLINIC | Age: 34
End: 2024-06-03
Payer: COMMERCIAL

## 2024-06-03 ENCOUNTER — LAB VISIT (OUTPATIENT)
Dept: LAB | Facility: OTHER | Age: 34
End: 2024-06-03
Attending: NURSE PRACTITIONER
Payer: COMMERCIAL

## 2024-06-03 DIAGNOSIS — Z87.59 HISTORY OF POSTPARTUM DEPRESSION: ICD-10-CM

## 2024-06-03 DIAGNOSIS — O99.810 ABNORMAL GLUCOSE TOLERANCE AFFECTING PREGNANCY, ANTEPARTUM: ICD-10-CM

## 2024-06-03 DIAGNOSIS — Z86.59 HISTORY OF POSTPARTUM DEPRESSION: ICD-10-CM

## 2024-06-03 DIAGNOSIS — Z36.89 ENCOUNTER FOR ULTRASOUND TO ASSESS FETAL GROWTH: Primary | ICD-10-CM

## 2024-06-03 DIAGNOSIS — Z36.2 ENCOUNTER FOR FOLLOW-UP ULTRASOUND OF FETAL ANATOMY: ICD-10-CM

## 2024-06-03 DIAGNOSIS — F32.A DEPRESSION, UNSPECIFIED DEPRESSION TYPE: Primary | ICD-10-CM

## 2024-06-03 LAB
GLUCOSE SERPL-MCNC: 102 MG/DL
GLUCOSE SERPL-MCNC: 144 MG/DL
GLUCOSE SERPL-MCNC: 197 MG/DL
GLUCOSE SERPL-MCNC: 93 MG/DL (ref 70–110)

## 2024-06-03 PROCEDURE — 82951 GLUCOSE TOLERANCE TEST (GTT): CPT | Performed by: NURSE PRACTITIONER

## 2024-06-03 PROCEDURE — 82952 GTT-ADDED SAMPLES: CPT | Performed by: NURSE PRACTITIONER

## 2024-06-03 PROCEDURE — 90837 PSYTX W PT 60 MINUTES: CPT | Mod: 95,,, | Performed by: SOCIAL WORKER

## 2024-06-03 PROCEDURE — 36415 COLL VENOUS BLD VENIPUNCTURE: CPT | Performed by: NURSE PRACTITIONER

## 2024-06-03 PROCEDURE — 3044F HG A1C LEVEL LT 7.0%: CPT | Mod: CPTII,95,, | Performed by: SOCIAL WORKER

## 2024-06-03 PROCEDURE — 76816 OB US FOLLOW-UP PER FETUS: CPT | Mod: S$GLB,,, | Performed by: OBSTETRICS & GYNECOLOGY

## 2024-06-03 NOTE — PROGRESS NOTES
The patient location is: [place]  The chief complaint leading to consultation is: depression, anxiety    Visit type: audiovisual    Face to Face time with patient: 55 mins  65 minutes of total time spent on the encounter, which includes face to face time and non-face to face time preparing to see the patient (eg, review of tests), Obtaining and/or reviewing separately obtained history, Documenting clinical information in the electronic or other health record, Independently interpreting results (not separately reported) and communicating results to the patient/family/caregiver, or Care coordination (not separately reported).     Each patient to whom he or she provides medical services by telemedicine is:  (1) informed of the relationship between the physician and patient and the respective role of any other health care provider with respect to management of the patient; and (2) notified that he or she may decline to receive medical services by telemedicine and may withdraw from such care at any time.    Notes:     Individual Psychotherapy (PhD/LCSW)    2024     Site:  Telemed         Therapeutic Intervention: Met with patient.  Outpatient - Insight oriented psychotherapy 60 min - CPT code 75803 and Outpatient - Supportive psychotherapy 60 min - CPT Code 30894    Chief complaint/reason for encounter: depression and anxiety     Interval history and content of current session: Patient returns for follow up psychotherapy. Patient is present for STeP session #8.     EPDS/PHQ9:    6/3/2024   [place]  Depression Scale    I have been able to laugh and see the funny side of things. 1    I have looked forward with enjoyment to things. 0    I have blamed myself unnecessarily when things went wrong. 2    I have been anxious or worried for no good reason. 2    I have felt scared or panicky for no good reason. 0    Things have been getting on top of me. 2    I have been so unhappy that I have had difficulty  "sleeping. 1    I have felt sad or miserable. 1    I have been so unhappy that I have been crying. 0    The thought of harming myself has occurred to me. 0      Reports mood as "frustrated"    Update: had her 3 hour glucose test and passed. States that she has developed a rash and this has shined light on how her self care has taken a dip. Discussed her thoughts on the idea that she is punishing herself with pregnancy. Patient has decided to not know the gender of baby. She found out for all other children. Discussed that she may be trying to punish herself and not allowing herself to get to know baby or bond with them.     Pregnancy Status: 30 weeks  Medication Desire: currently on medication, picked up increased prescription and will start this week    Goals:   Values: discussed in previous sessions.  Cognitive distortions: discussed at previous sessions.  Challenging Cognitive Distortions: discussed at previous session  Behavioral Activation: discussed at previous session  Meditation/Mindfulness: discussed that she did do breathing meditation. Did it before she went to bed and noticed that it helped her fall asleep faster.   Self Care: discussed the importance of self care. Discussed the importance of this not just for her but also her children.     Action Plan:  Values: continue to identify values for herself.   Cognitive Distortions: continue to identify cognitive distortions  Challenge cognitive distortions: continue to work on reframing thoughts  Behavioral activation: continue to use behavioral activation schedule  Meditation/Mindfulness: continue to practice mindfulness  Nutrition/Movement: sent patient information on mental health and exercise/nutrition    Treatment plan:  Target symptoms: depression, anxiety   Why chosen therapy is appropriate versus another modality: relevant to diagnosis, evidence based practice  Outcome monitoring methods: self-report, observation  Therapeutic intervention type: insight " oriented psychotherapy, supportive psychotherapy    Risk parameters:  Patient reports no suicidal ideation  Patient reports no homicidal ideation  Patient reports no self-injurious behavior  Patient reports no violent behavior    Verbal deficits: None    Patient's response to intervention:  The patient's response to intervention is accepting.    Progress toward goals and other mental status changes:  The patient's progress toward goals is good.    Diagnosis:   Encounter Diagnoses   Name Primary?    Depression, unspecified depression type Yes    History of postpartum depression        Plan:  individual psychotherapy    Return to clinic: 1 week    Length of Service (minutes): 60

## 2024-06-03 NOTE — TELEPHONE ENCOUNTER
Pt is 30 weeks pregnant. Pt having painful, itchy rash. Saw OB last week and was given fungal steroid cream. Pt has been using it twice a day but rash is still spreading. Rash started on inner thigh, has spread to the back of her legs and and up her buttocks and now to vagina. Pt is very uncomfortable.    Dispo- see provider within 24 hours. Offered pt appts at pcp office but pt would prefer to attempt [facility]. Message also routed to OBGYN staff an care advice given. Pt VU.  Reason for Disposition   SEVERE itching (i.e., interferes with sleep, normal activities or school)    Additional Information   Negative: [1] Life-threatening reaction (anaphylaxis) in the past to similar substance (e.g., food, insect bite/sting, chemical, etc.) AND [2] < 2 hours since exposure   Negative: [1] Sudden onset of rash (within last 2 hours) AND [2] difficulty breathing or swallowing   Negative: Shock suspected (e.g., cold/pale/clammy skin, too weak to stand, low BP, rapid pulse)   Negative: Difficult to awaken or acting confused (e.g., disoriented, slurred speech)   Negative: [1] Purple or blood-colored spots or dots AND [2] fever   Negative: Sounds like a life-threatening emergency to the triager   Negative: [1] Bright red, sunburn-like rash AND [2] current tampon use or nasal packing   Negative: [1] Bright red, sunburn-like rash AND [3] wound infection or recent surgery   Negative: [1] Bright red skin AND [2] peels off in sheets   Negative: Stiff neck (can't touch chin to chest)   Negative: Fever   Negative: Joint pain or swelling   Negative: Patient sounds very sick or weak to the triager   Negative: [1] Purple or blood-colored rash (spots or dots) AND [2] no fever AND [3] sounds well to triager   Negative: Large or small blisters on skin (i.e., fluid filled bubbles or sacs)   Negative: Bloody crusts on lips or sores in mouth   Negative: Face becomes swollen   Negative: [1] Headache AND [2] no fever    Protocols used: Rash or Redness  - Widespread-A-AH

## 2024-06-04 NOTE — PROGRESS NOTES
INTERNAL MEDICINE CLINIC - SAME DAY APPOINTMENT  Progress Note    PRESENTING HISTORY     PCP: Fili Joshua MD    Chief Complaint/Reason for Visit:   No chief complaint on file.    History of Present Illness & ROS : Ms. Sonam Hidalgo is a 34 y.o. female.    New to me  Very pleasant lady.   Followed by OB/GYN. Currently 30 wks gestation.   Reports that has been using Lotrimin prescribed by GYN, 'does not feel helping'. The redness is worse and the now going to buttock. No pustiles or vesicles. X 2 weeks. Itches and 'hurts'.     Review of Systems:  Eyes: denies visual changes at this time denies floaters   ENT: no nasal congestion or sore throat  Respiratory: no cough or shorness of breath  Cardiovascular: no chest pain or palpitations  Gastrointestinal: no nausea or vomiting, no abdominal pain or change in bowel habits  Genitourinary: no hematuria or dysuria; denies frequency  Hematologic/Lymphatic: no easy bruising or lymphadenopathy  Musculoskeletal: no arthralgias or myalgias  Neurological: no seizures or tremors  Endocrine: no heat or cold intolerance      PAST HISTORY:     Past Medical History:   Diagnosis Date    Abnormal Pap smear of vagina     Allergy     Anxiety     Hypertension     Pre-eclampsia        Past Surgical History:   Procedure Laterality Date    CHOLECYSTECTOMY      LAPAROSCOPIC SURGICAL REMOVAL OF CYST OF OVARY Left 11/20/2019    Procedure: EXCISION, CYST, OVARY, LAPAROSCOPIC;  Surgeon: Merary Benton MD;  Location: [facility];  Service: OB/GYN;  Laterality: Left;    median arcuate ligament          Family History   Problem Relation Name Age of Onset    Diabetes Mother      Breast cancer Mother      Diabetes Father      Alcohol abuse Father      Dementia Father          vascular and ? lewey    Hypertension Brother      Celiac disease Neg Hx      Cirrhosis Neg Hx      Colon cancer Neg Hx      Colon polyps Neg Hx      Crohn's disease Neg Hx      Cystic fibrosis Neg Hx       Esophageal cancer Neg Hx      Hemochromatosis Neg Hx      Inflammatory bowel disease Neg Hx      Irritable bowel syndrome Neg Hx      Liver cancer Neg Hx      Liver disease Neg Hx      Rectal cancer Neg Hx      Stomach cancer Neg Hx      Ulcerative colitis Neg Hx      Jacob's disease Neg Hx      Ovarian cancer Neg Hx      Arrhythmia Neg Hx      Cardiomyopathy Neg Hx      Congenital heart disease Neg Hx      Early death Neg Hx      Heart attacks under age 50 Neg Hx      Pacemaker/defibrilator Neg Hx         Social History     Socioeconomic History    Marital status: Single   Tobacco Use    Smoking status: Never    Smokeless tobacco: Never   Substance and Sexual Activity    Alcohol use: Not Currently     Comment: 1 glass wine on occ    Drug use: No    Sexual activity: Yes     Partners: Male     Birth control/protection: None   Social History Narrative    2 kids at home (5 Sheldon and Buster 2 yrs. Aorta issue and Partial anomalous PV return, asymptomatic), in apt attached to her mom's hse. Fiance deployed arm. Not much forma exercise. Works for [employer].     Social Determinants of Health     Financial Resource Strain: Low Risk  (5/20/2024)    Overall Financial Resource Strain (CARDIA)     Difficulty of Paying Living Expenses: Not very hard   Recent Concern: Financial Resource Strain - Medium Risk (4/8/2024)    Overall Financial Resource Strain (CARDIA)     Difficulty of Paying Living Expenses: Somewhat hard   Food Insecurity: Food Insecurity Present (5/20/2024)    Hunger Vital Sign     Worried About Running Out of Food in the Last Year: Sometimes true     Ran Out of Food in the Last Year: Never true   Transportation Needs: No Transportation Needs (4/8/2024)    PRAPARE - Transportation     Lack of Transportation (Medical): No     Lack of Transportation (Non-Medical): No   Physical Activity: Inactive (5/20/2024)    Exercise Vital Sign     Days of Exercise per Week: 0 days     Minutes of Exercise per Session: 0 min    Stress: Patient Declined (5/20/2024)    [place] [employer] - Occupational Stress Questionnaire     Feeling of Stress : Patient declined   Housing Stability: Low Risk  (4/8/2024)    Housing Stability Vital Sign     Unable to Pay for Housing in the Last Year: No     Number of Places Lived in the Last Year: 1     Unstable Housing in the Last Year: No       MEDICATIONS & ALLERGIES:     Current Outpatient Medications on File Prior to Visit   Medication Sig Dispense Refill    aspirin (ECOTRIN) 81 MG EC tablet Take 1 tablet (81 mg total) by mouth once daily.  0    augmented betamethasone (DIPROLENE) 0.05 % lotion Apply topically 2 (two) times daily. for 7 days 60 mL 1    clotrimazole (LOTRIMIN) 1 % cream Apply topically 2 (two) times daily. 60 g 0    PNV no.153/FA/om3/dha/epa/fish (PRENATAL GUMMIES ORAL) Take by mouth.      sertraline (ZOLOFT) 50 MG tablet Take 1 tablet (50 mg total) by mouth once daily. 30 tablet 2     No current facility-administered medications on file prior to visit.        Review of patient's allergies indicates:  No Known Allergies    Medications Reconciliation:   I have reconciled the patient's home medications with the patient/family. I have updated all changes.  Refer to After-Visit Medication List.    OBJECTIVE:     Vital Signs:  There were no vitals filed for this visit.  Wt Readings from Last 3 Encounters:   05/27/24 1538 125.1 kg (275 lb 12.7 oz)   05/17/24 1533 126.8 kg (279 lb 8.7 oz)   05/03/24 1503 125.2 kg (276 lb 0.3 oz)     There is no height or weight on file to calculate BMI.   Wt Readings from Last 3 Encounters:   06/05/24 125.1 kg (275 lb 12.7 oz)   05/27/24 125.1 kg (275 lb 12.7 oz)   05/17/24 126.8 kg (279 lb 8.7 oz)     Temp Readings from Last 3 Encounters:   07/18/22 97.8 °F (36.6 °C)   06/30/22 97.9 °F (36.6 °C) (Oral)   08/03/21 97.5 °F (36.4 °C)     BP Readings from Last 3 Encounters:   06/05/24 110/64   05/27/24 112/74   05/17/24 122/68     Pulse  Readings from Last 3 Encounters:   06/05/24 88   12/29/22 63   07/18/22 107         Physical Exam:  General: Well developed, well nourished. No distress.  HEENT: Head is normocephalic, atraumatic  Eyes: Clear conjunctiva.  Neck: Supple, symmetrical neck; trachea midline.  Extremities: No LE edema. Pulses 2+ and symmetric.  Skin: Skin color, texture, turgor normal  Perineal region / buttock:erythematous, scaly, demarcated margin exanthem; no exudative with satellite lesions  Musculoskeletal: Normal gait.   Neurologic: Normal strength and tone. No focal numbness or weakness.       Laboratory  Lab Results   Component Value Date    WBC 8.90 05/27/2024    HGB 11.1 (L) 05/27/2024    HCT 35.3 (L) 05/27/2024     05/27/2024    CHOL 170 09/30/2019    TRIG 145 09/30/2019    HDL 47 09/30/2019    ALT 10 05/27/2024    AST 12 05/27/2024     05/27/2024    K 3.8 05/27/2024     05/27/2024    CREATININE 0.7 05/27/2024    BUN 8 05/27/2024    CO2 24 05/27/2024    TSH 1.108 09/30/2019    INR 1.1 09/30/2019    HGBA1C 5.1 01/26/2024         ASSESSMENT & PLAN:     Same day apt    *Currently pregnant (~ 30 w)    Intertrigo / Fungal infection of skin  -     nystatin (MYCOSTATIN) powder; Apply topically every morning. for 14 days  Dispense: 30 g; Refill: 2  -     ketoconazole (NIZORAL) 2 % cream; Apply topically every evening. for 14 days  Dispense: 30 g; Refill: 1    *May need to extend the duration of time of applications. Has been advised to spare vagina when applying the powder or cream being prescribed. Advised to keeping are clean and as dry as possible at all times.   *Unfortunately, given pregnancy, limited and restricted to prescribing topicals versus oral agents to gain control.     Future Appointments   Date Time Provider Department Center   6/6/2024  1:45 PM OB FINANCIAL COUNSELOR [facility] OBGYN[facility]   6/10/2024  2:00 PM Rockweiler, Cassandra, LCSW [facility] PSYCH [facility]   6/14/2024  4:00 PM Nancy Mobley  G, NP [facility] OBGYNF [address]   6/17/2024  2:00 PM [facility]   6/24/2024  2:00 PM Rockweiler, Cassandra, [facility] PSYCH [facility]   6/28/2024  3:45 PM [facility] [place]   7/1/2024  7:40 AM ROOM 5, [facility]   7/5/2024  3:15 PM [facility] [place]   7/12/2024  3:45 PM [facility] [place]   7/19/2024  3:30 PM [facility] [place]   7/26/2024  3:45 PM Merary Benton MD [facility] OBGYN [place]        Medication List            Accurate as of June 5, 2024  8:31 AM. If you have any questions, ask your nurse or doctor.                START taking these medications      ketoconazole 2 % cream  Commonly known as: NIZORAL  Apply topically every evening. for 14 days  Started by: TEE Otero     nystatin powder  Commonly known as: MYCOSTATIN  Apply topically every morning. for 14 days  Started by: TEE Otero            CONTINUE taking these medications      aspirin 81 MG EC tablet  Commonly known as: ECOTRIN  Take 1 tablet (81 mg total) by mouth once daily.     augmented betamethasone 0.05 % lotion  Commonly known as: DIPROLENE  Apply topically 2 (two) times daily. for 7 days     PRENATAL GUMMIES ORAL     sertraline 50 MG tablet  Commonly known as: ZOLOFT  Take 1 tablet (50 mg total) by mouth once daily.            STOP taking these medications      clotrimazole 1 % cream  Commonly known as: LOTRIMIN  Stopped by: TEE Otero               Where to Get Your Medications        These medications were sent to [facility]/pharmacy #[id number] - [place] - [address]  [address] [facility] [address]      Phone: [phone]   ketoconazole 2 % cream  nystatin powder         Signing Physician:  TEE Otero

## 2024-06-05 ENCOUNTER — OFFICE VISIT (OUTPATIENT)
Dept: INTERNAL MEDICINE | Facility: CLINIC | Age: 34
End: 2024-06-05
Payer: COMMERCIAL

## 2024-06-05 ENCOUNTER — PATIENT MESSAGE (OUTPATIENT)
Dept: OBSTETRICS AND GYNECOLOGY | Facility: CLINIC | Age: 34
End: 2024-06-05
Payer: COMMERCIAL

## 2024-06-05 VITALS
DIASTOLIC BLOOD PRESSURE: 64 MMHG | HEART RATE: 88 BPM | HEIGHT: 63 IN | BODY MASS INDEX: 48.87 KG/M2 | SYSTOLIC BLOOD PRESSURE: 110 MMHG | OXYGEN SATURATION: 97 % | WEIGHT: 275.81 LBS

## 2024-06-05 DIAGNOSIS — L30.4 INTERTRIGO: Primary | ICD-10-CM

## 2024-06-05 DIAGNOSIS — B36.9 FUNGAL INFECTION OF SKIN: ICD-10-CM

## 2024-06-05 PROCEDURE — 3078F DIAST BP <80 MM HG: CPT | Mod: CPTII,S$GLB,, | Performed by: NURSE PRACTITIONER

## 2024-06-05 PROCEDURE — 3008F BODY MASS INDEX DOCD: CPT | Mod: CPTII,S$GLB,, | Performed by: NURSE PRACTITIONER

## 2024-06-05 PROCEDURE — 3044F HG A1C LEVEL LT 7.0%: CPT | Mod: CPTII,S$GLB,, | Performed by: NURSE PRACTITIONER

## 2024-06-05 PROCEDURE — 99999 PR PBB SHADOW E&M-EST. PATIENT-LVL IV: CPT | Mod: PBBFAC,,, | Performed by: NURSE PRACTITIONER

## 2024-06-05 PROCEDURE — 3074F SYST BP LT 130 MM HG: CPT | Mod: CPTII,S$GLB,, | Performed by: NURSE PRACTITIONER

## 2024-06-05 PROCEDURE — 99214 OFFICE O/P EST MOD 30 MIN: CPT | Mod: S$GLB,,, | Performed by: NURSE PRACTITIONER

## 2024-06-05 PROCEDURE — 1159F MED LIST DOCD IN RCRD: CPT | Mod: CPTII,S$GLB,, | Performed by: NURSE PRACTITIONER

## 2024-06-05 RX ORDER — NYSTATIN 100000 [USP'U]/G
POWDER TOPICAL EVERY MORNING
Qty: 30 G | Refills: 2 | Status: SHIPPED | OUTPATIENT
Start: 2024-06-05 | End: 2024-06-19

## 2024-06-05 RX ORDER — KETOCONAZOLE 20 MG/G
CREAM TOPICAL NIGHTLY
Qty: 30 G | Refills: 1 | Status: SHIPPED | OUTPATIENT
Start: 2024-06-05 | End: 2024-06-19

## 2024-06-10 ENCOUNTER — PATIENT MESSAGE (OUTPATIENT)
Dept: INTERNAL MEDICINE | Facility: CLINIC | Age: 34
End: 2024-06-10
Payer: COMMERCIAL

## 2024-06-14 ENCOUNTER — ROUTINE PRENATAL (OUTPATIENT)
Dept: OBSTETRICS AND GYNECOLOGY | Facility: CLINIC | Age: 34
End: 2024-06-14
Payer: COMMERCIAL

## 2024-06-14 ENCOUNTER — HOSPITAL ENCOUNTER (EMERGENCY)
Facility: OTHER | Age: 34
Discharge: HOME | End: 2024-06-14
Attending: OBSTETRICS & GYNECOLOGY
Payer: COMMERCIAL

## 2024-06-14 ENCOUNTER — PATIENT MESSAGE (OUTPATIENT)
Dept: OTHER | Facility: OTHER | Age: 34
End: 2024-06-14
Payer: COMMERCIAL

## 2024-06-14 VITALS
WEIGHT: 278.44 LBS | BODY MASS INDEX: 49.32 KG/M2 | SYSTOLIC BLOOD PRESSURE: 118 MMHG | DIASTOLIC BLOOD PRESSURE: 78 MMHG

## 2024-06-14 VITALS
OXYGEN SATURATION: 97 % | HEART RATE: 89 BPM | TEMPERATURE: 98 F | RESPIRATION RATE: 16 BRPM | SYSTOLIC BLOOD PRESSURE: 124 MMHG | DIASTOLIC BLOOD PRESSURE: 72 MMHG

## 2024-06-14 DIAGNOSIS — Z3A.32 32 WEEKS GESTATION OF PREGNANCY: Primary | ICD-10-CM

## 2024-06-14 DIAGNOSIS — N89.8 VAGINAL DISCHARGE DURING PREGNANCY IN THIRD TRIMESTER: Primary | ICD-10-CM

## 2024-06-14 DIAGNOSIS — O26.893 VAGINAL DISCHARGE DURING PREGNANCY IN THIRD TRIMESTER: Primary | ICD-10-CM

## 2024-06-14 DIAGNOSIS — O47.9 UTERINE CONTRACTIONS: ICD-10-CM

## 2024-06-14 LAB
BILIRUB SERPL-MCNC: ABNORMAL MG/DL
BLOOD URINE, POC: ABNORMAL
COLOR, POC UA: ABNORMAL
GLUCOSE UR QL STRIP: ABNORMAL
KETONES UR QL STRIP: ABNORMAL
LEUKOCYTE ESTERASE URINE, POC: ABNORMAL
NITRITE, POC UA: ABNORMAL
PH, POC UA: 6
PROTEIN, POC: ABNORMAL
SPECIFIC GRAVITY, POC UA: 1.01
UROBILINOGEN, POC UA: ABNORMAL

## 2024-06-14 PROCEDURE — 99284 EMERGENCY DEPT VISIT MOD MDM: CPT | Mod: 25,,, | Performed by: OBSTETRICS & GYNECOLOGY

## 2024-06-14 PROCEDURE — 99284 EMERGENCY DEPT VISIT MOD MDM: CPT | Mod: 25

## 2024-06-14 PROCEDURE — 99999 PR PBB SHADOW E&M-EST. PATIENT-LVL III: CPT | Mod: PBBFAC,,, | Performed by: NURSE PRACTITIONER

## 2024-06-14 PROCEDURE — 81002 URINALYSIS NONAUTO W/O SCOPE: CPT

## 2024-06-14 PROCEDURE — 59025 FETAL NON-STRESS TEST: CPT

## 2024-06-14 PROCEDURE — 59025 FETAL NON-STRESS TEST: CPT | Mod: 26,,, | Performed by: OBSTETRICS & GYNECOLOGY

## 2024-06-14 NOTE — PROGRESS NOTES
Here for routine OB appt at 32w0d. Feeling fatigued. Reports good FM. She does endorse cramping worse in the afternoon. Denies any regularity to cramps. For the past week, she has noticed a trickle of fluid. She notices this is worse when she goes from sitting to standing. She notices that her underwear are always damp. Denies VB.   Referred to OB ER to be evaluated for LOF. Call to OB ER. Warm hand off given.   Discussed Tdap vaccine. Deferred today due to patient presenting to OB ER. Can consider at [facility] in 2 weeks.   F/U scheduled 2 weeks

## 2024-06-14 NOTE — ED PROVIDER NOTES
Encounter Date: 2024       History     Chief Complaint   Patient presents with     Premature Rupture Of Membranes     Sonam Hidalgo is a 34 y.o. [id number] at 32w0d presents complaining of LOF and abdominal cramping.   This IUP is complicated by gHTN, depression, migraines, and obesity (PP BMI 46).  Patient reports contractions, denies vaginal bleeding, reports LOF.   Fetal Movement: normal    Patient reports LOF last Thursday or Friday and she has seen scant trickle of fluid since. She reports clear in color however was once green upon wiping. She also reports intermittent cramping for the past two weeks, about 1-1.5 hours apart.     The history is provided by the patient. No  was used.       Review of patient's allergies indicates:  No Known Allergies  Past Medical History:   Diagnosis Date    Abnormal Pap smear of vagina     Allergy     Anxiety     Hypertension     Pre-eclampsia      Past Surgical History:   Procedure Laterality Date    CHOLECYSTECTOMY      LAPAROSCOPIC SURGICAL REMOVAL OF CYST OF OVARY Left 2019    Procedure: EXCISION, CYST, OVARY, LAPAROSCOPIC;  Surgeon: Merary Benton MD;  Location: [facility];  Service: OB/GYN;  Laterality: Left;    median arcuate ligament        Family History   Problem Relation Name Age of Onset    Diabetes Mother      Breast cancer Mother      Diabetes Father      Alcohol abuse Father      Dementia Father          vascular and ? lewey    Hypertension Brother      Celiac disease Neg Hx      Cirrhosis Neg Hx      Colon cancer Neg Hx      Colon polyps Neg Hx      Crohn's disease Neg Hx      Cystic fibrosis Neg Hx      Esophageal cancer Neg Hx      Hemochromatosis Neg Hx      Inflammatory bowel disease Neg Hx      Irritable bowel syndrome Neg Hx      Liver cancer Neg Hx      Liver disease Neg Hx      Rectal cancer Neg Hx      Stomach cancer Neg Hx      Ulcerative colitis Neg Hx      Jacob's disease Neg Hx      Ovarian cancer  Neg Hx      Arrhythmia Neg Hx      Cardiomyopathy Neg Hx      Congenital heart disease Neg Hx      Early death Neg Hx      Heart attacks under age 50 Neg Hx      Pacemaker/defibrilator Neg Hx       Social History     Tobacco Use    Smoking status: Never    Smokeless tobacco: Never   Substance Use Topics    Alcohol use: Not Currently     Comment: 1 glass wine on occ    Drug use: No     Review of Systems   Constitutional:  Negative for chills and fever.   Respiratory:  Negative for shortness of breath.    Cardiovascular:  Negative for chest pain.   Gastrointestinal:  Positive for abdominal pain. Negative for nausea and vomiting.   Genitourinary:  Positive for vaginal discharge.   Neurological:  Negative for light-headedness and headaches.   Psychiatric/Behavioral:  Negative for confusion. The patient is not nervous/anxious.        Physical Exam     Initial Vitals [06/14/24 1713]   BP Pulse Resp Temp SpO2   126/72 86 16 98.2 °F (36.8 °C) 97 %      MAP       --       Temp:  [98.2 °F (36.8 °C)] 98.2 °F (36.8 °C)  Pulse:  [81-91] 89  Resp:  [16] 16  SpO2:  [97 %-98 %] 97 %  BP: (111-138)/(58-78) 124/72    Physical Exam    Vitals reviewed.  Constitutional: She appears well-developed and well-nourished.   HENT:   Head: Normocephalic and atraumatic.   Eyes: Conjunctivae are normal.   Cardiovascular:  Normal rate.           Pulmonary/Chest: No respiratory distress.   Normal work of breathing   Abdominal:   Gravid abdomen     Neurological: She is alert and oriented to person, place, and time.   Skin: Skin is warm and dry.   Psychiatric: She has a normal mood and affect. Her behavior is normal. Thought content normal.     OB LABOR EXAM:       Method: Sterile speculum exam per MD and Sterile vaginal exam per MD.       Dilatation: 2.   Station: -3.   Effacement: 50%.   Amniotic Fluid Color: no fluid.     Comments: Negative nitrazine or pooling on examination   Unchanged on repeat SVE 2 hrs later       ED Course   Obtain Fetal  nonstress test (NST)    Date/Time: 2024 4:57 PM    Performed by: Abi Cardozo MD  Authorized by: Clara Pedersen MD    Nonstress Test:     Variability:  6-25 BPM    Decelerations:  None    Accelerations:  15 bpm    Baseline:  135    Contractions:  Irregular  Biophysical Profile:     Nonstress Test Interpretation: reactive      Overall Impression:  Reassuring  Post-procedure:     Patient tolerance:  Patient tolerated the procedure well with no immediate complications    Labs Reviewed   POCT URINALYSIS, DIPSTICK OR TABLET REAGENT, AUTOMATED, WITH MICROSCOP - Abnormal   POCT PH OF BODY FLUIDS   POCT FERN TEST, AMNIO          Imaging Results    None          Medications - No data to display  Medical Decision Making  Rule-out rupture:  - negative pooling and nitrazine  - MVP with 4.6cm cm pocket of fluid  - Good FM noted on U/S  - Exam reassuring for intact membranes  - SVE 2/50/-3, unchanged with reexamination   - No concern for PPROM or PTL at this time      Stable for discharge at this time. ED return precautions given.      Abi Cardozo MD PGY1  Obstetrics and Gynecology        Amount and/or Complexity of Data Reviewed  Labs: ordered.              Attending Attestation:   Physician Attestation Statement for Resident:  As the supervising MD   Physician Attestation Statement: I have personally seen and examined this patient.   I agree with the above history.  -:   As the supervising MD I agree with the above PE.     As the supervising MD I agree with the above treatment, course, plan, and disposition.   -: I agree with the above edited resident note. Pt seen and examined, chart and labs reviewed.    Briefly, 33 yo  at 32w0d presenting for r/o PPROM and r/o PTL. Afebrile, VSS. NST Cat I reactive, [place] with irregular ctx. SSE neg pooling, neg nitrazine, neg valsalva and MVP on BSS 4cm. SVE 2/50/-3. Repeat SVE 2 hrs later unchanged. Exam reassuring for intact membranes and not c/w PTL. Precautions  reviewed.     All questions answered. Stable for d/c home with outpatient follow up.     Clara Pedersen MD  OB Hospitalist  6/14/2024     I was personally present during the critical portions of the procedure(s) performed by the resident and was immediately available in the ED to provide services and assistance as needed during the entire procedure.  I have reviewed and agree with the residents interpretation of the following: lab data.  I have reviewed the following: old records at this facility.                                       Clinical Impression:  Final diagnoses:  [O26.893, N89.8] Vaginal discharge during pregnancy in third trimester (Primary)  [O47.9] Uterine contractions  [P07.35] 32 week prematurity          ED Disposition Condition    Discharge Stable          ED Prescriptions    None       Follow-up Information    None          Clara Pedersen MD  06/14/24 1386

## 2024-06-15 NOTE — DISCHARGE INSTRUCTIONS
Please attend all remaining prenatal visits. Please call or visit your provider, the OB clinic, or the OB ED at [phone] if you have:    - Increased vaginal discharge  - Vaginal bleeding  - Contractions that are 2-5 minutes apart for two hours  - A blood pressure at or above 160/110  - Blurry or spots in your vision   - Upper right abdominal pain  - A headache unrelieved by medication  - Decreased fetal movement (less than 10 kicks in two hours).

## 2024-06-17 ENCOUNTER — OFFICE VISIT (OUTPATIENT)
Dept: PSYCHIATRY | Facility: CLINIC | Age: 34
End: 2024-06-17
Payer: COMMERCIAL

## 2024-06-17 DIAGNOSIS — Z87.59 HISTORY OF POSTPARTUM DEPRESSION: ICD-10-CM

## 2024-06-17 DIAGNOSIS — F32.A DEPRESSION, UNSPECIFIED DEPRESSION TYPE: Primary | ICD-10-CM

## 2024-06-17 DIAGNOSIS — Z86.59 HISTORY OF POSTPARTUM DEPRESSION: ICD-10-CM

## 2024-06-17 PROCEDURE — 90837 PSYTX W PT 60 MINUTES: CPT | Mod: 95,,, | Performed by: SOCIAL WORKER

## 2024-06-17 PROCEDURE — 90785 PSYTX COMPLEX INTERACTIVE: CPT | Mod: 95,,, | Performed by: SOCIAL WORKER

## 2024-06-17 PROCEDURE — 3044F HG A1C LEVEL LT 7.0%: CPT | Mod: CPTII,95,, | Performed by: SOCIAL WORKER

## 2024-06-17 NOTE — PROGRESS NOTES
The patient location is: [place]  The chief complaint leading to consultation is: depression, anxiety    Visit type: audiovisual    Face to Face time with patient: 55 mins  65 minutes of total time spent on the encounter, which includes face to face time and non-face to face time preparing to see the patient (eg, review of tests), Obtaining and/or reviewing separately obtained history, Documenting clinical information in the electronic or other health record, Independently interpreting results (not separately reported) and communicating results to the patient/family/caregiver, or Care coordination (not separately reported).     Each patient to whom he or she provides medical services by telemedicine is:  (1) informed of the relationship between the physician and patient and the respective role of any other health care provider with respect to management of the patient; and (2) notified that he or she may decline to receive medical services by telemedicine and may withdraw from such care at any time.    Notes:     Individual Psychotherapy (PhD/LCSW)    2024     Site:  Telemed         Therapeutic Intervention: Met with patient.  Outpatient - Insight oriented psychotherapy 60 min - CPT code 88026 and Outpatient - Supportive psychotherapy 60 min - CPT Code 38541    Chief complaint/reason for encounter: depression and anxiety     Interval history and content of current session: Patient returns for follow up psychotherapy. Patient is present for STeP session #8.     EPDS/PHQ9:    6/3/2024   [place]  Depression Scale    I have been able to laugh and see the funny side of things. 1    I have looked forward with enjoyment to things. 0    I have blamed myself unnecessarily when things went wrong. 2    I have been anxious or worried for no good reason. 2    I have felt scared or panicky for no good reason. 0    Things have been getting on top of me. 2    I have been so unhappy that I have had difficulty  "sleeping. 1    I have felt sad or miserable. 1    I have been so unhappy that I have been crying. 0    The thought of harming myself has occurred to me. 0      Reports mood as "    Update: reports that last Monday sent the morning in court because JULIO C had filed child support on himself. When he got subpoena he tried to talk patient out of it. When she told him what it would cost he started to talk about custody. JULIO C did not show up for court and she had to sit there all morning waiting for him. Eventually child support was calculated and it is going to be garnished from JULIO C's pay. Reports that she also recently spent a night in the [facility]. Was having some cramping and they decided to monitor her and the baby. Discussed monitoring herself and baby's movements so that she can be aware if she needs to go to hospital. Scheduled follow up for 7/15    Pregnancy Status: 32 weeks  Medication Desire: currently on medication    Goals:   Values: discussed in previous sessions.  Cognitive distortions: discussed at previous sessions.  Challenging Cognitive Distortions: discussed at previous session  Behavioral Activation: discussed at previous session  Meditation/Mindfulness: discussed in previous session  Self Care: discussed in previous session    Action Plan:  Values: continue to identify values for herself.   Cognitive Distortions: continue to identify cognitive distortions  Challenge cognitive distortions: continue to work on reframing thoughts  Behavioral activation: continue to use behavioral activation schedule  Meditation/Mindfulness: continue to practice mindfulness  Nutrition/Movement: sent patient information on mental health and exercise/nutrition    Treatment plan:  Target symptoms: depression, anxiety   Why chosen therapy is appropriate versus another modality: relevant to diagnosis, evidence based practice  Outcome monitoring methods: self-report, observation  Therapeutic intervention type: insight oriented " psychotherapy, supportive psychotherapy    Risk parameters:  Patient reports no suicidal ideation  Patient reports no homicidal ideation  Patient reports no self-injurious behavior  Patient reports no violent behavior    Verbal deficits: None    Patient's response to intervention:  The patient's response to intervention is accepting.    Progress toward goals and other mental status changes:  The patient's progress toward goals is good.    Diagnosis:   Encounter Diagnoses   Name Primary?    Depression, unspecified depression type Yes    History of postpartum depression        Plan:  individual psychotherapy    Return to clinic: 1 week    Length of Service (minutes): 60

## 2024-06-24 ENCOUNTER — OFFICE VISIT (OUTPATIENT)
Dept: PSYCHIATRY | Facility: CLINIC | Age: 34
End: 2024-06-24
Payer: COMMERCIAL

## 2024-06-24 DIAGNOSIS — F32.A DEPRESSION, UNSPECIFIED DEPRESSION TYPE: Primary | ICD-10-CM

## 2024-06-24 DIAGNOSIS — Z87.59 HISTORY OF POSTPARTUM DEPRESSION: ICD-10-CM

## 2024-06-24 DIAGNOSIS — Z86.59 HISTORY OF POSTPARTUM DEPRESSION: ICD-10-CM

## 2024-06-24 PROCEDURE — 90837 PSYTX W PT 60 MINUTES: CPT | Mod: 95,,, | Performed by: SOCIAL WORKER

## 2024-06-24 PROCEDURE — 3044F HG A1C LEVEL LT 7.0%: CPT | Mod: CPTII,95,, | Performed by: SOCIAL WORKER

## 2024-06-24 PROCEDURE — 90785 PSYTX COMPLEX INTERACTIVE: CPT | Mod: 95,,, | Performed by: SOCIAL WORKER

## 2024-06-24 NOTE — PROGRESS NOTES
The patient location is: [place]  The chief complaint leading to consultation is: depression, anxiety    Visit type: audiovisual    Face to Face time with patient: 55 mins  65 minutes of total time spent on the encounter, which includes face to face time and non-face to face time preparing to see the patient (eg, review of tests), Obtaining and/or reviewing separately obtained history, Documenting clinical information in the electronic or other health record, Independently interpreting results (not separately reported) and communicating results to the patient/family/caregiver, or Care coordination (not separately reported).     Each patient to whom he or she provides medical services by telemedicine is:  (1) informed of the relationship between the physician and patient and the respective role of any other health care provider with respect to management of the patient; and (2) notified that he or she may decline to receive medical services by telemedicine and may withdraw from such care at any time.    Notes:     Individual Psychotherapy (PhD/LCSW)    2024     Site:  Telemed         Therapeutic Intervention: Met with patient.  Outpatient - Insight oriented psychotherapy 60 min - CPT code 43153 and Outpatient - Supportive psychotherapy 60 min - CPT Code 03528    Chief complaint/reason for encounter: depression and anxiety     Interval history and content of current session: Patient returns for follow up psychotherapy. Patient is present for STeP session #10.     EPDS/PHQ9:    2024   [place]  Depression Scale    I have been able to laugh and see the funny side of things. 1    I have looked forward with enjoyment to things. 0    I have blamed myself unnecessarily when things went wrong. 2    I have been anxious or worried for no good reason. 0    I have felt scared or panicky for no good reason. 1    Things have been getting on top of me. 1    I have been so unhappy that I have had difficulty  "sleeping. 1    I have felt sad or miserable. 1    I have been so unhappy that I have been crying. 1    The thought of harming myself has occurred to me. 0      Reports mood as "up and down".     Update: reports that she has had ups and down and trying to focus on what she can and cannot control. Has been trying to get flea infestation taken care of and is struggling with what option to do. Got another letter to appear in court again for child support. Gets frustrated because she paid some bills with the intention of getting child support to pay for school and cheer. Now she is worried that she isn't going to get child support until appeal is over. Feels frustrated with FOB and his behaviors as of late.     Scheduled follow up for 7/15    Pregnancy Status: 33 weeks  Medication Desire: currently on medication    Goals:   Values: discussed in previous sessions.  Cognitive distortions: discussed at previous sessions.  Challenging Cognitive Distortions: discussed at previous session  Behavioral Activation: discussed at previous session  Meditation/Mindfulness: discussed in previous session  Self Care: discussed in previous session    Action Plan:  Values: continue to identify values for herself.   Cognitive Distortions: continue to identify cognitive distortions  Challenge cognitive distortions: continue to work on reframing thoughts  Behavioral activation: continue to use behavioral activation schedule  Meditation/Mindfulness: continue to practice mindfulness  Nutrition/Movement: sent patient information on mental health and exercise/nutrition    Treatment plan:  Target symptoms: depression, anxiety   Why chosen therapy is appropriate versus another modality: relevant to diagnosis, evidence based practice  Outcome monitoring methods: self-report, observation  Therapeutic intervention type: insight oriented psychotherapy, supportive psychotherapy    Risk parameters:  Patient reports no suicidal ideation  Patient reports " no homicidal ideation  Patient reports no self-injurious behavior  Patient reports no violent behavior    Verbal deficits: None    Patient's response to intervention:  The patient's response to intervention is accepting.    Progress toward goals and other mental status changes:  The patient's progress toward goals is good.    Diagnosis:   Encounter Diagnoses   Name Primary?    Depression, unspecified depression type Yes    History of postpartum depression      Plan:  individual psychotherapy    Return to clinic: 1 week    Length of Service (minutes): 60

## 2024-06-28 ENCOUNTER — ROUTINE PRENATAL (OUTPATIENT)
Dept: OBSTETRICS AND GYNECOLOGY | Facility: CLINIC | Age: 34
End: 2024-06-28
Payer: COMMERCIAL

## 2024-06-28 VITALS — SYSTOLIC BLOOD PRESSURE: 126 MMHG | WEIGHT: 278 LBS | BODY MASS INDEX: 49.25 KG/M2 | DIASTOLIC BLOOD PRESSURE: 74 MMHG

## 2024-06-28 DIAGNOSIS — O36.63X0 EXCESSIVE FETAL GROWTH AFFECTING MANAGEMENT OF PREGNANCY IN THIRD TRIMESTER, SINGLE OR UNSPECIFIED FETUS: ICD-10-CM

## 2024-06-28 DIAGNOSIS — Z3A.34 34 WEEKS GESTATION OF PREGNANCY: Primary | ICD-10-CM

## 2024-06-28 PROCEDURE — 99999 PR PBB SHADOW E&M-EST. PATIENT-LVL III: CPT | Mod: PBBFAC,,, | Performed by: OBSTETRICS & GYNECOLOGY

## 2024-06-28 NOTE — PROGRESS NOTES
Pregnancy dating, labs, ultrasound reports, prenatal testing, and problem list; prior records and results; and available outside records were reviewed and updated in EMR.  Pertinent findings were noted below.    Reason for Visit   Routine Prenatal Visit    HPI   34 y.o., at 34w0d by Estimated Date of Delivery: 24    Denies SOB or blurry vision.  She had a HA and had TYlenol and sudafed and it imropoved    Contractions: No   Bleeding: No   Loss of fluid: No   Fetal movement: Yes   Nausea: No   Vomiting: No   Headache: Yes     Exam   /74   Wt 126.1 kg (278 lb)   LMP 2023 (Exact Date)   BMI 49.25 kg/m²     GENERAL: No acute distress  ABD: Gravid    Assessment and Plan   34 weeks gestation of pregnancy    Excessive fetal growth affecting management of pregnancy in third trimester, single or unspecified fetus      US is scheduled for MONDAY for EFW     labor and preE precautions given  Follow-up: 1 week

## 2024-07-01 ENCOUNTER — PROCEDURE VISIT (OUTPATIENT)
Dept: MATERNAL FETAL MEDICINE | Facility: CLINIC | Age: 34
End: 2024-07-01
Payer: COMMERCIAL

## 2024-07-01 DIAGNOSIS — Z36.2 ENCOUNTER FOR FOLLOW-UP ULTRASOUND OF FETAL ANATOMY: Primary | ICD-10-CM

## 2024-07-01 DIAGNOSIS — Z36.89 ENCOUNTER FOR ULTRASOUND TO ASSESS FETAL GROWTH: ICD-10-CM

## 2024-07-01 PROCEDURE — 76819 FETAL BIOPHYS PROFIL W/O NST: CPT | Mod: S$GLB,,, | Performed by: OBSTETRICS & GYNECOLOGY

## 2024-07-01 PROCEDURE — 76816 OB US FOLLOW-UP PER FETUS: CPT | Mod: S$GLB,,, | Performed by: OBSTETRICS & GYNECOLOGY

## 2024-07-05 ENCOUNTER — ROUTINE PRENATAL (OUTPATIENT)
Dept: OBSTETRICS AND GYNECOLOGY | Facility: CLINIC | Age: 34
End: 2024-07-05
Payer: COMMERCIAL

## 2024-07-05 ENCOUNTER — PATIENT MESSAGE (OUTPATIENT)
Dept: OTHER | Facility: OTHER | Age: 34
End: 2024-07-05
Payer: COMMERCIAL

## 2024-07-05 VITALS
WEIGHT: 279.13 LBS | DIASTOLIC BLOOD PRESSURE: 78 MMHG | BODY MASS INDEX: 49.44 KG/M2 | SYSTOLIC BLOOD PRESSURE: 112 MMHG

## 2024-07-05 DIAGNOSIS — F32.A DEPRESSION, UNSPECIFIED DEPRESSION TYPE: ICD-10-CM

## 2024-07-05 DIAGNOSIS — Z3A.35 35 WEEKS GESTATION OF PREGNANCY: Primary | ICD-10-CM

## 2024-07-05 DIAGNOSIS — E66.01 MORBIDLY OBESE: ICD-10-CM

## 2024-07-05 DIAGNOSIS — I10 HYPERTENSION, UNSPECIFIED TYPE: ICD-10-CM

## 2024-07-05 PROCEDURE — 99999 PR PBB SHADOW E&M-EST. PATIENT-LVL III: CPT | Mod: PBBFAC,,, | Performed by: OBSTETRICS & GYNECOLOGY

## 2024-07-05 PROCEDURE — 87081 CULTURE SCREEN ONLY: CPT | Performed by: OBSTETRICS & GYNECOLOGY

## 2024-07-05 NOTE — PROGRESS NOTES
Pregnancy dating, labs, ultrasound reports, prenatal testing, and problem list; prior records and results; and available outside records were reviewed and updated in EMR.  Pertinent findings were noted below.    Reason for Visit   Routine Prenatal Visit    HPI   34 y.o., at 35w0d by Estimated Date of Delivery: 24    She has some ctx and pressure    Contractions: Yes   Bleeding: No   Loss of fluid: No   Fetal movement: Yes   Nausea: No   Vomiting: No   Headache: No     Exam   /78   Wt 126.6 kg (279 lb 1.6 oz)   LMP 2023 (Exact Date)   BMI 49.44 kg/m²     GENERAL: No acute distress  ABD: Gravid    Assessment and Plan   35 weeks gestation of pregnancy    Morbidly obese    Depression, unspecified depression type    Hypertension, unspecified type      [facility] BID     labor and Labor precautions given  Follow-up: 1 week

## 2024-07-06 LAB — BACTERIA SPEC AEROBE CULT: NORMAL

## 2024-07-09 ENCOUNTER — PATIENT MESSAGE (OUTPATIENT)
Dept: PSYCHIATRY | Facility: CLINIC | Age: 34
End: 2024-07-09
Payer: COMMERCIAL

## 2024-07-11 ENCOUNTER — TELEPHONE (OUTPATIENT)
Dept: OBSTETRICS AND GYNECOLOGY | Facility: CLINIC | Age: 34
End: 2024-07-11
Payer: COMMERCIAL

## 2024-07-12 ENCOUNTER — ROUTINE PRENATAL (OUTPATIENT)
Dept: OBSTETRICS AND GYNECOLOGY | Facility: CLINIC | Age: 34
End: 2024-07-12
Payer: COMMERCIAL

## 2024-07-12 VITALS
WEIGHT: 278.44 LBS | SYSTOLIC BLOOD PRESSURE: 112 MMHG | BODY MASS INDEX: 49.32 KG/M2 | DIASTOLIC BLOOD PRESSURE: 70 MMHG

## 2024-07-12 DIAGNOSIS — Z34.80 SUPERVISION OF OTHER NORMAL PREGNANCY, ANTEPARTUM: Primary | ICD-10-CM

## 2024-07-12 PROCEDURE — 99999 PR PBB SHADOW E&M-EST. PATIENT-LVL II: CPT | Mod: PBBFAC,,, | Performed by: NURSE PRACTITIONER

## 2024-07-12 NOTE — PROGRESS NOTES
Here for routine OB appt at 36w0d, with complaints of more cramping.  Reports good FM- less pronounced now.  Denies LOF, denies VB, and reports irregular contractions.  Reviewed warning signs of Labor and Preeclampsia.  Daily FM counts reinforced.  T3 labs next visit.   F/U scheduled 1 week

## 2024-07-15 ENCOUNTER — HOSPITAL ENCOUNTER (EMERGENCY)
Facility: OTHER | Age: 34
Discharge: HOME | End: 2024-07-15
Attending: OBSTETRICS & GYNECOLOGY
Payer: COMMERCIAL

## 2024-07-15 ENCOUNTER — OFFICE VISIT (OUTPATIENT)
Dept: PSYCHIATRY | Facility: CLINIC | Age: 34
End: 2024-07-15
Payer: COMMERCIAL

## 2024-07-15 VITALS — HEART RATE: 79 BPM | DIASTOLIC BLOOD PRESSURE: 67 MMHG | OXYGEN SATURATION: 98 % | SYSTOLIC BLOOD PRESSURE: 116 MMHG

## 2024-07-15 DIAGNOSIS — Z86.59 HISTORY OF POSTPARTUM DEPRESSION: ICD-10-CM

## 2024-07-15 DIAGNOSIS — O47.9 UTERINE CONTRACTIONS: Primary | ICD-10-CM

## 2024-07-15 DIAGNOSIS — F32.A DEPRESSION, UNSPECIFIED DEPRESSION TYPE: Primary | ICD-10-CM

## 2024-07-15 DIAGNOSIS — Z3A.36 36 WEEKS GESTATION OF PREGNANCY: ICD-10-CM

## 2024-07-15 DIAGNOSIS — Z87.59 HISTORY OF POSTPARTUM DEPRESSION: ICD-10-CM

## 2024-07-15 PROCEDURE — 99284 EMERGENCY DEPT VISIT MOD MDM: CPT | Mod: ,,, | Performed by: OBSTETRICS & GYNECOLOGY

## 2024-07-15 PROCEDURE — 90785 PSYTX COMPLEX INTERACTIVE: CPT | Mod: 95,,, | Performed by: SOCIAL WORKER

## 2024-07-15 PROCEDURE — 59025 FETAL NON-STRESS TEST: CPT

## 2024-07-15 PROCEDURE — 99284 EMERGENCY DEPT VISIT MOD MDM: CPT | Mod: 25

## 2024-07-15 PROCEDURE — 90837 PSYTX W PT 60 MINUTES: CPT | Mod: 95,,, | Performed by: SOCIAL WORKER

## 2024-07-15 PROCEDURE — 3044F HG A1C LEVEL LT 7.0%: CPT | Mod: CPTII,95,, | Performed by: SOCIAL WORKER

## 2024-07-15 NOTE — PROGRESS NOTES
ED General





- General


Chief Complaint: Shortness Of Breath


Stated Complaint: COUGH


Time Seen by Provider: 05/25/20 14:25


Mode of Arrival: Ambulatory


Information source: Patient


Notes: 





33-year-old healthy male recently discharged from the  presents to the 

emergency department with complaints of fever, shortness of breath, productive 

cough.  Patient gives history of symptoms started May 15 he became tired 

lightheaded.  Reports he had a fever up to 102.2.  He reports he started coughin

g last week with white clear bubbly sputum.  [facility] [place] [facility] 

Saturday.  They treated him for an ear infection which he is taking antibiotics 

twice a day for 10 days.  Told him he had a viral infection also prescribed him 

an inhaler Mucinex and cough drops.  He reports he had a chest x-ray done and 

was told it was good.  He reports his ear Is feeling a little bit better.  He 

reports he is still short of breath he went for a walk today became extremely 

exhausted.  He reports a little bit of diarrhea this morning but nothing big.  

Denies vomiting.  Reports decreased appetite because when he eats he gets very 

short of breath.  Denies recent trip.  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  Reports he was tested for the COVID as 

well as wife and they were both negative.  Patient also reports he is a smoker. 

Has not smoked anything since May 15 due to his shortness of breath.





- HPI


Onset: Other - May 15


Onset/Duration: Persistent


Associated symptoms: Productive cough, Fever, Nausea, Shortness of breath


Exacerbated by: Walking


Relieved by: Denies


Similar symptoms previously: Yes


Recently seen / treated by doctor: Yes





- Related Data


Allergies/Adverse Reactions: 


                                        





No Known Drug Allergies Allergy (Verified 05/25/20 14:14)


   








Home Medications: INHALER, TYLENOL, IBUPROFEN





Past Medical History





- General


Information source: Patient





- Social History


Smoking Status: Current Every Day Smoker


Cigarette use (# per day): Yes


Chew tobacco use (# tins/day): No


Frequency of alcohol use: None


Drug Abuse: None


Occupation: Recent discharge from the , unemployed now


Lives with: Family


Family History: None


Patient has suicidal ideation: No


Patient has homicidal ideation: No


Pulmonary Medical History: Reports: Hx Bronchitis, Hx Pneumonia


Past Surgical History: Reports: Hx Orthopedic Surgery - RIGHT KNEE, JAW





Review of Systems





- Review of Systems


Notes: 





Review HPI for review of systems., All other systems negative





Physical Exam





- Vital signs


Vitals: 


                                        











Temp


 


 98.8 F 


 


 05/25/20 14:05














- General


General appearance: Alert


In distress: None





- HEENT


Head: Normocephalic, Atraumatic


Eyes: Normal


Conjunctiva: Normal


Extraocular movements intact: Yes


Pupils: PERRL


Ears: Normal


External canal: Normal


Tympanic membrane: Injected - Left


Nasal: Normal


Mucous membranes: Moist


Pharynx: Normal


Neck: Normal, Supple.  No: Lymphadenopathy





- Respiratory


Respiratory status: No respiratory distress


Chest status: Nontender


Breath sounds: Decreased air movement, Rhonchi


Chest palpation: Normal





- Cardiovascular


Rhythm: Regular


Heart sounds: Normal auscultation


Murmur: No





- Abdominal


Inspection: Normal


Distension: No distension


Bowel sounds: Normal


Tenderness: Nontender


Organomegaly: No organomegaly





- Back


Back: Normal, Nontender





- Extremities


General upper extremity: Normal color, Normal ROM


General lower extremity: Normal color, Normal ROM





- Neurological


Neuro grossly intact: Yes


Cognition: Normal


Orientation: AAOx4


Allyson Coma Scale Eye Opening: Spontaneous


Allyson Coma Scale Verbal: Oriented


Allyson Coma Scale Motor: Obeys Commands


Allyson Coma Scale Total: 15


Speech: Normal





- Psychological


Associated symptoms: Normal affect, Normal mood





- Skin


Skin Temperature: Warm


Skin Moisture: Dry


Skin Color: Normal





Course





- Re-evaluation


Re-evalutation: 





05/25/20 15:06


33-year-old relatively healthy male presents with shortness of breath fever 

cough.  Recently tested for the COVID at [facility] which was negative.  Patient 

presents here with increased shortness of breath reports low oxygen levels.  O2 

sat 90% on room air.  2 L nasal cannula placed.  Labs ordered as well as CT of 

the chest.


05/25/20 18:00


I ambulated the patient around on room air.  Patient O2 sat stayed between 92 to

93%.  Patient became extremely short of breath heart rate bumped up to 115.  CTA

notes groundglass attenuation in both lungs predominantly in the right which is 

reported as a common feature of COVID-19, pneumonia.  I consulted Malou MICHAELS, the hospital lead for admissions. She is going to talk to the ICU 

intensivist, dr younger


                                        





Chest X-Ray  05/25/20 00:00


IMPRESSION:  HAZY AIRSPACE DISEASE IN THE LUNG BASES.


 








Chest/Abdomen CTA  05/25/20 14:49


IMPRESSION:


1. Patchy areas of tree-in-bud and ground-glass attenuation in both lungs, 

predominantly in the right lower lobe.  Commonly reported imaging features of 

COVID-19  pneumonia are present. Other processes such as influenza pneumonia and

organizing pneumonia, as can be seen with drug toxicity and connective tissue 

disease, can cause a similar imaging pattern.


2. No pulmonary embolism.  Evaluation is mildly limited due to respiratory 

motion and bolus timing.


3. Left renal cortical cyst.


 











Laboratory











  05/25/20 05/25/20 05/25/20





  15:17 15:17 15:17


 


WBC    10.3


 


RBC    4.88


 


Hgb    15.3


 


Hct    43.1


 


MCV    88


 


MCH    31.4


 


MCHC    35.5


 


RDW    12.4


 


Plt Count    370


 


Lymph % (Auto)    12.4 L


 


Mono % (Auto)    10.9


 


Eos % (Auto)    3.5


 


Baso % (Auto)    0.5


 


Absolute Neuts (auto)    7.5


 


Absolute Lymphs (auto)    1.3


 


Absolute Monos (auto)    1.1


 


Absolute Eos (auto)    0.4


 


Absolute Basos (auto)    0.0


 


Seg Neutrophils %    72.7


 


Sodium   


 


Potassium   


 


Chloride   


 


Carbon Dioxide   


 


Anion Gap   


 


BUN   


 


Creatinine   


 


Est GFR ( Amer)   


 


Est GFR (MDRD) Non-Af   


 


Glucose   


 


Calcium   


 


Total Bilirubin   


 


Direct Bilirubin   


 


Neonat Total Bilirubin   


 


Neonat Direct Bilirubin   


 


Neonat Indirect Bili   


 


AST   


 


ALT   


 


Alkaline Phosphatase   


 


Total Protein   


 


Albumin   


 


Urine Color   


 


Urine Appearance   


 


Urine pH   


 


Ur Specific Gravity   


 


Urine Protein   


 


Urine Glucose (UA)   


 


Urine Ketones   


 


Urine Blood   


 


Urine Nitrite   


 


Urine Bilirubin   


 


Urine Urobilinogen   


 


Ur Leukocyte Esterase   


 


Urine WBC (Auto)   


 


Urine RBC (Auto)   


 


Squamous Epi Cells Auto   


 


Urine Mucus (Auto)   


 


Urine Ascorbic Acid   


 


Influenza A (Rapid)   NEGATIVE 


 


Influenza B (Rapid)   NEGATIVE 


 


Group A Strep Rapid  NEGATIVE  














  05/25/20 05/25/20





  15:17 15:32


 


WBC  


 


RBC  


 


Hgb  


 


Hct  


 


MCV  


 


MCH  


 


MCHC  


 


RDW  


 


Plt Count  


 


Lymph % (Auto)  


 


Mono % (Auto)  


 


Eos % (Auto)  


 


Baso % (Auto)  


 


Absolute Neuts (auto)  


 


Absolute Lymphs (auto)  


 


Absolute Monos (auto)  


 


Absolute Eos (auto)  


 


Absolute Basos (auto)  


 


Seg Neutrophils %  


 


Sodium  136.9 L 


 


Potassium  4.0 


 


Chloride  98 


 


Carbon Dioxide  30 


 


Anion Gap  9 


 


BUN  8 


 


Creatinine  0.88 


 


Est GFR ( Amer)  > 60 


 


Est GFR (MDRD) Non-Af  > 60 


 


Glucose  95 


 


Calcium  9.7 


 


Total Bilirubin  0.6 


 


Direct Bilirubin  0.2 


 


Neonat Total Bilirubin  Not Reportable 


 


Neonat Direct Bilirubin  Not Reportable 


 


Neonat Indirect Bili  Not Reportable 


 


AST  43 


 


ALT  60 H 


 


Alkaline Phosphatase  87 


 


Total Protein  7.5 


 


Albumin  4.3 


 


Urine Color   YELLOW


 


Urine Appearance   CLEAR


 


Urine pH   6.0


 


Ur Specific Gravity   1.019


 


Urine Protein   NEGATIVE


 


Urine Glucose (UA)   NEGATIVE


 


Urine Ketones   TRACE H


 


Urine Blood   NEGATIVE


 


Urine Nitrite   NEGATIVE


 


Urine Bilirubin   NEGATIVE


 


Urine Urobilinogen   4.0 H


 


Ur Leukocyte Esterase   NEGATIVE


 


Urine WBC (Auto)   1


 


Urine RBC (Auto)   5


 


Squamous Epi Cells Auto   <1


 


Urine Mucus (Auto)   RARE


 


Urine Ascorbic Acid   20 H


 


Influenza A (Rapid)  


 


Influenza B (Rapid)  


 


Group A Strep Rapid  











05/25/20 18:40


Dr. Stafford in patient's room to assess


05/25/20 18:59


Patient to be admitted for hypoxia, cough, present under investigation for 

possible COVID-19.  Dr. Stafford reports he informed patient he will be admitted.





- Vital Signs


Vital signs: 


                                        











Temp Pulse Resp BP Pulse Ox


 


 98.8 F   81   24 H  136/81 H  92 


 


 05/25/20 14:08  05/25/20 14:08  05/25/20 14:08  05/25/20 14:08  05/25/20 15:24














- Laboratory


Result Diagrams: 


                                 05/25/20 15:17





                                 05/25/20 15:17


Laboratory results interpreted by me: 


                                        











  05/25/20 05/25/20 05/25/20





  15:17 15:17 15:32


 


Lymph % (Auto)  12.4 L  


 


Sodium   136.9 L 


 


ALT   60 H 


 


Urine Ketones    TRACE H


 


Urine Urobilinogen    4.0 H


 


Urine Ascorbic Acid    20 H














- Diagnostic Test


Radiology reviewed: Image reviewed, Reports reviewed





Discharge





- Discharge


Clinical Impression: 


 Hypoxia, Cough, Person under investigation for COVID-19





Condition: Stable


Disposition: ADMITTED AS INPATIENT


Unit Admitted: Medical Floor The patient location is: [place]  The chief complaint leading to consultation is: depression, anxiety    Visit type: audiovisual    Face to Face time with patient: 55 mins  65 minutes of total time spent on the encounter, which includes face to face time and non-face to face time preparing to see the patient (eg, review of tests), Obtaining and/or reviewing separately obtained history, Documenting clinical information in the electronic or other health record, Independently interpreting results (not separately reported) and communicating results to the patient/family/caregiver, or Care coordination (not separately reported).     Each patient to whom he or she provides medical services by telemedicine is:  (1) informed of the relationship between the physician and patient and the respective role of any other health care provider with respect to management of the patient; and (2) notified that he or she may decline to receive medical services by telemedicine and may withdraw from such care at any time.    Notes:     Individual Psychotherapy (PhD/LCSW)    07/15/2024     Site:  Telemed         Therapeutic Intervention: Met with patient.  Outpatient - Insight oriented psychotherapy 60 min - CPT code 08497 and Outpatient - Supportive psychotherapy 60 min - CPT Code 65903    Chief complaint/reason for encounter: depression and anxiety     Interval history and content of current session: Patient returns for follow up psychotherapy. Patient is present for STeP session #11.     EPDS/PHQ9:    7/15/2024   [place]  Depression Scale    I have been able to laugh and see the funny side of things. 1    I have looked forward with enjoyment to things. 0    I have blamed myself unnecessarily when things went wrong. 1    I have been anxious or worried for no good reason. 1    I have felt scared or panicky for no good reason. 0    Things have been getting on top of me. 1    I have been so unhappy that I have had difficulty  "sleeping. 0    I have felt sad or miserable. 1    I have been so unhappy that I have been crying. 0    The thought of harming myself has occurred to me. 0      Reports mood as "pretty good".     Update: reports that she went to [facility] yesterday. Has been having contractions and is 4cm dilated. Believes that her water broke a little while she was there. Has been doing a kick count to monitor baby's movement. Feels like this have been going pretty well.  Discussed creating plan with mother for postpartum.     Scheduled follow up for 7/30    Pregnancy Status: 36 weeks  Medication Desire: currently on medication    Goals:   Values: discussed in previous sessions.  Cognitive distortions: discussed at previous sessions.  Challenging Cognitive Distortions: discussed at previous session  Behavioral Activation: discussed at previous session  Meditation/Mindfulness: discussed in previous session  Self Care: discussed in previous session    Action Plan:  Values: continue to identify values for herself.   Cognitive Distortions: continue to identify cognitive distortions  Challenge cognitive distortions: continue to work on reframing thoughts  Behavioral activation: continue to use behavioral activation schedule  Meditation/Mindfulness: continue to practice mindfulness  Nutrition/Movement: sent patient information on mental health and exercise/nutrition    Treatment plan:  Target symptoms: depression, anxiety   Why chosen therapy is appropriate versus another modality: relevant to diagnosis, evidence based practice  Outcome monitoring methods: self-report, observation  Therapeutic intervention type: insight oriented psychotherapy, supportive psychotherapy    Risk parameters:  Patient reports no suicidal ideation  Patient reports no homicidal ideation  Patient reports no self-injurious behavior  Patient reports no violent behavior    Verbal deficits: None    Patient's response to intervention:  The patient's response to " intervention is accepting.    Progress toward goals and other mental status changes:  The patient's progress toward goals is good.    Diagnosis:   Encounter Diagnoses   Name Primary?    Depression, unspecified depression type Yes    History of postpartum depression      Plan:  individual psychotherapy    Return to clinic: 1 week    Length of Service (minutes): 60

## 2024-07-15 NOTE — ED PROVIDER NOTES
Encounter Date: 7/15/2024       History   No chief complaint on file.    Sonam Hidalgo is a 34 y.o. [id number] at 36w3d presents complaining of contractions. She reports an episode of LOF earlier yesterday as well. She denies any vaginal bleeding. She reports normal fetal movement. She denies any headaches, vision changes, RUQ pain or SOB.    This IUP is complicated by cHTN, obesity (PP BMI 49), migraines and depression.       Review of patient's allergies indicates:  No Known Allergies  Past Medical History:   Diagnosis Date    Abnormal Pap smear of vagina     Allergy     Anxiety     Hypertension     Pre-eclampsia      Past Surgical History:   Procedure Laterality Date    CHOLECYSTECTOMY      LAPAROSCOPIC SURGICAL REMOVAL OF CYST OF OVARY Left 2019    Procedure: EXCISION, CYST, OVARY, LAPAROSCOPIC;  Surgeon: Merary Benton MD;  Location: [facility];  Service: OB/GYN;  Laterality: Left;    median arcuate ligament        Family History   Problem Relation Name Age of Onset    Diabetes Mother      Breast cancer Mother      Diabetes Father      Alcohol abuse Father      Dementia Father          vascular and ? lewey    Hypertension Brother      Celiac disease Neg Hx      Cirrhosis Neg Hx      Colon cancer Neg Hx      Colon polyps Neg Hx      Crohn's disease Neg Hx      Cystic fibrosis Neg Hx      Esophageal cancer Neg Hx      Hemochromatosis Neg Hx      Inflammatory bowel disease Neg Hx      Irritable bowel syndrome Neg Hx      Liver cancer Neg Hx      Liver disease Neg Hx      Rectal cancer Neg Hx      Stomach cancer Neg Hx      Ulcerative colitis Neg Hx      Jacob's disease Neg Hx      Ovarian cancer Neg Hx      Arrhythmia Neg Hx      Cardiomyopathy Neg Hx      Congenital heart disease Neg Hx      Early death Neg Hx      Heart attacks under age 50 Neg Hx      Pacemaker/defibrilator Neg Hx       Social History     Tobacco Use    Smoking status: Never    Smokeless tobacco: Never   Substance Use Topics     Alcohol use: Not Currently     Comment: 1 glass wine on occ    Drug use: No     Review of Systems   Constitutional:  Negative for fever.   HENT:  Negative for sore throat.    Respiratory:  Negative for shortness of breath.    Cardiovascular:  Negative for chest pain.   Gastrointestinal:  Positive for abdominal pain. Negative for nausea.   Genitourinary:  Positive for vaginal discharge (LOF). Negative for dysuria and vaginal bleeding.   Musculoskeletal:  Negative for back pain.   Skin:  Negative for rash.   Neurological:  Negative for weakness.   Hematological:  Does not bruise/bleed easily.       Physical Exam     Initial Vitals [07/15/24 0245]   BP Pulse Resp Temp SpO2   115/70 82 -- -- 98 %      MAP       --         Physical Exam    Constitutional: She appears well-developed and well-nourished.   Pulmonary/Chest: No respiratory distress.   Abdominal: Abdomen is soft. She exhibits no distension. There is no abdominal tenderness. There is no rebound and no guarding.   Musculoskeletal:         General: Normal range of motion.     Neurological: She is alert and oriented to person, place, and time.   Skin: Skin is warm and dry.   Psychiatric: She has a normal mood and affect.     OB LABOR EXAM:       Method: Sterile vaginal exam per MD and Sterile speculum exam per MD.       Dilatation: 4.   Station: -3.   Effacement: 60%.   Amniotic Fluid Color: no fluid.   Amniotic Fluid Amount: absent.         ED Course   Obtain Fetal nonstress test (NST)    Date/Time: 7/15/2024 3:51 AM    Performed by: Stefany Hess MD  Authorized by: Stefany Hess MD    Nonstress Test:     Variability:  6-25 BPM    Decelerations:  None    Accelerations:  15 bpm    Baseline:  130    Contractions:  Irregular  Biophysical Profile:     MVP (Maximal Vertical Pocket):  3.35    Nonstress Test Interpretation: reactive      Overall Impression:  Reassuring  Post-procedure:     Patient tolerance:  Patient tolerated the procedure well with no immediate  complications    Labs Reviewed - No data to display       Imaging Results    None          Medications - No data to display  Medical Decision Making  34 y.o. [id number] at 36w3d presents complaining of contractions.  VSS  NST: reactive and reassuring, irregular contractions on toco  SSE: not grossly ruptured, no pooling, negative nitrazine, no ferning, difficult to visualize cervix due to vaginal tissue  SVE: /-3  US: vertex presentation, MVP 3.35 cm  Recheck: unchanged  Patient reports contractions have spaced out. Patient deemed stable for discharge. Strict return precautions given              Attending Attestation:   Physician Attestation Statement for Resident:  As the supervising MD   Physician Attestation Statement: I have personally seen and examined this patient.   I agree with the above history.  -:   As the supervising MD I agree with the above PE.     As the supervising MD I agree with the above treatment, course, plan, and disposition.   -: No change in cervix  Not in active labor                                           Clinical Impression:  Final diagnoses:  [O47.9] Uterine contractions (Primary)  [Z3A.36] 36 weeks gestation of pregnancy          ED Disposition Condition    Discharge Stable          ED Prescriptions    None       Follow-up Information    None          Stefany Hess MD  Resident  07/15/24 [id number]       Monty Jones MD  24 [phone]

## 2024-07-18 ENCOUNTER — TELEPHONE (OUTPATIENT)
Dept: OBSTETRICS AND GYNECOLOGY | Facility: CLINIC | Age: 34
End: 2024-07-18
Payer: COMMERCIAL

## 2024-07-19 ENCOUNTER — ROUTINE PRENATAL (OUTPATIENT)
Dept: OBSTETRICS AND GYNECOLOGY | Facility: CLINIC | Age: 34
End: 2024-07-19
Payer: COMMERCIAL

## 2024-07-19 ENCOUNTER — LAB VISIT (OUTPATIENT)
Dept: LAB | Facility: HOSPITAL | Age: 34
End: 2024-07-19
Attending: NURSE PRACTITIONER
Payer: COMMERCIAL

## 2024-07-19 VITALS
SYSTOLIC BLOOD PRESSURE: 122 MMHG | BODY MASS INDEX: 49.13 KG/M2 | DIASTOLIC BLOOD PRESSURE: 80 MMHG | WEIGHT: 277.31 LBS

## 2024-07-19 DIAGNOSIS — Z34.80 SUPERVISION OF OTHER NORMAL PREGNANCY, ANTEPARTUM: Primary | ICD-10-CM

## 2024-07-19 DIAGNOSIS — Z34.80 SUPERVISION OF OTHER NORMAL PREGNANCY, ANTEPARTUM: ICD-10-CM

## 2024-07-19 LAB
BASOPHILS # BLD AUTO: 0.05 K/UL (ref 0–0.2)
BASOPHILS NFR BLD: 0.5 % (ref 0–1.9)
DIFFERENTIAL METHOD BLD: ABNORMAL
EOSINOPHIL # BLD AUTO: 0.1 K/UL (ref 0–0.5)
EOSINOPHIL NFR BLD: 1.2 % (ref 0–8)
ERYTHROCYTE [DISTWIDTH] IN BLOOD BY AUTOMATED COUNT: 15.9 % (ref 11.5–14.5)
HCT VFR BLD AUTO: 38.4 % (ref 37–48.5)
HGB BLD-MCNC: 12.2 G/DL (ref 12–16)
HIV 1+2 AB+HIV1 P24 AG SERPL QL IA: NORMAL
IMM GRANULOCYTES # BLD AUTO: 0.09 K/UL (ref 0–0.04)
IMM GRANULOCYTES NFR BLD AUTO: 0.9 % (ref 0–0.5)
LYMPHOCYTES # BLD AUTO: 2.1 K/UL (ref 1–4.8)
LYMPHOCYTES NFR BLD: 21.8 % (ref 18–48)
MCH RBC QN AUTO: 27.2 PG (ref 27–31)
MCHC RBC AUTO-ENTMCNC: 31.8 G/DL (ref 32–36)
MCV RBC AUTO: 86 FL (ref 82–98)
MONOCYTES # BLD AUTO: 0.6 K/UL (ref 0.3–1)
MONOCYTES NFR BLD: 6.4 % (ref 4–15)
NEUTROPHILS # BLD AUTO: 6.6 K/UL (ref 1.8–7.7)
NEUTROPHILS NFR BLD: 69.2 % (ref 38–73)
NRBC BLD-RTO: 0 /100 WBC
PLATELET # BLD AUTO: 306 K/UL (ref 150–450)
PMV BLD AUTO: 10.5 FL (ref 9.2–12.9)
RBC # BLD AUTO: 4.48 M/UL (ref 4–5.4)
TREPONEMA PALLIDUM IGG+IGM AB [PRESENCE] IN SERUM OR PLASMA BY IMMUNOASSAY: NONREACTIVE
WBC # BLD AUTO: 9.48 K/UL (ref 3.9–12.7)

## 2024-07-19 PROCEDURE — 0502F SUBSEQUENT PRENATAL CARE: CPT | Mod: CPTII,S$GLB,, | Performed by: NURSE PRACTITIONER

## 2024-07-19 PROCEDURE — 85025 COMPLETE CBC W/AUTO DIFF WBC: CPT | Performed by: NURSE PRACTITIONER

## 2024-07-19 PROCEDURE — 36415 COLL VENOUS BLD VENIPUNCTURE: CPT | Mod: PN | Performed by: NURSE PRACTITIONER

## 2024-07-19 PROCEDURE — 99999 PR PBB SHADOW E&M-EST. PATIENT-LVL III: CPT | Mod: PBBFAC,,, | Performed by: NURSE PRACTITIONER

## 2024-07-19 PROCEDURE — 86593 SYPHILIS TEST NON-TREP QUANT: CPT | Performed by: NURSE PRACTITIONER

## 2024-07-19 PROCEDURE — 87389 HIV-1 AG W/HIV-1&-2 AB AG IA: CPT | Performed by: NURSE PRACTITIONER

## 2024-07-19 NOTE — PROGRESS NOTES
Here for routine OB appt at 37w0d, with complaints of irregular contractions.  Reports good FM.  Denies LOF, denies VB.   Reviewed warning signs of Labor and Preeclampsia.  Daily FM counts reinforced.  T3 labs today.   F/U scheduled 1 weeks

## 2024-07-24 ENCOUNTER — ANESTHESIA EVENT (OUTPATIENT)
Dept: OBSTETRICS AND GYNECOLOGY | Facility: OTHER | Age: 34
End: 2024-07-24
Payer: COMMERCIAL

## 2024-07-24 ENCOUNTER — TELEPHONE (OUTPATIENT)
Dept: MATERNAL FETAL MEDICINE | Facility: CLINIC | Age: 34
End: 2024-07-24
Payer: COMMERCIAL

## 2024-07-24 ENCOUNTER — HOSPITAL ENCOUNTER (INPATIENT)
Facility: OTHER | Age: 34
LOS: 2 days | Discharge: HOME | End: 2024-07-26
Attending: OBSTETRICS & GYNECOLOGY | Admitting: OBSTETRICS & GYNECOLOGY
Payer: COMMERCIAL

## 2024-07-24 ENCOUNTER — ANESTHESIA (OUTPATIENT)
Dept: OBSTETRICS AND GYNECOLOGY | Facility: OTHER | Age: 34
End: 2024-07-24
Payer: COMMERCIAL

## 2024-07-24 DIAGNOSIS — O16.3 HYPERTENSION DURING PREGNANCY IN THIRD TRIMESTER, UNSPECIFIED HYPERTENSION IN PREGNANCY TYPE: ICD-10-CM

## 2024-07-24 DIAGNOSIS — E66.01 SEVERE OBESITY DUE TO EXCESS CALORIES AFFECTING PREGNANCY IN THIRD TRIMESTER: ICD-10-CM

## 2024-07-24 DIAGNOSIS — O47.9 UTERINE CONTRACTIONS DURING PREGNANCY: ICD-10-CM

## 2024-07-24 DIAGNOSIS — Z3A.37 37 WEEKS GESTATION OF PREGNANCY: ICD-10-CM

## 2024-07-24 DIAGNOSIS — O09.299 HX OF PREECLAMPSIA, PRIOR PREGNANCY, CURRENTLY PREGNANT: ICD-10-CM

## 2024-07-24 DIAGNOSIS — O99.213 SEVERE OBESITY DUE TO EXCESS CALORIES AFFECTING PREGNANCY IN THIRD TRIMESTER: ICD-10-CM

## 2024-07-24 LAB
ABO + RH BLD: NORMAL
ALBUMIN SERPL BCP-MCNC: 2.8 G/DL (ref 3.5–5.2)
ALP SERPL-CCNC: 145 U/L (ref 55–135)
ALT SERPL W/O P-5'-P-CCNC: 14 U/L (ref 10–44)
ANION GAP SERPL CALC-SCNC: 12 MMOL/L (ref 8–16)
AST SERPL-CCNC: 14 U/L (ref 10–40)
BASOPHILS # BLD AUTO: 0.02 K/UL (ref 0–0.2)
BASOPHILS NFR BLD: 0.2 % (ref 0–1.9)
BILIRUB SERPL-MCNC: 0.6 MG/DL (ref 0.1–1)
BLD GP AB SCN CELLS X3 SERPL QL: NORMAL
BUN SERPL-MCNC: 7 MG/DL (ref 6–20)
CALCIUM SERPL-MCNC: 9.5 MG/DL (ref 8.7–10.5)
CHLORIDE SERPL-SCNC: 106 MMOL/L (ref 95–110)
CO2 SERPL-SCNC: 17 MMOL/L (ref 23–29)
CREAT SERPL-MCNC: 0.6 MG/DL (ref 0.5–1.4)
CREAT UR-MCNC: 160.3 MG/DL (ref 15–325)
CREAT UR-MCNC: 70.7 MG/DL (ref 15–325)
DIFFERENTIAL METHOD BLD: ABNORMAL
EOSINOPHIL # BLD AUTO: 0.1 K/UL (ref 0–0.5)
EOSINOPHIL NFR BLD: 0.7 % (ref 0–8)
ERYTHROCYTE [DISTWIDTH] IN BLOOD BY AUTOMATED COUNT: 15.8 % (ref 11.5–14.5)
EST. GFR  (NO RACE VARIABLE): >60 ML/MIN/1.73 M^2
GLUCOSE SERPL-MCNC: 72 MG/DL (ref 70–110)
HCT VFR BLD AUTO: 39.4 % (ref 37–48.5)
HGB BLD-MCNC: 13 G/DL (ref 12–16)
IMM GRANULOCYTES # BLD AUTO: 0.07 K/UL (ref 0–0.04)
IMM GRANULOCYTES NFR BLD AUTO: 0.7 % (ref 0–0.5)
LYMPHOCYTES # BLD AUTO: 2 K/UL (ref 1–4.8)
LYMPHOCYTES NFR BLD: 20.5 % (ref 18–48)
MCH RBC QN AUTO: 27.4 PG (ref 27–31)
MCHC RBC AUTO-ENTMCNC: 33 G/DL (ref 32–36)
MCV RBC AUTO: 83 FL (ref 82–98)
MONOCYTES # BLD AUTO: 0.5 K/UL (ref 0.3–1)
MONOCYTES NFR BLD: 5.1 % (ref 4–15)
NEUTROPHILS # BLD AUTO: 7.1 K/UL (ref 1.8–7.7)
NEUTROPHILS NFR BLD: 72.8 % (ref 38–73)
NRBC BLD-RTO: 0 /100 WBC
PLATELET # BLD AUTO: 296 K/UL (ref 150–450)
PMV BLD AUTO: 9.9 FL (ref 9.2–12.9)
POTASSIUM SERPL-SCNC: 4 MMOL/L (ref 3.5–5.1)
PROT SERPL-MCNC: 6.9 G/DL (ref 6–8.4)
PROT UR-MCNC: 19 MG/DL (ref 0–15)
PROT UR-MCNC: 8 MG/DL (ref 0–15)
PROT/CREAT UR: 0.11 MG/G{CREAT} (ref 0–0.2)
PROT/CREAT UR: 0.12 MG/G{CREAT} (ref 0–0.2)
RBC # BLD AUTO: 4.75 M/UL (ref 4–5.4)
SODIUM SERPL-SCNC: 135 MMOL/L (ref 136–145)
SPECIMEN OUTDATE: NORMAL
TREPONEMA PALLIDUM IGG+IGM AB [PRESENCE] IN SERUM OR PLASMA BY IMMUNOASSAY: NONREACTIVE
WBC # BLD AUTO: 9.71 K/UL (ref 3.9–12.7)

## 2024-07-24 PROCEDURE — 85025 COMPLETE CBC W/AUTO DIFF WBC: CPT

## 2024-07-24 PROCEDURE — 99285 EMERGENCY DEPT VISIT HI MDM: CPT | Mod: 25

## 2024-07-24 PROCEDURE — 59025 FETAL NON-STRESS TEST: CPT

## 2024-07-24 PROCEDURE — 11000001 HC ACUTE MED/SURG PRIVATE ROOM

## 2024-07-24 PROCEDURE — 63600175 PHARM REV CODE 636 W HCPCS

## 2024-07-24 PROCEDURE — 86593 SYPHILIS TEST NON-TREP QUANT: CPT

## 2024-07-24 PROCEDURE — 86901 BLOOD TYPING SEROLOGIC RH(D): CPT

## 2024-07-24 PROCEDURE — 3E033VJ INTRODUCTION OF OTHER HORMONE INTO PERIPHERAL VEIN, PERCUTANEOUS APPROACH: ICD-10-PCS | Performed by: OBSTETRICS & GYNECOLOGY

## 2024-07-24 PROCEDURE — 84156 ASSAY OF PROTEIN URINE: CPT

## 2024-07-24 PROCEDURE — 4A0HXCZ MEASUREMENT OF PRODUCTS OF CONCEPTION, CARDIAC RATE, EXTERNAL APPROACH: ICD-10-PCS | Performed by: OBSTETRICS & GYNECOLOGY

## 2024-07-24 PROCEDURE — 80053 COMPREHEN METABOLIC PANEL: CPT

## 2024-07-24 PROCEDURE — 10907ZC DRAINAGE OF AMNIOTIC FLUID, THERAPEUTIC FROM PRODUCTS OF CONCEPTION, VIA NATURAL OR ARTIFICIAL OPENING: ICD-10-PCS | Performed by: OBSTETRICS & GYNECOLOGY

## 2024-07-24 RX ORDER — TRANEXAMIC ACID 10 MG/ML
1000 INJECTION, SOLUTION INTRAVENOUS EVERY 30 MIN PRN
Status: DISCONTINUED | OUTPATIENT
Start: 2024-07-24 | End: 2024-07-25

## 2024-07-24 RX ORDER — OXYTOCIN 10 [USP'U]/ML
10 INJECTION, SOLUTION INTRAMUSCULAR; INTRAVENOUS ONCE AS NEEDED
Status: DISCONTINUED | OUTPATIENT
Start: 2024-07-24 | End: 2024-07-25

## 2024-07-24 RX ORDER — SODIUM CHLORIDE 9 MG/ML
INJECTION, SOLUTION INTRAVENOUS
Status: DISCONTINUED | OUTPATIENT
Start: 2024-07-24 | End: 2024-07-25

## 2024-07-24 RX ORDER — CALCIUM CARBONATE 200(500)MG
500 TABLET,CHEWABLE ORAL 3 TIMES DAILY PRN
Status: DISCONTINUED | OUTPATIENT
Start: 2024-07-24 | End: 2024-07-25

## 2024-07-24 RX ORDER — DIPHENOXYLATE HYDROCHLORIDE AND ATROPINE SULFATE 2.5; .025 MG/1; MG/1
2 TABLET ORAL EVERY 6 HOURS PRN
Status: DISCONTINUED | OUTPATIENT
Start: 2024-07-24 | End: 2024-07-25

## 2024-07-24 RX ORDER — LIDOCAINE HYDROCHLORIDE 10 MG/ML
10 INJECTION, SOLUTION INFILTRATION; PERINEURAL ONCE AS NEEDED
Status: DISCONTINUED | OUTPATIENT
Start: 2024-07-24 | End: 2024-07-25

## 2024-07-24 RX ORDER — CARBOPROST TROMETHAMINE 250 UG/ML
250 INJECTION, SOLUTION INTRAMUSCULAR
Status: DISCONTINUED | OUTPATIENT
Start: 2024-07-24 | End: 2024-07-25

## 2024-07-24 RX ORDER — OXYTOCIN-SODIUM CHLORIDE 0.9% IV SOLN 30 UNIT/500ML 30-0.9/5 UT/ML-%
95 SOLUTION INTRAVENOUS ONCE
Status: DISCONTINUED | OUTPATIENT
Start: 2024-07-24 | End: 2024-07-25

## 2024-07-24 RX ORDER — SIMETHICONE 80 MG
1 TABLET,CHEWABLE ORAL 4 TIMES DAILY PRN
Status: DISCONTINUED | OUTPATIENT
Start: 2024-07-24 | End: 2024-07-25

## 2024-07-24 RX ORDER — OXYTOCIN-SODIUM CHLORIDE 0.9% IV SOLN 30 UNIT/500ML 30-0.9/5 UT/ML-%
0-32 SOLUTION INTRAVENOUS CONTINUOUS
Status: DISCONTINUED | OUTPATIENT
Start: 2024-07-24 | End: 2024-07-25

## 2024-07-24 RX ORDER — MISOPROSTOL 200 UG/1
800 TABLET ORAL ONCE AS NEEDED
Status: DISCONTINUED | OUTPATIENT
Start: 2024-07-24 | End: 2024-07-25

## 2024-07-24 RX ORDER — OXYTOCIN-SODIUM CHLORIDE 0.9% IV SOLN 30 UNIT/500ML 30-0.9/5 UT/ML-%
95 SOLUTION INTRAVENOUS ONCE AS NEEDED
Status: DISCONTINUED | OUTPATIENT
Start: 2024-07-24 | End: 2024-07-25

## 2024-07-24 RX ORDER — OXYTOCIN-SODIUM CHLORIDE 0.9% IV SOLN 30 UNIT/500ML 30-0.9/5 UT/ML-%
10 SOLUTION INTRAVENOUS ONCE
Status: DISCONTINUED | OUTPATIENT
Start: 2024-07-24 | End: 2024-07-25

## 2024-07-24 RX ORDER — SODIUM CHLORIDE 0.9 % (FLUSH) 0.9 %
2 SYRINGE (ML) INJECTION
Status: DISCONTINUED | OUTPATIENT
Start: 2024-07-24 | End: 2024-07-25

## 2024-07-24 RX ORDER — METHYLERGONOVINE MALEATE 0.2 MG/ML
200 INJECTION INTRAVENOUS ONCE AS NEEDED
Status: DISCONTINUED | OUTPATIENT
Start: 2024-07-24 | End: 2024-07-25

## 2024-07-24 RX ORDER — ONDANSETRON 8 MG/1
8 TABLET, ORALLY DISINTEGRATING ORAL EVERY 8 HOURS PRN
Status: DISCONTINUED | OUTPATIENT
Start: 2024-07-24 | End: 2024-07-25

## 2024-07-24 RX ORDER — OXYTOCIN-SODIUM CHLORIDE 0.9% IV SOLN 30 UNIT/500ML 30-0.9/5 UT/ML-%
10 SOLUTION INTRAVENOUS ONCE AS NEEDED
Status: DISCONTINUED | OUTPATIENT
Start: 2024-07-24 | End: 2024-07-25

## 2024-07-24 RX ORDER — SODIUM CHLORIDE, SODIUM LACTATE, POTASSIUM CHLORIDE, CALCIUM CHLORIDE 600; 310; 30; 20 MG/100ML; MG/100ML; MG/100ML; MG/100ML
INJECTION, SOLUTION INTRAVENOUS CONTINUOUS
Status: DISCONTINUED | OUTPATIENT
Start: 2024-07-24 | End: 2024-07-25

## 2024-07-24 RX ADMIN — SODIUM CHLORIDE, POTASSIUM CHLORIDE, SODIUM LACTATE AND CALCIUM CHLORIDE: 600; 310; 30; 20 INJECTION, SOLUTION INTRAVENOUS at 05:07

## 2024-07-24 NOTE — H&P
HISTORY AND PHYSICAL                                                OBSTETRICS          Subjective:       Sonam Hidalgo is a 34 y.o.  female with IUP at 37w5d weeks gestation who presents with painful contractions q3 min. Her IUP is complicated by cHTN, depression, h/o migraines, obesity (pre-pregnancy BMI 49).    Patient reports contractions and denies vaginal bleeding, leakage of fluid. She reports good fetal movement.    Review of Systems   Constitutional:  Negative for chills and fever.   Respiratory:  Negative for cough and shortness of breath.    Cardiovascular:  Negative for chest pain and palpitations.   Gastrointestinal:  Positive for abdominal pain. Negative for nausea and vomiting.   Genitourinary:  Negative for vaginal bleeding and vaginal discharge.   Integumentary:  Positive for rash.       PMHx:   Past Medical History:   Diagnosis Date    Abnormal Pap smear of vagina     Allergy     Anxiety     Hypertension     Pre-eclampsia        PSHx:   Past Surgical History:   Procedure Laterality Date    CHOLECYSTECTOMY      LAPAROSCOPIC SURGICAL REMOVAL OF CYST OF OVARY Left 2019    Procedure: EXCISION, CYST, OVARY, LAPAROSCOPIC;  Surgeon: Merary Benton MD;  Location: [facility];  Service: OB/GYN;  Laterality: Left;    median arcuate ligament          All: Review of patient's allergies indicates:  No Known Allergies    Meds:   Medications Prior to Admission   Medication Sig Dispense Refill Last Dose    aspirin (ECOTRIN) 81 MG EC tablet Take 1 tablet (81 mg total) by mouth once daily.  0     augmented betamethasone (DIPROLENE) 0.05 % lotion Apply topically 2 (two) times daily. for 7 days 60 mL 1     ferrous sulfate (IRON ORAL) Take by mouth.       ketoconazole (NIZORAL) 2 % cream Apply topically every evening. for 14 days 30 g 1     nystatin (MYCOSTATIN) powder Apply topically every morning. for 14 days 30 g 2     PNV no.153/FA/om3/dha/epa/fish (PRENATAL GUMMIES ORAL) Take by mouth.        sertraline (ZOLOFT) 50 MG tablet Take 1 tablet (50 mg total) by mouth once daily. 30 tablet 2        SH:   Social History     Socioeconomic History    Marital status: Single   Tobacco Use    Smoking status: Never    Smokeless tobacco: Never   Substance and Sexual Activity    Alcohol use: Not Currently     Comment: 1 glass wine on occ    Drug use: No    Sexual activity: Yes     Partners: Male     Birth control/protection: None   Social History Narrative    2 kids at home (5 Sheldon and Buster 2 yrs. Aorta issue and Partial anomalous PV return, asymptomatic), in apt attached to her mom's hse. Fiance deployed arm. Not much forma exercise. Works for [employer].     Social Determinants of Health     Financial Resource Strain: Low Risk  (5/20/2024)    Overall Financial Resource Strain (CARDIA)     Difficulty of Paying Living Expenses: Not very hard   Recent Concern: Financial Resource Strain - Medium Risk (4/8/2024)    Overall Financial Resource Strain (CARDIA)     Difficulty of Paying Living Expenses: Somewhat hard   Food Insecurity: Food Insecurity Present (5/20/2024)    Hunger Vital Sign     Worried About Running Out of Food in the Last Year: Sometimes true     Ran Out of Food in the Last Year: Never true   Transportation Needs: No Transportation Needs (4/8/2024)    PRAPARE - Transportation     Lack of Transportation (Medical): No     Lack of Transportation (Non-Medical): No   Physical Activity: Inactive (5/20/2024)    Exercise Vital Sign     Days of Exercise per Week: 0 days     Minutes of Exercise per Session: 0 min   Stress: Patient Declined (5/20/2024)    [place] [employer] - Occupational Stress Questionnaire     Feeling of Stress : Patient declined   Housing Stability: Low Risk  (4/8/2024)    Housing Stability Vital Sign     Unable to Pay for Housing in the Last Year: No     Number of Places Lived in the Last Year: 1     Unstable Housing in the Last Year: No       FH:   Family History    Problem Relation Name Age of Onset    Diabetes Mother      Breast cancer Mother      Diabetes Father      Alcohol abuse Father      Dementia Father          vascular and ? irmaey    Hypertension Brother      Celiac disease Neg Hx      Cirrhosis Neg Hx      Colon cancer Neg Hx      Colon polyps Neg Hx      Crohn's disease Neg Hx      Cystic fibrosis Neg Hx      Esophageal cancer Neg Hx      Hemochromatosis Neg Hx      Inflammatory bowel disease Neg Hx      Irritable bowel syndrome Neg Hx      Liver cancer Neg Hx      Liver disease Neg Hx      Rectal cancer Neg Hx      Stomach cancer Neg Hx      Ulcerative colitis Neg Hx      Jacob's disease Neg Hx      Ovarian cancer Neg Hx      Arrhythmia Neg Hx      Cardiomyopathy Neg Hx      Congenital heart disease Neg Hx      Early death Neg Hx      Heart attacks under age 50 Neg Hx      Pacemaker/defibrilator Neg Hx         OBHx:   OB History    Para Term  AB Living   6 3 3 0 2 3   SAB IAB Ectopic Multiple Live Births   1 1 0 0 3      # Outcome Date GA Lbr Angelito/2nd Weight Sex Type Anes PTL Lv   6 Current            5 Term 22 37w6d / 00:31 3.629 kg (8 lb) M Vag-Spont EPI N ZENA      Name: BIANKA THOMPSON      Apgar1: 8  Apgar5: 9   4 SAB 10/2019        FD   3 Term 19 37w2d / 00:10 3.033 kg (6 lb 11 oz) F Vag-Spont None N ZENA      Name: SHEILA THOMPSON      Apgar1: 9  Apgar5: 9   2 Term 11/30/15 38w3d  2.81 kg (6 lb 3.1 oz) M Vag-Spont EPI N ZENA      Birth Comments: NICU x 4 days.    Needed HFNC and CPAP but no intubation.  Abx for 48 hour rule out, blood cx negative.   Hepatitis B given on 12/3/15      Name: Sheldon      Apgar1: 2  Apgar5: 5   1 IAB                Objective:       BP (!) 141/82   Pulse 91   Temp 99 °F (37.2 °C) (Oral)   Resp 18   LMP 2023 (Exact Date)   SpO2 96%     Vitals:    24 1630 24 1631 24 1634   BP:  (!) 141/82 (!) 141/82   Pulse: 91     Resp: 18     Temp: 99 °F (37.2 °C)     TempSrc:  Oral     SpO2: 96%         General:   alert, appears stated age and cooperative, no apparent distress   HENT:  normocephalic, atraumatic   Eyes:  extraocular movements and conjunctivae normal   Neck:  supple, range of motion normal, no thyromegaly   Lungs:   no respiratory distress   Heart:   regular rate   Abdomen:  soft, non-tender, non-distended but gravid, no rebound or guarding   Extremities negative edema, negative erythema   FHT: 120 bpm, moderate variability, +accels, -decels;  Cat 1 (reassuring)                 TOCO: [place] with difficulty detecting contractions secondary to body habitus; patient reporting q3 min   Presentations: cephalic by ultrasound   Cervix:     Dilation: 4.5    Effacement: 80%    Station:  -2    Consistency: soft    Position: anterior     Recent Growth Scan: EFW 2133g (13%), AC 28% @ 34w3d    Lab Review  Blood Type O POS  GBBS: negative  Rubella: Immune  Trep Abs: admit labs pending, negative   HIV: negative  HepB: negative       Assessment:       37w5d weeks gestation with gHTN, depression, h/o migraines, obesity (pre-pregnancy BMI 49) presenting with frequent painful contractions    Active Hospital Problems    Diagnosis  POA    Depression [F32.A]  Yes    Class 3 severe obesity in adult [E66.01]  Yes    Fetal echo with top normal size of right heart, recommend  echo prior to nursery discharge [Z03.73]  Yes    Gestational hypertension [O13.9]  Yes      Resolved Hospital Problems   No resolved problems to display.          Plan:      Risks, benefits, alternatives and possible complications have been discussed in detail with the patient.   - Consents signed and to chart  - Admit to Labor and Delivery unit   - Epidural per Anesthesia  - Draw CBC, T&S  - Notify Staff  - Recheck in 2-4 hrs or PRN  - EFW 2133g (13%), AC 28% @ 34w3d  - Maternal pelvis proven to 8 lb    cHTN  - BP as above  - asymptomatic  - preE labs pending  - No hypertensive agent at this time    Depression  -  Continue home medications sertraline 100 mg daily  - Will need 1-2 week postpartum mood check    Post-Partum Hemorrhage risk - medium    Lisette Marc MD  OB/GYN PGY-1    Attestation:  I have reviewed the notes, assessments, and/or procedures performed by Dr. Marc, I concur with her/his documentation of Sonam Hidalgo.    Rayne Gutierrez MD  OB/GYN

## 2024-07-24 NOTE — ANESTHESIA PREPROCEDURE EVALUATION
[facility]  Anesthesia Pre-Operative Evaluation         Patient Name: Sonam Hidalgo  YOB: 1990  MRN: [id number]    2024      Sonam Hidalgo is a 34 y.o. female  @ 37w5d who presents in labor. IUP c/b chtn, morbid obesity (BMI 49), migraines and depression.  Patient denies cardiopulmonary disease, bleeding disorders, or spine pathology.     OB History    Para Term  AB Living   6 3 3 0 2 3   SAB IAB Ectopic Multiple Live Births   1 1 0 0 3      # Outcome Date GA Lbr Angelito/2nd Weight Sex Type Anes PTL Lv   6 Current            5 Term 22 37w6d / 00:31 3.629 kg (8 lb) M Vag-Spont EPI N ZENA   4 SAB 10/2019        FD   3 Term 19 37w2d / 00:10 3.033 kg (6 lb 11 oz) F Vag-Spont None N ZENA   2 Term 11/30/15 38w3d  2.81 kg (6 lb 3.1 oz) M Vag-Spont EPI N ZENA      Birth Comments: NICU x 4 days.    Needed HFNC and CPAP but no intubation.  Abx for 48 hour rule out, blood cx negative.   Hepatitis B given on 12/3/15   1 IAB                Review of patient's allergies indicates:  No Known Allergies    Wt Readings from Last 1 Encounters:   24 1046 125.8 kg (277 lb 5.4 oz)       BP Readings from Last 3 Encounters:   24 (!) 141/82   24 122/80   07/15/24 116/67       Patient Active Problem List   Diagnosis    Gestational hypertension    Joint pain    Shoulder pain, left    Fetal echo with top normal size of right heart, recommend  echo prior to nursery discharge    Depression    Class 3 severe obesity in adult    Focal neurological deficit    Pregnancy at early stage    Chronic migraine w/o aura w/o status migrainosus, not intractable    Intracranial aneurysm    Complex migraine / 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, not intractable    Cyst of ovary    S/P laparoscopic ovarian cystectomy, 19    Current mild episode of major depressive disorder without prior episode    Decreased strength of trunk  and back    Lumbar spine instability    Encounter for induction of labor    Amenorrhea       Past Surgical History:   Procedure Laterality Date    CHOLECYSTECTOMY      LAPAROSCOPIC SURGICAL REMOVAL OF CYST OF OVARY Left 11/20/2019    Procedure: EXCISION, CYST, OVARY, LAPAROSCOPIC;  Surgeon: Merary Benton MD;  Location: [facility];  Service: OB/GYN;  Laterality: Left;    median arcuate ligament          Social History     Socioeconomic History    Marital status: Single   Tobacco Use    Smoking status: Never    Smokeless tobacco: Never   Substance and Sexual Activity    Alcohol use: Not Currently     Comment: 1 glass wine on occ    Drug use: No    Sexual activity: Yes     Partners: Male     Birth control/protection: None   Social History Narrative    2 kids at home (5 Sheldon and Buster 2 yrs. Aorta issue and Partial anomalous PV return, asymptomatic), in apt attached to her mom's hse. Fiance deployed arm. Not much forma exercise. Works for [employer].     Social Determinants of Health     Financial Resource Strain: Low Risk  (5/20/2024)    Overall Financial Resource Strain (CARDIA)     Difficulty of Paying Living Expenses: Not very hard   Recent Concern: Financial Resource Strain - Medium Risk (4/8/2024)    Overall Financial Resource Strain (CARDIA)     Difficulty of Paying Living Expenses: Somewhat hard   Food Insecurity: Food Insecurity Present (5/20/2024)    Hunger Vital Sign     Worried About Running Out of Food in the Last Year: Sometimes true     Ran Out of Food in the Last Year: Never true   Transportation Needs: No Transportation Needs (4/8/2024)    PRAPARE - Transportation     Lack of Transportation (Medical): No     Lack of Transportation (Non-Medical): No   Physical Activity: Inactive (5/20/2024)    Exercise Vital Sign     Days of Exercise per Week: 0 days     Minutes of Exercise per Session: 0 min   Stress: Patient Declined (5/20/2024)    [place] [place] [employer] - Occupational Stress  "Questionnaire     Feeling of Stress : Patient declined   Housing Stability: Low Risk  (4/8/2024)    Housing Stability Vital Sign     Unable to Pay for Housing in the Last Year: No     Number of Places Lived in the Last Year: 1     Unstable Housing in the Last Year: No         Chemistry        Component Value Date/Time     05/27/2024 1614    K 3.8 05/27/2024 1614     05/27/2024 1614    CO2 24 05/27/2024 1614    BUN 8 05/27/2024 1614    CREATININE 0.7 05/27/2024 1614    GLU 92 05/27/2024 1614        Component Value Date/Time    CALCIUM 9.2 05/27/2024 1614    ALKPHOS 78 05/27/2024 1614    AST 12 05/27/2024 1614    ALT 10 05/27/2024 1614    BILITOT 0.5 05/27/2024 1614    ESTGFRAFRICA >60 07/16/2022 1754    EGFRNONAA >60 07/16/2022 1754            Lab Results   Component Value Date    WBC 9.48 07/19/2024    HGB 12.2 07/19/2024    HCT 38.4 07/19/2024    MCV 86 07/19/2024     07/19/2024       No results for input(s): "PT", "INR", "PROTIME", "APTT" in the last 72 hours.            Pre-op Assessment    I have reviewed the Patient Summary Reports.     I have reviewed the Nursing Notes.    I have reviewed the Medications.     Review of Systems  Anesthesia Hx:  No problems with previous Anesthesia   History of prior surgery of interest to airway management or planning:          Denies Family Hx of Anesthesia complications.    Denies Personal Hx of Anesthesia complications.                    Social:  Non-Smoker       Hematology/Oncology:    Oncology Normal                                   Cardiovascular:      Denies Hypertension.       Denies Angina.        ECG has been reviewed.                          Pulmonary:       Denies Shortness of breath.  Denies Recent URI.                 Renal/:  Renal/ Normal                 Hepatic/GI:      Denies GERD. Denies Liver Disease.            Neurological:    Denies CVA.   Headaches Denies Seizures.                                Endocrine:  Endocrine Normal " Denies Diabetes.         Morbid Obesity / BMI > 40  Psych:  Psychiatric History  depression                Physical Exam  General: Well nourished, Cooperative and Alert    Airway:  Mallampati: II   Mouth Opening: Normal  TM Distance: Normal  Tongue: Normal  Neck ROM: Normal ROM    Dental:  Intact        Anesthesia Plan  Type of Anesthesia, risks & benefits discussed:    Anesthesia Type: Gen ETT, Epidural, Spinal, CSE  Intra-op Monitoring Plan: Standard ASA Monitors  Post Op Pain Control Plan: multimodal analgesia, epidural analgesia and intrathecal opioid  Induction:  IV  Airway Plan: Video, Post-Induction  Informed Consent: Informed consent signed with the Patient and all parties understand the risks and agree with anesthesia plan.  All questions answered. Patient consented to blood products? Yes  ASA Score: 3  Day of Surgery Review of History & Physical: H&P Update referred to the surgeon/provider.    Ready For Surgery From Anesthesia Perspective.     .

## 2024-07-24 NOTE — TELEPHONE ENCOUNTER
Pt called in stating she is having contractions but can talk through them and she hasn't been timing them so isn't sure how far apart they are. Pt was worried as this is her 4th delivery and doesn't want to wait too long. Advised pt that she can start counting and wait until contractions are stronger and closer together or go to [facility] if she feels the need to be seen sooner. Pt verbalized understanding.

## 2024-07-24 NOTE — ED PROVIDER NOTES
Encounter Date: 2024       History     Chief Complaint   Patient presents with    Contractions     Sonam Hidalgo is a 34 y.o. [id number] at 37w5d who presents complaining of contractions. Patient reports contractions this morning which have become increasingly painful and closer in duration. Patient denies vaginal bleeding and leakage of fluid and endorses good fetal movement.      This IUP is complicated by obesity (PP BMI 49), migraines and depression. cHTN documented in patient chart, however upon review no elevated BP found on visits between pregnancy or in early pregnancy. Pt reports hx preeclampsia. Pt reports natural labor at 37 weeks for 3 prior deliveries.         Review of patient's allergies indicates:  No Known Allergies  Past Medical History:   Diagnosis Date    Abnormal Pap smear of vagina     Allergy     Anxiety     Hypertension     Pre-eclampsia      Past Surgical History:   Procedure Laterality Date    CHOLECYSTECTOMY      LAPAROSCOPIC SURGICAL REMOVAL OF CYST OF OVARY Left 2019    Procedure: EXCISION, CYST, OVARY, LAPAROSCOPIC;  Surgeon: Merary Benton MD;  Location: [facility];  Service: OB/GYN;  Laterality: Left;    median arcuate ligament        Family History   Problem Relation Name Age of Onset    Diabetes Mother      Breast cancer Mother      Diabetes Father      Alcohol abuse Father      Dementia Father          vascular and ? lewey    Hypertension Brother      Celiac disease Neg Hx      Cirrhosis Neg Hx      Colon cancer Neg Hx      Colon polyps Neg Hx      Crohn's disease Neg Hx      Cystic fibrosis Neg Hx      Esophageal cancer Neg Hx      Hemochromatosis Neg Hx      Inflammatory bowel disease Neg Hx      Irritable bowel syndrome Neg Hx      Liver cancer Neg Hx      Liver disease Neg Hx      Rectal cancer Neg Hx      Stomach cancer Neg Hx      Ulcerative colitis Neg Hx      Jacob's disease Neg Hx      Ovarian cancer Neg Hx      Arrhythmia Neg Hx      Cardiomyopathy  Neg Hx      Congenital heart disease Neg Hx      Early death Neg Hx      Heart attacks under age 50 Neg Hx      Pacemaker/defibrilator Neg Hx       Social History     Tobacco Use    Smoking status: Never    Smokeless tobacco: Never   Substance Use Topics    Alcohol use: Not Currently     Comment: 1 glass wine on occ    Drug use: No     Review of Systems   Constitutional:  Negative for chills and fever.   HENT:  Negative for congestion and sore throat.    Respiratory:  Negative for cough and shortness of breath.    Cardiovascular:  Negative for chest pain and palpitations.   Gastrointestinal:  Positive for abdominal pain. Negative for nausea and vomiting.   Genitourinary:  Negative for dysuria, vaginal bleeding and vaginal discharge.   Skin:  Positive for rash (medial to left breast).       Physical Exam     Initial Vitals   BP Pulse Resp Temp SpO2   07/24/24 1631 07/24/24 1630 07/24/24 1630 07/24/24 1630 07/24/24 1630   (!) 141/82 91 18 99 °F (37.2 °C) 96 %      MAP       --                Physical Exam    Nursing note and vitals reviewed.  Constitutional: She appears well-developed and well-nourished. She is not diaphoretic. No distress.   HENT:   Head: Normocephalic and atraumatic.   Nose: Nose normal.   Eyes: Conjunctivae and EOM are normal.   Neck:   Normal range of motion.  Cardiovascular:  Normal rate and intact distal pulses.           Pulmonary/Chest: No respiratory distress. She exhibits no tenderness.   Abdominal: Abdomen is soft. Bowel sounds are normal. There is no abdominal tenderness. There is no rebound and no guarding.   Musculoskeletal:         General: No tenderness or edema. Normal range of motion.      Cervical back: Normal range of motion.     Neurological: She is alert and oriented to person, place, and time.   Skin: Skin is warm and dry. Rash noted.   2x2 cm medial to inferior crease of left breast, appears red and dry without evidence of infection (no exudate, crusting)   Psychiatric: She has  a normal mood and affect. Her behavior is normal.         ED Course   Fetal non-stress test    Date/Time: 2024 4:19 PM    Performed by: Lisette Marc MD  Authorized by: Merary Benton MD    Nonstress Test:     Variability:  6-25 BPM    Decelerations:  None    Accelerations:  15 bpm    Baseline:  120    Uterine Irritability: Yes      Contractions:  Regular    Contraction Frequency:  Difficulty detecting contractions secondary to body habitus; patient reporting q3 min  Biophysical Profile:     Nonstress Test Interpretation: reactive      Overall Impression:  Reassuring  Post-procedure:     Patient tolerance:  Patient tolerated the procedure well with no immediate complications    Labs Reviewed - No data to display       Imaging Results    None            Medical Decision Making  Sonam Hidalgo is a 34 y.o. [id number] at 37w5d who presents complaining of contractions.    Temp:  [97.5 °F (36.4 °C)-99 °F (37.2 °C)] 98.2 °F (36.8 °C)  Pulse:  [69-92] 80  Resp:  [16-18] 18  SpO2:  [95 %-99 %] 97 %  BP: (120-145)/(58-82) 134/65    NST: 120 bpm, moderate variability, +accels, no decels (reactive and reassuring)  [place]: difficulty detecting contractions secondary to body habitus; patient reporting q3 min, uterine irritability and activity visualized    Contractions  - Patient painfully cierra q3 min although toco with difficulty detecting due to body habitus  - Cervical exam as above  - Patient with mild range BP while in OB ED and multiple newly elevated blood pressures at recent encounters. Prior notes reveal hx of cHTN, however unable to find elevated BP prior to 20 weeks on file this pregnancy - unclear if patient has cHTN vs gHTN  - Decision made to admit patient for monitoring of labor in setting of frequent painful contractions, please see H&P for further details    Breast rash without evidence of infection, encouraged patient to keep area clean and dry and notify provider if any signs of  infection including crusting or purulence    Lisette Marc MD  OB/GYN PGY-1    Amount and/or Complexity of Data Reviewed  Labs: ordered.              Attending Attestation:   Physician Attestation Statement for Resident:  As the supervising MD   Physician Attestation Statement: I have personally seen and examined this patient.   I agree with the above history.  -:   As the supervising MD I agree with the above PE.     As the supervising MD I agree with the above treatment, course, plan, and disposition.   I was personally present during the critical portions of the procedure(s) performed by the resident and was immediately available in the ED to provide services and assistance as needed during the entire procedure.  I have reviewed and agree with the residents interpretation of the following: lab data.  I have reviewed the following: old records at this facility.            Attending ED Notes:   I have personally seen and examined the patient. I agree with the resident's note . Questions welcomed and answered to patient satisfaction.      @ 37w6d  presenting for contractions  NST: reactive and reassuring   Pt appears in pain with recurrent contractions, unable to appreciate on tocometer. 37-38 week spontaneous labor with other pregnancies.   SVE 4cm in clinic, now 5/80/-2  BP reviewed, cHTN documented however extensive BP review without interpregnancy elevations or early pregnancy elevations. Concern for gHTN/PreE currently as pt has a history of preeclampsia     Transfer to L&D for further monitoring, pt unable to tolerate contraction pain.     Lata Drake MD  2024 3:14 PM                                 Clinical Impression:  Final diagnoses:  [O47.9] Uterine contractions during pregnancy  [Z3A.37] 37 weeks gestation of pregnancy  [O16.3] Hypertension during pregnancy in third trimester, unspecified hypertension in pregnancy type  [O09.299] Hx of preeclampsia, prior pregnancy, currently  pregnant  [O99.213, E66.01] Severe obesity due to excess calories affecting pregnancy in third trimester          ED Disposition Condition    Send to L&Lisette Montalvo MD  Resident  07/25/24 1605       Lata Calloway MD  07/27/24 1489

## 2024-07-25 PROBLEM — Z03.73 FETAL ANOMALY SUSPECTED BUT NOT FOUND: Status: RESOLVED | Noted: 2019-04-12 | Resolved: 2024-07-25

## 2024-07-25 PROCEDURE — 0HQ9XZZ REPAIR PERINEUM SKIN, EXTERNAL APPROACH: ICD-10-PCS | Performed by: OBSTETRICS & GYNECOLOGY

## 2024-07-25 PROCEDURE — 25000003 PHARM REV CODE 250

## 2024-07-25 PROCEDURE — 59400 OBSTETRICAL CARE: CPT | Mod: AT,,, | Performed by: OBSTETRICS & GYNECOLOGY

## 2024-07-25 PROCEDURE — 63600175 PHARM REV CODE 636 W HCPCS

## 2024-07-25 PROCEDURE — 72200005 HC VAGINAL DELIVERY LEVEL II

## 2024-07-25 PROCEDURE — 25000003 PHARM REV CODE 250: Performed by: OBSTETRICS & GYNECOLOGY

## 2024-07-25 PROCEDURE — 27200710 HC EPIDURAL INFUSION PUMP SET: Performed by: ANESTHESIOLOGY

## 2024-07-25 PROCEDURE — 11000001 HC ACUTE MED/SURG PRIVATE ROOM

## 2024-07-25 PROCEDURE — C1751 CATH, INF, PER/CENT/MIDLINE: HCPCS | Performed by: ANESTHESIOLOGY

## 2024-07-25 PROCEDURE — 72100002 HC LABOR CARE, 1ST 8 HOURS

## 2024-07-25 PROCEDURE — 72100003 HC LABOR CARE, EA. ADDL. 8 HRS

## 2024-07-25 PROCEDURE — 62326 NJX INTERLAMINAR LMBR/SAC: CPT

## 2024-07-25 RX ORDER — ACETAMINOPHEN 325 MG/1
650 TABLET ORAL EVERY 6 HOURS SCHEDULED
Status: DISCONTINUED | OUTPATIENT
Start: 2024-07-25 | End: 2024-07-26 | Stop reason: HOSPADM

## 2024-07-25 RX ORDER — OXYTOCIN-SODIUM CHLORIDE 0.9% IV SOLN 30 UNIT/500ML 30-0.9/5 UT/ML-%
95 SOLUTION INTRAVENOUS ONCE
Status: DISCONTINUED | OUTPATIENT
Start: 2024-07-25 | End: 2024-07-26 | Stop reason: HOSPADM

## 2024-07-25 RX ORDER — SODIUM CHLORIDE 0.9 % (FLUSH) 0.9 %
10 SYRINGE (ML) INJECTION
Status: DISCONTINUED | OUTPATIENT
Start: 2024-07-25 | End: 2024-07-26 | Stop reason: HOSPADM

## 2024-07-25 RX ORDER — OXYTOCIN 10 [USP'U]/ML
10 INJECTION, SOLUTION INTRAMUSCULAR; INTRAVENOUS ONCE AS NEEDED
Status: DISCONTINUED | OUTPATIENT
Start: 2024-07-25 | End: 2024-07-26 | Stop reason: HOSPADM

## 2024-07-25 RX ORDER — DOCUSATE SODIUM 100 MG/1
200 CAPSULE, LIQUID FILLED ORAL 2 TIMES DAILY PRN
Status: DISCONTINUED | OUTPATIENT
Start: 2024-07-25 | End: 2024-07-26 | Stop reason: HOSPADM

## 2024-07-25 RX ORDER — OXYTOCIN-SODIUM CHLORIDE 0.9% IV SOLN 30 UNIT/500ML 30-0.9/5 UT/ML-%
95 SOLUTION INTRAVENOUS ONCE AS NEEDED
Status: DISCONTINUED | OUTPATIENT
Start: 2024-07-25 | End: 2024-07-26 | Stop reason: HOSPADM

## 2024-07-25 RX ORDER — DIPHENHYDRAMINE HYDROCHLORIDE 50 MG/ML
25 INJECTION INTRAMUSCULAR; INTRAVENOUS EVERY 4 HOURS PRN
Status: DISCONTINUED | OUTPATIENT
Start: 2024-07-25 | End: 2024-07-26 | Stop reason: HOSPADM

## 2024-07-25 RX ORDER — DIPHENOXYLATE HYDROCHLORIDE AND ATROPINE SULFATE 2.5; .025 MG/1; MG/1
2 TABLET ORAL EVERY 6 HOURS PRN
Status: DISCONTINUED | OUTPATIENT
Start: 2024-07-25 | End: 2024-07-26 | Stop reason: HOSPADM

## 2024-07-25 RX ORDER — SERTRALINE HYDROCHLORIDE 25 MG/1
50 TABLET, FILM COATED ORAL DAILY
Status: DISCONTINUED | OUTPATIENT
Start: 2024-07-25 | End: 2024-07-25

## 2024-07-25 RX ORDER — SERTRALINE HYDROCHLORIDE 50 MG/1
50 TABLET, FILM COATED ORAL NIGHTLY
Status: DISCONTINUED | OUTPATIENT
Start: 2024-07-25 | End: 2024-07-26 | Stop reason: HOSPADM

## 2024-07-25 RX ORDER — ENOXAPARIN SODIUM 100 MG/ML
40 INJECTION SUBCUTANEOUS EVERY 12 HOURS
Status: DISCONTINUED | OUTPATIENT
Start: 2024-07-25 | End: 2024-07-26 | Stop reason: HOSPADM

## 2024-07-25 RX ORDER — TRANEXAMIC ACID 10 MG/ML
1000 INJECTION, SOLUTION INTRAVENOUS EVERY 30 MIN PRN
Status: DISCONTINUED | OUTPATIENT
Start: 2024-07-25 | End: 2024-07-26 | Stop reason: HOSPADM

## 2024-07-25 RX ORDER — MISOPROSTOL 200 UG/1
800 TABLET ORAL ONCE AS NEEDED
Status: DISCONTINUED | OUTPATIENT
Start: 2024-07-25 | End: 2024-07-26 | Stop reason: HOSPADM

## 2024-07-25 RX ORDER — DIPHENHYDRAMINE HCL 25 MG
25 CAPSULE ORAL EVERY 4 HOURS PRN
Status: DISCONTINUED | OUTPATIENT
Start: 2024-07-25 | End: 2024-07-26 | Stop reason: HOSPADM

## 2024-07-25 RX ORDER — ONDANSETRON 8 MG/1
8 TABLET, ORALLY DISINTEGRATING ORAL EVERY 8 HOURS PRN
Status: DISCONTINUED | OUTPATIENT
Start: 2024-07-25 | End: 2024-07-26 | Stop reason: HOSPADM

## 2024-07-25 RX ORDER — HYDROCODONE BITARTRATE AND ACETAMINOPHEN 5; 325 MG/1; MG/1
1 TABLET ORAL EVERY 4 HOURS PRN
Status: DISCONTINUED | OUTPATIENT
Start: 2024-07-25 | End: 2024-07-26 | Stop reason: HOSPADM

## 2024-07-25 RX ORDER — ENOXAPARIN SODIUM 100 MG/ML
40 INJECTION SUBCUTANEOUS EVERY 12 HOURS
Status: DISCONTINUED | OUTPATIENT
Start: 2024-07-25 | End: 2024-07-25

## 2024-07-25 RX ORDER — SIMETHICONE 80 MG
1 TABLET,CHEWABLE ORAL EVERY 6 HOURS PRN
Status: DISCONTINUED | OUTPATIENT
Start: 2024-07-25 | End: 2024-07-26 | Stop reason: HOSPADM

## 2024-07-25 RX ORDER — METHYLERGONOVINE MALEATE 0.2 MG/ML
200 INJECTION INTRAVENOUS ONCE AS NEEDED
Status: DISCONTINUED | OUTPATIENT
Start: 2024-07-25 | End: 2024-07-26 | Stop reason: HOSPADM

## 2024-07-25 RX ORDER — CARBOPROST TROMETHAMINE 250 UG/ML
250 INJECTION, SOLUTION INTRAMUSCULAR
Status: DISCONTINUED | OUTPATIENT
Start: 2024-07-25 | End: 2024-07-26 | Stop reason: HOSPADM

## 2024-07-25 RX ORDER — PRENATAL WITH FERROUS FUM AND FOLIC ACID 3080; 920; 120; 400; 22; 1.84; 3; 20; 10; 1; 12; 200; 27; 25; 2 [IU]/1; [IU]/1; MG/1; [IU]/1; MG/1; MG/1; MG/1; MG/1; MG/1; MG/1; UG/1; MG/1; MG/1; MG/1; MG/1
1 TABLET ORAL DAILY
Status: DISCONTINUED | OUTPATIENT
Start: 2024-07-25 | End: 2024-07-26 | Stop reason: HOSPADM

## 2024-07-25 RX ORDER — HYDROCORTISONE 25 MG/G
CREAM TOPICAL 3 TIMES DAILY PRN
Status: DISCONTINUED | OUTPATIENT
Start: 2024-07-25 | End: 2024-07-26 | Stop reason: HOSPADM

## 2024-07-25 RX ORDER — IBUPROFEN 600 MG/1
600 TABLET ORAL EVERY 6 HOURS PRN
Status: DISCONTINUED | OUTPATIENT
Start: 2024-07-25 | End: 2024-07-26 | Stop reason: HOSPADM

## 2024-07-25 RX ORDER — FENTANYL/BUPIVACAINE/NS/PF 2MCG/ML-.1
PLASTIC BAG, INJECTION (ML) INJECTION
Status: DISCONTINUED
Start: 2024-07-25 | End: 2024-07-25 | Stop reason: WASHOUT

## 2024-07-25 RX ORDER — HYDROCODONE BITARTRATE AND ACETAMINOPHEN 10; 325 MG/1; MG/1
1 TABLET ORAL EVERY 4 HOURS PRN
Status: DISCONTINUED | OUTPATIENT
Start: 2024-07-25 | End: 2024-07-26 | Stop reason: HOSPADM

## 2024-07-25 RX ORDER — OXYTOCIN-SODIUM CHLORIDE 0.9% IV SOLN 30 UNIT/500ML 30-0.9/5 UT/ML-%
10 SOLUTION INTRAVENOUS ONCE AS NEEDED
Status: DISCONTINUED | OUTPATIENT
Start: 2024-07-25 | End: 2024-07-26 | Stop reason: HOSPADM

## 2024-07-25 RX ADMIN — HYDROCODONE BITARTRATE AND ACETAMINOPHEN 1 TABLET: 5; 325 TABLET ORAL at 08:07

## 2024-07-25 RX ADMIN — DOCUSATE SODIUM 200 MG: 100 CAPSULE, LIQUID FILLED ORAL at 09:07

## 2024-07-25 RX ADMIN — Medication 4 MILLI-UNITS/MIN: at 12:07

## 2024-07-25 RX ADMIN — ACETAMINOPHEN 650 MG: 325 TABLET, FILM COATED ORAL at 02:07

## 2024-07-25 RX ADMIN — IBUPROFEN 600 MG: 600 TABLET, FILM COATED ORAL at 04:07

## 2024-07-25 RX ADMIN — IBUPROFEN 600 MG: 600 TABLET, FILM COATED ORAL at 09:07

## 2024-07-25 RX ADMIN — SODIUM CHLORIDE, POTASSIUM CHLORIDE, SODIUM LACTATE AND CALCIUM CHLORIDE 999 ML/HR: 600; 310; 30; 20 INJECTION, SOLUTION INTRAVENOUS at 06:07

## 2024-07-25 RX ADMIN — ACETAMINOPHEN 650 MG: 325 TABLET, FILM COATED ORAL at 09:07

## 2024-07-25 RX ADMIN — SERTRALINE HYDROCHLORIDE 50 MG: 50 TABLET ORAL at 09:07

## 2024-07-25 RX ADMIN — SODIUM CHLORIDE, POTASSIUM CHLORIDE, SODIUM LACTATE AND CALCIUM CHLORIDE: 600; 310; 30; 20 INJECTION, SOLUTION INTRAVENOUS at 12:07

## 2024-07-25 NOTE — ANESTHESIA PROCEDURE NOTES
Epidural    Patient location during procedure: OB   Reason for block: primary anesthetic   Reason for block: labor analgesia requested by patient and obstetrician  Diagnosis: IUP   Start time: 7/25/2024 7:31 AM  Timeout: 7/25/2024 7:30 AM  End time: 7/25/2024 7:35 AM    Staffing  Performing Provider: Daniel Collado MD  Authorizing Provider: Leonela Mckeon MD    Staffing  Performed by: Daniel Collado MD  Authorized by: Leonela Mckeon MD        Preanesthetic Checklist  Completed: patient identified, IV checked, site marked, risks and benefits discussed, surgical consent, monitors and equipment checked, pre-op evaluation, timeout performed, anesthesia consent given, hand hygiene performed and patient being monitored  Preparation  Patient position: sitting  Prep: ChloraPrep  Patient monitoring: continuous capnometry and Blood Pressure  Reason for block: primary anesthetic   Epidural  Skin Anesthetic: lidocaine 1%  Skin Wheal: 1 mL  Administration type: continuous  Approach: midline  Interspace: L4-5    Injection technique: ARIANA air  Needle and Epidural Catheter  Needle type: Tuohy   Needle gauge: 17  Needle length: 3.5 inches  Insertion Attempts: 1  Needle localization: anatomical landmarks  Additional Notes  Patient felt the need to push after insertion of epidural catheter so procedure aborted. No inadvertent dural puncture with Tuohy.  Dural puncture not performed with spinal needle

## 2024-07-25 NOTE — PROGRESS NOTES
"  LABOR NOTE    S:  Complaints: No.  Epidural Working:  not applicable  Resident to bedside for AROM.    O: BP (!) 122/58   Pulse 82   Temp 98.2 °F (36.8 °C) (Oral)   Resp 18   Ht 5' 3" (1.6 m)   Wt 125.8 kg (277 lb 5.4 oz)   LMP 11/06/2023 (Exact Date)   SpO2 99%   Breastfeeding No   BMI 49.13 kg/m²     FHT: 130, mod BTBV, +accels, -decels Cat 1 (reassuring)  CTX: q 4 minutes, pit @16  SVE: 4.5/70/-2    TIMELINE:   2330: 4.5/70/-3, AROM scant clr fluid  0300: 5/70/-3      PLAN:      Continue Close Maternal/Fetal Monitoring  Pitocin augmentation per protocol.   Recheck 4 hours or PRN    Dara Fonseca MD  Obstetrics & Gynecology, PGY-1    "

## 2024-07-25 NOTE — PROGRESS NOTES
"Patient is a Cat 2 tracing after intermittent late decels. Recommendation for induction of labor, AROM and pitocin augmentation.     LABOR NOTE    S:  Complaints: No.  Epidural Working:  not applicable  Resident to bedside for AROM.    O: /72   Pulse 72   Temp 98.2 °F (36.8 °C) (Oral)   Resp 18   Ht 5' 3" (1.6 m)   Wt 125.8 kg (277 lb 5.4 oz)   LMP 11/06/2023 (Exact Date)   SpO2 97%   Breastfeeding No   BMI 49.13 kg/m²     FHT: 130, mod BTBV, +accels, -decels Cat 1 (reassuring)  CTX: q 4 minutes   SVE: 4.5/70/-2    TIMELINE:   2330: 4.7/70/-3, AROM scant clr fluid    PLAN:      Continue Close Maternal/Fetal Monitoring  Will start pitocin.   Recheck 4 hours or PRN    Dara Fonseca MD  Obstetrics & Gynecology, PGY-1    "

## 2024-07-25 NOTE — CARE UPDATE
Resident to bedside for repeat SVE. Patient reports she is still feeling contractions but has not been timing them. SVE unchanged 4.5/70/-3. Cervix does not appear to be laboring. Discussed that we are unable to induce her at this time given her gestational age. Given reactive and reassuring tracing with no cervical change, patient was given the option to be discharged or stay a little longer and repeat SVE. Patient undecided at this time and would like time to think about her options.    Stefany Hess MD  PGY-4 OB/GYN

## 2024-07-25 NOTE — PLAN OF CARE
Pt ambulating and voiding without difficulty. Patient safety maintained, side rails up, bed low and locked position.  Pain well controlled with PRN pain medication. Fundus midline, firm, with moderate lochia. VSS. Patient's mother at bedside; patient responding to infant cues and bonding appropriately. No further needs at this time.

## 2024-07-25 NOTE — L&D DELIVERY NOTE
"[facility]  Vaginal Delivery   Operative Note    SUMMARY     Precipitous normal vaginal delivery of live infant, was placed on mothers abdomen for skin to skin and bulb suctioning performed.  Infant delivered position OA over intact perineum.  Nuchal cord: Yes, cord reduced following delivery.    Spontaneous delivery of placenta and IV pitocin given noting good uterine tone.  1st degree laceration noted but not repaired due to patient request given no epidural in place.  Patient tolerated delivery well. Sponge needle and lap counted correctly x2.    Indications:  (spontaneous vaginal delivery)  Pregnancy complicated by:   Patient Active Problem List   Diagnosis    Chronic hypertension affecting pregnancy    Joint pain    Shoulder pain, left    Depression    Class 3 severe obesity in adult    Focal neurological deficit    Pregnancy at early stage    Chronic migraine w/o aura w/o status migrainosus, not intractable    Intracranial aneurysm    Complex migraine / 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, not intractable    Cyst of ovary    S/P laparoscopic ovarian cystectomy, 19    Current mild episode of major depressive disorder without prior episode    Decreased strength of trunk and back    Lumbar spine instability    Encounter for induction of labor    Amenorrhea     (spontaneous vaginal delivery)     Admitting GA: 37w6d    Delivery Information for Daryl Hidalgo    Birth information:  YOB: 2023   Time of birth: 7:44 AM   Sex: female   Head Delivery Date/Time: 2024  7:44 AM   Delivery type: Vaginal, Spontaneous   Gestational Age: 37w6d        Delivery Providers    Delivering clinician: Rayne Gutierrez MD   Provider Role    Effie Ortiz, Surinder Jose ST Terrio, Jena P, RN Kurtz, Paige N, RN               Measurements    Weight: 3110 g  Weight (lbs): 6 lb 13.7 oz  Length: 50.8 cm  Length (in): 20"  Head circumference: 33 " cm  Chest circumference: 32.4 cm         Apgars    Living status: Living  Apgar Component Scores:  1 min.:  5 min.:  10 min.:  15 min.:  20 min.:    Skin color:  0  0       Heart rate:  2  2       Reflex irritability:  2  2       Muscle tone:  2  2       Respiratory effort:  2  2       Total:  8  8       Apgars assigned by: MÓNICA         Operative Delivery    Forceps attempted?: No  Vacuum extractor attempted?: No         Shoulder Dystocia    Shoulder dystocia present?: No           Presentation    Presentation: Vertex  Position: Occiput Anterior           Interventions/Resuscitation    Method: Bulb Suctioning, Tactile Stimulation       Cord    Vessels: 3 vessels  Complications: Nuchal  Nuchal Intervention: reduced  Nuchal Cord Description: tight nuchal cord  Number of Loops: 1  Delayed Cord Clamping?: Yes  Cord Clamped Date/Time: 2024  7:45 AM  Cord Blood Disposition: Lab  Gases Sent?: No  Stem Cell Collection (by MD): No       Placenta    Placenta delivery date/time: 2024 0747  Placenta removal: Spontaneous  Placenta appearance: Intact  Placenta disposition: Discarded           Labor Events:       labor: No     Labor Onset Date/Time:         Dilation Complete Date/Time: 2024 07:38     Start Pushing Date/Time: 2024 07:43       Start Pushing Date/Time: 2024 07:43     Rupture Date/Time: 24  234         Rupture type: ARM (Artificial Rupture)         Fluid Amount:       Fluid Color: Clear               steroids: None     Antibiotics given for GBS: No     Induction: oxytocin     Indications for induction:  Hypertension     Augmentation:       Indications for augmentation:       Labor complications: None     Additional complications:          Cervical ripening:                     Delivery:      Episiotomy: None     Indication for Episiotomy:       Perineal Lacerations: 1st Repaired:  No   Periurethral Laceration:   Repaired:     Labial Laceration:   Repaired:      Sulcus Laceration:   Repaired:     Vaginal Laceration:   Repaired:     Cervical Laceration:   Repaired:     Repair suture: None     Repair # of packets: 0     Last Value - EBL - Nursing (mL):       Sum - EBL - Nursing (mL): 0     Last Value - EBL - Anesthesia (mL):      Calculated QBL (mL): 200      Running total QBL (mL): 200      Vaginal Sweep Performed: No     Surgicount Correct: Yes     Vaginal Packing: No Quantity:       Other providers:       Anesthesia    Method: Other          Details (if applicable):  Trial of Labor      Categorization:      Priority:     Indications for :     Incision Type:       Additional  information:  Forceps:    Vacuum:    Breech:    Observed anomalies    Other (Comments):         Rayne Gutierrez MD  OB/GYN

## 2024-07-25 NOTE — DISCHARGE INSTRUCTIONS
Community Resources for Breastfeeding Mothers:   Hospital Breastfeeding Centers/ Lactation Consultants:   [facility]........................................................................................[phone]   [facility]....................................................................................[phone]   [facility]..........................[phone]   [facility].................................................................................[phone]   [facility].......................................................[phone]   [facility].................................[phone]   [facility].......................................[phone]   [facility]..................................................[phone]   [facility].....................................................................[phone]      [facility] (Poison Control)...........................................................[phone]    PoisonHelp.org   Free medical advice 24/7 through the Poison Help Line and the online tool      Online Resources:   International Breastfeeding [place] ...............................................................................ibconline.ca   Dr Axel Brunson online resource provides videos, articles, and information sheets.     Coeffective...............................................................................................................coeffective.com   Download the free mobile davy to help get off to a great start with breastfeeding.   Droplet.....................................................................................................................LLLer.com   Global Health  Media...........................................................................................Triptrotting.org   Videos that teach and empower mothers and caregivers   Infant [facility].............................................................................[phone]      Code71   Provides up to date information for medication use by moms during pregnancy and while breastfeeding.   Samantha Sharma....................................................................................................................kellymom.com   Provides online information on breastfeeding and parenting      La Leche League........................................................................................ lllalmsla.org   /   llli.org   Mother-to-mother support groups with education, information support, and encouragement    Work and Pump........................................................................................... [employer].com   Information about breastfeeding for working moms     [place] Resources:   [place] [facility]feeding [facility]............................... [phone]    [facility]   Find local breastfeeding support   [place] Breastfeeding Support............................................................ LaBreastfeedingSport.org   Zip code search of breastfeeding resources in your area   Partners for Healthy Babies............................................................[phone]   0397836qptu.org   Connects [place] moms and their families to health and pregnancy resources.  24/7   WIC (Women, Infant, Children)......................................................... [phone]   ldh.la.gov/WIC   Download the [employer] davy, get code from WIC office    [facility] Resources:     Baby Cafe............................................................................................................. babycafeusa.org   Free, drop in, informal  breastfeeding support groups offering professional lactation care and intervention.    [facility]/ [place] [facility]....................................... birthmarkAmba Defence.com   Infant feeding drop in clinics, Lactation services, support groups, education programs   Cafe [facility]...............................................[phone]   500Friends.com/groups/[facility]   Free breastfeeding support group for families of color   Mothers Milk Bank [facility]....................................................[phone]                                                             H.BLOOMsMountain View Locksmith.[employer]/services/mothers-milk-bank[facility]   [facility]J Nesting..................................................................................[phone] nolanesting.com   In person and virtual support for families through pregnancy, birth, and early parenthood.       [facility] [place]- Dr. Orly Levin.......................[phone]   [address] [place] [address]                                  [email or web address]   [email or web address]   theAudience Lactation Care, [facility] (Erinn Vásquez RN, IBCLC) ............................[phone] [email or web address]    Healthy Start [place].....................................[phone] (Shannon)  [phone] ([place]uman)   [facility].gov/health-department/healthy-start   Serves women of childbearing age and addresses issues for pregnant women and their children from birth  to age two.          La Leche League- [place] B[place]............................. InSupply.[employer]/ 500Friends.[employer] SA/ Elitecore Technologiesmilan   In person and virtual mother to mother support groups with education, information support and   encouragement to women who want to breastfeed      [place] Resources:   Breastfeeding Resources- [facility]  Health.....msdh.[place].gov (under womens services)   Find resources and info about planning for breastfeeding, its benefits, and help with breastfeeding  s uccessfully.    [facility]regnancy Choices- [place]....................................... [employer]   [phone]   [address] [place], [place]. Call or text 24/7   [facility].......................................................Orca Pharmaceuticalsastbreastfeedingcenter.CNG-One   [facility] Lactation Consultants sere [facility], including [facility],   [facility], and surrounding areas.    [place] Breastfeeding Coalition...............................................................................msbfc.org   Promotes and supports breastfeeding with families, health providers, and communities.   [facility] breastfeeding Coalition.....................................................................smbfc.org   Find breastfeeding resources and support groups in your area.    WIC Nutrition Program- [facility] of Health.................................... [place].gov

## 2024-07-25 NOTE — PLAN OF CARE
Problem:  Fall Injury Risk  Goal: Absence of Fall, Infant Drop and Related Injury  Outcome: Progressing     Problem: Adult Inpatient Plan of Care  Goal: Plan of Care Review  Outcome: Progressing  Goal: Patient-Specific Goal (Individualized)  Outcome: Progressing  Goal: Absence of Hospital-Acquired Illness or Injury  Outcome: Progressing  Goal: Optimal Comfort and Wellbeing  Outcome: Progressing  Goal: Readiness for Transition of Care  Outcome: Progressing     Problem: Bariatric Environmental Safety  Goal: Safety Maintained with Care  Outcome: Progressing     Problem: Infection  Goal: Absence of Infection Signs and Symptoms  Outcome: Progressing     Problem: Labor  Goal: Hemostasis  Outcome: Progressing  Goal: Stable Fetal Wellbeing  Outcome: Progressing  Goal: Effective Progression to Delivery  Outcome: Progressing  Goal: Absence of Infection Signs and Symptoms  Outcome: Progressing  Goal: Acceptable Pain Control  Outcome: Progressing  Goal: Normal Uterine Contraction Pattern  Outcome: Progressing

## 2024-07-26 ENCOUNTER — PATIENT MESSAGE (OUTPATIENT)
Dept: OBSTETRICS AND GYNECOLOGY | Facility: CLINIC | Age: 34
End: 2024-07-26
Payer: COMMERCIAL

## 2024-07-26 VITALS
RESPIRATION RATE: 18 BRPM | BODY MASS INDEX: 49.14 KG/M2 | WEIGHT: 277.31 LBS | OXYGEN SATURATION: 96 % | HEART RATE: 74 BPM | SYSTOLIC BLOOD PRESSURE: 129 MMHG | TEMPERATURE: 98 F | HEIGHT: 63 IN | DIASTOLIC BLOOD PRESSURE: 68 MMHG

## 2024-07-26 LAB
BASOPHILS # BLD AUTO: 0.04 K/UL (ref 0–0.2)
BASOPHILS NFR BLD: 0.4 % (ref 0–1.9)
DIFFERENTIAL METHOD BLD: ABNORMAL
EOSINOPHIL # BLD AUTO: 0.2 K/UL (ref 0–0.5)
EOSINOPHIL NFR BLD: 2.1 % (ref 0–8)
ERYTHROCYTE [DISTWIDTH] IN BLOOD BY AUTOMATED COUNT: 15.7 % (ref 11.5–14.5)
HCT VFR BLD AUTO: 35 % (ref 37–48.5)
HGB BLD-MCNC: 11.5 G/DL (ref 12–16)
IMM GRANULOCYTES # BLD AUTO: 0.11 K/UL (ref 0–0.04)
IMM GRANULOCYTES NFR BLD AUTO: 1.2 % (ref 0–0.5)
LYMPHOCYTES # BLD AUTO: 3.1 K/UL (ref 1–4.8)
LYMPHOCYTES NFR BLD: 32.4 % (ref 18–48)
MCH RBC QN AUTO: 27.7 PG (ref 27–31)
MCHC RBC AUTO-ENTMCNC: 32.9 G/DL (ref 32–36)
MCV RBC AUTO: 84 FL (ref 82–98)
MONOCYTES # BLD AUTO: 0.6 K/UL (ref 0.3–1)
MONOCYTES NFR BLD: 5.8 % (ref 4–15)
NEUTROPHILS # BLD AUTO: 5.5 K/UL (ref 1.8–7.7)
NEUTROPHILS NFR BLD: 58.1 % (ref 38–73)
NRBC BLD-RTO: 0 /100 WBC
PLATELET # BLD AUTO: 264 K/UL (ref 150–450)
PMV BLD AUTO: 9.8 FL (ref 9.2–12.9)
RBC # BLD AUTO: 4.15 M/UL (ref 4–5.4)
WBC # BLD AUTO: 9.5 K/UL (ref 3.9–12.7)

## 2024-07-26 PROCEDURE — 36415 COLL VENOUS BLD VENIPUNCTURE: CPT | Performed by: OBSTETRICS & GYNECOLOGY

## 2024-07-26 PROCEDURE — 25000003 PHARM REV CODE 250

## 2024-07-26 PROCEDURE — 63600175 PHARM REV CODE 636 W HCPCS: Performed by: OBSTETRICS & GYNECOLOGY

## 2024-07-26 PROCEDURE — 25000003 PHARM REV CODE 250: Performed by: OBSTETRICS & GYNECOLOGY

## 2024-07-26 PROCEDURE — 85025 COMPLETE CBC W/AUTO DIFF WBC: CPT | Performed by: OBSTETRICS & GYNECOLOGY

## 2024-07-26 RX ORDER — HYDROCODONE BITARTRATE AND ACETAMINOPHEN 5; 325 MG/1; MG/1
1 TABLET ORAL EVERY 4 HOURS PRN
Qty: 8 TABLET | Refills: 0 | Status: SHIPPED | OUTPATIENT
Start: 2024-07-26

## 2024-07-26 RX ORDER — DOCUSATE SODIUM 100 MG/1
200 CAPSULE, LIQUID FILLED ORAL 2 TIMES DAILY PRN
Qty: 60 CAPSULE | Refills: 0 | Status: SHIPPED | OUTPATIENT
Start: 2024-07-26

## 2024-07-26 RX ORDER — IBUPROFEN 600 MG/1
600 TABLET ORAL 3 TIMES DAILY
Qty: 60 TABLET | Refills: 0 | Status: SHIPPED | OUTPATIENT
Start: 2024-07-26

## 2024-07-26 RX ADMIN — IBUPROFEN 600 MG: 600 TABLET, FILM COATED ORAL at 05:07

## 2024-07-26 RX ADMIN — ENOXAPARIN SODIUM 40 MG: 40 INJECTION SUBCUTANEOUS at 08:07

## 2024-07-26 RX ADMIN — ACETAMINOPHEN 650 MG: 325 TABLET, FILM COATED ORAL at 08:07

## 2024-07-26 RX ADMIN — IBUPROFEN 600 MG: 600 TABLET, FILM COATED ORAL at 11:07

## 2024-07-26 RX ADMIN — ENOXAPARIN SODIUM 40 MG: 40 INJECTION SUBCUTANEOUS at 12:07

## 2024-07-26 RX ADMIN — IBUPROFEN 600 MG: 600 TABLET, FILM COATED ORAL at 12:07

## 2024-07-26 RX ADMIN — PRENATAL VIT W/ FE FUMARATE-FA TAB 27-0.8 MG 1 TABLET: 27-0.8 TAB at 08:07

## 2024-07-26 RX ADMIN — DOCUSATE SODIUM 200 MG: 100 CAPSULE, LIQUID FILLED ORAL at 08:07

## 2024-07-26 RX ADMIN — ACETAMINOPHEN 650 MG: 325 TABLET, FILM COATED ORAL at 03:07

## 2024-07-26 NOTE — PLAN OF CARE
Patient safety maintained, side rails up, bed low and locked position. Pt ambulating and voiding independently. Pain well controlled with scheduled pain medication. Fundus midline, firm, with light lochia. Patient responding to infant cues.

## 2024-07-26 NOTE — LACTATION NOTE
"   07/26/24 1230   Maternal Infant Feeding   Maternal Emotional State relaxed   Community Referrals   Community Referrals outpatient lactation program;pediatric care provider;support group     Visited patient in room, holding sleeping baby on her chest.  C/o the baby waking to feed q 45 min last night.  Stated her nipples are "flattened" when they come out of the baby's mouth, think the baby starts c a deep latch and the becomes "tired" and slips toward the tip of her nipple.  Stated she supplemented c formula pc last night because the baby continued to act hungry after breastfeeding. Stated she met c Dr. Orly Levin for assistance c her first baby post-discharge.  Discharge education provided, Guide reviewed including the First Alert form.  Emphasis placed on the process of attaching baby deeply to the breast and signs of a deep latch and milk transfer.  Requested patient to call for assistance at the next feeding.  "

## 2024-07-26 NOTE — PLAN OF CARE
Pt ambulating, voiding, and passing flatus. Pt tolerating PO well and no SS of distress at this time. Pt's pain well controlled throughout shift by oral pain medication. Pts bleeding has been light throughout shift. Fundus is firm. Mother baby care guide reviewed. All questions answered. Pt verbalized understanding to follow up with OB 4-6 weeks. Medications delivered to bedside. Reviewed medication list, when to call provider, and SS of infection. Pt stable at this time. ID band verified. All safety measures in place.

## 2024-07-26 NOTE — HPI
Sonam Hidalgo is a 34 y.o.  female with IUP at 37w5d weeks gestation who presents with painful contractions q3 min. Her IUP is complicated by cHTN, depression, h/o migraines, obesity (pre-pregnancy BMI 49).     Patient reports contractions and denies vaginal bleeding, leakage of fluid. She reports good fetal movement.

## 2024-07-26 NOTE — PROGRESS NOTES
[facility]  Obstetrics  Postpartum Progress Note    Patient Name: Sonam Hidalgo  MRN: [id number]  Admission Date: 2024  Hospital Length of Stay: 2 days  Attending Physician: Merary Benton MD  Primary Care Provider: Fili Joshua MD    Subjective:     Principal Problem: (spontaneous vaginal delivery)    Hospital Course:  24 PPD#1 doing well, normal involution, desires discharge today    Interval History:     She is doing well this morning. She is tolerating a regular diet without nausea or vomiting. She is voiding spontaneously. She is ambulating. She has passed flatus, and has not a BM. Vaginal bleeding is mild. She denies fever or chills. Abdominal pain is mild and controlled with oral medications. She Is breastfeeding. She desires circumcision for her male baby: not applicable.    Objective:     Vital Signs (Most Recent):  Temp: 98 °F (36.7 °C) (24 075)  Pulse: 68 (24 075)  Resp: 18 (24 075)  BP: 124/77 (24 0756)  SpO2: 96 % (24 075) Vital Signs (24h Range):  Temp:  [98 °F (36.7 °C)-98.8 °F (37.1 °C)] 98 °F (36.7 °C)  Pulse:  [68-92] 68  Resp:  [16-18] 18  SpO2:  [95 %-100 %] 96 %  BP: (115-135)/(57-77) 124/77     Weight: 125.8 kg (277 lb 5.4 oz)  Body mass index is 49.13 kg/m².      Intake/Output Summary (Last 24 hours) at 2024 0914  Last data filed at 2024 1042  Gross per 24 hour   Intake 641.71 ml   Output 200 ml   Net 441.71 ml         Significant Labs:  Lab Results   Component Value Date    GROUPTRH O POS 2024    HEPBSAG Non-reactive 2024    STREPBCULT No Group B Streptococcus isolated 2024     Recent Labs   Lab 24  0615   HGB 11.5*   HCT 35.0*       I have personallly reviewed all pertinent lab results from the last 24 hours.  Recent Lab Results         24  0615        Baso # 0.04       Basophil % 0.4       Differential Method Automated       Eos # 0.2       Eos % 2.1       Gran #  (ANC) 5.5       Gran % 58.1       Hematocrit 35.0       Hemoglobin 11.5       Immature Grans (Abs) 0.11  Comment: Mild elevation in immature granulocytes is non specific and   can be seen in a variety of conditions including stress response,   acute inflammation, trauma and pregnancy. Correlation with other   laboratory and clinical findings is essential.         Immature Granulocytes 1.2       Lymph # 3.1       Lymph % 32.4       MCH 27.7       MCHC 32.9       MCV 84       Mono # 0.6       Mono % 5.8       MPV 9.8       nRBC 0       Platelet Count 264       RBC 4.15       RDW 15.7       WBC 9.50               Physical Exam:   Constitutional: She is oriented to person, place, and time. She appears well-developed and well-nourished.    HENT:   Head: Normocephalic and atraumatic.    Eyes: Pupils are equal, round, and reactive to light. EOM are normal.      Pulmonary/Chest: Effort normal.        Abdominal: Soft. There is no abdominal tenderness.     Genitourinary:    Uterus normal.             Musculoskeletal: Normal range of motion.       Neurological: She is alert and oriented to person, place, and time.    Skin: Skin is warm and dry.    Psychiatric: She has a normal mood and affect. Her behavior is normal. Judgment and thought content normal.       Review of Systems   Constitutional:  Negative for fever.   Eyes:  Negative for visual disturbance.   Cardiovascular:  Negative for chest pain.   Gastrointestinal:  Positive for abdominal pain.        Cramping    Genitourinary:  Positive for vaginal bleeding. Negative for vaginal pain.        Mild lochia rubra     Neurological:  Negative for headaches.   All other systems reviewed and are negative.    Assessment/Plan:     34 y.o. female  for:    *  (spontaneous vaginal delivery)  Routine postpartum care    Class 3 severe obesity in adult  Discontinue lovenox prior to discharge    Depression  2 week mood check indicated  Zoloft     Chronic hypertension affecting  pregnancy  Asymptomatic for SIpre-E        Disposition: As patient meets milestones, will plan to discharge today on PPD#1.    Melanie Muñoz CNM  Obstetrics  [facility]

## 2024-07-26 NOTE — ANESTHESIA POSTPROCEDURE EVALUATION
Anesthesia Post Evaluation    Patient: Sonam Hidalgo    Procedure(s) Performed: * No procedures listed *    Final Anesthesia Type: other      Patient location during evaluation: labor & delivery  Patient participation: Yes- Able to Participate  Level of consciousness: awake and alert and oriented  Post-procedure vital signs: reviewed and stable  Pain management: adequate  Airway patency: patent    PONV status at discharge: No PONV  Anesthetic complications: no      Cardiovascular status: blood pressure returned to baseline  Respiratory status: unassisted  Hydration status: euvolemic  Follow-up not needed.  Comments: Patient received nitrous for analgesia.            Vitals Value Taken Time   /68 07/26/24 1246   Temp 36.7 °C (98 °F) 07/26/24 1246   Pulse 74 07/26/24 1246   Resp 18 07/26/24 1246   SpO2 96 % 07/26/24 1246         No case tracking events are documented in the log.      Pain/Vj Score: Pain Rating Prior to Med Admin: 2 (7/26/2024 11:21 AM)  Pain Rating Post Med Admin: 1 (7/26/2024 12:15 PM)

## 2024-07-26 NOTE — SUBJECTIVE & OBJECTIVE
Interval History:     She is doing well this morning. She is tolerating a regular diet without nausea or vomiting. She is voiding spontaneously. She is ambulating. She has passed flatus, and has not a BM. Vaginal bleeding is mild. She denies fever or chills. Abdominal pain is mild and controlled with oral medications. She Is breastfeeding. She desires circumcision for her male baby: not applicable.    Objective:     Vital Signs (Most Recent):  Temp: 98 °F (36.7 °C) (07/26/24 0756)  Pulse: 68 (07/26/24 0756)  Resp: 18 (07/26/24 0756)  BP: 124/77 (07/26/24 0756)  SpO2: 96 % (07/26/24 0756) Vital Signs (24h Range):  Temp:  [98 °F (36.7 °C)-98.8 °F (37.1 °C)] 98 °F (36.7 °C)  Pulse:  [68-92] 68  Resp:  [16-18] 18  SpO2:  [95 %-100 %] 96 %  BP: (115-135)/(57-77) 124/77     Weight: 125.8 kg (277 lb 5.4 oz)  Body mass index is 49.13 kg/m².      Intake/Output Summary (Last 24 hours) at 7/26/2024 0914  Last data filed at 7/25/2024 1042  Gross per 24 hour   Intake 641.71 ml   Output 200 ml   Net 441.71 ml         Significant Labs:  Lab Results   Component Value Date    GROUPTRH O POS 07/24/2024    HEPBSAG Non-reactive 01/26/2024    STREPBCULT No Group B Streptococcus isolated 07/05/2024     Recent Labs   Lab 07/26/24  0615   HGB 11.5*   HCT 35.0*       I have personallly reviewed all pertinent lab results from the last 24 hours.  Recent Lab Results         07/26/24 0615        Baso # 0.04       Basophil % 0.4       Differential Method Automated       Eos # 0.2       Eos % 2.1       Gran # (ANC) 5.5       Gran % 58.1       Hematocrit 35.0       Hemoglobin 11.5       Immature Grans (Abs) 0.11  Comment: Mild elevation in immature granulocytes is non specific and   can be seen in a variety of conditions including stress response,   acute inflammation, trauma and pregnancy. Correlation with other   laboratory and clinical findings is essential.         Immature Granulocytes 1.2       Lymph # 3.1       Lymph % 32.4       MCH  27.7       MCHC 32.9       MCV 84       Mono # 0.6       Mono % 5.8       MPV 9.8       nRBC 0       Platelet Count 264       RBC 4.15       RDW 15.7       WBC 9.50               Physical Exam:   Constitutional: She is oriented to person, place, and time. She appears well-developed and well-nourished.    HENT:   Head: Normocephalic and atraumatic.    Eyes: Pupils are equal, round, and reactive to light. EOM are normal.      Pulmonary/Chest: Effort normal.        Abdominal: Soft. There is no abdominal tenderness.     Genitourinary:    Uterus normal.             Musculoskeletal: Normal range of motion.       Neurological: She is alert and oriented to person, place, and time.    Skin: Skin is warm and dry.    Psychiatric: She has a normal mood and affect. Her behavior is normal. Judgment and thought content normal.       Review of Systems   Constitutional:  Negative for fever.   Eyes:  Negative for visual disturbance.   Cardiovascular:  Negative for chest pain.   Gastrointestinal:  Positive for abdominal pain.        Cramping    Genitourinary:  Positive for vaginal bleeding. Negative for vaginal pain.        Mild lochia rubra     Neurological:  Negative for headaches.   All other systems reviewed and are negative.

## 2024-07-26 NOTE — LACTATION NOTE
This note was copied from a baby's chart.  Discussed the desired feeding choice with the patient.  Reviewed the benefits of breastfeeding and the risks of formula feeding. States understanding of all information and verbalized appropriate recall.    Instructed on the risks of formula feeding including:  Lacks the nutrients found in colostrums to help prevent infection, mature the gut, aid in digestion and resist allergies  Contains artificial additives and preservatives which increases incidence of contamination  Increase spitting up due to slower digestion  Increased cost and requires preparation, including bottle sanitation and formula refrigeration  Increased incidence of NEC for the  baby  Increased risk of diabetes with family history, SIDS and ear infections  Skipped feedings for the breastfeeding mother increases chance of engorgement, mastitis and plugged ducts  Decreases breastfeeding babys appetite resulting in poor feeding session, decreased breast stimulation and poor milk supply  Exposes the breastfeeding baby to the possibility of allergic reactions and colic  Pt states understanding and verbalized appropriate recall.

## 2024-07-26 NOTE — DISCHARGE SUMMARY
[facility], operated by [facility] ([place])  Obstetrics  Discharge Summary      Patient Name: Sonam Hidalgo  MRN: [id number]  Admission Date: 2024  Hospital Length of Stay: 2 days  Discharge Date and Time:  2024 9:19 AM  Attending Physician: Merary Benton MD   Discharging Provider: Melanie Muñoz CNM   Primary Care Provider: Fili Joshua MD    HPI: Sonam Hidalgo is a 34 y.o.  female with IUP at 37w5d weeks gestation who presents with painful contractions q3 min. Her IUP is complicated by cHTN, depression, h/o migraines, obesity (pre-pregnancy BMI 49).     Patient reports contractions and denies vaginal bleeding, leakage of fluid. She reports good fetal movement.        * No surgery found *     Hospital Course:   24 PPD#1 doing well, normal involution, desires discharge today         Final Active Diagnoses:    Diagnosis Date Noted POA    PRINCIPAL PROBLEM:   (spontaneous vaginal delivery) [O80] 2024 Not Applicable    Depression [F32.A] 2019 Yes    Class 3 severe obesity in adult [E66.01] 2019 Yes    Chronic hypertension affecting pregnancy [O10.919] 2015 Yes      Problems Resolved During this Admission:    Diagnosis Date Noted Date Resolved POA    Fetal echo with top normal size of right heart, recommend  echo prior to nursery discharge [Z03.73] 2019 Yes        Significant Diagnostic Studies: Labs: All labs within the past 24 hours have been reviewed      Feeding Method: breast    Immunizations       Date Immunization Status Dose Route/Site Given by    24 0847 MMR Incomplete 0.5 mL Subcutaneous/     24 0847 Tdap Incomplete 0.5 mL Intramuscular/             Delivery:    Episiotomy: None   Lacerations: 1st   Repair suture: None   Repair # of packets: 0   Blood loss (ml):       Birth information:  YOB: 2023   Time of birth: 7:44 AM   Sex: female   Delivery type: Vaginal, Spontaneous   Gestational  "Age: 37w6d     Measurements    Weight: 3110 g  Weight (lbs): 6 lb 13.7 oz  Length: 50.8 cm  Length (in): 20"  Head circumference: 33 cm  Chest circumference: 32.4 cm         Delivery Clinician: Delivery Providers    Delivering clinician: Rayne Gutierrez MD   Provider Role    Effie Ortiz, Surinder Jose ST Terrio, Jena P, RN Kurtz, Paige N, RN              Additional  information:  Forceps:    Vacuum:    Breech:    Observed anomalies      Living?:     Apgars    Living status: Living  Apgar Component Scores:  1 min.:  5 min.:  10 min.:  15 min.:  20 min.:    Skin color:  0  0       Heart rate:  2  2       Reflex irritability:  2  2       Muscle tone:  2  2       Respiratory effort:  2  2       Total:  8  8       Apgars assigned by: MÓNICA         Placenta: Delivered:       appearance  Pending Diagnostic Studies:       None            Discharged Condition: stable    Disposition: Home or Self Care    Follow Up:   Follow-up Information       Merary Benton MD Follow up in 1 week(s).    Specialties: Obstetrics, Obstetrics and Gynecology  Why: BP check via [employer]  Contact information:  [address] [id number] [phone]               Merary Benton MD Follow up in 2 week(s).    Specialties: Obstetrics, Obstetrics and Gynecology  Why: mood check  Contact information:  [address] [address] [phone]               Merary Benton MD Follow up in 6 week(s).    Specialties: Obstetrics, Obstetrics and Gynecology  Why: Postpartum visit  Contact information:  [address] [place], [place] [id number] [phone]                           Patient Instructions:      Diet Adult Regular     No driving until:   Order Comments: 1-2 weeks postpartum     Pelvic Rest   Order Comments: 6 weeks postpartum     Notify your health care provider if you experience any of the following:  temperature >100.4     Notify your health care provider if " you experience any of the following:  severe uncontrolled pain     Notify your health care provider if you experience any of the following:  redness, tenderness, or signs of infection (pain, swelling, redness, odor or green/yellow discharge around incision site)     Notify your health care provider if you experience any of the following:  difficulty breathing or increased cough     Notify your health care provider if you experience any of the following:  severe persistent headache     Notify your health care provider if you experience any of the following:  persistent dizziness, light-headedness, or visual disturbances     Notify your health care provider if you experience any of the following:  increased confusion or weakness     Activity as tolerated     Medications:  Current Discharge Medication List        START taking these medications    Details   docusate sodium (COLACE) 100 MG capsule Take 2 capsules (200 mg total) by mouth 2 (two) times daily as needed for Constipation.  Qty: 60 capsule, Refills: 0      HYDROcodone-acetaminophen (NORCO) 5-325 mg per tablet Take 1 tablet by mouth every 4 (four) hours as needed for Pain.  Qty: 8 tablet, Refills: 0    Comments: Quantity prescribed more than 7 day supply? No      ibuprofen (ADVIL,MOTRIN) 600 MG tablet Take 1 tablet (600 mg total) by mouth 3 (three) times daily.  Qty: 60 tablet, Refills: 0           CONTINUE these medications which have NOT CHANGED    Details   augmented betamethasone (DIPROLENE) 0.05 % lotion Apply topically 2 (two) times daily. for 7 days  Qty: 60 mL, Refills: 1    Associated Diagnoses: PUPP (pruritic urticarial papules and plaques of pregnancy)      ferrous sulfate (IRON ORAL) Take by mouth.      ketoconazole (NIZORAL) 2 % cream Apply topically every evening. for 14 days  Qty: 30 g, Refills: 1      nystatin (MYCOSTATIN) powder Apply topically every morning. for 14 days  Qty: 30 g, Refills: 2      PNV no.153/FA/om3/dha/epa/fish (PRENATAL  GUMMIES ORAL) Take by mouth.      sertraline (ZOLOFT) 50 MG tablet Take 1 tablet (50 mg total) by mouth once daily.  Qty: 30 tablet, Refills: 2    Associated Diagnoses: Depression, unspecified depression type           STOP taking these medications       aspirin (ECOTRIN) 81 MG EC tablet Comments:   Reason for Stopping:               Melanie Muñoz CNM  Obstetrics  [facility]

## 2024-07-26 NOTE — PLAN OF CARE
"Plan of care:  Patient will nurse baby on cue  "8 or more in 24" and lengthen feedings until content; if baby is unable to achieve and sustain a deep latch, patient will double pump breasts for 15-20 min using the most suction that is comfortable; will supplement baby at each feeding c EBM/ formula ad doroteo; will monitor the baby's 24 hr diaper counts; will care for the collection kit per 's instructions; will increase calories, fluids, and rest; will call the Warmline for support and for assistance prn.     "

## 2024-07-28 NOTE — ADDENDUM NOTE
Addendum  created 07/28/24 1459 by Leonela Mckeon MD    Attestation recorded in Intraprocedure, Flowsheet accepted, Intraprocedure Attestations filed

## 2024-07-29 ENCOUNTER — PATIENT MESSAGE (OUTPATIENT)
Dept: OBSTETRICS AND GYNECOLOGY | Facility: OTHER | Age: 34
End: 2024-07-29
Payer: COMMERCIAL

## 2024-07-30 ENCOUNTER — PATIENT MESSAGE (OUTPATIENT)
Dept: PSYCHIATRY | Facility: CLINIC | Age: 34
End: 2024-07-30
Payer: COMMERCIAL

## 2024-07-30 ENCOUNTER — OFFICE VISIT (OUTPATIENT)
Dept: PSYCHIATRY | Facility: CLINIC | Age: 34
End: 2024-07-30
Payer: COMMERCIAL

## 2024-07-30 DIAGNOSIS — Z86.59 HISTORY OF POSTPARTUM DEPRESSION: ICD-10-CM

## 2024-07-30 DIAGNOSIS — F32.A DEPRESSION, UNSPECIFIED DEPRESSION TYPE: Primary | ICD-10-CM

## 2024-07-30 DIAGNOSIS — Z87.59 HISTORY OF POSTPARTUM DEPRESSION: ICD-10-CM

## 2024-07-30 PROCEDURE — 3044F HG A1C LEVEL LT 7.0%: CPT | Mod: CPTII,95,, | Performed by: SOCIAL WORKER

## 2024-07-30 PROCEDURE — 90785 PSYTX COMPLEX INTERACTIVE: CPT | Mod: 95,,, | Performed by: SOCIAL WORKER

## 2024-07-30 PROCEDURE — 90834 PSYTX W PT 45 MINUTES: CPT | Mod: 95,,, | Performed by: SOCIAL WORKER

## 2024-07-30 NOTE — PROGRESS NOTES
The patient location is: [place]  The chief complaint leading to consultation is: depression, anxiety    Visit type: audiovisual    Face to Face time with patient: 55 mins  65 minutes of total time spent on the encounter, which includes face to face time and non-face to face time preparing to see the patient (eg, review of tests), Obtaining and/or reviewing separately obtained history, Documenting clinical information in the electronic or other health record, Independently interpreting results (not separately reported) and communicating results to the patient/family/caregiver, or Care coordination (not separately reported).     Each patient to whom he or she provides medical services by telemedicine is:  (1) informed of the relationship between the physician and patient and the respective role of any other health care provider with respect to management of the patient; and (2) notified that he or she may decline to receive medical services by telemedicine and may withdraw from such care at any time.    Notes:     Individual Psychotherapy (PhD/LCSW)    07/30/2024     Site:  Telemed         Therapeutic Intervention: Met with patient.  Outpatient - Insight oriented psychotherapy 60 min - CPT code 85267 and Outpatient - Supportive psychotherapy 60 min - CPT Code 81437    Chief complaint/reason for encounter: depression and anxiety     Interval history and content of current session: Patient returns for follow up psychotherapy. Patient is present for follow up session. Reports that daughter was born on 7/25. Was counting contractions and it got to about four minutes apart. Went to labor and delivery floor and was admitted. Was trying to get an epidural but had desire to push and didn't get epidural. Was in the hospital for 24 hours. FOB was able to make it to birth. Reports that mood has been up and down. A night recently when she was cooking dinner and wanted to cry and felt overwhelmed. Discussed baby blues symptoms  and the difference between baby blues and postpartum depression. Discussed communicating with mother about symptoms from previous episodes of depression. JULIO C filed an extension for child support court. Found that out that he also had a  now. States that she was very upset because she asked for extension due to pregnancy and was told no but he was granted an extension for an unknown reason.     Scheduled follow up for 8/16    Baby - Greer    Treatment plan:  Target symptoms: depression, anxiety   Why chosen therapy is appropriate versus another modality: relevant to diagnosis, evidence based practice  Outcome monitoring methods: self-report, observation  Therapeutic intervention type: insight oriented psychotherapy, supportive psychotherapy    Risk parameters:  Patient reports no suicidal ideation  Patient reports no homicidal ideation  Patient reports no self-injurious behavior  Patient reports no violent behavior    Verbal deficits: None    Patient's response to intervention:  The patient's response to intervention is accepting.    Progress toward goals and other mental status changes:  The patient's progress toward goals is good.    Diagnosis:   Encounter Diagnoses   Name Primary?    Depression, unspecified depression type Yes    History of postpartum depression        Plan:  individual psychotherapy    Return to clinic: 1 week    Length of Service (minutes): 60

## 2024-07-31 ENCOUNTER — TELEPHONE (OUTPATIENT)
Dept: OBSTETRICS AND GYNECOLOGY | Facility: CLINIC | Age: 34
End: 2024-07-31
Payer: COMMERCIAL

## 2024-07-31 ENCOUNTER — HOSPITAL ENCOUNTER (EMERGENCY)
Facility: OTHER | Age: 34
Discharge: HOME | End: 2024-07-31
Attending: STUDENT IN AN ORGANIZED HEALTH CARE EDUCATION/TRAINING PROGRAM
Payer: COMMERCIAL

## 2024-07-31 VITALS
HEART RATE: 64 BPM | TEMPERATURE: 99 F | SYSTOLIC BLOOD PRESSURE: 133 MMHG | DIASTOLIC BLOOD PRESSURE: 77 MMHG | OXYGEN SATURATION: 100 % | RESPIRATION RATE: 18 BRPM

## 2024-07-31 DIAGNOSIS — I10 CHRONIC HYPERTENSION: Primary | ICD-10-CM

## 2024-07-31 LAB
ALBUMIN SERPL BCP-MCNC: 2.8 G/DL (ref 3.5–5.2)
ALP SERPL-CCNC: 101 U/L (ref 55–135)
ALT SERPL W/O P-5'-P-CCNC: 18 U/L (ref 10–44)
ANION GAP SERPL CALC-SCNC: 10 MMOL/L (ref 8–16)
AST SERPL-CCNC: 14 U/L (ref 10–40)
BASOPHILS # BLD AUTO: 0.04 K/UL (ref 0–0.2)
BASOPHILS NFR BLD: 0.6 % (ref 0–1.9)
BILIRUB SERPL-MCNC: 0.2 MG/DL (ref 0.1–1)
BUN SERPL-MCNC: 16 MG/DL (ref 6–20)
CALCIUM SERPL-MCNC: 9.3 MG/DL (ref 8.7–10.5)
CHLORIDE SERPL-SCNC: 106 MMOL/L (ref 95–110)
CO2 SERPL-SCNC: 24 MMOL/L (ref 23–29)
CREAT SERPL-MCNC: 0.7 MG/DL (ref 0.5–1.4)
DIFFERENTIAL METHOD BLD: ABNORMAL
EOSINOPHIL # BLD AUTO: 0.3 K/UL (ref 0–0.5)
EOSINOPHIL NFR BLD: 3.7 % (ref 0–8)
ERYTHROCYTE [DISTWIDTH] IN BLOOD BY AUTOMATED COUNT: 14.8 % (ref 11.5–14.5)
EST. GFR  (NO RACE VARIABLE): >60 ML/MIN/1.73 M^2
GLUCOSE SERPL-MCNC: 83 MG/DL (ref 70–110)
HCT VFR BLD AUTO: 39.3 % (ref 37–48.5)
HGB BLD-MCNC: 12.8 G/DL (ref 12–16)
IMM GRANULOCYTES # BLD AUTO: 0.03 K/UL (ref 0–0.04)
IMM GRANULOCYTES NFR BLD AUTO: 0.4 % (ref 0–0.5)
LYMPHOCYTES # BLD AUTO: 2.6 K/UL (ref 1–4.8)
LYMPHOCYTES NFR BLD: 36.7 % (ref 18–48)
MCH RBC QN AUTO: 27.1 PG (ref 27–31)
MCHC RBC AUTO-ENTMCNC: 32.6 G/DL (ref 32–36)
MCV RBC AUTO: 83 FL (ref 82–98)
MONOCYTES # BLD AUTO: 0.6 K/UL (ref 0.3–1)
MONOCYTES NFR BLD: 8.1 % (ref 4–15)
NEUTROPHILS # BLD AUTO: 3.5 K/UL (ref 1.8–7.7)
NEUTROPHILS NFR BLD: 50.5 % (ref 38–73)
NRBC BLD-RTO: 0 /100 WBC
PLATELET # BLD AUTO: 366 K/UL (ref 150–450)
PMV BLD AUTO: 9.1 FL (ref 9.2–12.9)
POTASSIUM SERPL-SCNC: 4 MMOL/L (ref 3.5–5.1)
PROT SERPL-MCNC: 6.6 G/DL (ref 6–8.4)
RBC # BLD AUTO: 4.72 M/UL (ref 4–5.4)
SODIUM SERPL-SCNC: 140 MMOL/L (ref 136–145)
WBC # BLD AUTO: 6.94 K/UL (ref 3.9–12.7)

## 2024-07-31 PROCEDURE — 99283 EMERGENCY DEPT VISIT LOW MDM: CPT

## 2024-07-31 PROCEDURE — 80053 COMPREHEN METABOLIC PANEL: CPT

## 2024-07-31 PROCEDURE — 25000003 PHARM REV CODE 250

## 2024-07-31 PROCEDURE — 85025 COMPLETE CBC W/AUTO DIFF WBC: CPT

## 2024-07-31 RX ORDER — METOCLOPRAMIDE 5 MG/1
10 TABLET ORAL ONCE
Status: COMPLETED | OUTPATIENT
Start: 2024-07-31 | End: 2024-07-31

## 2024-07-31 RX ORDER — DIPHENHYDRAMINE HCL 25 MG
25 CAPSULE ORAL ONCE
Status: COMPLETED | OUTPATIENT
Start: 2024-07-31 | End: 2024-07-31

## 2024-07-31 RX ADMIN — DIPHENHYDRAMINE HYDROCHLORIDE 25 MG: 25 CAPSULE ORAL at 05:07

## 2024-07-31 RX ADMIN — METOCLOPRAMIDE 10 MG: 5 TABLET ORAL at 05:07

## 2024-07-31 NOTE — TELEPHONE ENCOUNTER
----- Message from Terry Roque sent at 7/31/2024  2:13 PM CDT -----  Regarding: Self [phone]  Type: Patient Call Back    Who called: Self     What is the request in detail: Stated that she has been having elevated blood pressure and would like a call back in regards to talking to the nurse about this.     Can the clinic reply by [facility]? No     Would the patient rather a call back or a response via [facility]? Call back     Best call back number: [phone]     Additional Information:    Thank you.

## 2024-07-31 NOTE — TELEPHONE ENCOUNTER
Spoke with pt who stated she has high bp reading with headaches. Advised pt per NP borner to head over to the OB ED. Pt verbalized understanding.

## 2024-08-05 DIAGNOSIS — F32.A DEPRESSION, UNSPECIFIED DEPRESSION TYPE: ICD-10-CM

## 2024-08-05 RX ORDER — SERTRALINE HYDROCHLORIDE 50 MG/1
50 TABLET, FILM COATED ORAL
Qty: 90 TABLET | Refills: 0 | Status: SHIPPED | OUTPATIENT
Start: 2024-08-05

## 2024-08-16 ENCOUNTER — OFFICE VISIT (OUTPATIENT)
Dept: PSYCHIATRY | Facility: CLINIC | Age: 34
End: 2024-08-16
Payer: COMMERCIAL

## 2024-08-16 DIAGNOSIS — F32.A DEPRESSION, UNSPECIFIED DEPRESSION TYPE: Primary | ICD-10-CM

## 2024-08-16 DIAGNOSIS — Z86.59 HISTORY OF POSTPARTUM DEPRESSION: ICD-10-CM

## 2024-08-16 DIAGNOSIS — Z87.59 HISTORY OF POSTPARTUM DEPRESSION: ICD-10-CM

## 2024-08-16 NOTE — PROGRESS NOTES
"The patient location is: [place]  The chief complaint leading to consultation is: depression, anxiety    Visit type: audiovisual    Face to Face time with patient: 45 mins  55 minutes of total time spent on the encounter, which includes face to face time and non-face to face time preparing to see the patient (eg, review of tests), Obtaining and/or reviewing separately obtained history, Documenting clinical information in the electronic or other health record, Independently interpreting results (not separately reported) and communicating results to the patient/family/caregiver, or Care coordination (not separately reported).     Each patient to whom he or she provides medical services by telemedicine is:  (1) informed of the relationship between the physician and patient and the respective role of any other health care provider with respect to management of the patient; and (2) notified that he or she may decline to receive medical services by telemedicine and may withdraw from such care at any time.    Notes:     Individual Psychotherapy (PhD/LCSW)    08/16/2024     Site:  Telemed         Therapeutic Intervention: Met with patient.  Outpatient - Insight oriented psychotherapy 60 min - CPT code 95512 and Outpatient - Supportive psychotherapy 60 min - CPT Code 28485    Chief complaint/reason for encounter: depression and anxiety     Interval history and content of current session: Patient returns for follow up psychotherapy. Patient reports that she is doing "okay". Children started school and started volleyball. Things are going okay and baby is doing well. Went to Dr. Levin to help with breastfeeding again. Reports that in the past son struggled to gain weight but daughter is gaining weight. Had oldest son tested for ADHD 10 weeks ago and felt like the report said that it was "stupid". Discussed results and different questions when she goes to appointment next week. FOB stopped by last weekend. Attempted to bring " up him getting a  and he ignored the question. He came in and wanted to have a hug from patient. Told him that he had children that he could hug. Discussed history of sporadic desire for closeness. Working is going okay and they are being accommodating which is helpful.     Scheduled follow up for 9/13    Postpartum Status: 3 weeks  Baby - Greer     Treatment plan:  Target symptoms: depression, anxiety   Why chosen therapy is appropriate versus another modality: relevant to diagnosis, evidence based practice  Outcome monitoring methods: self-report, observation  Therapeutic intervention type: insight oriented psychotherapy, supportive psychotherapy    Risk parameters:  Patient reports no suicidal ideation  Patient reports no homicidal ideation  Patient reports no self-injurious behavior  Patient reports no violent behavior    Verbal deficits: None    Patient's response to intervention:  The patient's response to intervention is accepting.    Progress toward goals and other mental status changes:  The patient's progress toward goals is good.    Diagnosis:   Encounter Diagnoses   Name Primary?    Depression, unspecified depression type Yes    History of postpartum depression        Plan:  individual psychotherapy    Return to clinic: 1 week    Length of Service (minutes): 60

## 2024-08-29 ENCOUNTER — PATIENT MESSAGE (OUTPATIENT)
Dept: ADMINISTRATIVE | Facility: OTHER | Age: 34
End: 2024-08-29
Payer: COMMERCIAL

## 2024-08-30 ENCOUNTER — POSTPARTUM VISIT (OUTPATIENT)
Dept: OBSTETRICS AND GYNECOLOGY | Facility: CLINIC | Age: 34
End: 2024-08-30
Payer: COMMERCIAL

## 2024-08-30 VITALS
HEIGHT: 63 IN | BODY MASS INDEX: 46.75 KG/M2 | WEIGHT: 263.88 LBS | SYSTOLIC BLOOD PRESSURE: 124 MMHG | DIASTOLIC BLOOD PRESSURE: 82 MMHG

## 2024-08-30 PROCEDURE — 99999 PR PBB SHADOW E&M-EST. PATIENT-LVL III: CPT | Mod: PBBFAC,,, | Performed by: OBSTETRICS & GYNECOLOGY

## 2024-08-30 NOTE — PROGRESS NOTES
"CC: Post-partum follow-up    Sonam Hidalgo is a 34 y.o. female  who presents for post-partum visit.  She is S/P a  2024 .  She and the baby are doing well.  No pain.  No fever.   No bowel / bladder complaints.    Delivery Date: 2024  Gender: female, MAY  Breast Feeding: YES  Depression: NO  Contraception: abstence    Pregnancy was complicated by:  HTN      /82   Ht 5' 3" (1.6 m)   Wt 119.7 kg (263 lb 14.3 oz)   LMP 2023 (Exact Date)   Breastfeeding Yes   BMI 46.75 kg/m²     ROS:  GENERAL: No fever, chills, fatigability.  VULVAR: No pain, no lesions and no itching.  VAGINAL: No relaxation, no itching, no discharge, no abnormal bleeding and no lesions.  ABDOMEN: No abdominal pain. Denies nausea. Denies vomiting. No diarrhea. No constipation  BREAST: Denies pain. No lumps.  URINARY: No incontinence, no nocturia, no frequency and no dysuria.  CARDIOVASCULAR: No chest pain. No shortness of breath. No leg cramps.  NEUROLOGICAL: No headaches. No vision changes.    PHYSICAL EXAM:  Exam chaperoned by nurse  ABDOMEN:  Soft, non-tender, non-distended  VULVA:  Normal, no lesions  CERVIX:  Without lesions, polyps or tenderness.  UTERUS:  Normal size, shape, consistency, no mass or tenderness.  ADNEXA:  Normal in size without mass or tenderness    IMP:  Doing well S/P   Instructions / precautions reviewed  Contraceptive counseling    PLAN:  Pap done  May resume normal activities  Return: PRN      "

## 2024-09-13 ENCOUNTER — OFFICE VISIT (OUTPATIENT)
Dept: PSYCHIATRY | Facility: CLINIC | Age: 34
End: 2024-09-13
Payer: COMMERCIAL

## 2024-09-13 ENCOUNTER — PATIENT MESSAGE (OUTPATIENT)
Dept: PSYCHIATRY | Facility: CLINIC | Age: 34
End: 2024-09-13

## 2024-09-13 DIAGNOSIS — Z87.59 HISTORY OF POSTPARTUM DEPRESSION: ICD-10-CM

## 2024-09-13 DIAGNOSIS — Z86.59 HISTORY OF POSTPARTUM DEPRESSION: ICD-10-CM

## 2024-09-13 DIAGNOSIS — F32.A DEPRESSION, UNSPECIFIED DEPRESSION TYPE: Primary | ICD-10-CM
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
## 2024-09-25 ENCOUNTER — PATIENT MESSAGE (OUTPATIENT)
Dept: ADMINISTRATIVE | Facility: OTHER | Age: 34
End: 2024-09-25
Payer: COMMERCIAL

## 2024-10-17 ENCOUNTER — PATIENT MESSAGE (OUTPATIENT)
Dept: OBSTETRICS AND GYNECOLOGY | Facility: CLINIC | Age: 34
End: 2024-10-17
Payer: COMMERCIAL

## 2024-10-18 ENCOUNTER — TELEPHONE (OUTPATIENT)
Dept: OBSTETRICS AND GYNECOLOGY | Facility: CLINIC | Age: 34
End: 2024-10-18
Payer: COMMERCIAL

## 2024-11-03 DIAGNOSIS — F32.A DEPRESSION, UNSPECIFIED DEPRESSION TYPE: ICD-10-CM

## 2024-11-04 RX ORDER — SERTRALINE HYDROCHLORIDE 50 MG/1
50 TABLET, FILM COATED ORAL
Qty: 90 TABLET | Refills: 0 | Status: SHIPPED | OUTPATIENT
Start: 2024-11-04

## 2024-11-22 ENCOUNTER — PATIENT MESSAGE (OUTPATIENT)
Dept: ADMINISTRATIVE | Facility: OTHER | Age: 34
End: 2024-11-22
Payer: COMMERCIAL

## 2025-02-05 DIAGNOSIS — F32.A DEPRESSION, UNSPECIFIED DEPRESSION TYPE: ICD-10-CM

## 2025-02-05 RX ORDER — SERTRALINE HYDROCHLORIDE 50 MG/1
50 TABLET, FILM COATED ORAL
Qty: 90 TABLET | Refills: 0 | Status: SHIPPED | OUTPATIENT
Start: 2025-02-05

## 2025-03-05 NOTE — PROGRESS NOTES
"INTERNAL MEDICINE CLINIC - SAME DAY APPOINTMENT  Progress Note    PRESENTING HISTORY     PCP: Fili Joshua MD    Chief Complaint/Reason for Visit:   No chief complaint on file.    History of Present Illness & ROS : Ms. Sonam Hidalgo is a 34 y.o. female.    Same day apt.   Est'd with Dr. Joshua.  Very pleasant lady.   Having pain to left shoulder, dates back to 2016.   No injury or trauma.   Pain with moving away from body and with lifting. "Feels like something is catching'.     Review of Systems:  Eyes: denies visual changes at this time denies floaters   ENT: no nasal congestion or sore throat  Respiratory: no cough or shorness of breath  Cardiovascular: no chest pain or palpitations  Gastrointestinal: no nausea or vomiting, no abdominal pain or change in bowel habits  Genitourinary: no hematuria or dysuria; denies frequency  Hematologic/Lymphatic: no easy bruising or lymphadenopathy  Neurological: no seizures or tremors  Endocrine: no heat or cold intolerance      PAST HISTORY:     Past Medical History:   Diagnosis Date    Abnormal Pap smear of vagina     Allergy     Anxiety     Hypertension     Pre-eclampsia        Past Surgical History:   Procedure Laterality Date    CHOLECYSTECTOMY      LAPAROSCOPIC SURGICAL REMOVAL OF CYST OF OVARY Left 11/20/2019    Procedure: EXCISION, CYST, OVARY, LAPAROSCOPIC;  Surgeon: Merary Benton MD;  Location: [facility];  Service: OB/GYN;  Laterality: Left;    median arcuate ligament          Family History   Problem Relation Name Age of Onset    Diabetes Mother      Breast cancer Mother      Diabetes Father      Alcohol abuse Father      Dementia Father          vascular and ? lewey    Hypertension Brother      Celiac disease Neg Hx      Cirrhosis Neg Hx      Colon cancer Neg Hx      Colon polyps Neg Hx      Crohn's disease Neg Hx      Cystic fibrosis Neg Hx      Esophageal cancer Neg Hx      Hemochromatosis Neg Hx      Inflammatory bowel disease Neg Hx      " Irritable bowel syndrome Neg Hx      Liver cancer Neg Hx      Liver disease Neg Hx      Rectal cancer Neg Hx      Stomach cancer Neg Hx      Ulcerative colitis Neg Hx      Jacob's disease Neg Hx      Ovarian cancer Neg Hx      Arrhythmia Neg Hx      Cardiomyopathy Neg Hx      Congenital heart disease Neg Hx      Early death Neg Hx      Heart attacks under age 50 Neg Hx      Pacemaker/defibrilator Neg Hx         Social History     Socioeconomic History    Marital status: Single   Tobacco Use    Smoking status: Never    Smokeless tobacco: Never   Substance and Sexual Activity    Alcohol use: Not Currently     Comment: 1 glass wine on occ    Drug use: No    Sexual activity: Yes     Partners: Male     Birth control/protection: None   Social History Narrative    2 kids at home (5 Sheldon and Buster 2 yrs. Aorta issue and Partial anomalous PV return, asymptomatic), in apt attached to her mom's hse. Fiance deployed arm. Not much forma exercise. Works for [employer].     Social Drivers of Health     Financial Resource Strain: Low Risk  (5/20/2024)    Overall Financial Resource Strain (CARDIA)     Difficulty of Paying Living Expenses: Not very hard   Recent Concern: Financial Resource Strain - Medium Risk (4/8/2024)    Overall Financial Resource Strain (CARDIA)     Difficulty of Paying Living Expenses: Somewhat hard   Food Insecurity: Food Insecurity Present (5/20/2024)    Hunger Vital Sign     Worried About Running Out of Food in the Last Year: Sometimes true     Ran Out of Food in the Last Year: Never true   Transportation Needs: No Transportation Needs (4/8/2024)    PRAPARE - Transportation     Lack of Transportation (Medical): No     Lack of Transportation (Non-Medical): No   Physical Activity: Inactive (5/20/2024)    Exercise Vital Sign     Days of Exercise per Week: 0 days     Minutes of Exercise per Session: 0 min   Stress: Patient Declined (5/20/2024)    [employer] - Occupational Stress  Questionnaire     Feeling of Stress : Patient declined   Housing Stability: Low Risk  (4/8/2024)    Housing Stability Vital Sign     Unable to Pay for Housing in the Last Year: No     Number of Places Lived in the Last Year: 1     Unstable Housing in the Last Year: No       MEDICATIONS & ALLERGIES:     Medications Ordered Prior to Encounter[1]     Review of patient's allergies indicates:  No Known Allergies    Medications Reconciliation:   I have reconciled the patient's home medications with the patient/family. I have updated all changes.  Refer to After-Visit Medication List.    OBJECTIVE:     Vital Signs:  There were no vitals filed for this visit.  Wt Readings from Last 3 Encounters:   08/30/24 1412 119.7 kg (263 lb 14.3 oz)   07/24/24 2030 125.8 kg (277 lb 5.4 oz)   07/19/24 1046 125.8 kg (277 lb 5.4 oz)     There is no height or weight on file to calculate BMI.     Wt Readings from Last 3 Encounters:   03/06/25 125.5 kg (276 lb 10.8 oz)   08/30/24 119.7 kg (263 lb 14.3 oz)   07/24/24 125.8 kg (277 lb 5.4 oz)     Temp Readings from Last 3 Encounters:   07/31/24 98.8 °F (37.1 °C) (Oral)   07/26/24 98 °F (36.7 °C) (Oral)   06/14/24 98.2 °F (36.8 °C) (Oral)     BP Readings from Last 3 Encounters:   03/06/25 125/80   08/30/24 124/82   07/31/24 133/77     Pulse Readings from Last 3 Encounters:   03/06/25 71   07/31/24 64   07/26/24 74       Physical Exam:  General: Well developed, well nourished. No distress.  HEENT: Head is normocephalic, atraumatic  Eyes: Clear conjunctiva.  Neck: Supple, symmetrical neck; trachea midline.  Extremities: No LE edema. Pulses 2+ and symmetric.   LUE: limited AROM and PROM  Skin: Skin color, texture, turgor normal. No rashes.  Musculoskeletal: Normal gait.   Neurologic: Normal strength and tone. No focal numbness or weakness.   Psychiatric: Not depressed.      Laboratory  Lab Results   Component Value Date    WBC 6.94 07/31/2024    HGB 12.8 07/31/2024    HCT 39.3 07/31/2024      07/31/2024    CHOL 170 09/30/2019    TRIG 145 09/30/2019    HDL 47 09/30/2019    ALT 18 07/31/2024    AST 14 07/31/2024     07/31/2024    K 4.0 07/31/2024     07/31/2024    CREATININE 0.7 07/31/2024    BUN 16 07/31/2024    CO2 24 07/31/2024    TSH 1.108 09/30/2019    INR 1.1 09/30/2019    HGBA1C 5.1 01/26/2024       ASSESSMENT & PLAN:     Same day apt.     Shoulder pain, unspecified chronicity, unspecified laterality  Acute pain of left shoulder  *Xrays of Left shoulder noted from 2/2016  Seen by Dr. NENITA Hernandez and Dr. RFACISCO Hayes in 8/2016  -     Ambulatory referral/consult to Sports Medicine; Future; Expected date: 03/13/2025  -     MRI Shoulder Without Contrast Left; Future; Expected date: 03/06/2025    Future Appointments:  No future appointments.       Medication List            Accurate as of March 6, 2025  8:21 AM. If you have any questions, ask your nurse or doctor.                CONTINUE taking these medications      ibuprofen 600 MG tablet  Commonly known as: ADVIL,MOTRIN  Take 1 tablet (600 mg total) by mouth 3 (three) times daily.     PRENATAL GUMMIES ORAL     sertraline 50 MG tablet  Commonly known as: ZOLOFT  TAKE 1 TABLET BY MOUTH EVERY DAY            Signing Physician:  TEE Otero         [1]   Current Outpatient Medications on File Prior to Visit   Medication Sig Dispense Refill    ibuprofen (ADVIL,MOTRIN) 600 MG tablet Take 1 tablet (600 mg total) by mouth 3 (three) times daily. 60 tablet 0    PNV no.153/FA/om3/dha/epa/fish (PRENATAL GUMMIES ORAL) Take by mouth.      sertraline (ZOLOFT) 50 MG tablet TAKE 1 TABLET BY MOUTH EVERY DAY 90 tablet 0     No current facility-administered medications on file prior to visit.

## 2025-03-06 ENCOUNTER — OFFICE VISIT (OUTPATIENT)
Dept: INTERNAL MEDICINE | Facility: CLINIC | Age: 35
End: 2025-03-06
Payer: COMMERCIAL

## 2025-03-06 VITALS
SYSTOLIC BLOOD PRESSURE: 125 MMHG | OXYGEN SATURATION: 98 % | HEIGHT: 63 IN | BODY MASS INDEX: 49.02 KG/M2 | HEART RATE: 71 BPM | WEIGHT: 276.69 LBS | DIASTOLIC BLOOD PRESSURE: 80 MMHG

## 2025-03-06 DIAGNOSIS — M25.512 ACUTE PAIN OF LEFT SHOULDER: ICD-10-CM

## 2025-03-06 DIAGNOSIS — M25.519 SHOULDER PAIN, UNSPECIFIED CHRONICITY, UNSPECIFIED LATERALITY: Primary | ICD-10-CM

## 2025-03-06 PROCEDURE — 99214 OFFICE O/P EST MOD 30 MIN: CPT | Mod: S$GLB,,, | Performed by: NURSE PRACTITIONER

## 2025-03-06 PROCEDURE — 1159F MED LIST DOCD IN RCRD: CPT | Mod: CPTII,S$GLB,, | Performed by: NURSE PRACTITIONER

## 2025-03-06 PROCEDURE — 3074F SYST BP LT 130 MM HG: CPT | Mod: CPTII,S$GLB,, | Performed by: NURSE PRACTITIONER

## 2025-03-06 PROCEDURE — 3079F DIAST BP 80-89 MM HG: CPT | Mod: CPTII,S$GLB,, | Performed by: NURSE PRACTITIONER

## 2025-03-06 PROCEDURE — 99999 PR PBB SHADOW E&M-EST. PATIENT-LVL IV: CPT | Mod: PBBFAC,,, | Performed by: NURSE PRACTITIONER

## 2025-03-06 PROCEDURE — 3008F BODY MASS INDEX DOCD: CPT | Mod: CPTII,S$GLB,, | Performed by: NURSE PRACTITIONER

## 2025-03-10 ENCOUNTER — PATIENT MESSAGE (OUTPATIENT)
Dept: INTERNAL MEDICINE | Facility: CLINIC | Age: 35
End: 2025-03-10
Payer: COMMERCIAL

## 2025-03-17 ENCOUNTER — OFFICE VISIT (OUTPATIENT)
Dept: URGENT CARE | Facility: CLINIC | Age: 35
End: 2025-03-17
Payer: COMMERCIAL

## 2025-03-17 VITALS
DIASTOLIC BLOOD PRESSURE: 86 MMHG | SYSTOLIC BLOOD PRESSURE: 121 MMHG | TEMPERATURE: 98 F | WEIGHT: 276 LBS | RESPIRATION RATE: 19 BRPM | HEART RATE: 96 BPM | BODY MASS INDEX: 48.9 KG/M2 | HEIGHT: 63 IN | OXYGEN SATURATION: 100 %

## 2025-03-17 DIAGNOSIS — U07.1 COVID-19 VIRUS DETECTED: ICD-10-CM

## 2025-03-17 DIAGNOSIS — U07.1 COVID: Primary | ICD-10-CM

## 2025-03-17 LAB
CTP QC/QA: YES
CTP QC/QA: YES
POC MOLECULAR INFLUENZA A AGN: NEGATIVE
POC MOLECULAR INFLUENZA B AGN: NEGATIVE
SARS CORONAVIRUS 2 ANTIGEN: POSITIVE

## 2025-03-17 PROCEDURE — 87502 INFLUENZA DNA AMP PROBE: CPT | Mod: QW,S$GLB,, | Performed by: NURSE PRACTITIONER

## 2025-03-17 PROCEDURE — 99203 OFFICE O/P NEW LOW 30 MIN: CPT | Mod: S$GLB,,, | Performed by: NURSE PRACTITIONER

## 2025-03-17 PROCEDURE — 87811 SARS-COV-2 COVID19 W/OPTIC: CPT | Mod: QW,S$GLB,, | Performed by: NURSE PRACTITIONER

## 2025-03-17 RX ORDER — BENZONATATE 200 MG/1
200 CAPSULE ORAL EVERY 8 HOURS PRN
Qty: 30 CAPSULE | Refills: 0 | Status: SHIPPED | OUTPATIENT
Start: 2025-03-17

## 2025-03-17 NOTE — PATIENT INSTRUCTIONS
Oral fluids  Rest  Steam (hot showers, hot tea)  Blow nose often  Droplet and contact precautions: good handwashing; wear mask as needed; physical distancing   OTC ibuprofen or tylenol for aches and/or fever   You no longer have to quarantine   You may return to normal activities when for at least 24 hours: your symptoms are overall improving; and you have not had a fever (without use of fever-reducing medicines)   If symptoms worsen, after returning to normal activities, return home and avoid close contact with others for at least another 24 hours   Seek ER care with symptoms such as wheeze, respiratory distress, lethargy, dehydration

## 2025-03-17 NOTE — PROGRESS NOTES
"Subjective:      Patient ID: Sonam Hidalgo is a 35 y.o. female.    Vitals:  height is 5' 3" (1.6 m) and weight is 125.2 kg (276 lb). Her oral temperature is 98.2 °F (36.8 °C). Her blood pressure is 121/86 and her pulse is 96. Her respiration is 19 and oxygen saturation is 100%.     Chief Complaint: Cough    This is a 35 y.o. female who presents today with a chief complaint of cough. Pt states symptoms started yesterday afternoon with chills and fever.      Cough  This is a new problem. The current episode started yesterday. The problem has been unchanged. The problem occurs constantly. The cough is Productive of sputum. Associated symptoms include chills, ear congestion, a fever, headaches, nasal congestion, postnasal drip, rhinorrhea, a sore throat and sweats. Pertinent negatives include no shortness of breath or wheezing. Nothing aggravates the symptoms. She has tried nothing for the symptoms. The treatment provided no relief.       Constitution: Positive for chills, fatigue and fever.   HENT:  Positive for congestion, postnasal drip and sore throat.    Respiratory:  Positive for cough. Negative for shortness of breath and wheezing.    Gastrointestinal:  Negative for vomiting and diarrhea.   Neurological:  Positive for headaches.      Objective:     Physical Exam   Constitutional: She is oriented to person, place, and time. She appears well-developed. She is cooperative.  Non-toxic appearance. No distress.   HENT:   Head: Normocephalic.   Ears:   Right Ear: Hearing, tympanic membrane, external ear and ear canal normal.   Left Ear: Hearing, tympanic membrane, external ear and ear canal normal.   Nose: Congestion present. No mucosal edema, rhinorrhea or nasal deformity. No epistaxis. Right sinus exhibits no maxillary sinus tenderness and no frontal sinus tenderness. Left sinus exhibits no maxillary sinus tenderness and no frontal sinus tenderness.   Mouth/Throat: Uvula is midline and mucous membranes " are normal. Mucous membranes are moist. No trismus in the jaw. Normal dentition. No uvula swelling. Posterior oropharyngeal erythema present. No oropharyngeal exudate or posterior oropharyngeal edema. Oropharynx is clear.   Eyes: Conjunctivae and lids are normal. No scleral icterus.   Neck: Trachea normal and phonation normal. Neck supple. No edema present. No erythema present. No neck rigidity present.   Cardiovascular: Normal rate and regular rhythm.   Pulmonary/Chest: Effort normal and breath sounds normal. No respiratory distress. She has no decreased breath sounds. She has no wheezes. She has no rhonchi.   Abdominal: Normal appearance.   Musculoskeletal: Normal range of motion.         General: No deformity. Normal range of motion.   Neurological: She is alert and oriented to person, place, and time. She exhibits normal muscle tone. Coordination normal.   Skin: Skin is warm, dry, intact, not diaphoretic and not pale.   Psychiatric: Her speech is normal and behavior is normal. Judgment and thought content normal.   Nursing note and vitals reviewed.    Results for orders placed or performed in visit on 03/17/25   POCT Influenza A/B MOLECULAR    Collection Time: 03/17/25  8:34 AM   Result Value Ref Range    POC Molecular Influenza A Ag Negative Negative    POC Molecular Influenza B Ag Negative Negative     Acceptable Yes    SARS Coronavirus 2 Antigen, POCT Manual Read    Collection Time: 03/17/25  8:34 AM   Result Value Ref Range    SARS Coronavirus 2 Antigen Positive (A) Negative, Presumptive Negative     Acceptable Yes       Assessment:     1. COVID        Plan:       COVID  -     POCT Influenza A/B MOLECULAR  -     SARS Coronavirus 2 Antigen, POCT Manual Read  -     nirmatrelvir-ritonavir 300 mg (150 mg x 2)-100 mg copackaged tablets (EUA); Take 3 tablets by mouth 2 (two) times daily for 5 days. Each dose contains 2 nirmatrelvir (pink tablets) and 1 ritonavir (white tablet). Take  all 3 tablets together  Dispense: 30 tablet; Refill: 0  -     benzonatate (TESSALON) 200 MG capsule; Take 1 capsule (200 mg total) by mouth every 8 (eight) hours as needed for Cough.  Dispense: 30 capsule; Refill: 0      Patient Instructions   Oral fluids  Rest  Steam (hot showers, hot tea)  Blow nose often  Droplet and contact precautions: good handwashing; wear mask as needed; physical distancing   OTC ibuprofen or tylenol for aches and/or fever   You no longer have to quarantine   You may return to normal activities when for at least 24 hours: your symptoms are overall improving; and you have not had a fever (without use of fever-reducing medicines)   If symptoms worsen, after returning to normal activities, return home and avoid close contact with others for at least another 24 hours   Seek ER care with symptoms such as wheeze, respiratory distress, lethargy, dehydration

## 2025-03-17 NOTE — LETTER
March 17, 2025      [facility]  [address]  [facility] [address]  Phone: [phone]  Fax: [phone]       Patient: Sonam Hidalgo   YOB: 1990  Date of Visit: 03/17/2025    To Whom It May Concern:    Joana Hidalgo  was at [facility] on 03/17/2025. The patient may return to work/school on 3/21/2025 with no restrictions. If you have any questions or concerns, or if I can be of further assistance, please do not hesitate to contact me.    Sincerely,      Babs Avila, TEE-BC

## 2025-03-25 ENCOUNTER — OFFICE VISIT (OUTPATIENT)
Dept: ORTHOPEDICS | Facility: CLINIC | Age: 35
End: 2025-03-25
Payer: COMMERCIAL

## 2025-03-25 ENCOUNTER — HOSPITAL ENCOUNTER (OUTPATIENT)
Dept: RADIOLOGY | Facility: HOSPITAL | Age: 35
Discharge: HOME | End: 2025-03-25
Attending: NURSE PRACTITIONER
Payer: COMMERCIAL

## 2025-03-25 VITALS
HEART RATE: 77 BPM | WEIGHT: 276 LBS | TEMPERATURE: 99 F | SYSTOLIC BLOOD PRESSURE: 117 MMHG | BODY MASS INDEX: 48.9 KG/M2 | DIASTOLIC BLOOD PRESSURE: 85 MMHG | HEIGHT: 63 IN

## 2025-03-25 DIAGNOSIS — G62.9 NEUROPATHY: ICD-10-CM

## 2025-03-25 DIAGNOSIS — M25.512 ACUTE PAIN OF LEFT SHOULDER: ICD-10-CM

## 2025-03-25 DIAGNOSIS — M25.519 SHOULDER PAIN, UNSPECIFIED CHRONICITY, UNSPECIFIED LATERALITY: ICD-10-CM

## 2025-03-25 DIAGNOSIS — M25.512 ACUTE PAIN OF LEFT SHOULDER: Primary | ICD-10-CM

## 2025-03-25 PROCEDURE — 3074F SYST BP LT 130 MM HG: CPT | Mod: CPTII,S$GLB,, | Performed by: NURSE PRACTITIONER

## 2025-03-25 PROCEDURE — 99999 PR PBB SHADOW E&M-EST. PATIENT-LVL IV: CPT | Mod: PBBFAC,,, | Performed by: NURSE PRACTITIONER

## 2025-03-25 PROCEDURE — 3008F BODY MASS INDEX DOCD: CPT | Mod: CPTII,S$GLB,, | Performed by: NURSE PRACTITIONER

## 2025-03-25 PROCEDURE — 3079F DIAST BP 80-89 MM HG: CPT | Mod: CPTII,S$GLB,, | Performed by: NURSE PRACTITIONER

## 2025-03-25 PROCEDURE — 73030 X-RAY EXAM OF SHOULDER: CPT | Mod: 26,LT,, | Performed by: RADIOLOGY

## 2025-03-25 PROCEDURE — 1159F MED LIST DOCD IN RCRD: CPT | Mod: CPTII,S$GLB,, | Performed by: NURSE PRACTITIONER

## 2025-03-25 PROCEDURE — 99203 OFFICE O/P NEW LOW 30 MIN: CPT | Mod: 25,S$GLB,, | Performed by: NURSE PRACTITIONER

## 2025-03-25 PROCEDURE — 1160F RVW MEDS BY RX/DR IN RCRD: CPT | Mod: CPTII,S$GLB,, | Performed by: NURSE PRACTITIONER

## 2025-03-25 PROCEDURE — 20610 DRAIN/INJ JOINT/BURSA W/O US: CPT | Mod: LT,S$GLB,, | Performed by: NURSE PRACTITIONER

## 2025-03-25 PROCEDURE — 73030 X-RAY EXAM OF SHOULDER: CPT | Mod: TC,LT

## 2025-03-25 RX ORDER — LIDOCAINE HYDROCHLORIDE 10 MG/ML
4 INJECTION, SOLUTION INFILTRATION; PERINEURAL
Status: DISCONTINUED | OUTPATIENT
Start: 2025-03-25 | End: 2025-03-25 | Stop reason: HOSPADM

## 2025-03-25 RX ORDER — TRIAMCINOLONE ACETONIDE 40 MG/ML
40 INJECTION, SUSPENSION INTRA-ARTICULAR; INTRAMUSCULAR
Status: DISCONTINUED | OUTPATIENT
Start: 2025-03-25 | End: 2025-03-25 | Stop reason: HOSPADM

## 2025-03-25 RX ADMIN — LIDOCAINE HYDROCHLORIDE 4 ML: 10 INJECTION, SOLUTION INFILTRATION; PERINEURAL at 01:03

## 2025-03-25 RX ADMIN — TRIAMCINOLONE ACETONIDE 40 MG: 40 INJECTION, SUSPENSION INTRA-ARTICULAR; INTRAMUSCULAR at 01:03

## 2025-03-25 NOTE — PROCEDURES
Large Joint Aspiration/Injection: L glenohumeral    Date/Time: 3/25/2025 1:00 PM    Performed by: Alirio Aguilar NP  Authorized by: Alirio Aguilar NP    Consent Done?:  Yes (Verbal)  Indications:  Pain  Site marked: the procedure site was marked    Timeout: prior to procedure the correct patient, procedure, and site was verified      Local anesthesia used?: Yes    Approach:  Posterior  Location:  Shoulder  Site:  L glenohumeral  Medications:  4 mL LIDOcaine HCL 10 mg/ml (1%) 10 mg/mL (1 %); 40 mg triamcinolone acetonide 40 mg/mL  Patient tolerance:  Patient tolerated the procedure well with no immediate complications

## 2025-03-25 NOTE — PROGRESS NOTES
SUBJECTIVE:     Chief Complaint & History of Present Illness:  Sonam Hidalgo is a New 35 y.o. year old female patient presenting with intermittent left shoulder pain that started 2 weeks ago.  She is Right hand dominant.  There is not a history of injury.  She remembers initially injuring it in high school and then reaggregateing it sometime in collage.  This time there has been no inciting events.  She reports she feels the pain mostly in the anterior shoulder and has intermittent tingling sensation.  She rates the pain as a 3/10.  It is aggravated by activity and lifting.  Associated symptoms include upper arm paresthesias.  Previous treatments include acetaminophen and OTC NSAIDS which have provided minimal relief.  There is a history of previous injury or surgery to the shoulder.      Review of patient's allergies indicates:  No Known Allergies      Current Medications[1]    Past Medical History:   Diagnosis Date    Abnormal Pap smear of vagina     Allergy     Anxiety     Hypertension     Pre-eclampsia        Past Surgical History:   Procedure Laterality Date    CHOLECYSTECTOMY      LAPAROSCOPIC SURGICAL REMOVAL OF CYST OF OVARY Left 11/20/2019    Procedure: EXCISION, CYST, OVARY, LAPAROSCOPIC;  Surgeon: Merary Benton MD;  Location: [facility];  Service: OB/GYN;  Laterality: Left;    median arcuate ligament          Family History   Problem Relation Name Age of Onset    Diabetes Mother      Breast cancer Mother      Diabetes Father      Alcohol abuse Father      Dementia Father          vascular and ? lewey    Hypertension Brother      Celiac disease Neg Hx      Cirrhosis Neg Hx      Colon cancer Neg Hx      Colon polyps Neg Hx      Crohn's disease Neg Hx      Cystic fibrosis Neg Hx      Esophageal cancer Neg Hx      Hemochromatosis Neg Hx      Inflammatory bowel disease Neg Hx      Irritable bowel syndrome Neg Hx      Liver cancer Neg Hx      Liver disease Neg Hx      Rectal cancer Neg Hx       "Stomach cancer Neg Hx      Ulcerative colitis Neg Hx      Jacob's disease Neg Hx      Ovarian cancer Neg Hx      Arrhythmia Neg Hx      Cardiomyopathy Neg Hx      Congenital heart disease Neg Hx      Early death Neg Hx      Heart attacks under age 50 Neg Hx      Pacemaker/defibrilator Neg Hx       Review of Systems:  ROS:  Constitutional: no fever or chills  Eyes: no visual changes  ENT: no nasal congestion or sore throat  Respiratory: no cough or shortness of breath  Cardiovascular: no chest pain or palpitations  Gastrointestinal: no nausea or vomiting, tolerating diet  Genitourinary: no hematuria or dysuria  Integument/Breast: no rash or pruritis  Hematologic/Lymphatic: no easy bruising or lymphadenopathy  Musculoskeletal: no arthralgias or myalgias  Neurological: no seizures or tremors  Behavioral/Psych: no auditory or visual hallucinations  Endocrine: no heat or cold intolerance      OBJECTIVE:     PHYSICAL EXAM:  Vital Signs (Most Recent)  Vitals:    03/25/25 1300   BP: 117/85   Pulse: 77   Temp: 98.7 °F (37.1 °C)     Height: 5' 3" (160 cm) Weight: 125.2 kg (276 lb 0.3 oz),   Estimated body mass index is 48.89 kg/m² as calculated from the following:    Height as of this encounter: 5' 3" (1.6 m).    Weight as of this encounter: 125.2 kg (276 lb 0.3 oz).   General Appearance: Well nourished, well developed, in no acute distress.  HENT: Normal cephalic, oropharynx pink and moist  Eyes: PERRLA bilaterally and EOM x 4  Respiratory: Even and unlabored  Skin: Warm and Dry.   Psychiatric: AAO x 4, Mood & affect are normal.    Shoulder exam: left  Tenderness: AC joint  ROM: forward flexion 180/180, extension 45/45, full abduction 180/180, abduction-glenohumeral 90/90, external rotation 15, internal rotation L1   Shoulder Strength: biceps 5/5, triceps 5/5, abduction 5/5, adduction 5/5, external rotation 5/5 with shoulder at side, flexion 5/5, and extension 5/5  non-specific diffuse tenderness about the shoulder, sensory " exam normal, and radial pulse intact  Special Tests:Cross-chest abduction: pain at AC joint    RADIOGRAPHS:  Left shoulder xray was obtained, no acute fractures seen.  All radiographs were personally reviewed by me.    ASSESSMENT/PLAN:       ICD-10-CM ICD-9-CM   1. Acute pain of left shoulder  M25.512 719.41   2. Neuropathy  G62.9 355.9     -Sonam Hidalgo presents to clinic with c/c left shoulder pain for the past 2 weeks  -X-ray as above.  -Recommend RICE therapy.    I suspect rotator cuff tendonitis.  I will treat with a steroid injection and refer to PT.  If this fails, will consider advance imaging.  If neuropathy worsens, refer to back and spine as this could be secondary to her neck.    See procedure note.    This note was generated with the assistance of ambient listening technology. Verbal consent was obtained by the patient and accompanying visitor(s) for the recording of patient appointment to facilitate this note. I attest to having reviewed and edited the generated note for accuracy, though some syntax or spelling errors may persist. Please contact the author of this note for any clarification.             [1]   Current Outpatient Medications   Medication Sig Dispense Refill    benzonatate (TESSALON) 200 MG capsule Take 1 capsule (200 mg total) by mouth every 8 (eight) hours as needed for Cough. 30 capsule 0    ibuprofen (ADVIL,MOTRIN) 600 MG tablet Take 1 tablet (600 mg total) by mouth 3 (three) times daily. 60 tablet 0    PNV no.153/FA/om3/dha/epa/fish (PRENATAL GUMMIES ORAL) Take by mouth.      sertraline (ZOLOFT) 50 MG tablet TAKE 1 TABLET BY MOUTH EVERY DAY 90 tablet 0     No current facility-administered medications for this visit.

## 2025-03-27 ENCOUNTER — PATIENT MESSAGE (OUTPATIENT)
Dept: PSYCHIATRY | Facility: CLINIC | Age: 35
End: 2025-03-27
Payer: COMMERCIAL

## 2025-04-03 ENCOUNTER — CLINICAL SUPPORT (OUTPATIENT)
Dept: REHABILITATION | Facility: HOSPITAL | Age: 35
End: 2025-04-03
Payer: COMMERCIAL

## 2025-04-03 DIAGNOSIS — M25.612 DECREASED ROM OF LEFT SHOULDER: Primary | ICD-10-CM

## 2025-04-03 DIAGNOSIS — R29.3 BAD POSTURE: ICD-10-CM

## 2025-04-03 DIAGNOSIS — M25.512 ACUTE PAIN OF LEFT SHOULDER: ICD-10-CM

## 2025-04-03 PROCEDURE — 97112 NEUROMUSCULAR REEDUCATION: CPT

## 2025-04-03 PROCEDURE — 97161 PT EVAL LOW COMPLEX 20 MIN: CPT

## 2025-04-03 PROCEDURE — 97110 THERAPEUTIC EXERCISES: CPT

## 2025-04-03 NOTE — PROGRESS NOTES
Outpatient Rehab    Physical Therapy Evaluation    Patient Name: Sonam Hidalgo  MRN: [id number]  YOB: 1990  Encounter Date: 4/3/2025    Therapy Diagnosis:   Encounter Diagnoses   Name Primary?    Acute pain of left shoulder     Decreased ROM of left shoulder Yes    Bad posture      Physician: Alirio Aguilar NP    Physician Orders: Eval and Treat  Medical Diagnosis: Acute pain of left shoulder    Visit # / Visits Authorized:  1 / 1  Insurance Authorization Period: 3/25/2025 to 3/25/2026  Date of Evaluation: 4/3/2025  Plan of Care Certification: 4/3/2025 to 6/3/2025     Time In: 0800   Time Out: 0855  Total Time: 55   Total Billable Time: 55    Intake Outcome Measure for FOTO Survey    Therapist reviewed FOTO scores for Sonam Hidalgo on 4/3/2025.   FOTO report - see Media section or FOTO account episode details.     Intake Score: 55%         Subjective   History of Present Illness  Sonam is a 35 y.o. female who reports to physical therapy with a chief concern of L shoulder pain.         Diagnostic tests related to this condition: X-ray.   X-Ray Details: Impression:     No significant abnormality.  No significant detrimental interval change since 02/29/2016 is appreciated. - patient will get an MRI if PT does not help    History of Present Condition/Illness: Patient reports in the beginning of March her L shoulder began to hurt without a mechanism of injury. Patient reports she was having trouble going over her head with her shoulder, reaching away from her body, and lifting up her baby. Patient reports she had an injection in her L shoulder last week and feels that it may have helped her a little bit with reaching up, but reaching outward is very painful. Patient still has trouble lifting up her baby.     Activities of Daily Living  Social history was obtained from Patient.               Previously independent with activities of daily living? Yes     Currently  independent with activities of daily living? Yes          Previously independent with instrumental activities of daily living? Yes     Currently independent with instrumental activities of daily living? Yes              Pain     Patient reports a current pain level of 5/10. Pain at best is reported as 3/10. Pain at worst is reported as 7/10.   Location: L shoulder - anteriorly  Clinical Progression (since onset): Stable  Pain Qualities: Aching, Sharp, Other (Comment)  Other Pain Qualities: Stabbing  Pain-Relieving Factors: Rest, Medications - prescription  Pain-Aggravating Factors: Other (Comment)  Other Pain-Aggravating Factors: Reaching overhead, reaching away from body, lifting baby         Employment  Patient does not report that: Does the patient's condition impact their ability to work?  Employment Status: Employed full-time   Desk Job      Past Medical History/Physical Systems Review:   Sonam Hidalgo  has a past medical history of Abnormal Pap smear of vagina, Allergy, Anxiety, Hypertension, and Pre-eclampsia.    Sonam Hidalgo  has a past surgical history that includes median arcuate ligament ; Cholecystectomy; and Laparoscopic surgical removal of cyst of ovary (Left, 11/20/2019).    Sonam has a current medication list which includes the following prescription(s): benzonatate, ibuprofen, pnv no.153/fa/om3/dha/epa/fish, and sertraline.    Review of patient's allergies indicates:  No Known Allergies     Objective   Posture  Patient presents with a Forward head position.     Shoulders are Rounded.             Shoulder Range of Motion  Right Shoulder   Active (deg) Passive (deg) Pain   Flexion 180 180     Extension         Scaption         ABduction 180       ADduction         Horizontal ABduction         Horizontal ADduction         External Rotation (Shoulder ABducted 0 degrees)         External Rotation (Shoulder ABducted 45 degrees)         External Rotation (Shoulder ABducted  90 degrees) 90 90     Internal Rotation (Shoulder ABducted 0 degrees)         Internal Rotation (Shoulder ABducted 45 degrees)         Internal Rotation (Shoulder ABducted 90 degrees) 90 90       Left Shoulder   Active (deg) Passive (deg) Pain   Flexion 80 140 Yes   Extension         Scaption         ABduction 80 90 Yes   ADduction         Horizontal ABduction         Horizontal ADduction         External Rotation (Shoulder ABducted 0 degrees)         External Rotation (Shoulder ABducted 45 degrees)         External Rotation (Shoulder ABducted 90 degrees) 30 40 Yes   Internal Rotation (Shoulder ABducted 0 degrees)         Internal Rotation (Shoulder ABducted 45 degrees)         Internal Rotation (Shoulder ABducted 90 degrees) 40 50 Yes                 Shoulder Strength - Planes of Motion   Right Strength Right Pain Left Strength Left  Pain   Flexion 5   3- Yes   Extension           ABduction 5   3- Yes   ADduction           Horizontal ABduction           Horizontal ADduction           Internal Rotation 0° 5   3- Yes   Internal Rotation 90°           External Rotation 0° 5   3- Yes   External Rotation 90°                         Shoulder Special Tests  Positive: Left Internal Rotation Resistance Strength  Impingement Tests  Positive: Left Mason-Isael and Left Infraspinatus Muscle           Shoulder Joint Mobility  Left Shoulder Mobility  Hypomobile: Anterior Capsule Mobility, Posterior Capsule Mobility, and Inferior Capsule Mobility  Increased muscle guarding with joint play                     Treatment:    THERAPEUTIC EXERCISES to develop strength, endurance, ROM, and flexibility including    Education on diagnosis and HEP    MANUAL THERAPY TECHNIQUES were applied to L shoulder including:        NEUROMUSCULAR RE-EDUCATION ACTIVITIES to improve Coordination, Sense, Proprioception, Posture, and Motor Control.  The following were included:        Seated Scapular Retraction  2 sets - 10 reps  Standing Isometric  Shoulder Internal Rotation at Doorway  10 reps - 10 hold  Isometric Shoulder Flexion at Wall  10 reps - 10 hold  Isometric Shoulder Extension at Wall  10 reps - 10 hold  Isometric Shoulder Abduction at Wall  10 reps - 10 hold  Isometric Shoulder External Rotation at Wall  10 reps - 10 hold      THERAPEUTIC ACTIVITIES to improve dynamic and functional performance including      Time Entry(in minutes):  PT Evaluation (Low) Time Entry: 30  Neuromuscular Re-Education Time Entry: 15  Therapeutic Exercise Time Entry: 10    Assessment & Plan   Assessment  Sonam presents with a condition of Low complexity.   Presentation of Symptoms: Stable  Will Comorbidities Impact Care: No       Functional Limitations: Participating in leisure activities  Impairments: Impaired physical strength, Pain with functional activity  Personal Factors Affecting Prognosis: Pain    Patient Goal for Therapy (PT): Reduce L shoulder pain and improve ability to lift her baby without issue  Prognosis: Good  Assessment Details: Patient presents to PT with a month history of L shoulder pain without a mechanism of injury. Patient presents with reduced L shoulder range of motion, reduced L shoulder strength, increased muscle guarding, and poor posture awareness. Patients signs and symptoms present similarly to L shoulder tendinitis. Patient has increased muscle guarding with majority of range of motion and joint play. Patient was progressed today with L shoulder isometrics and posture awareness exercises. Patient had pain with isometrics, but was tolerable.     Plan  From a physical therapy perspective, the patient would benefit from: Skilled Rehab Services    Planned therapy interventions include: Therapeutic exercise, Therapeutic activities, Neuromuscular re-education, Manual therapy, and ADLs/IADLs.    Planned modalities to include: Diathermy, Electrical stimulation - attended, Electrical stimulation - passive/unattended, and Other (Comment).  Functional Dry Needling       Visit Frequency: 2 times Per Week for 6 Weeks.       This plan was discussed with Patient.   Discussion participants: Agreed Upon Plan of Care             Patient's spiritual, cultural, and educational needs considered and patient agreeable to plan of care and goals.     Education  Education was done with Patient. The patient's learning style includes Demonstration, Listening, and Pictures/video. The patient Demonstrates understanding and Verbalizes understanding.         Educated patient on patient diagnosis and prognosis. Educated patient on home program and expectations with physical therapy.       Goals:   Active       Functional outcome       Patient stated goal: reduce shoulder pain and improve ability to lift her baby without pain       Start:  04/03/25    Expected End:  06/03/25            Patient will demonstrate independence in home program for support of progression       Start:  04/03/25    Expected End:  06/03/25               Pain       Patient will report pain of 0/10 demonstrating a reduction of overall pain       Start:  04/03/25    Expected End:  06/03/25               Range of Motion       Patient will achieve left shoulder flexion of 180 degrees to improve overhead activities        Start:  04/03/25    Expected End:  06/03/25            Patient will achieve left shoulder abduction of 180 degrees to improve overhead activities       Start:  04/03/25    Expected End:  06/03/25            Patient will achieve left shoulder external rotation of 90 degrees in 45 degrees abd to improve reaching outward       Start:  04/03/25    Expected End:  06/03/25            Patient will achieve left shoulder internal rotation of 90 degrees in 45 degrees abd       Start:  04/03/25    Expected End:  06/03/25               Strength       Patient will achieve left shoulder flexion strength of 5/5 to improve ability to lift baby       Start:  04/03/25    Expected End:  06/03/25                 Tra Coburn, PT, DPT

## 2025-04-03 NOTE — PATIENT INSTRUCTIONS
Access Code: [email or web address]  URL: [email or web address]  Date: 04/03/2025  Prepared by: Tra Coburn    Exercises  - Seated Scapular Retraction  - 2 x daily - 7 x weekly - 2 sets - 10 reps  - Standing Isometric Shoulder Internal Rotation at Doorway  - 2 x daily - 7 x weekly - 10 reps - 10 hold  - Isometric Shoulder Flexion at Wall  - 2 x daily - 7 x weekly - 10 reps - 10 hold  - Isometric Shoulder Extension at Wall  - 2 x daily - 7 x weekly - 10 reps - 10 hold  - Isometric Shoulder Abduction at Wall  - 2 x daily - 7 x weekly - 10 reps - 10 hold  - Isometric Shoulder External Rotation at Wall  - 2 x daily - 7 x weekly - 10 reps - 10 hold

## 2025-04-22 ENCOUNTER — CLINICAL SUPPORT (OUTPATIENT)
Dept: REHABILITATION | Facility: HOSPITAL | Age: 35
End: 2025-04-22
Payer: COMMERCIAL

## 2025-04-22 DIAGNOSIS — M25.612 DECREASED ROM OF LEFT SHOULDER: ICD-10-CM

## 2025-04-22 DIAGNOSIS — M25.512 ACUTE PAIN OF LEFT SHOULDER: Primary | ICD-10-CM

## 2025-04-22 DIAGNOSIS — R29.3 BAD POSTURE: ICD-10-CM

## 2025-04-22 PROCEDURE — 97112 NEUROMUSCULAR REEDUCATION: CPT

## 2025-04-22 PROCEDURE — 97110 THERAPEUTIC EXERCISES: CPT

## 2025-04-22 NOTE — PROGRESS NOTES
"  Outpatient Rehab    Physical Therapy Visit    Patient Name: Sonam Hidalgo  MRN: [id number]  YOB: 1990  Encounter Date: 4/22/2025    Therapy Diagnosis:   Encounter Diagnoses   Name Primary?    Acute pain of left shoulder Yes    Decreased ROM of left shoulder     Bad posture      Physician: Alirio Aguilar NP    Physician Orders: Eval and Treat  Medical Diagnosis: Acute pain of left shoulder    Visit # / Visits Authorized:  1 / 16  Insurance Authorization Period: 4/7/2025 to 12/31/2025  Date of Evaluation: 4/3/2025  Plan of Care Certification: 4/3/2025 to 6/3/2025      PT/PTA: PT   Number of PTA visits since last PT visit:0  Time In: 0800   Time Out: 0855  Total Time: 55   Total Billable Time: 55    FOTO:  Intake Score:  %  Survey Score 1:  %  Survey Score 2:  %         Subjective   Patient reports continues L shoulder pain with overhead movements. Patient is still having trouble lifting her 8 month old..  Pain reported as 5/10.      Objective            Treatment:    THERAPEUTIC EXERCISES to develop strength, endurance, ROM, and flexibility, including:     Pulleys (flex/abd) 3' ea   (L) Standing ball rolls outs x10 (flex/abd)   A/A wand flex/ER 3" 2x10     MANUAL THERAPY TECHNIQUES were applied to left shoulder :       NEUROMUSCULAR RE-EDUCATION ACTIVITIES to improve Coordination, Proprioception, and Posture. The following were included:    (L) Ball circles on wall 30x cw,ccw  (L)  IR/ER walkouts YTB 15x ea   Down dog on EOM 2x10 3"   Seated scapular retractions w/ bilateral ER 0# 2x10 3"   (L) shld isometrics (flex/abd) 10x10"       THERAPEUTIC ACTIVITIES to improve dynamic and functional performance, including:               Time Entry(in minutes):  Neuromuscular Re-Education Time Entry: 38  Therapeutic Exercise Time Entry: 17    Assessment & Plan   Assessment: Patient presents to PT with continued L shoulder pain. However, patient showed improvements with rom and pain since initial " dennys. PAtient tolerated progressions well today to address L shoulder pain and motor control. Patient had no adverse effects from today.  Evaluation/Treatment Tolerance: Patient tolerated treatment well    Patient will continue to benefit from skilled outpatient physical therapy to address the deficits listed in the problem list box on initial evaluation, provide pt/family education and to maximize pt's level of independence in the home and community environment.     Patient's spiritual, cultural, and educational needs considered and patient agreeable to plan of care and goals.           Plan: Cont to progress as tolerated    Goals:   Active       Functional outcome       Patient stated goal: reduce shoulder pain and improve ability to lift her baby without pain (Progressing)       Start:  04/03/25    Expected End:  06/03/25            Patient will demonstrate independence in home program for support of progression (Progressing)       Start:  04/03/25    Expected End:  06/03/25               Pain       Patient will report pain of 0/10 demonstrating a reduction of overall pain (Progressing)       Start:  04/03/25    Expected End:  06/03/25               Range of Motion       Patient will achieve left shoulder flexion of 180 degrees to improve overhead activities  (Progressing)       Start:  04/03/25    Expected End:  06/03/25            Patient will achieve left shoulder abduction of 180 degrees to improve overhead activities (Progressing)       Start:  04/03/25    Expected End:  06/03/25            Patient will achieve left shoulder external rotation of 90 degrees in 45 degrees abd to improve reaching outward (Progressing)       Start:  04/03/25    Expected End:  06/03/25            Patient will achieve left shoulder internal rotation of 90 degrees in 45 degrees abd (Progressing)       Start:  04/03/25    Expected End:  06/03/25               Strength       Patient will achieve left shoulder flexion strength of 5/5  to improve ability to lift baby (Progressing)       Start:  04/03/25    Expected End:  06/03/25                Tra Coburn PT, DPT

## 2025-04-24 ENCOUNTER — CLINICAL SUPPORT (OUTPATIENT)
Dept: REHABILITATION | Facility: HOSPITAL | Age: 35
End: 2025-04-24
Payer: COMMERCIAL

## 2025-04-24 DIAGNOSIS — R29.3 BAD POSTURE: ICD-10-CM

## 2025-04-24 DIAGNOSIS — M25.512 ACUTE PAIN OF LEFT SHOULDER: Primary | ICD-10-CM

## 2025-04-24 DIAGNOSIS — M25.612 DECREASED ROM OF LEFT SHOULDER: ICD-10-CM

## 2025-04-24 PROCEDURE — 97110 THERAPEUTIC EXERCISES: CPT | Mod: CQ

## 2025-04-24 PROCEDURE — 97112 NEUROMUSCULAR REEDUCATION: CPT | Mod: CQ

## 2025-04-24 NOTE — PROGRESS NOTES
"  Outpatient Rehab    Physical Therapy Visit    Patient Name: Sonam Hidalgo  MRN: [id number]  YOB: 1990  Encounter Date: 4/24/2025    Therapy Diagnosis:   Encounter Diagnoses   Name Primary?    Acute pain of left shoulder Yes    Decreased ROM of left shoulder     Bad posture        Physician: Alirio Aguilar NP    Physician Orders: Eval and Treat  Medical Diagnosis: Acute pain of left shoulder    Visit # / Visits Authorized:  2 / 16  Insurance Authorization Period: 4/7/2025 to 12/31/2025  Date of Evaluation: 4/3/2025  Plan of Care Certification: 4/3/2025 to 6/3/2025      PT/PTA: PTA   Number of PTA visits since last PT visit:1  Time In: 0800   Time Out: 0840  Total Time: 40   Total Billable Time: 40    FOTO:  Intake Score:  %  Survey Score 1:  %  Survey Score 2:  %         Subjective   Pt reports soreness after her last session and is feeling a little achy today..         Objective            Treatment:    THERAPEUTIC EXERCISES to develop strength, endurance, ROM, and flexibility, including:     Pulleys (flex/abd) 3' ea   (L) Standing ball rolls outs x10 (flex/abd)   A/A wand flex/ER 3" 2x10     MANUAL THERAPY TECHNIQUES were applied to left shoulder :       NEUROMUSCULAR RE-EDUCATION ACTIVITIES to improve Coordination, Proprioception, and Posture. The following were included:    (L) Ball circles on wall 30x cw,ccw  (L)  IR/ER walkouts YTB 15x ea   Down dog on EOM 2x10 3"   Seated scapular retractions w/ bilateral ER 0# 2x10 3"   (L) shld isometrics (flex/abd/ext) 10x10"       THERAPEUTIC ACTIVITIES to improve dynamic and functional performance, including:           Time Entry(in minutes):  Neuromuscular Re-Education Time Entry: 28  Therapeutic Exercise Time Entry: 12    Assessment & Plan   Assessment: Continued with established activities for improved ROM and decreased shoulder pain. Pt noted muscle burn with IR/ER walkouts.  Evaluation/Treatment Tolerance: Patient tolerated treatment " well    Patient will continue to benefit from skilled outpatient physical therapy to address the deficits listed in the problem list box on initial evaluation, provide pt/family education and to maximize pt's level of independence in the home and community environment.     Patient's spiritual, cultural, and educational needs considered and patient agreeable to plan of care and goals.           Plan: Progress towards improved ROM and shoulder stabilization. Consider rhythmic stabilizations.    Goals:   Active       Functional outcome       Patient stated goal: reduce shoulder pain and improve ability to lift her baby without pain (Progressing)       Start:  04/03/25    Expected End:  06/03/25            Patient will demonstrate independence in home program for support of progression (Progressing)       Start:  04/03/25    Expected End:  06/03/25               Pain       Patient will report pain of 0/10 demonstrating a reduction of overall pain (Progressing)       Start:  04/03/25    Expected End:  06/03/25               Range of Motion       Patient will achieve left shoulder flexion of 180 degrees to improve overhead activities  (Progressing)       Start:  04/03/25    Expected End:  06/03/25            Patient will achieve left shoulder abduction of 180 degrees to improve overhead activities (Progressing)       Start:  04/03/25    Expected End:  06/03/25            Patient will achieve left shoulder external rotation of 90 degrees in 45 degrees abd to improve reaching outward (Progressing)       Start:  04/03/25    Expected End:  06/03/25            Patient will achieve left shoulder internal rotation of 90 degrees in 45 degrees abd (Progressing)       Start:  04/03/25    Expected End:  06/03/25               Strength       Patient will achieve left shoulder flexion strength of 5/5 to improve ability to lift baby (Progressing)       Start:  04/03/25    Expected End:  06/03/25                Shawna Martinez PTA

## 2025-04-29 ENCOUNTER — CLINICAL SUPPORT (OUTPATIENT)
Dept: REHABILITATION | Facility: HOSPITAL | Age: 35
End: 2025-04-29
Payer: COMMERCIAL

## 2025-04-29 DIAGNOSIS — M25.512 ACUTE PAIN OF LEFT SHOULDER: Primary | ICD-10-CM

## 2025-04-29 DIAGNOSIS — M25.612 DECREASED ROM OF LEFT SHOULDER: ICD-10-CM

## 2025-04-29 DIAGNOSIS — R29.3 BAD POSTURE: ICD-10-CM

## 2025-04-29 PROCEDURE — 97112 NEUROMUSCULAR REEDUCATION: CPT

## 2025-04-29 PROCEDURE — 97110 THERAPEUTIC EXERCISES: CPT

## 2025-04-29 NOTE — PROGRESS NOTES
"  Outpatient Rehab    Physical Therapy Visit    Patient Name: Sonam Hidalgo  MRN: [id number]  YOB: 1990  Encounter Date: 4/29/2025    Therapy Diagnosis:   Encounter Diagnoses   Name Primary?    Acute pain of left shoulder Yes    Decreased ROM of left shoulder     Bad posture        Physician: Alirio Aguilar NP    Physician Orders: Eval and Treat  Medical Diagnosis: Acute pain of left shoulder    Visit # / Visits Authorized:  3 / 16  Insurance Authorization Period: 4/7/2025 to 12/31/2025  Date of Evaluation: 4/3/2025  Plan of Care Certification: 4/3/2025 to 6/3/2025      PT/PTA: PT   Number of PTA visits since last PT visit:0  Time In: 0800   Time Out: 0855  Total Time: 55   Total Billable Time: 55    FOTO:  Intake Score: 55%  Survey Score 1: 64%  Survey Score 2:  %         Subjective   Patient reports continued pain in L shoulder and limited overhead mobility. Patient attempted to lift her baby but caused her pain.  Pain reported as 5/10.      Objective            Treatment:    THERAPEUTIC EXERCISES to develop strength, endurance, ROM, and flexibility, including:     Pulleys (flex/abd) 3' ea   (L) Standing ball rolls outs x10 (flex/abd)   A/A wand flex/ER 3" 2x10     MANUAL THERAPY TECHNIQUES were applied to left shoulder :       NEUROMUSCULAR RE-EDUCATION ACTIVITIES to improve Coordination, Proprioception, and Posture. The following were included:    (L) Ball circles on wall 30x cw,ccw  (L)  IR/ER walkouts 15x ea   - YTB ER  - RTB IR  Down dog on EOM 2x10 3"   Wall Slides flx x15  Wall Slides abd x7   Seated scapular retractions w/ bilateral ER 0# 2x10 3"   (L) shld isometrics (flex/abd/ext) 10x10"       THERAPEUTIC ACTIVITIES to improve dynamic and functional performance, including:           Time Entry(in minutes):  Neuromuscular Re-Education Time Entry: 38  Therapeutic Exercise Time Entry: 17    Assessment & Plan   Assessment: Continued with exercises to address muscle guarding, " shoulder rom, shoulder stability and shoulder pain. Patient was progressed with wall slides and walk outs today. Patient achieved more rom in the shoulder the longer       Patient will continue to benefit from skilled outpatient physical therapy to address the deficits listed in the problem list box on initial evaluation, provide pt/family education and to maximize pt's level of independence in the home and community environment.     Patient's spiritual, cultural, and educational needs considered and patient agreeable to plan of care and goals.           Plan: Progress towards improved ROM and shoulder stabilization. Consider rhythmic stabilizations.    Goals:   Active       Functional outcome       Patient stated goal: reduce shoulder pain and improve ability to lift her baby without pain (Progressing)       Start:  04/03/25    Expected End:  06/03/25            Patient will demonstrate independence in home program for support of progression (Progressing)       Start:  04/03/25    Expected End:  06/03/25               Pain       Patient will report pain of 0/10 demonstrating a reduction of overall pain (Progressing)       Start:  04/03/25    Expected End:  06/03/25               Range of Motion       Patient will achieve left shoulder flexion of 180 degrees to improve overhead activities  (Progressing)       Start:  04/03/25    Expected End:  06/03/25            Patient will achieve left shoulder abduction of 180 degrees to improve overhead activities (Progressing)       Start:  04/03/25    Expected End:  06/03/25            Patient will achieve left shoulder external rotation of 90 degrees in 45 degrees abd to improve reaching outward (Progressing)       Start:  04/03/25    Expected End:  06/03/25            Patient will achieve left shoulder internal rotation of 90 degrees in 45 degrees abd (Progressing)       Start:  04/03/25    Expected End:  06/03/25               Strength       Patient will achieve left  shoulder flexion strength of 5/5 to improve ability to lift baby (Progressing)       Start:  04/03/25    Expected End:  06/03/25                Tra Coburn PT, DPT

## (undated) DEVICE — UNDERGLOVES BIOGEL PI SZ 7 LF

## (undated) DEVICE — JELLY SURGILUBE 5GR

## (undated) DEVICE — PACK LAPAROSCOPY BAPTIST

## (undated) DEVICE — TROCAR KII FIOS 5MM X 100MM

## (undated) DEVICE — GLOVE BIOGEL SKINSENSE PI 6.5

## (undated) DEVICE — ELECTRODE NEEDLE 1IN

## (undated) DEVICE — SOL NS 1000CC

## (undated) DEVICE — SOL 9P NACL IRR PIC IL

## (undated) DEVICE — ELECTRODE REM PLYHSV RETURN 9

## (undated) DEVICE — PAD ABD 8X10 STERILE

## (undated) DEVICE — KIT WING PAD POSITIONING

## (undated) DEVICE — PENCIL ELECTROSURG HOLST W/BLD

## (undated) DEVICE — NDL INSUF ULTRA VERESS 120MM

## (undated) DEVICE — SUT MCRYL PLUS 4-0 PS2 27IN

## (undated) DEVICE — IRRIGATOR ENDOSCOPY DISP.

## (undated) DEVICE — STRIP STERI REIN CLSR 1/2X2IN

## (undated) DEVICE — SOL CLEARIFY VISUALIZATION LAP

## (undated) DEVICE — SYR 10CC LUER LOCK

## (undated) DEVICE — TROCAR KII FIOS 11MM X 100MM

## (undated) DEVICE — POWDER ARISTA AH 3G